# Patient Record
Sex: MALE | Race: WHITE | Employment: OTHER | URBAN - METROPOLITAN AREA
[De-identification: names, ages, dates, MRNs, and addresses within clinical notes are randomized per-mention and may not be internally consistent; named-entity substitution may affect disease eponyms.]

---

## 2017-01-16 DIAGNOSIS — R73.02 GLUCOSE INTOLERANCE (IMPAIRED GLUCOSE TOLERANCE): Primary | ICD-10-CM

## 2017-01-16 NOTE — TELEPHONE ENCOUNTER
accuchek marcus plus strips        Last Written Prescription Date 12/01/2015  Last Fill Quantity: 200, # refills: 3  Last Office Visit with [facility], [facility] or  Health prescribing provider:  08/17/2016   Next 5 appointments (look out 90 days)     Feb 15, 2017  8:00 AM   Renetta Pratt with Tee Cuba MD   [facility]    [address] [place] [id number] [phone]                   BP Readings from Last 3 Encounters:   12/01/16 156/75   11/14/16 132/70   10/19/16 135/76     MICROL       18   6/1/2015  No results found for this basename: microalbumin  CREATININE   Date Value Ref Range Status   09/22/2016 0.86 0.66 - 1.25 mg/dL Final   ]  GFR ESTIMATE   Date Value Ref Range Status   09/22/2016 87 >60 mL/min/1.7m2 Final     Comment:     Non  GFR Calc   09/21/2016 >90  Non  GFR Calc   >60 mL/min/1.7m2 Final   09/21/2016 >90  Non  GFR Calc   >60 mL/min/1.7m2 Final     GFR ESTIMATE IF BLACK   Date Value Ref Range Status   09/22/2016 >90   GFR Calc   >60 mL/min/1.7m2 Final   09/21/2016 >90   GFR Calc   >60 mL/min/1.7m2 Final   09/21/2016 >90   GFR Calc   >60 mL/min/1.7m2 Final     CHOL      147   11/14/2016  HDL       74   11/14/2016  LDL       57   11/14/2016  TRIG       80   11/14/2016  CHOLHDLRATIO      2.2   6/1/2015  AST       25   9/2/2008  ALT       19   11/15/2011  A1C      6.2   11/14/2016  A1C      6.5   3/29/2016  A1C      5.8   8/3/2015  A1C      6.0   6/1/2015  A1C      6.0   2/10/2015  POTASSIUM   Date Value Ref Range Status   09/22/2016 4.2 3.4 - 5.3 mmol/L Final       Claritza Acosta  Pharmacy Technician  [facility]  Ph# [phone]  Fax# [phone]

## 2017-01-18 NOTE — TELEPHONE ENCOUNTER
Prescription approved per [facility] Refill Protocol.     Annie Anglin RN   January 18, 2017 11:43 AM  [facility]   [phone]

## 2017-02-09 DIAGNOSIS — R73.02 GLUCOSE INTOLERANCE (IMPAIRED GLUCOSE TOLERANCE): Primary | ICD-10-CM

## 2017-02-09 NOTE — TELEPHONE ENCOUNTER
Thank you  Rupali Jennings CPht    [facility]  Phone: [phone]  Fax: [phone]

## 2017-02-13 NOTE — TELEPHONE ENCOUNTER
Routing refill request to provider for review/approval because:  Drug not active on patient's medication list    Annie Anglin RN   February 13, 2017 2:04 PM  [facility] Triage   [phone]

## 2017-02-16 ENCOUNTER — DOCUMENTATION ONLY (OUTPATIENT)
Dept: VASCULAR SURGERY | Facility: CLINIC | Age: 72
End: 2017-02-16

## 2017-02-28 ENCOUNTER — OFFICE VISIT (OUTPATIENT)
Dept: FAMILY MEDICINE | Facility: CLINIC | Age: 72
End: 2017-02-28
Payer: COMMERCIAL

## 2017-02-28 VITALS
HEIGHT: 74 IN | TEMPERATURE: 98.4 F | SYSTOLIC BLOOD PRESSURE: 126 MMHG | WEIGHT: 193 LBS | DIASTOLIC BLOOD PRESSURE: 76 MMHG | HEART RATE: 60 BPM | BODY MASS INDEX: 24.77 KG/M2

## 2017-02-28 DIAGNOSIS — Z00.00 ENCOUNTER FOR ROUTINE ADULT HEALTH EXAMINATION WITHOUT ABNORMAL FINDINGS: ICD-10-CM

## 2017-02-28 DIAGNOSIS — F34.1 DYSTHYMIC DISORDER: ICD-10-CM

## 2017-02-28 DIAGNOSIS — Z13.89 SCREENING FOR DIABETIC PERIPHERAL NEUROPATHY: ICD-10-CM

## 2017-02-28 DIAGNOSIS — E78.5 HYPERLIPIDEMIA LDL GOAL <100: ICD-10-CM

## 2017-02-28 DIAGNOSIS — M25.511 RIGHT SHOULDER PAIN, UNSPECIFIED CHRONICITY: ICD-10-CM

## 2017-02-28 DIAGNOSIS — R07.89 CHEST WALL PAIN: ICD-10-CM

## 2017-02-28 DIAGNOSIS — I10 HYPERTENSION GOAL BP (BLOOD PRESSURE) < 140/90: ICD-10-CM

## 2017-02-28 DIAGNOSIS — Z13.5 SCREENING FOR DIABETIC RETINOPATHY: ICD-10-CM

## 2017-02-28 DIAGNOSIS — C49.3 SOFT TISSUE SARCOMA OF CHEST WALL (H): ICD-10-CM

## 2017-02-28 DIAGNOSIS — R73.02 GLUCOSE INTOLERANCE (IMPAIRED GLUCOSE TOLERANCE): Primary | ICD-10-CM

## 2017-02-28 LAB — HBA1C MFR BLD: 6.5 % (ref 4.3–6)

## 2017-02-28 PROCEDURE — 36415 COLL VENOUS BLD VENIPUNCTURE: CPT | Performed by: FAMILY MEDICINE

## 2017-02-28 PROCEDURE — 99214 OFFICE O/P EST MOD 30 MIN: CPT | Mod: 25 | Performed by: FAMILY MEDICINE

## 2017-02-28 PROCEDURE — 99207 C FOOT EXAM  NO CHARGE: CPT | Performed by: FAMILY MEDICINE

## 2017-02-28 PROCEDURE — 83036 HEMOGLOBIN GLYCOSYLATED A1C: CPT | Performed by: FAMILY MEDICINE

## 2017-02-28 NOTE — PROGRESS NOTES
"  SUBJECTIVE:                                                    Gerber Levine is a 72 year old male who presents to clinic today for the following health issues:    Prediabetes. Tolerating metformin well without side effects.   Lab Results   Component Value Date    A1C 6.5 02/28/2017    A1C 6.2 11/14/2016    A1C 6.5 03/29/2016    A1C 5.8 08/03/2015    A1C 6.0 06/01/2015         Sarcoma. 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 Reviewed notes from oncologist dated 12/01/2016, who recommends chest CT scan in a 6 month interval. He does note intermittent right shoulder pain with associated decreased rage of motion secondary to surgery. Also notes intermittent transient right-sided superficial numbness in the fourth intercostal space surrounding scar from surgery. More recently, it has been \"acting up\" after spending the weekend doing some house cleaning and vacuuming.     Colonoscopy. Colonoscopy performed on 5/12/2016 remarkable for sessile polyp in ascending colon however pathology records not available.  Based on finding, recommend repeat in 3-5 years.     Hyperlipidemia. Tolerating medications well without side effects.     Subjective:    PHQ-9 was 7      Past/recent records reviewed and discussed for -- medications and immunizations.      Diabetes Follow-up    Patient is checking blood sugars: once daily.  Results are as follows:         Varies between morning/evening    Diabetic concerns: None     Symptoms of hypoglycemia (low blood sugar): none     Paresthesias (numbness or burning in feet) or sores: No     Date of last diabetic eye exam: 3/28/16     Hyperlipidemia Follow-Up      Rate your low fat/cholesterol diet?: not monitoring fat    Taking statin?  Yes, no muscle aches from statin    Other lipid medications/supplements?:  none     Hypertension Follow-up      Outpatient blood pressures checks occasionally    Low Salt Diet: low salt         Amount of " "exercise or physical activity: 6-7 days/week for an average of 30-45 minutes    Problems taking medications regularly: Yes,  Forgets once in while    Medication side effects: none  Diet: diabetic      Problem list and histories reviewed & adjusted, as indicated.  Additional history: as documented    Problem list, Medication list, Allergies, and Medical/Social/Surgical histories reviewed in EPIC and updated as appropriate.    ROS:  MS: +myalgias  Constitutional, HEENT, cardiovascular, pulmonary, GI, , musculoskeletal, neuro, skin, endocrine and psych systems are negative, except as otherwise noted.    This document serves as a record of the services and decisions personally performed and made by Iggy Cuba MD. It was created on their behalf by Braeden Van, a trained medical scribe. The creation of this document is based the provider's statements to the medical scribe.  Braeden Van February 28, 2017 8:10 AM         OBJECTIVE:                                                    /76 (Cuff Size: Adult Large)  Pulse 60  Temp 98.4  F (36.9  C) (Oral)  Ht 1.88 m (6' 2\")  Wt 87.5 kg (193 lb)  BMI 24.78 kg/m2  Body mass index is 24.78 kg/(m^2).  GENERAL: healthy, alert and no distress  HENT: ear canals and TM's normal, nose and mouth without ulcers or lesions  RESP: lungs clear to auscultation - no rales, rhonchi or wheezes  CV: regular rate and rhythm, normal S1 S2, no S3 or S4, no murmur, click or rub, no peripheral edema   MS: no gross musculoskeletal defects noted, no edema -- crepitation in right AC joint, decreased range of motion with adduction but able to raise arm at 90 degrees  PSYCH: mentation appears normal, affect normal/bright  Foot exam: normal monofilament exam, no lesions  Skin non lestons    Diagnostic Test Results:  Results for orders placed or performed in visit on 02/28/17 (from the past 24 hour(s))   Hemoglobin A1c   Result Value Ref Range    Hemoglobin A1C 6.5 (H) 4.3 - 6.0 %    "     ASSESSMENT/PLAN:                                                    (R73.02) Glucose intolerance (impaired glucose tolerance)  (primary encounter diagnosis)  Comment: controlled  Plan: Hemoglobin A1c, OPHTHALMOLOGY ADULT REFERRAL,         ** Albumin Random Urine Quant FUTURE 1yr,         CANCELED: Albumin Random Urine Quantitative        Continue present medications    (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 PHQ-9 was 7  Plan: Follow up as needed    (I10) Hypertension goal BP (blood pressure) < 140/90  Comment: stable  Plan: Continue to monitor and same medications    (E78.5) Hyperlipidemia LDL goal <100  Comment: stable from previous workups  Plan: Continue present medications    (Z13.5) Screening for diabetic retinopathy  Comment: patient due for eye exam  Plan: OPHTHALMOLOGY ADULT REFERRAL            (Z13.89) Screening for diabetic peripheral neuropathy  Comment: foot exam was normal  Plan: FOOT EXAM  NO CHARGE [25092.114]            (Z00.00) Encounter for routine adult health examination without abnormal findings  Comment:   Plan:     (R07.89) Chest wall pain  Comment: see comments above  Plan: [facility] PT, HAND, AND CHIROPRACTIC REFERRAL            (M25.511) Right shoulder pain, unspecified chronicity  Comment: crepitation in right AC joint with some decreased range of motion  Plan: Schedule appointment with PT     Patient Instructions     MY DIABETES TODAY:    1)  Goal A1C is under <7.0  Mine is:      Lab Results   Component Value Date    A1C 6.5 02/28/2017       2)  Goal LDL (bad cholesterol) under 100  (measured at least yearly)- I am currently at:   Lab Results   Component Value Date    LDL 57 11/14/2016       3)  Goal blood pressure under 140/90- mine was 126/76 today    4)  Take  aspirin daily yes    5)  No tobacco use          ACTION PLAN CHANGES FROM TODAY:    Care Plan changes:    -work with physical therapist     Labs:     Lab Results   Component Value Date    A1C 6.5 02/28/2017    A1C 6.2 11/14/2016    A1C 6.5 03/29/2016    A1C 5.8 08/03/2015    A1C 6.0 06/01/2015         Follow up in 6 months      Call one number to schedule at any of the above locations: [phone].        Tee Cuba MD, MD  [facility]

## 2017-02-28 NOTE — MR AVS SNAPSHOT
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)        Dysthymic disorder        Hypertension goal BP (blood pressure) < 140/90        Hyperlipidemia LDL goal <100        Screening for diabetic retinopathy        Screening for diabetic peripheral neuropathy        Encounter for routine adult health examination without abnormal findings        Chest wall pain        Right shoulder pain, unspecified chronicity          Care Instructions    MY DIABETES TODAY:    1)  Goal A1C is under <7.0  Mine is:      Lab Results   Component Value Date    A1C 6.5 02/28/2017       2)  Goal LDL (bad cholesterol) under 100  (measured at least yearly)- I am currently at:   Lab Results   Component Value Date    LDL 57 11/14/2016       3)  Goal blood pressure under 140/90- mine was 126/76 today    4)  Take aspirin daily yes    5)  No tobacco use          ACTION PLAN CHANGES FROM TODAY:    Care Plan changes:    -work with physical therapist     Labs:     Lab Results   Component Value Date    A1C 6.5 02/28/2017    A1C 6.2 11/14/2016    A1C 6.5 03/29/2016    A1C 5.8 08/03/2015    A1C 6.0 06/01/2015         Follow up in 6 months      Call one number to schedule at any of the above locations: [phone].        Follow-ups after your visit        Additional Services     [facility] PT, HAND, AND CHIROPRACTIC REFERRAL       **This order will print in the [facility] Scheduling Office**    Physical Therapy, Hand Therapy and Chiropractic Care are available through:    *[facility]  *[facility]  *[facility]    Call one number to schedule at any of the  above locations: [phone].    Your provider has referred you to: Physical Therapy at [facility] or [facility]    Indication/Reason for Referral: chest wall pain post surgica  Onset of Illness: surgery Sept 21- right shoulder pain and chest wall pain since then  Therapy Orders: Evaluate and Treat  Special Programs: None  Special Request: None    Tanesha Almendarez      Additional Comments for the Therapist or Chiropractor: recent thoracic surgery    Please be aware that coverage of these services is subject to the terms and limitations of your health insurance plan.  Call member services at your health plan with any benefit or coverage questions.      Please bring the following to your appointment:    *Your personal calendar for scheduling future appointments  *Comfortable clothing            OPHTHALMOLOGY ADULT REFERRAL       Your provider has referred you to: [facility]    Please be aware that coverage of these services is subject to the terms and limitations of your health insurance plan.  Call member services at your health plan with any benefit or coverage questions.      Please bring the following with you to your appointment:    (1) Any X-Rays, CTs or MRIs which have been performed.  Contact the facility where they were done to arrange for  prior to your scheduled appointment.    (2) List of current medications  (3) This referral request   (4) Any documents/labs given to you for this referral                  Future tests that were ordered for you today     Open Future Orders        Priority Expected Expires Ordered    ** Albumin Random Urine Quant FUTURE 1yr Routine 1/29/2018 2/28/2018 2/28/2017            Who to contact     If you have questions or need follow up information about today's clinic visit or your schedule please contact [facility] directly at [phone].  Normal or non-critical lab and imaging results will be communicated to you by MyChart, letter or phone within  "4 business days after the clinic has received the results. If you do not hear from us within 7 days, please contact the clinic through [facility] or phone. If you have a critical or abnormal lab result, we will notify you by phone as soon as possible.  Submit refill requests through [facility] or call your pharmacy and they will forward the refill request to us. Please allow 3 business days for your refill to be completed.          Additional Information About Your Visit        [facility] Information     [facility] gives you secure access to your electronic health record. If you see a primary care provider, you can also send messages to your care team and make appointments. If you have questions, please call your primary care clinic.  If you do not have a primary care provider, please call [phone] and they will assist you.        Care EveryWhere ID     This is your Care EveryWhere ID. This could be used by other organizations to access your [facility] medical records  [id number]        Your Vitals Were     Pulse Temperature Height BMI (Body Mass Index)          60 98.4  F (36.9  C) (Oral) 6' 2\" (1.88 m) 24.78 kg/m2         Blood Pressure from Last 3 Encounters:   02/28/17 126/76   12/01/16 156/75   11/14/16 132/70    Weight from Last 3 Encounters:   02/28/17 193 lb (87.5 kg)   12/01/16 185 lb 3.2 oz (84 kg)   11/14/16 186 lb 2 oz (84.4 kg)              We Performed the Following     FOOT EXAM  NO CHARGE [09476.114]     Hemoglobin A1c     [facility] PT, HAND, AND CHIROPRACTIC REFERRAL     OPHTHALMOLOGY ADULT REFERRAL          Today's Medication Changes          These changes are accurate as of: 2/28/17  8:33 AM.  If you have any questions, ask your nurse or doctor.               These medicines have changed or have updated prescriptions.        Dose/Directions    aspirin 81 MG tablet   This may have changed:  See the new instructions.        1 tab po QD (Once per day)   Refills:  0       atorvastatin 40 MG tablet   Commonly known " as:  LIPITOR   This may have changed:    - when to take this  - additional instructions   Used for:  Glucose intolerance (impaired glucose tolerance)        Dose:  40 mg   Take 1 tablet (40 mg) by mouth daily Refill when requested   Quantity:  90 tablet   Refills:  3       fluticasone 50 MCG/ACT spray   Commonly known as:  FLONASE   This may have changed:  when to take this   Used for:  Cough, Globus pharyngeus        Dose:  2 spray   Spray 2 sprays into both nostrils daily   Quantity:  16 g   Refills:  1         Stop taking these medicines if you haven't already. Please contact your care team if you have questions.     oxyCODONE 5 MG IR tablet   Commonly known as:  ROXICODONE   Stopped by:  Tee Cuba MD                    Primary Care Provider Office Phone # Fax #    Tee Cuba [phone]       [address] [address]        Thank you!     Thank you for choosing [facility]  for your care. Our goal is always to provide you with excellent care. Hearing back from our patients is one way we can continue to improve our services. Please take a few minutes to complete the written survey that you may receive in the mail after your visit with us. Thank you!             Your Updated Medication List - Protect others around you: Learn how to safely use, store and throw away your medicines at www.disposemymeds.org.          This list is accurate as of: 2/28/17  8:33 AM.  Always use your most recent med list.                   Brand Name Dispense Instructions for use    aspirin 81 MG tablet      1 tab po QD (Once per day)       atorvastatin 40 MG tablet    LIPITOR    90 tablet    Take 1 tablet (40 mg) by mouth daily Refill when requested       B-12 PO      Take by mouth every morning Take 1/2 tablet daily       blood glucose calibration solution     3 Bottle    1 drop by In Vitro route as needed       blood glucose  monitoring lancets          blood glucose monitoring meter device kit          blood glucose monitoring test strip    ACCU-CHEK RODRI    200 each    1 strip by In Vitro route 2 times daily       CALCITRATE/VITAMIN D 315-200 MG-UNIT Tabs per tablet   Generic drug:  calcium citrate-vitamin D      One tab daily in the morning       fluticasone 50 MCG/ACT spray    FLONASE    16 g    Spray 2 sprays into both nostrils daily       losartan 25 MG tablet    COZAAR    90 tablet    Take 1 tablet (25 mg) by mouth daily       metFORMIN 500 MG tablet    GLUCOPHAGE    45 tablet    Take 0.5 tablets (250 mg) by mouth daily (with dinner) REFILL WHEN REQUESTED       * order for DME     1 each    Glucometer, brand as covered by insurance.       * order for DME     100 each    Test strips for pt's glucometer, brand as covered by insurance. Test daily and prn.       * order for DME     100 each    Lancets.  Daily and prn.       * Notice:  This list has 3 medication(s) that are the same as other medications prescribed for you. Read the directions carefully, and ask your doctor or other care provider to review them with you.

## 2017-02-28 NOTE — NURSING NOTE
"Chief Complaint   Patient presents with     Diabetes     Surgical Followup       Initial /76 (Cuff Size: Adult Large)  Pulse 60  Temp 98.4  F (36.9  C) (Oral)  Ht 6' 2\" (1.88 m)  Wt 193 lb (87.5 kg)  BMI 24.78 kg/m2 Estimated body mass index is 24.78 kg/(m^2) as calculated from the following:    Height as of this encounter: 6' 2\" (1.88 m).    Weight as of this encounter: 193 lb (87.5 kg).  Medication Reconciliation: complete   Ignacia Gonzales, Certified Medical Assistant (AAMA)     "

## 2017-02-28 NOTE — PATIENT INSTRUCTIONS
MY DIABETES TODAY:    1)  Goal A1C is under <7.0  Mine is:      Lab Results   Component Value Date    A1C 6.5 02/28/2017       2)  Goal LDL (bad cholesterol) under 100  (measured at least yearly)- I am currently at:   Lab Results   Component Value Date    LDL 57 11/14/2016       3)  Goal blood pressure under 140/90- mine was 126/76 today    4)  Take aspirin daily yes    5)  No tobacco use          ACTION PLAN CHANGES FROM TODAY:    Care Plan changes:    -work with physical therapist     Labs:     Lab Results   Component Value Date    A1C 6.5 02/28/2017    A1C 6.2 11/14/2016    A1C 6.5 03/29/2016    A1C 5.8 08/03/2015    A1C 6.0 06/01/2015         Follow up in 6 months      Call one number to schedule at any of the above locations: [phone].

## 2017-03-01 ASSESSMENT — PATIENT HEALTH QUESTIONNAIRE - PHQ9: SUM OF ALL RESPONSES TO PHQ QUESTIONS 1-9: 7

## 2017-03-06 ENCOUNTER — THERAPY VISIT (OUTPATIENT)
Dept: PHYSICAL THERAPY | Facility: CLINIC | Age: 72
End: 2017-03-06
Payer: COMMERCIAL

## 2017-03-06 DIAGNOSIS — M25.511 ACUTE PAIN OF RIGHT SHOULDER: Primary | ICD-10-CM

## 2017-03-06 PROCEDURE — 97110 THERAPEUTIC EXERCISES: CPT | Mod: GP | Performed by: PHYSICAL THERAPIST

## 2017-03-06 PROCEDURE — 97161 PT EVAL LOW COMPLEX 20 MIN: CPT | Mod: GP | Performed by: PHYSICAL THERAPIST

## 2017-03-06 NOTE — PROGRESS NOTES
[facility] Initial Evaluation -- Upper Extremity    Evaluation Date: March 6, 2017  Gerber Levine is a 72 year old male with a chest wall/(R) shoulder condition.   Referral: GP  Work mechanical stresses: NA  Employment status:  Retired  Leisure mechanical stresses: Walking 30 min daily  Functional disability score (SPADI): See SPADI  VAS score (0-10): 2-3/10  Handedness (R/L):  Right    HISTORY    Present symptoms: (R) pec region, scapula.    Pain quality (sharp/shooting/stabbing/aching/burning/cramping):  aching    Present since:  September 2016   Symptoms (improving/unchanging/worsening):  improving.  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   Condition occurred in the following environment: Post surgical status.    Symptoms at onset: As above  Paresthesia (yes/no):  Yes  Spinal history: No   Cough/Sneeze (pos/neg):  Neg    Constant symptoms: None  Intermittent symptoms: As above    (With consideration for bending, sitting, turning neck, dressing, reaching, gripping, am/as day progresses/pm, when still/on the move, sleeping prone/sup/side R/L, other)  Symptoms are worse with the following: reaching overhead, pushing   Symptoms are better with the following: heat/ice    Continued use makes the pain (better/worse/no effect): No effect  Disturbed night (yes/no):  No  Pain at rest (yes/no): No  Site (neck/shoulder/elbow/wrist/hand): NA  Other questions (swelling/catching/clicking/locking/subluxing):  Clicking in shoulder    Previous episodes:  No  Previous treatments: None    Specific Questions:  General health (excellent/good/fair/poor):  Good  Pertinent medical history includes: Cancer (stable - scans every 6 months), Diabetes and High blood pressure  Medications (nil/NSAIDS/analg/steroids/anticoag/other):  Other - High blood pressure and Metformin  Medical allergies:  Oxycodone  Imaging (NA/Xray/MRI): X-Ray (post surgical October - stable)  Recent or  "major surgery (yes/no): As above  Night pain (yes/no): No  Accidents (yes/no): No  Unexplained weight loss (yes/no): No  Barriers at home: None  Other red flags: None    Sites for physical examination (neck/shoulder/elbow/wrist/hand: Shoulder    EXAMINATION    Posture:  Sitting (good/fair/poor): Fair  Correction of posture (better/worse/no effect/NA): No Effect  Standing (good/fair/poor): Fair  Other observations:  NA    Neurological (NA/motor/sensory/reflexes/dural): NA    Baselines (pain or functional activity): Painful and limited reaching overhead    Extremities (Shoulder/Elbow/Wrist/Hand): Shoulder    Movement Loss Eduardo Mod Min Nil Pain   Flexion  X   120/ERP vs 140 L   Extension    X 65 (B)   Abduction  X   120/ERP vs 140 L   Internal Rotation   X  2\" difference   External Rotation   X  2\" difference      Passive movement (+/- overpressure):  Not assessed  Resisted Test Response (pain): Flex 4+/5, painful, Abd 4+/5, painful, ER 5/5; IR 5/5, Bicep 5/5  Other Tests: NA    Spine:  Movement loss: NA  Effect of repeated movements: NA  Effect of static positioning: NA  Spine testing (not relevant/relevant/secondary problem): Not relevant    Baseline Symptoms: ROM as above  Repeated Tests Symptom Response Mechanical Response   Active/Passive movement, resisted test, functional test During - Produce, Abolish, Increase, Decrease, NE After -   Better, Worse, NB, NW, NE Effect -   ? or ? ROM, strength or key functional test No Effect   Flexion (sliding up door w/self OP) Produce No Worse  X   Abd/ER w/self OP (Door) Produce No Worse  X   Effect of static positioning       NA           Provisional Classification: Other - Post surgical    Extremity or Spine: Extremity  Principle of Management (education/equipment/mechanical therapy/specific principle): Remodeling, restore ROM, progressive strengthening    ASSESSMENT/PLAN:    Patient is a 72 year old male with right side shoulder complaints.    Patient has the following " significant findings with corresponding treatment plan.                Diagnosis 1:  (R) Shoulder/Chest wall pain    Pain -  self management, education, directional preference exercise and home program  Decreased ROM/flexibility - manual therapy, therapeutic exercise and home program  Decreased joint mobility - manual therapy, therapeutic exercise and home program  Decreased strength - therapeutic exercise, therapeutic activities and home program  Impaired muscle performance - neuro re-education and home program  Decreased function - therapeutic activities and home program    Therapy Evaluation Codes:   1) History comprised of:   Personal factors that impact the plan of care:      None.    Comorbidity factors that impact the plan of care are:      Cancer, Diabetes and High blood pressure.     Medications impacting care: High blood pressure and Diabetes.  2) Examination of Body Systems comprised of:   Body structures and functions that impact the plan of care:      Shoulder.   Activity limitations that impact the plan of care are:      reaching, pushing/pulling.  3) Clinical presentation characteristics are:   Stable/Uncomplicated.  4) Decision-Making    Low complexity using standardized patient assessment instrument and/or measureable assessment of functional outcome.  Cumulative Therapy Evaluation is: Low complexity.    Previous and current functional limitations:  (See Goal Flow Sheet for this information)    Short term and Long term goals: (See Goal Flow Sheet for this information)     Communication ability:  Patient appears to be able to clearly communicate and understand verbal and written communication and follow directions correctly.  Treatment Explanation - The following has been discussed with the patient:   RX ordered/plan of care  Anticipated outcomes  Possible risks and side effects  This patient would benefit from PT intervention to resume normal activities.   Rehab potential is good.    Frequency:  1 X  week, once daily  Duration:  for 6 weeks  Discharge Plan:  Achieve all LTG.  Independent in home treatment program.  Reach maximal therapeutic benefit.    Please refer to the daily flowsheet for treatment today, total treatment time and time spent performing 1:1 timed codes.

## 2017-03-08 ENCOUNTER — TRANSFERRED RECORDS (OUTPATIENT)
Dept: HEALTH INFORMATION MANAGEMENT | Facility: CLINIC | Age: 72
End: 2017-03-08

## 2017-03-20 ENCOUNTER — THERAPY VISIT (OUTPATIENT)
Dept: PHYSICAL THERAPY | Facility: CLINIC | Age: 72
End: 2017-03-20
Payer: COMMERCIAL

## 2017-03-20 DIAGNOSIS — M25.511 ACUTE PAIN OF RIGHT SHOULDER: ICD-10-CM

## 2017-03-20 PROCEDURE — 97112 NEUROMUSCULAR REEDUCATION: CPT | Mod: GP | Performed by: PHYSICAL THERAPIST

## 2017-03-20 PROCEDURE — 97110 THERAPEUTIC EXERCISES: CPT | Mod: GP | Performed by: PHYSICAL THERAPIST

## 2017-03-27 ENCOUNTER — THERAPY VISIT (OUTPATIENT)
Dept: PHYSICAL THERAPY | Facility: CLINIC | Age: 72
End: 2017-03-27
Payer: COMMERCIAL

## 2017-03-27 DIAGNOSIS — M25.511 ACUTE PAIN OF RIGHT SHOULDER: ICD-10-CM

## 2017-03-27 PROCEDURE — 97110 THERAPEUTIC EXERCISES: CPT | Mod: GP | Performed by: PHYSICAL THERAPIST

## 2017-03-27 PROCEDURE — 97112 NEUROMUSCULAR REEDUCATION: CPT | Mod: GP | Performed by: PHYSICAL THERAPIST

## 2017-03-29 DIAGNOSIS — R09.A2 GLOBUS PHARYNGEUS: ICD-10-CM

## 2017-03-29 DIAGNOSIS — R05.9 COUGH: ICD-10-CM

## 2017-03-29 NOTE — TELEPHONE ENCOUNTER
Fluticasone nasal spray      Last Written Prescription Date: 08/25/2016  Last Fill Quantity: 16,  # refills: 1   Last Office Visit with [facility], [facility] or [facility] prescribing provider: 05/05/2016                                         Next 5 appointments (look out 90 days)     Jun 13, 2017  8:00 AM CDT   PHYSICAL with Tee Cuba MD   [facility]    [address] [place] [id number] [phone]                    Claritza Acosta  Pharmacy Technician  [facility]  Ph# [phone]  Fax# [phone]

## 2017-03-30 NOTE — TELEPHONE ENCOUNTER
Routing refill request to provider for review/approval because:  Patient reports taking differently than prescribed        Giuliana Frias RN - BC

## 2017-03-31 RX ORDER — FLUTICASONE PROPIONATE 50 MCG
2 SPRAY, SUSPENSION (ML) NASAL DAILY
Qty: 16 G | Refills: 1 | Status: SHIPPED | OUTPATIENT
Start: 2017-03-31 | End: 2018-01-19

## 2017-04-10 ENCOUNTER — THERAPY VISIT (OUTPATIENT)
Dept: PHYSICAL THERAPY | Facility: CLINIC | Age: 72
End: 2017-04-10
Payer: COMMERCIAL

## 2017-04-10 DIAGNOSIS — M25.511 ACUTE PAIN OF RIGHT SHOULDER: ICD-10-CM

## 2017-04-10 PROCEDURE — 97112 NEUROMUSCULAR REEDUCATION: CPT | Mod: GP | Performed by: PHYSICAL THERAPIST

## 2017-04-10 PROCEDURE — 97110 THERAPEUTIC EXERCISES: CPT | Mod: GP | Performed by: PHYSICAL THERAPIST

## 2017-05-01 ENCOUNTER — THERAPY VISIT (OUTPATIENT)
Dept: PHYSICAL THERAPY | Facility: CLINIC | Age: 72
End: 2017-05-01
Payer: COMMERCIAL

## 2017-05-01 DIAGNOSIS — M25.511 ACUTE PAIN OF RIGHT SHOULDER: ICD-10-CM

## 2017-05-01 PROCEDURE — 97110 THERAPEUTIC EXERCISES: CPT | Mod: GP | Performed by: PHYSICAL THERAPIST

## 2017-05-01 NOTE — PROGRESS NOTES
Subjective:    HPI                    Objective:    System    Physical Exam    General     ROS    Assessment/Plan:      DISCHARGE REPORT    Progress reporting period is from 3.6.17 to 5.1.17.       SUBJECTIVE  Subjective changes noted by patient:  Pt reports he is not noticing any real pain in shoulder.  Will feel a little stiffness/discomfort at end range w/reaching overhead but nothing that lingers.  Has still been doing HEP 1 x daily and performing all ADL's.    Current Pain level: 0/10.     Initial Pain level:  (2-5/10).   Changes in function:  Yes (See Goal flowsheet attached for changes in current functional level)  Adverse reaction to treatment or activity: None    OBJECTIVE  AROM:  Flex 135; Abd 130; Flex/ER WNL; Ext/IR WNL.  Strength:  Flex 5/5; Abd 5/5; ER 5/5; IR 5/5     ASSESSMENT/PLAN  Updated problem list and treatment plan: Diagnosis 1: (R) Shoulder Pain    Pain -  self management, education, directional preference exercise and home program  Decreased ROM/flexibility - manual therapy, therapeutic exercise and home program  Decreased joint mobility - therapeutic exercise and home program  Impaired muscle performance - neuro re-education and home program  STG/LTGs have been met or progress has been made towards goals:  Yes (See Goal flow sheet completed today.)  Assessment of Progress: The patient's condition is improving.  Self Management Plans:  Patient is independent in a home treatment program.  Patient is independent in self management of symptoms.  I have re-evaluated this patient and find that the nature, scope, duration and intensity of the therapy is appropriate for the medical condition of the patient.  Gerber continues to require the following intervention to meet STG and LTG's:  PT intervention is no longer required to meet STG/LTG.    Recommendations:  This patient is ready to be discharged from therapy and continue their home treatment program.    Please refer to the daily flowsheet for  treatment today, total treatment time and time spent performing 1:1 timed codes.

## 2017-05-12 DIAGNOSIS — I10 ESSENTIAL HYPERTENSION, BENIGN: ICD-10-CM

## 2017-05-12 DIAGNOSIS — R73.02 GLUCOSE INTOLERANCE (IMPAIRED GLUCOSE TOLERANCE): ICD-10-CM

## 2017-05-15 RX ORDER — LOSARTAN POTASSIUM 25 MG/1
TABLET ORAL
Qty: 90 TABLET | Refills: 0 | Status: SHIPPED | OUTPATIENT
Start: 2017-05-15 | End: 2017-06-13

## 2017-05-15 RX ORDER — ATORVASTATIN CALCIUM 40 MG/1
TABLET, FILM COATED ORAL
Qty: 90 TABLET | Refills: 0 | Status: SHIPPED | OUTPATIENT
Start: 2017-05-15 | End: 2017-06-13

## 2017-05-15 NOTE — TELEPHONE ENCOUNTER
atorvastatin (LIPITOR) 40 MG tablet   40 mg, DAILY 3 ordered  Edit     Summary: Take 1 tablet (40 mg) by mouth daily Refill when requested, Disp-90 tablet, R-3, E-Prescribe   Dose, Route, Frequency: 40 mg, Oral, DAILY  Start: 4/1/2016  Ord/Sold: 4/1/2016 (O)  Report  Taking:   Long-term:   Pharmacy: [facility]  [address]  Med Dose History       Patient Sig: Take 1 tablet (40 mg) by mouth daily Refill when requested       Ordered on: 4/1/2016       Authorized by: MICHAEL MATAMOROS       Dispense: 90 tablet       Admin Instructions: Refill when requested       Patient taking differently: 40 mg Oral EVERY MORNING, Refill when requested, Informant: Self, Reported on 9/14/2016          Last Office Visit with [facility], [facility] or [facility] prescribing provider: 2-  Next 5 appointments (look out 90 days)     Jun 13, 2017  8:00 AM CDT   PHYSICAL with Michael Matamoros MD   [facility] ([address] [id number] [phone]                   Lab Results   Component Value Date    CHOL 147 11/14/2016     Lab Results   Component Value Date    HDL 74 11/14/2016     Lab Results   Component Value Date    LDL 57 11/14/2016     Lab Results   Component Value Date    TRIG 80 11/14/2016     Lab Results   Component Value Date    CHOLHDLRATIO 2.2 06/01/2015         losartan (COZAAR) 25 MG tablet   25 mg, DAILY 1 ordered  Edit     Summary: Take 1 tablet (25 mg) by mouth daily, Disp-90 tablet, R-1, E-Prescribe   Dose, Route, Frequency: 25 mg, Oral, DAILY  Start: 10/18/2016  Ord/Sold: 10/18/2016 (O)  Report  Taking:   Long-term:   Pharmacy: [facility]  [address]  Med Dose History       Patient Sig: Take 1 tablet (25 mg) by mouth daily       Ordered on: 10/18/2016       Authorized by: MICHAEL MATAMOROS       Dispense: 90 tablet          Last Office Visit with [facility], [facility] or  [facility] prescribing provider: 2-  Next 5 appointments (look out 90 days)     Jun 13, 2017  8:00 AM CDT   PHYSICAL with Tee Cuba MD   [facility]    [address] [place] [id number] [phone]                   Potassium   Date Value Ref Range Status   09/22/2016 4.2 3.4 - 5.3 mmol/L Final     Creatinine   Date Value Ref Range Status   09/22/2016 0.86 0.66 - 1.25 mg/dL Final     BP Readings from Last 3 Encounters:   02/28/17 126/76   12/01/16 156/75   11/14/16 132/70

## 2017-05-15 NOTE — TELEPHONE ENCOUNTER
Prescription approved per [facility] Refill Protocol.     Annie Anglin RN

## 2017-06-13 ENCOUNTER — OFFICE VISIT (OUTPATIENT)
Dept: FAMILY MEDICINE | Facility: CLINIC | Age: 72
End: 2017-06-13
Payer: COMMERCIAL

## 2017-06-13 ENCOUNTER — TELEPHONE (OUTPATIENT)
Dept: FAMILY MEDICINE | Facility: CLINIC | Age: 72
End: 2017-06-13

## 2017-06-13 ENCOUNTER — MYC MEDICAL ADVICE (OUTPATIENT)
Dept: FAMILY MEDICINE | Facility: CLINIC | Age: 72
End: 2017-06-13

## 2017-06-13 VITALS
SYSTOLIC BLOOD PRESSURE: 128 MMHG | WEIGHT: 192 LBS | BODY MASS INDEX: 24.64 KG/M2 | HEIGHT: 74 IN | HEART RATE: 76 BPM | TEMPERATURE: 98 F | DIASTOLIC BLOOD PRESSURE: 70 MMHG

## 2017-06-13 DIAGNOSIS — C49.3 SOFT TISSUE SARCOMA OF CHEST WALL (H): ICD-10-CM

## 2017-06-13 DIAGNOSIS — R52 PAIN IN SURGICAL SCAR: Primary | ICD-10-CM

## 2017-06-13 DIAGNOSIS — E78.5 HYPERLIPIDEMIA LDL GOAL <100: ICD-10-CM

## 2017-06-13 DIAGNOSIS — I10 ESSENTIAL HYPERTENSION, BENIGN: ICD-10-CM

## 2017-06-13 DIAGNOSIS — Z00.00 ROUTINE HISTORY AND PHYSICAL EXAMINATION OF ADULT: Primary | ICD-10-CM

## 2017-06-13 DIAGNOSIS — R07.89 CHEST WALL PAIN: ICD-10-CM

## 2017-06-13 DIAGNOSIS — G89.18 POSTOPERATIVE PAIN: ICD-10-CM

## 2017-06-13 DIAGNOSIS — L90.5 PAIN IN SURGICAL SCAR: Primary | ICD-10-CM

## 2017-06-13 DIAGNOSIS — R73.02 GLUCOSE INTOLERANCE (IMPAIRED GLUCOSE TOLERANCE): ICD-10-CM

## 2017-06-13 LAB — HBA1C MFR BLD: 6.2 % (ref 4.3–6)

## 2017-06-13 PROCEDURE — 99213 OFFICE O/P EST LOW 20 MIN: CPT | Mod: 25 | Performed by: FAMILY MEDICINE

## 2017-06-13 PROCEDURE — 83036 HEMOGLOBIN GLYCOSYLATED A1C: CPT | Performed by: FAMILY MEDICINE

## 2017-06-13 PROCEDURE — 80061 LIPID PANEL: CPT | Performed by: FAMILY MEDICINE

## 2017-06-13 PROCEDURE — 36415 COLL VENOUS BLD VENIPUNCTURE: CPT | Performed by: FAMILY MEDICINE

## 2017-06-13 PROCEDURE — 80048 BASIC METABOLIC PNL TOTAL CA: CPT | Performed by: FAMILY MEDICINE

## 2017-06-13 PROCEDURE — 99397 PER PM REEVAL EST PAT 65+ YR: CPT | Performed by: FAMILY MEDICINE

## 2017-06-13 RX ORDER — LOSARTAN POTASSIUM 25 MG/1
25 TABLET ORAL DAILY
Qty: 90 TABLET | Refills: 3 | Status: SHIPPED | OUTPATIENT
Start: 2017-06-13 | End: 2018-06-06

## 2017-06-13 RX ORDER — LIDOCAINE 50 MG/G
OINTMENT TOPICAL PRN
Qty: 50 G | Refills: 3 | Status: SHIPPED | OUTPATIENT
Start: 2017-06-13 | End: 2018-06-06

## 2017-06-13 RX ORDER — ATORVASTATIN CALCIUM 40 MG/1
40 TABLET, FILM COATED ORAL DAILY
Qty: 90 TABLET | Refills: 3 | Status: SHIPPED | OUTPATIENT
Start: 2017-06-13 | End: 2018-06-06

## 2017-06-13 NOTE — TELEPHONE ENCOUNTER
Ok to change to 5 % if covered otherwise review options for him.     Orders Placed This Encounter     lidocaine (XYLOCAINE) 5 % ointment     Sig: Apply topically as needed for moderate pain     Dispense:  50 g     Refill:  3

## 2017-06-13 NOTE — NURSING NOTE
"Chief Complaint   Patient presents with     Physical       Initial /70 (BP Location: Right arm, Cuff Size: Adult Regular)  Pulse 76  Temp 98  F (36.7  C) (Oral)  Ht 6' 2\" (1.88 m)  Wt 192 lb (87.1 kg)  BMI 24.65 kg/m2 Estimated body mass index is 24.65 kg/(m^2) as calculated from the following:    Height as of this encounter: 6' 2\" (1.88 m).    Weight as of this encounter: 192 lb (87.1 kg).  Medication Reconciliation: complete     Adriana Cunha MA     "

## 2017-06-13 NOTE — TELEPHONE ENCOUNTER
You sent Rx for Lidocaine gel 4% -- That is OTC and I don't have in stock.  I do have aspercreme with 4% lidocaine or salonpas lidocaine 4% patch.  Do you want us to switch to one of those items or change to 5% which is prescription?    Thank you  Rupali Jennings CPht    [facility]  Phone: [phone]  Fax: [phone]

## 2017-06-13 NOTE — PROGRESS NOTES
"  SUBJECTIVE:                                                            Gerber Levine is a 72 year old male who presents for Preventive Visit.    Are you in the first 12 months of your Medicare Part B coverage?  No    Healthy Habits:    Do you get at least three servings of calcium containing foods daily (dairy, green leafy vegetables, etc.)? yes    Amount of exercise or daily activities, outside of work: 7 day(s) per week    Problems taking medications regularly No    Medication side effects: No    Have you had an eye exam in the past two years? yes    Do you see a dentist twice per year? yes    Do you have sleep apnea, excessive snoring or daytime drowsiness? Yes, mild sleep apnea, does not use a CPAP machine    COGNITIVE SCREEN  1) Repeat 3 items (Banana, Sunrise, Chair)    2) Clock draw: NORMAL  3) 3 item recall: Recalls 2 objects   Results: NORMAL clock, 1-2 items recalled: COGNITIVE IMPAIRMENT LESS LIKELY    Mini-CogTM Copyright S [employer]. Licensed by the author for use in [facility]; reprinted with permission (gisele@[facility]). All rights reserved.      Diabetic eye examination. Reviewed transfer records. Exam was normal.     Right shoulder pain. 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 He notes intermittent right shoulder/scapula \"aching or muscle pull\" non-radiating pain sequential to surgery, specially in area surrounding scar from surgery. Discomfort worsens when he puts pressure on it and with reaching overhead. Also notes that area feels numb. Denies right upper extremity weakness or paresthesias. He has been doing PT but this doesn't seem to help.     Needs Surveillance CT for sarcoma- recommended by oncology    Past/recent records reviewed and discussed for -- medications.      Reviewed and updated as needed this visit by clinical staff         Reviewed and updated as needed this visit by Provider        Social History   Substance Use " Topics     Smoking status: Never Smoker     Smokeless tobacco: Never Used     Alcohol use Yes      Comment: Occasionaly       The patient does not drink >3 drinks per day nor >7 drinks per week.    Today's PHQ-2 Score: 0  PHQ-2 ( 1999 Pf[employer]) 5/31/2015 5/13/2014   Q1: Little interest or pleasure in doing things Not at all Not at all   Q2: Feeling down, depressed or hopeless Not at all Not at all   PHQ-2 Score 0 0       Do you feel safe in your environment - Yes    Do you have a Health Care Directive?: Yes: Patient states has Advance Directive and will bring in a copy to clinic.    Current providers sharing in care for this patient include:   Patient Care Team:  Tee Cuba MD as PCP - Cheryl Aldridge APRN CNS as Clinical Nurse Specialist (Oncology)      Hearing impairment: Yes, patient wears hearing aids    Ability to successfully perform activities of daily living: Yes, no assistance needed     Fall risk:  Fallen 2 or more times in the past year?: No  Any fall with injury in the past year?: No    Home safety:  lack of grab bars in the bathroom      The following health maintenance items are reviewed in Epic and correct as of today:  Health Maintenance   Topic Date Due     MICROALBUMIN Q1 YEAR  06/01/2016     TSH W/ FREE T4 REFLEX Q2 YEAR  06/01/2017     FALL RISK ASSESSMENT  06/01/2017     A1C Q6 MO  08/28/2017     INFLUENZA VACCINE (SYSTEM ASSIGNED)  09/01/2017     CREATININE Q1 YEAR  09/22/2017     LIPID MONITORING Q1 YEAR  11/14/2017     FOOT EXAM Q1 YEAR  02/28/2018     PHQ-9 Q1YR  02/28/2018     EYE EXAM Q1 YEAR  03/08/2018     ADVANCE DIRECTIVE PLANNING Q5 YRS  02/03/2020     TETANUS Q10 YR  03/29/2026     COLONOSCOPY Q10 YR  05/12/2026     PNEUMOCOCCAL  Completed     AORTIC ANEURYSM SCREENING (SYSTEM ASSIGNED)  Completed            Problem list, Medication list, Allergies, and Medical/Social/Surgical histories reviewed in EPIC and updated as  "appropriate.    ROS:  Constitutional, HEENT, cardiovascular, pulmonary, GI, , musculoskeletal, neuro, skin, endocrine and psych systems are negative, except as otherwise noted.    This document serves as a record of the services and decisions personally performed and made by Iggy Cuba MD. It was created on their behalf by Braeden Van, a trained medical scribe. The creation of this document is based the provider's statements to the medical scribe.  Braeden Van June 13, 2017 8:07 AM         OBJECTIVE:                                                            /70 (BP Location: Right arm, Cuff Size: Adult Regular)  Pulse 76  Temp 98  F (36.7  C) (Oral)  Ht 1.88 m (6' 2\")  Wt 87.1 kg (192 lb)  BMI 24.65 kg/m2 Estimated body mass index is 24.78 kg/(m^2) as calculated from the following:    Height as of 2/28/17: 6' 2\" (1.88 m).    Weight as of 2/28/17: 193 lb (87.5 kg).  EXAM:   GENERAL: healthy, alert and no distress  EYES: Eyes grossly normal to inspection, PERRL and conjunctivae and sclerae normal  HENT: ear canals and TM's normal, nose and mouth without ulcers or lesions  NECK: no adenopathy, no asymmetry, masses, or scars and thyroid normal to palpation  RESP: lungs clear to auscultation - no rales, rhonchi or wheezes  CV: regular rate and rhythm, normal S1 S2, no S3 or S4, no murmur, click or rub, no peripheral edema and peripheral pulses strong  ABDOMEN: soft, nontender, no hepatosplenomegaly, no masses and bowel sounds normal  MS: no gross musculoskeletal defects noted, no edema -- mild tenderness around the incision, incision is otherwise well healed without signs of infection   SKIN: no suspicious lesions or rashes  NEURO: Normal strength and tone, mentation intact and speech normal  PSYCH: mentation appears normal, affect normal/bright  LYMPH: no cervical, supraclavicular, axillary, or inguinal adenopathy    ASSESSMENT / PLAN:                                                            (Z00.00) " Routine history and physical examination of adult  (primary encounter diagnosis)  Comment: patient doing well; see comments below  Plan: see below    Sarcoma of chest wall  Q 6 juan surveillance recommended by oncology  DT ordered      (R73.02) Glucose intolerance (impaired glucose tolerance)  Comment: borderline elevated A1C in the past, we'll recheck today. Blood glucose has been somewhat elevated, per patient.    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 No focal neurological deficits or myalgias noted otherwise.   Plan: Lidocaine HCl 5 % GEL        -consider nerve block if not improved    (G89.18) Postoperative pain  Comment: see above  Plan: Lidocaine HCl 5 % GEL        See above  4% gel not covered    Patient Instructions       Preventive Health Recommendations:       Male Ages 65 and over    Yearly exam:             See your health care provider every year in order to  o   Review health changes.   o   Discuss preventive care.    o   Review your medicines if your doctor has prescribed any.    Talk with your health care provider about whether you should have a test to screen for prostate cancer (PSA).    Every 3 years, have a diabetes test (fasting glucose). If you are at risk for diabetes, you should have this test more often.    Every 5 years, have a cholesterol test. Have this test more often if you are at risk for high cholesterol or heart disease.     Every 10 years, have a colonoscopy. Or, have a yearly FIT test (stool test). These exams will check  for colon cancer.    Talk to with your health care provider about screening for Abdominal Aortic Aneurysm if you have a family history of AAA or have a history of smoking.  Shots:     Get a flu shot each year.     Get a tetanus shot every 10 years.     Talk to your doctor about your pneumonia vaccines. There are now two you should receive - Pneumovax (PPSV 23) and Prevnar (PCV 13).    Talk to your doctor about a shingles vaccine.     Talk to your doctor about the hepatitis B vaccine.  Nutrition:     Eat at least 5 servings of fruits and vegetables each day.     Eat whole-grain bread, whole-wheat pasta and brown rice instead of white grains and rice.     Talk to your doctor about Calcium and Vitamin D.   Lifestyle    Exercise for at least 150 minutes a week (30 minutes a day, 5 days a week). This will help you control your weight and prevent disease.     Limit alcohol to one drink per day.     No smoking.     Wear sunscreen to prevent skin cancer.     See your dentist every six months for an exam and cleaning.     See your eye doctor every 1 to 2 years to screen for conditions such as glaucoma, macular degeneration and cataracts.    Orders Placed This Encounter     Hemoglobin A1c     Last Lab Result: Hemoglobin A1C (%)       Date                     Value                 02/28/2017               6.5 (H)          ----------     Basic metabolic panel     Lipid Profile with reflex to direct LDL     Last Lab Result: LDL Cholesterol Calculated (mg/dL)       Date                     Value                 11/14/2016               57               ----------     Order Specific Question:   Perform labs while fasting or non-fasting?     Answer:   Fasting     losartan (COZAAR) 25 MG tablet     Sig: Take 1 tablet (25 mg) by mouth daily     Dispense:  90 tablet     Refill:  3     atorvastatin (LIPITOR) 40 MG tablet     Sig: Take 1 tablet (40 mg) by mouth daily     Dispense:  90 tablet     Refill:  3     Lidocaine HCl 4 % GEL      "Sig: Externally apply 1 Application topically 2 times daily     Dispense:  120 mL     Refill:  3         End of Life Planning:  Patient currently has an advanced directive: not discussed     COUNSELING:  Reviewed preventive health counseling, as reflected in patient instructions       Regular exercise       Healthy diet/nutrition       Colon cancer screening        Estimated body mass index is 24.78 kg/(m^2) as calculated from the following:    Height as of 2/28/17: 6' 2\" (1.88 m).    Weight as of 2/28/17: 193 lb (87.5 kg).     reports that he has never smoked. He has never used smokeless tobacco.      Appropriate preventive services were discussed with this patient, including applicable screening as appropriate for cardiovascular disease, diabetes, osteopenia/osteoporosis, and glaucoma.  As appropriate for age/gender, discussed screening for colorectal cancer, prostate cancer, breast cancer, and cervical cancer. Checklist reviewing preventive services available has been given to the patient.    Reviewed patients plan of care and provided an AVS. The Basic Care Plan (routine screening as documented in Health Maintenance) for Gerber meets the Care Plan requirement. This Care Plan has been established and reviewed with the Patient.    Counseling Resources:  ATP IV Guidelines  Pooled Cohorts Equation Calculator  Breast Cancer Risk Calculator  FRAX Risk Assessment  ICSI Preventive Guidelines  Dietary Guidelines for Americans, 2010  USDA's MyPlate  ASA Prophylaxis  Lung CA Screening  The information in this document, created by the medical scribe for me, accurately reflects the services I personally performed and the decisions made by me. I have reviewed and approved this document for accuracy prior to leaving the patient care area.    Tee Cuba MD, MD  [facility]  "

## 2017-06-13 NOTE — TELEPHONE ENCOUNTER
Prior authorization required for : Lidocaine 5% Ointment  Insurance plan: [employer] Part D  Patient Id: [id number]  [facility] Desk Number: [phone]    Please fax over an alternative medication to the pharmacy or if you are going to pursue a prior authorization please contact the pharmacy and patient when approved. Thanks!    Thank you  Rupali Jennings CPht    [facility] [place]  Phone: [phone]  Fax: [phone]

## 2017-06-13 NOTE — PATIENT INSTRUCTIONS
Preventive Health Recommendations:       Male Ages 65 and over    Yearly exam:             See your health care provider every year in order to  o   Review health changes.   o   Discuss preventive care.    o   Review your medicines if your doctor has prescribed any.    Talk with your health care provider about whether you should have a test to screen for prostate cancer (PSA).    Every 3 years, have a diabetes test (fasting glucose). If you are at risk for diabetes, you should have this test more often.    Every 5 years, have a cholesterol test. Have this test more often if you are at risk for high cholesterol or heart disease.     Every 10 years, have a colonoscopy. Or, have a yearly FIT test (stool test). These exams will check for colon cancer.    Talk to with your health care provider about screening for Abdominal Aortic Aneurysm if you have a family history of AAA or have a history of smoking.  Shots:     Get a flu shot each year.     Get a tetanus shot every 10 years.     Talk to your doctor about your pneumonia vaccines. There are now two you should receive - Pneumovax (PPSV 23) and Prevnar (PCV 13).    Talk to your doctor about a shingles vaccine.     Talk to your doctor about the hepatitis B vaccine.  Nutrition:     Eat at least 5 servings of fruits and vegetables each day.     Eat whole-grain bread, whole-wheat pasta and brown rice instead of white grains and rice.     Talk to your doctor about Calcium and Vitamin D.   Lifestyle    Exercise for at least 150 minutes a week (30 minutes a day, 5 days a week). This will help you control your weight and prevent disease.     Limit alcohol to one drink per day.     No smoking.     Wear sunscreen to prevent skin cancer.     See your dentist every six months for an exam and cleaning.     See your eye doctor every 1 to 2 years to screen for conditions such as glaucoma, macular degeneration and cataracts.    Orders Placed This Encounter     Hemoglobin A1c     Last  Lab Result: Hemoglobin A1C (%)       Date                     Value                 02/28/2017               6.5 (H)          ----------     Basic metabolic panel     Lipid Profile with reflex to direct LDL     Last Lab Result: LDL Cholesterol Calculated (mg/dL)       Date                     Value                 11/14/2016               57               ----------     Order Specific Question:   Perform labs while fasting or non-fasting?     Answer:   Fasting     losartan (COZAAR) 25 MG tablet     Sig: Take 1 tablet (25 mg) by mouth daily     Dispense:  90 tablet     Refill:  3     atorvastatin (LIPITOR) 40 MG tablet     Sig: Take 1 tablet (40 mg) by mouth daily     Dispense:  90 tablet     Refill:  3     Lidocaine HCl 4 % GEL     Sig: Externally apply 1 Application topically 2 times daily     Dispense:  120 mL     Refill:  3

## 2017-06-13 NOTE — MR AVS SNAPSHOT
After Visit Summary   6/13/2017    Gerber Levine    MRN: [id number]           Patient Information     Date Of Birth          1945        Visit Information        Provider Department      6/13/2017 8:00 AM Tee Cuba MD [facility]        Today's Diagnoses     Routine history and physical examination of adult    -  1    Glucose intolerance (impaired glucose tolerance)        Essential hypertension, benign        Chest wall pain        Postoperative pain        Hyperlipidemia LDL goal <100          Care Instructions      Preventive Health Recommendations:       Male Ages 65 and over    Yearly exam:             See your health care provider every year in order to  o   Review health changes.   o   Discuss preventive care.    o   Review your medicines if your doctor has prescribed any.    Talk with your health care provider about whether you should have a test to screen for prostate cancer (PSA).    Every 3 years, have a diabetes test (fasting glucose). If you are at risk for diabetes, you should have this test more often.    Every 5 years, have a cholesterol test. Have this test more often if you are at risk for high cholesterol or heart disease.     Every 10 years, have a colonoscopy. Or, have a yearly FIT test (stool test). These exams will check for colon cancer.    Talk to with your health care provider about screening for Abdominal Aortic Aneurysm if you have a family history of AAA or have a history of smoking.  Shots:     Get a flu shot each year.     Get a tetanus shot every 10 years.     Talk to your doctor about your pneumonia vaccines. There are now two you should receive - Pneumovax (PPSV 23) and Prevnar (PCV 13).    Talk to your doctor about a shingles vaccine.     Talk to your doctor about the hepatitis B vaccine.  Nutrition:     Eat at least 5 servings of fruits and vegetables each day.     Eat whole-grain bread, whole-wheat pasta and brown rice instead  of white grains and rice.     Talk to your doctor about Calcium and Vitamin D.   Lifestyle    Exercise for at least 150 minutes a week (30 minutes a day, 5 days a week). This will help you control your weight and prevent disease.     Limit alcohol to one drink per day.     No smoking.     Wear sunscreen to prevent skin cancer.     See your dentist every six months for an exam and cleaning.     See your eye doctor every 1 to 2 years to screen for conditions such as glaucoma, macular degeneration and cataracts.    Orders Placed This Encounter     Hemoglobin A1c     Last Lab Result: Hemoglobin A1C (%)       Date                     Value                 02/28/2017               6.5 (H)          ----------     Basic metabolic panel     Lipid Profile with reflex to direct LDL     Last Lab Result: LDL Cholesterol Calculated (mg/dL)       Date                     Value                 11/14/2016               57               ----------     Order Specific Question:   Perform labs while fasting or non-fasting?     Answer:   Fasting     losartan (COZAAR) 25 MG tablet     Sig: Take 1 tablet (25 mg) by mouth daily     Dispense:  90 tablet     Refill:  3     atorvastatin (LIPITOR) 40 MG tablet     Sig: Take 1 tablet (40 mg) by mouth daily     Dispense:  90 tablet     Refill:  3     Lidocaine HCl 4 % GEL     Sig: Externally apply 1 Application topically 2 times daily     Dispense:  120 mL     Refill:  3               Follow-ups after your visit        Who to contact     If you have questions or need follow up information about today's clinic visit or your schedule please contact [facility] directly at [phone].  Normal or non-critical lab and imaging results will be communicated to you by MyChart, letter or phone within 4 business days after the clinic has received the results. If you do not hear from us within 7 days, please contact the clinic through MyChart or phone. If you have a critical or abnormal  "lab result, we will notify you by phone as soon as possible.  Submit refill requests through [employer] or call your pharmacy and they will forward the refill request to us. Please allow 3 business days for your refill to be completed.          Additional Information About Your Visit        [employer] Information     [employer] gives you secure access to your electronic health record. If you see a primary care provider, you can also send messages to your care team and make appointments. If you have questions, please call your primary care clinic.  If you do not have a primary care provider, please call [phone] and they will assist you.        Care EveryWhere ID     This is your Care EveryWhere ID. This could be used by other organizations to access your [facility] medical records  [id number]        Your Vitals Were     Pulse Temperature Height BMI (Body Mass Index)          76 98  F (36.7  C) (Oral) 6' 2\" (1.88 m) 24.65 kg/m2         Blood Pressure from Last 3 Encounters:   06/13/17 128/70   02/28/17 126/76   12/01/16 156/75    Weight from Last 3 Encounters:   06/13/17 192 lb (87.1 kg)   02/28/17 193 lb (87.5 kg)   12/01/16 185 lb 3.2 oz (84 kg)              We Performed the Following     Basic metabolic panel     Hemoglobin A1c     Lipid Profile with reflex to direct LDL          Today's Medication Changes          These changes are accurate as of: 6/13/17  8:45 AM.  If you have any questions, ask your nurse or doctor.               Start taking these medicines.        Dose/Directions    Lidocaine HCl 4 % Gel   Used for:  Postoperative pain, Chest wall pain   Started by:  Tee Cuba MD        Dose:  1 Application   Externally apply 1 Application topically 2 times daily   Quantity:  120 mL   Refills:  3         These medicines have changed or have updated prescriptions.        Dose/Directions    aspirin 81 MG tablet   This may have changed:  See the new instructions.        1 tab po QD (Once per day) "   Refills:  0       atorvastatin 40 MG tablet   Commonly known as:  LIPITOR   This may have changed:  See the new instructions.   Used for:  Glucose intolerance (impaired glucose tolerance)   Changed by:  Tee Cuba MD        Dose:  40 mg   Take 1 tablet (40 mg) by mouth daily   Quantity:  90 tablet   Refills:  3       losartan 25 MG tablet   Commonly known as:  COZAAR   This may have changed:  See the new instructions.   Used for:  Essential hypertension, benign   Changed by:  Tee Cuba MD        Dose:  25 mg   Take 1 tablet (25 mg) by mouth daily   Quantity:  90 tablet   Refills:  3            Where to get your medicines      These medications were sent to [facility]  [place] - [address]  [address], [place] [address]     Phone:  [phone]     atorvastatin 40 MG tablet    Lidocaine HCl 4 % Gel    losartan 25 MG tablet                Primary Care Provider Office Phone # Fax #    Tee Cuba [phone]       [address] [address]        Thank you!     Thank you for choosing [facility]  for your care. Our goal is always to provide you with excellent care. Hearing back from our patients is one way we can continue to improve our services. Please take a few minutes to complete the written survey that you may receive in the mail after your visit with us. Thank you!             Your Updated Medication List - Protect others around you: Learn how to safely use, store and throw away your medicines at www.disposemymeds.org.          This list is accurate as of: 6/13/17  8:45 AM.  Always use your most recent med list.                   Brand Name Dispense Instructions for use    aspirin 81 MG tablet      1 tab po QD (Once per day)       atorvastatin 40 MG tablet    LIPITOR    90 tablet    Take 1 tablet (40 mg) by mouth daily       B-12 PO      Take  by mouth every morning Take 1/2 tablet daily       blood glucose calibration solution     3 Bottle    1 drop by In Vitro route as needed       blood glucose monitoring lancets          blood glucose monitoring meter device kit          blood glucose monitoring test strip    ACCU-CHEK RODRI    200 each    1 strip by In Vitro route 2 times daily       CALCITRATE/VITAMIN D 315-200 MG-UNIT Tabs per tablet   Generic drug:  calcium citrate-vitamin D      One tab daily in the morning       fluticasone 50 MCG/ACT spray    FLONASE    16 g    Spray 2 sprays into both nostrils daily       Lidocaine HCl 4 % Gel     120 mL    Externally apply 1 Application topically 2 times daily       losartan 25 MG tablet    COZAAR    90 tablet    Take 1 tablet (25 mg) by mouth daily       metFORMIN 500 MG tablet    GLUCOPHAGE    45 tablet    Take 0.5 tablets (250 mg) by mouth daily (with dinner) REFILL WHEN REQUESTED       * order for DME     1 each    Glucometer, brand as covered by insurance.       * order for DME     100 each    Test strips for pt's glucometer, brand as covered by insurance. Test daily and prn.       * order for DME     100 each    Lancets.  Daily and prn.       * Notice:  This list has 3 medication(s) that are the same as other medications prescribed for you. Read the directions carefully, and ask your doctor or other care provider to review them with you.

## 2017-06-14 ENCOUNTER — MYC MEDICAL ADVICE (OUTPATIENT)
Dept: FAMILY MEDICINE | Facility: CLINIC | Age: 72
End: 2017-06-14

## 2017-06-14 DIAGNOSIS — N28.1 RENAL CYST: Primary | ICD-10-CM

## 2017-06-14 LAB
ANION GAP SERPL CALCULATED.3IONS-SCNC: 10 MMOL/L (ref 3–14)
BUN SERPL-MCNC: 27 MG/DL (ref 7–30)
CALCIUM SERPL-MCNC: 9.4 MG/DL (ref 8.5–10.1)
CHLORIDE SERPL-SCNC: 108 MMOL/L (ref 94–109)
CHOLEST SERPL-MCNC: 137 MG/DL
CO2 SERPL-SCNC: 25 MMOL/L (ref 20–32)
CREAT SERPL-MCNC: 0.84 MG/DL (ref 0.66–1.25)
GFR SERPL CREATININE-BSD FRML MDRD: 90 ML/MIN/1.7M2
GLUCOSE SERPL-MCNC: 121 MG/DL (ref 70–99)
HDLC SERPL-MCNC: 67 MG/DL
LDLC SERPL CALC-MCNC: 54 MG/DL
NONHDLC SERPL-MCNC: 70 MG/DL
POTASSIUM SERPL-SCNC: 4.4 MMOL/L (ref 3.4–5.3)
SODIUM SERPL-SCNC: 143 MMOL/L (ref 133–144)
TRIGL SERPL-MCNC: 81 MG/DL

## 2017-07-06 ENCOUNTER — MYC MEDICAL ADVICE (OUTPATIENT)
Dept: FAMILY MEDICINE | Facility: CLINIC | Age: 72
End: 2017-07-06

## 2017-07-06 ENCOUNTER — TELEPHONE (OUTPATIENT)
Dept: FAMILY MEDICINE | Facility: CLINIC | Age: 72
End: 2017-07-06

## 2017-07-06 NOTE — TELEPHONE ENCOUNTER
Dr Cuba- message for you from Mr Levine.   Francine Levine RN  Triage  [facility]

## 2017-07-06 NOTE — TELEPHONE ENCOUNTER
Called and left message. I will call again. No new concerns on chest CT     Cyst noted on kidney so may need to do ultrasound.     I will call again. Order for US has been placed.     Iggy Cuba MD

## 2017-07-10 ENCOUNTER — RADIANT APPOINTMENT (OUTPATIENT)
Dept: ULTRASOUND IMAGING | Facility: CLINIC | Age: 72
End: 2017-07-10
Attending: FAMILY MEDICINE
Payer: COMMERCIAL

## 2017-07-10 DIAGNOSIS — N28.1 RENAL CYST: ICD-10-CM

## 2017-07-10 PROCEDURE — 76770 US EXAM ABDO BACK WALL COMP: CPT

## 2017-07-27 DIAGNOSIS — R73.02 GLUCOSE INTOLERANCE (IMPAIRED GLUCOSE TOLERANCE): ICD-10-CM

## 2017-07-27 NOTE — TELEPHONE ENCOUNTER
blood glucose calibration (ACCU-CHEK RODRI) solution   1 drop, PRN 3 ordered  Edit     Summary: 1 drop by In Vitro route as needed, Disp-3 Bottle, R-3, E-Prescribe   Dose, Route, Frequency: 1 drop, In Vitro, PRN  Start: 2016  Ord/Sold: 2016 (O)  Report  Taking:   Long-term:   [facility] Dose History       Patient Si drop by In Vitro route as needed       Ordered on: 2016       Authorized by: MICHAEL MATAMOROS       Dispense: 3 Bottle        blood glucose monitoring (ACCU-CHEK RODRI) test strip   1 strip, 2 TIMES DAILY 0 ordered  Edit     Summary: 1 strip by In Vitro route 2 times daily, Disp-200 each, R-0, E-Prescribe   Dose, Route, Frequency: 1 strip, In Vitro, 2 TIMES DAILY  Start: 2017  Ord/Sold: 2017 (O)  Report  Taking:   Long-term:   Pharmacy: [facility]  [address] Dose History       Patient Si strip by In Vitro route 2 times daily       Ordered on: 2017       Authorized by: MICHAEL MATAMOROS       Dispense: 200 each          Last Office Visit with [facility], P or [facility] prescribing provider: 2017

## 2017-07-28 RX ORDER — BLOOD GLUCOSE CONTROL HIGH,LOW
EACH MISCELLANEOUS
Qty: 3 EACH | Refills: 3 | Status: SHIPPED | OUTPATIENT
Start: 2017-07-28 | End: 2018-11-12

## 2017-07-28 RX ORDER — BLOOD SUGAR DIAGNOSTIC
STRIP MISCELLANEOUS
Qty: 200 EACH | Refills: 0 | Status: SHIPPED | OUTPATIENT
Start: 2017-07-28 | End: 2018-03-02

## 2017-12-27 ENCOUNTER — OFFICE VISIT (OUTPATIENT)
Dept: FAMILY MEDICINE | Facility: CLINIC | Age: 72
End: 2017-12-27
Payer: COMMERCIAL

## 2017-12-27 VITALS
HEART RATE: 68 BPM | WEIGHT: 198.5 LBS | TEMPERATURE: 97.6 F | SYSTOLIC BLOOD PRESSURE: 134 MMHG | HEIGHT: 74 IN | DIASTOLIC BLOOD PRESSURE: 68 MMHG | BODY MASS INDEX: 25.48 KG/M2

## 2017-12-27 DIAGNOSIS — E11.9 TYPE 2 DIABETES MELLITUS WITHOUT COMPLICATION, WITHOUT LONG-TERM CURRENT USE OF INSULIN (H): Primary | ICD-10-CM

## 2017-12-27 DIAGNOSIS — Z82.0 FAMILY HISTORY OF ALZHEIMER'S DISEASE: ICD-10-CM

## 2017-12-27 DIAGNOSIS — I10 HYPERTENSION GOAL BP (BLOOD PRESSURE) < 140/90: ICD-10-CM

## 2017-12-27 DIAGNOSIS — E78.5 HYPERLIPIDEMIA LDL GOAL <100: ICD-10-CM

## 2017-12-27 DIAGNOSIS — C49.3 CHEST WALL SARCOMA (H): ICD-10-CM

## 2017-12-27 DIAGNOSIS — R41.3 MEMORY CHANGES: ICD-10-CM

## 2017-12-27 LAB
ANION GAP SERPL CALCULATED.3IONS-SCNC: 4 MMOL/L (ref 3–14)
BUN SERPL-MCNC: 25 MG/DL (ref 7–30)
CALCIUM SERPL-MCNC: 9.3 MG/DL (ref 8.5–10.1)
CHLORIDE SERPL-SCNC: 106 MMOL/L (ref 94–109)
CO2 SERPL-SCNC: 30 MMOL/L (ref 20–32)
CREAT SERPL-MCNC: 0.82 MG/DL (ref 0.66–1.25)
CREAT UR-MCNC: 146 MG/DL
GFR SERPL CREATININE-BSD FRML MDRD: >90 ML/MIN/1.7M2
GLUCOSE SERPL-MCNC: 140 MG/DL (ref 70–99)
HBA1C MFR BLD: 6.4 % (ref 4.3–6)
MICROALBUMIN UR-MCNC: 12 MG/L
MICROALBUMIN/CREAT UR: 8.22 MG/G CR (ref 0–17)
POTASSIUM SERPL-SCNC: 4.6 MMOL/L (ref 3.4–5.3)
SODIUM SERPL-SCNC: 140 MMOL/L (ref 133–144)

## 2017-12-27 PROCEDURE — 99214 OFFICE O/P EST MOD 30 MIN: CPT | Performed by: FAMILY MEDICINE

## 2017-12-27 PROCEDURE — 36415 COLL VENOUS BLD VENIPUNCTURE: CPT | Performed by: FAMILY MEDICINE

## 2017-12-27 PROCEDURE — 80048 BASIC METABOLIC PNL TOTAL CA: CPT | Performed by: FAMILY MEDICINE

## 2017-12-27 PROCEDURE — 83036 HEMOGLOBIN GLYCOSYLATED A1C: CPT | Performed by: FAMILY MEDICINE

## 2017-12-27 PROCEDURE — 82043 UR ALBUMIN QUANTITATIVE: CPT | Performed by: FAMILY MEDICINE

## 2017-12-27 NOTE — MR AVS SNAPSHOT
After Visit Summary   12/27/2017    Gerber Levine    MRN: [id number]           Patient Information     Date Of Birth          1945        Visit Information        Provider Department      12/27/2017 9:00 AM Tee Cuba MD [facility]        Today's Diagnoses     Glucose intolerance (impaired glucose tolerance)    -  1    Hypertension goal BP (blood pressure) < 140/90        Chest wall sarcoma (H)        Hyperlipidemia LDL goal <100        Memory changes        Family history of Alzheimer's disease          Care Instructions    Radiology scheduling at [facility] and [facility]-[phone] or    [phone] [facility] imaging scheduling    Orders Placed This Encounter     CT Chest w/o Contrast     Standing Status:   Future     Standing Expiration Date:   12/27/2018     Order Specific Question:   Priority     Answer:   Routine     Order Specific Question:   Clinical Info for the Interpreting Provider     Answer:   Surveillance chest wall sarcoma     Order Specific Question:   Appointment urgency?     Answer:   AT YOUR CONVENIENCE     Order Specific Question:   PLEASE ANSWER ONE OF THE FOLLOWING QUESTIONS!     Answer:   SELECT ONE ONLY     Order Specific Question:   Other?     Answer:   Enter comment to the right => [20]     Hemoglobin A1c     Last Lab Result: Hemoglobin A1C (%)       Date                     Value                 06/13/2017               6.2 (H)          ----------     Basic metabolic panel     metFORMIN (GLUCOPHAGE) 500 MG tablet     Sig: Take 0.5 tablets (250 mg) by mouth daily (with dinner) REFILL WHEN REQUESTED     Dispense:  45 tablet     Refill:  3             Follow-ups after your visit        Additional Services     NEUROPSYCHOLOGY REFERRAL       Your provider has referred you to:    [facility] [place] [phone] Preferred Provider: No preferred provider    [email or web address]    All scheduling is subject to the client's specific insurance plan & benefits, provider/location availability, and provider clinical specialities.  Please arrive 15 minutes early for your first appointment and bring your completed paperwork.    Please be aware that coverage of these services is subject to the terms and limitations of your health insurance plan.  Call member services at your health plan with any benefit or coverage questions.    Please bring the following to your appointment:  >>   Any x-rays, CTs or MRIs which have been performed.  Contact the facility where they were done to arrange for  prior to your scheduled appointment.  Any new CT, MRI or other procedures ordered by your specialist must be performed at a [facility] or coordinated by your clinic's referral office.    >>   List of current medications   >>   This referral request   >>   Any documents/labs given to you for this referral                  Future tests that were ordered for you today     Open Future Orders        Priority Expected Expires Ordered    CT Chest w/o Contrast Routine  12/27/2018 12/27/2017            Who to contact     If you have questions or need follow up information about today's clinic visit or your schedule please contact [facility] directly at [phone].  Normal or non-critical lab and imaging results will be communicated to you by MyChart, letter or phone within 4 business days after the clinic has received the results. If you do not hear from us within 7 days, please contact the clinic through MyChart or phone. If you have a critical or abnormal lab result, we will notify you by phone as soon as possible.  Submit refill requests through [employer] or call your pharmacy and they will forward the refill request to us. Please allow 3 business days for your refill to be completed.          Additional Information About Your  "Visit        MyChart Information     [employer] gives you secure access to your electronic health record. If you see a primary care provider, you can also send messages to your care team and make appointments. If you have questions, please call your primary care clinic.  If you do not have a primary care provider, please call [phone] and they will assist you.        Care EveryWhere ID     This is your Care EveryWhere ID. This could be used by other organizations to access your [place] medical records  [id number]        Your Vitals Were     Pulse Temperature Height BMI (Body Mass Index)          68 97.6  F (36.4  C) (Oral) 6' 2\" (1.88 m) 25.49 kg/m2         Blood Pressure from Last 3 Encounters:   12/27/17 134/68   06/13/17 128/70   02/28/17 126/76    Weight from Last 3 Encounters:   12/27/17 198 lb 8 oz (90 kg)   06/13/17 192 lb (87.1 kg)   02/28/17 193 lb (87.5 kg)              We Performed the Following     ** Albumin Random Urine Quant FUTURE 1yr     Basic metabolic panel     Hemoglobin A1c     NEUROPSYCHOLOGY REFERRAL          Today's Medication Changes          These changes are accurate as of: 12/27/17  9:55 AM.  If you have any questions, ask your nurse or doctor.               These medicines have changed or have updated prescriptions.        Dose/Directions    aspirin 81 MG tablet   This may have changed:  See the new instructions.        1 tab po QD (Once per day)   Refills:  0            Where to get your medicines      These medications were sent to [facility] [address] [place] [address]     Phone:  [phone]     metFORMIN 500 MG tablet                Primary Care Provider Office Phone # Fax #    Tee Cuba [phone]       [address] [facility] [address]        Equal Access to Services     SARAH DELEON: Ishmael Soto, sandor bailey, qasebastian han, sudarshan ramirez " raffaele joaquínzo lorenayvon ladeng ah. So [facility] [phone].    ATENCIÓN: Si samila chava, tiene a pierre disposición servicios gratuitos de asistencia lingüística. Marquita calderón [phone].    We comply with applicable federal civil rights laws and [place] laws. We do not discriminate on the basis of race, color, national origin, age, disability, sex, sexual orientation, or gender identity.            Thank you!     Thank you for choosing [facility]  for your care. Our goal is always to provide you with excellent care. Hearing back from our patients is one way we can continue to improve our services. Please take a few minutes to complete the written survey that you may receive in the mail after your visit with us. Thank you!             Your Updated Medication List - Protect others around you: Learn how to safely use, store and throw away your medicines at www.disposemymeds.org.          This list is accurate as of: 12/27/17  9:55 AM.  Always use your most recent med list.                   Brand Name Dispense Instructions for use Diagnosis    ACCU-CHEK RODRI PLUS test strip   Generic drug:  blood glucose monitoring     200 each    USE TO TEST BLOOD SUGAR TWO TIMES A DAY OR AS DIRECTED    Glucose intolerance (impaired glucose tolerance)       aspirin 81 MG tablet      1 tab po QD (Once per day)        atorvastatin 40 MG tablet    LIPITOR    90 tablet    Take 1 tablet (40 mg) by mouth daily    Glucose intolerance (impaired glucose tolerance)       B-12 PO      Take by mouth every morning Take 1/2 tablet daily        blood glucose calibration solution     3 each    USE 1 DROP AS NEEDED    Glucose intolerance (impaired glucose tolerance)       blood glucose monitoring meter device kit           CALCITRATE/VITAMIN D 315-200 MG-UNIT Tabs per tablet   Generic drug:  calcium citrate-vitamin D      One tab daily in the morning        fluticasone 50 MCG/ACT spray    FLONASE    16 g    Spray 2 sprays into both  nostrils daily    Cough, Globus pharyngeus       lidocaine 5 % ointment    XYLOCAINE    50 g    Apply topically as needed for moderate pain    Pain in surgical scar       losartan 25 MG tablet    COZAAR    90 tablet    Take 1 tablet (25 mg) by mouth daily    Essential hypertension, benign       metFORMIN 500 MG tablet    GLUCOPHAGE    45 tablet    Take 0.5 tablets (250 mg) by mouth daily (with dinner) REFILL WHEN REQUESTED    Glucose intolerance (impaired glucose tolerance)       order for DME     100 each    Lancets.  Daily and prn.    Glucose intolerance (impaired glucose tolerance)

## 2017-12-27 NOTE — PATIENT INSTRUCTIONS
Radiology scheduling at [facility] and [facility]-[phone] or    [phone] [place] imaging scheduling    Orders Placed This Encounter     CT Chest w/o Contrast     Standing Status:   Future     Standing Expiration Date:   12/27/2018     Order Specific Question:   Priority     Answer:   Routine     Order Specific Question:   Clinical Info for the Interpreting Provider     Answer:   Surveillance chest wall sarcoma     Order Specific Question:   Appointment urgency?     Answer:   AT YOUR CONVENIENCE     Order Specific Question:   PLEASE ANSWER ONE OF THE FOLLOWING QUESTIONS!     Answer:   SELECT ONE ONLY     Order Specific Question:   Other?     Answer:   Enter comment to the right => [20]     Hemoglobin A1c     Last Lab Result: Hemoglobin A1C (%)       Date                     Value                 06/13/2017               6.2 (H)          ----------     Basic metabolic panel     metFORMIN (GLUCOPHAGE) 500 MG tablet     Sig: Take 0.5 tablets (250 mg) by mouth daily (with dinner) REFILL WHEN REQUESTED     Dispense:  45 tablet     Refill:  3

## 2017-12-27 NOTE — PROGRESS NOTES
SUBJECTIVE:   Gerber Levine is a 72 year old male who presents to clinic today for the following health issues:    Diabetes Follow-up    Patient is checking blood sugars: once daily.  Results are as follows:       Sometimes below 100, or 108, 109, 120, once in great while will be 159        Diabetic concerns: None     Symptoms of hypoglycemia (low blood sugar): none     Paresthesias (numbness or burning in feet) or sores: No     Date of last diabetic eye exam: March 2017 and has appt for March 2018  BP Readings from Last 2 Encounters:   06/13/17 128/70   02/28/17 126/76     Hemoglobin A1C (%)   Date Value   06/13/2017 6.2 (H)   02/28/2017 6.5 (H)     LDL Cholesterol Calculated (mg/dL)   Date Value   06/13/2017 54   11/14/2016 57         Amount of exercise or physical activity: Walking pretty much every day for 30 minutes.    Problems taking medications regularly: No    Medication side effects: none    Diet: regular (no restrictions)    Diabetes Mellitus Type 2  A1C is slightly elevated today at 6.4 compared to 6.2 six months ago on 6/13/17. No recent changes in diet, but admits he could eat healthier. He continues to take metformin 500 mg QD.   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 He has been getting chest CT scans in 6 month intervals and is due for another one but needs a referral. He denies any changes post-op and reports he continues to feel right chest pain as well as right posterior flank pain. He has been going to physical therapy for this with no improvement, but thinks that physical therapy has been good exercise. He has never pursued a massage before.     Hypertension  Continues to take losartan 25 mg. Blood pressure stable today at 134/68.     Immunizations  Up to date on influenza  vaccine for 2017 and pneumonia vaccines.     Memory concerns  Patient expresses concern for dementia as he is currently experiencing symptoms and mentions he has a family history of dementia (mother). He is still able to drive without any issues, but states that he is becoming more forgetful. Names, dates, and places are becoming more difficult to recall. He states that he is able to complete numerical calculations without any problems though. No close friends or family members have noted subtle changes yet.     Chronic cough   Previous history of chronic cough that appeared to be consistent with post nasal drip. Has used Flonase for this but discontinued use since cough improved. However, cough has now returned.    Problem list and histories reviewed & adjusted, as indicated.  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)     Chest wall sarcoma (H)     Past Surgical History:   Procedure Laterality Date     ABDOMEN SURGERY  1976    Spleen removed     APPENDECTOMY  1976     BIOPSY  2016     BRONCHOSCOPY FLEXIBLE N/A 9/21/2016    Procedure: BRONCHOSCOPY FLEXIBLE;  Surgeon: Leah Nixon MD;  Location: [facility] OR     CHOLECYSTECTOMY  1976     COLONOSCOPY  2006     ENT SURGERY  2014     EYE SURGERY  2014    cataracts     HERNIA REPAIR  1976     REPAIR CHEST WALL N/A 9/21/2016    Procedure: REPAIR CHEST WALL;  Surgeon: Leah Nixon MD;  Location: [facility] OR     SURGICAL HISTORY OF -   1976    SPLENECTOMY     SURGICAL HISTORY OF -       APPENDECTOMY     SURGICAL HISTORY OF -        "CHOLECYSTECTOMY     SURGICAL HISTORY OF -       HERNIAORRHAPHY     SURGICAL HISTORY OF -   1981    cholesteotoma       Social History   Substance Use Topics     Smoking status: Never Smoker     Smokeless tobacco: Never Used     Alcohol use Yes      Comment: Occasionaly     Family History   Problem Relation Age of Onset     DIABETES Father      Genitourinary Problems Daughter      spherocytosis, had spleen removed     DIABETES Maternal Grandmother      Intellectual Disability (Mental Retardation) Sister      Dementia Mother      DIABETES Sister      type 1     DIABETES Sister      Prostate Cancer Brother      C.A.D. No family hx of      Cancer - colorectal No family hx of      Hypertension No family hx of      CEREBROVASCULAR DISEASE No family hx of      Breast Cancer No family hx of          Labs reviewed in EPIC      Reviewed and updated as needed this visit by clinical staff     Reviewed and updated as needed this visit by Provider       ROS:  Constitutional, HEENT, cardiovascular, pulmonary, GI, , musculoskeletal, neuro, skin, endocrine and psych systems are negative, except as otherwise noted.    This document serves as a record of the services and decisions personally performed and made by Tee Cuba MD. It was created on his behalf by Donita Meyer, a trained medical scribe. The creation of this document is based the provider's statements to the medical scribe.  Scribe Donita Meyer 9:29 AM, December 27, 2017    OBJECTIVE:     /68 (BP Location: Right arm, Cuff Size: Adult Large)  Pulse 68  Temp 97.6  F (36.4  C) (Oral)  Ht 6' 2\" (1.88 m)  Wt 198 lb 8 oz (90 kg)  BMI 25.49 kg/m2  Body mass index is 25.49 kg/(m^2).  GENERAL: healthy, alert and no distress  NECK: no adenopathy, no asymmetry, masses, or scars and thyroid normal to palpation  RESP: lungs clear to auscultation - no rales, rhonchi or wheezes  CV: regular rate and rhythm, normal S1 S2, no S3 or S4, no murmur, click or rub, no " peripheral edema and peripheral pulses strong  ABDOMEN: soft, nontender, no hepatosplenomegaly, no masses and bowel sounds normal  MS: no gross musculoskeletal defects noted, no edema. Tenderness of the rhomboids and mild tenderness of the intercostal spaces. No masses palpable.   Neuro: normal clock test 3 word recall and serial 7's  Diagnostic Test Results:  Results for orders placed or performed in visit on 12/27/17 (from the past 24 hour(s))   Hemoglobin A1c   Result Value Ref Range    Hemoglobin A1C 6.4 (H) 4.3 - 6.0 %       ASSESSMENT/PLAN:   Diabetes Type 2 not on insulin  Comment: Hemoglobin A1C slightly elevated at 6.4 compared to 6.2 six months ago. Advised to continue present plan and medications, but return for a repeat A1C in 3 months rather than in 6 months. Recommended walking 30 minutes a day.   Plan: Hemoglobin A1c, Basic metabolic panel,         metFORMIN (GLUCOPHAGE) 500 MG tablet    Test 1 time per day but do am and 2 hour post prandial throughout the week    (I10) Hypertension goal BP (blood pressure) < 140/90  Comment: Stable, the current medical regimen is effective;  continue present plan and medications.   Plan: Continue to monitor and same medications.     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 I discussed considerations for nerve blocks and suggested pursuing massages for relief. After discussion, it was agreed to obtain a chest CT scan to evaluate for any abnormalities. Patient will call and schedule this.   Plan: CT Chest w/o Contrast, CANCELED: CT Chest w/o         Contrast    (E78.5) Hyperlipidemia LDL goal <100  Comment: Stable from previous workups.   Plan: Continue current medications without change.     (R41.3) Memory changes  Comment: Concerns for dementia given his family history. Successfully recalled 3 things, kezia clock correctly, but did have minor problems with serial 7s. Per patient's request,  neuropsychology referral placed at the [facility] for neuropsychometric testing.   Plan: NEUROPSYCHOLOGY REFERRAL    (Z82.0) Family history of Alzheimer's disease  Comment: Mother with a history of dementia. Neuropsychometric testing referral placed per patient request.   Plan: NEUROPSYCHOLOGY REFERRAL    Follow up in 3 months    FUTURE LABS:       - Schedule non-fasting labs in 3 months to recheck hemoglobin A1C.     The information in this document, created by the medical scribe for me, accurately reflects the services I personally performed and the decisions made by me. I have reviewed and approved this document for accuracy prior to leaving the patient care area.  9:29 AM, 12/27/17    Tee Cuba MD, MD  [facility]

## 2018-01-03 ENCOUNTER — RADIANT APPOINTMENT (OUTPATIENT)
Dept: CT IMAGING | Facility: CLINIC | Age: 73
End: 2018-01-03
Attending: FAMILY MEDICINE
Payer: COMMERCIAL

## 2018-01-03 DIAGNOSIS — C49.3 CHEST WALL SARCOMA (H): ICD-10-CM

## 2018-01-04 ENCOUNTER — OFFICE VISIT (OUTPATIENT)
Dept: NEUROPSYCHOLOGY | Facility: CLINIC | Age: 73
End: 2018-01-04
Payer: COMMERCIAL

## 2018-01-04 DIAGNOSIS — R41.3 MEMORY LOSS: Primary | ICD-10-CM

## 2018-01-04 DIAGNOSIS — F33.0 MAJOR DEPRESSIVE DISORDER, RECURRENT EPISODE, MILD (H): ICD-10-CM

## 2018-01-04 NOTE — MR AVS SNAPSHOT
After Visit Summary   1/4/2018    Gerber Levine    MRN: [id number]           Patient Information     Date Of Birth          1945        Visit Information        Provider Department      1/4/2018 12:30 PM Alfonzo Lara, PhD [facility] Neuropsychology        Today's Diagnoses     Memory loss    -  1    Major depressive disorder, recurrent episode, mild (H)           Follow-ups after your visit        Your next 10 appointments already scheduled     Mar 19, 2018  8:00 AM CDT   [facility]   [facility]    [address] [place] [id number] [phone]              Who to contact     Please call your clinic at [phone] to:    Ask questions about your health    Make or cancel appointments    Discuss your medicines    Learn about your test results    Speak to your doctor   If you have compliments or concerns about an experience at your clinic, or if you wish to file a complaint, please contact [facility] Patient Relations at [phone] or email us at [email or web address]         Additional Information About Your Visit        MyChart Information     [employer] gives you secure access to your electronic health record. If you see a primary care provider, you can also send messages to your care team and make appointments. If you have questions, please call your primary care clinic.  If you do not have a primary care provider, please call [phone] and they will assist you.      [employer] is an electronic gateway that provides easy, online access to your medical records. With [employer], you can request a clinic appointment, read your test results, renew a prescription or communicate with your care team.     To access your existing account, please contact your [facility] or call [phone] for assistance.        Care EveryWhere ID      This is your Care EveryWhere ID. This could be used by other organizations to access your [place] medical records  [id number]         Blood Pressure from Last 3 Encounters:   12/27/17 134/68   06/13/17 128/70   02/28/17 126/76    Weight from Last 3 Encounters:   12/27/17 90 kg (198 lb 8 oz)   06/13/17 87.1 kg (192 lb)   02/28/17 87.5 kg (193 lb)              We Performed the Following     [id number] TESTING, PER HR/PSYCHOLOGIST     NEUROPSYCH TESTING BY [facility]          Today's Medication Changes          These changes are accurate as of: 1/4/18 11:59 PM.  If you have any questions, ask your nurse or doctor.               These medicines have changed or have updated prescriptions.        Dose/Directions    aspirin 81 MG tablet   This may have changed:  See the new instructions.        1 tab po QD (Once per day)   Refills:  0                Primary Care Provider Office Phone # Fax #    Tee Cuba [phone]       [facility] [address]        Equal Access to Services     [facility]: Hadii idalmis singh hadasho Soomaali, waaxda luqadaha, qaybta kaalmada adezoyajaida, sudarshan olson . So [facility] [phone].    ATENCIÓN: Si habla español, tiene a pierre disposición servicios gratuitos de asistencia lingüística. Llame al [phone].    We comply with applicable federal civil rights laws and [place] laws. We do not discriminate on the basis of race, color, national origin, age, disability, sex, sexual orientation, or gender identity.            Thank you!     Thank you for choosing [facility] NEUROPSYCHOLOGY  for your care. Our goal is always to provide you with excellent care. Hearing back from our patients is one way we can continue to improve our services. Please take a few minutes to complete the written survey that you may receive in the mail after your visit with us. Thank you!             Your Updated Medication List - Protect others around you:  Learn how to safely use, store and throw away your medicines at www.disposemymeds.org.          This list is accurate as of: 1/4/18 11:59 PM.  Always use your most recent med list.                   Brand Name Dispense Instructions for use Diagnosis    ACCU-CHEK RODRI PLUS test strip   Generic drug:  blood glucose monitoring     200 each    USE TO TEST BLOOD SUGAR TWO TIMES A DAY OR AS DIRECTED    Glucose intolerance (impaired glucose tolerance)       aspirin 81 MG tablet      1 tab po QD (Once per day)        atorvastatin 40 MG tablet    LIPITOR    90 tablet    Take 1 tablet (40 mg) by mouth daily    Glucose intolerance (impaired glucose tolerance)       B-12 PO      Take by mouth every morning Take 1/2 tablet daily        blood glucose calibration solution     3 each    USE 1 DROP AS NEEDED    Glucose intolerance (impaired glucose tolerance)       blood glucose monitoring meter device kit           CALCITRATE/VITAMIN D 315-200 MG-UNIT Tabs per tablet   Generic drug:  calcium citrate-vitamin D      One tab daily in the morning        fluticasone 50 MCG/ACT spray    FLONASE    16 g    Spray 2 sprays into both nostrils daily    Cough, Globus pharyngeus       lidocaine 5 % ointment    XYLOCAINE    50 g    Apply topically as needed for moderate pain    Pain in surgical scar       losartan 25 MG tablet    COZAAR    90 tablet    Take 1 tablet (25 mg) by mouth daily    Essential hypertension, benign       metFORMIN 500 MG tablet    GLUCOPHAGE    45 tablet    Take 0.5 tablets (250 mg) by mouth daily (with dinner) REFILL WHEN REQUESTED    Glucose intolerance (impaired glucose tolerance)       order for DME     100 each    Lancets.  Daily and prn.    Glucose intolerance (impaired glucose tolerance)

## 2018-01-05 NOTE — PROGRESS NOTES
Name: Gerber Levine  MR#: [id number]  YOB: 1945  Date of Exam: 01/04/2018    Neuropsychology Laboratory  [facility]  [address]  [place]  [address] [phone]  NEUROPSYCHOLOGICAL EVALUATION    IDENTIFYING INFORMATION  Gerber Levine is a 72 year old, right handed, retired , with 13 years of formal education. He was accompanied to the evaluation by his wife, Adilia.     BACKGROUND INFORMATION / INTERVIEW FINDINGS    Records indicate that Mr. Levine's medical history includes generalized anxiety disorder, dysthymic disorder, chest wall sarcoma, hypertension, glucose intolerance, hyperlipidemia, hearing loss, hereditary spherocytosis, and allergic rhinitis. He has expressed concerns about changes in his thinking, and memory in particular. Further, he reportedly has a family history of Alzheimer's disease. The current evaluation was requested by Dr. Tee Cuba, in this context.    On interview, Mr. Levine confirmed the above history. Regarding cognition, he reported that he first started to notice changes in his thinking as many as five or more years ago. He denied having identified a trigger for onset of these changes. He stated his belief that the difficulties have been slowly and gradually worsening since that time. He reported that he first had difficulties with remembering names. He indicated that these names may come back to him later. He also noted troubles with forgetfulness such as forgetting what he needs to do. The patient s wife reported her belief that the patient is not having more troubles with his thinking than his same age peers. She reported her belief that he is depressed. She indicated that he is crabbier than he used to be, and indicated that he has low confidence levels. She reported her belief that he needs to find something to do with his time. She denied other changes in his personality. She reported that  he has outbursts when he becomes frustrated. The patient stated that he was diagnosed with depression and anxiety many years ago. He stated that his sleep is poor, and he often worries at night. He reported that he gets 5   or six hours of sleep per night. He reported that he was prescribed a medicine for his sleep in the past, but it was not effective. He stated that he does not nap. He reported that he was under the care of a psychologist and psychiatrist in the past, but is not currently seeing either of these types of providers. He reported that he is not currently prescribed a medication for depression. He denied suicidal ideation.    With respect to other medical background, Mr. Levine denied prior TBI, stroke, or seizure. Per records, his current medications include aspirin, atorvastatin, calcitriol-vitamin D, cyanocobalamin, fluticasone, lidocaine, losartan, and metformin. He reported that he consumes approximate one alcoholic drink per month, and denied other substance use. He denied past substance abuse.    Mr. Levine lives at home with his wife. He manages his own basic and instrumental daily activities. He drives. By way of background, the patient and his wife have been  for 47 years. They have two adult daughters, both of whom live locally. Regarding educational background, he graduated from high school with average grades. He completed some college coursework at a [employer] and at the [employer], but did not earn a degree. He served in the [employer], but did not see active combat. 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 He retired in 2002. He reported that he currently keeps himself active with occasional handiwork and at his wife s store, or at their Jehov[employer] witness. He indicated that he is otherwise not engaged in much activity.    BEHAVIORAL OBSERVATIONS  Mr. Levine was  polite and cooperative with the exam. He worked quickly. His speech was normal. Comprehension was normal. His thought processes were notable for mild variability in attention. His mood was anxious with congruent affect. His effort was good. The current results are felt to be an accurate representation of his cognitive functioning.    RESULTS OF EXAM  His performances on measures of neuropsychological functioning were as follows:     He was fully oriented to time, place, and various aspects of personal information. Performance on a measure of single word reading was high average. Auditory attention for digits was borderline impaired. Mental calculations were average. Learning of words a list format was impaired. Delayed recall of list words was low average. Percent retention of list words was average. Delayed recognition of list words was low average. Learning, delayed recall, and delayed recognition of story information were all performed in the high average range. Learning of simple geometric shapes and their spatial locations was borderline impaired. Delayed recall of the shapes and their locations was borderline impaired. Percent retention of the shapes was performed of the borderline impaired to low average range. Delayed recognition of the shapes was normal. Visual-spatial judgments for variably oriented lines were average. Visual problem-solving with blocks was low average. His drawing of a complicated geometric figure was normal. Comprehension of phrases short stories was average. Verbal associative fluency was high average. Semantic verbal fluency was average. Naming to confrontation was average. Verbal abstract reasoning was average. Speed of visual sequencing under focused attention was average. A similar measure with a divided attention component was performed in the low average to average range. Speeded word reading was superior. Speeded color naming was performed in the average to high average range.  Speeded inhibition of an over learned response was performed in the low average range. Speeded visual motor coding was average.    He endorsed items consistent with mild symptoms of depression, and minimal symptoms of anxiety and self-report measures.    IMPRESSIONS  Mr. Levine demonstrated a pattern of weaknesses that raises some concern about mild dysfunction of frontal brain regions. The etiology of these weaknesses is not certain. Speculatively, one potential contributing factor could be cerebrovascular disease, given his medical history. I do suspect that there is a contribution to his weaknesses from his depression. Indeed, it could be the case that a good portion or all of his weaknesses could be attributable to depression. In this exam, mild weaknesses were identified in aspects of attention, learning, and executive functioning. Other cognitive abilities, including anterograde memory, were normal and performed in keeping with his likely average range cognitive baseline. I do not think that he has Alzheimer's disease. It may be the case that improvements are noted in his thinking following a reduction in his depression and anxiety.    RECOMMENDATIONS  Preliminary results and recommendations were provided to the patient over the telephone on 01/05/2018, and all questions were answered.    1. If medically indicated, an imaging study of his brain could be helpful in diagnostic clarification.    2. If medically indicated, consideration could be given to treatment of his mental health.    3. Along similar lines, referral for psychotherapy services could be considered. One potential referral option will be [facility], with locations throughout the [facility] area. They can be reached by calling [phone].    4. If he continues to have difficulties with memory, routine use of a memory notebook or other assistive device could be a benefit.    5. Follow-up neuropsychological evaluation is recommended  in one year in order to assess and update recommendations as appropriate. I would be pleased to evaluate sooner if changes are noted or if clinically indicated.    Alfonzo Lara, Ph.D., L.P., ABPP-CN   / Licensed Psychologist [id number]  Department of Rehabilitation Medicine  Division of Adult Neuropsychology  [facility]    Time spent:  Four hours professional time, including interview, record review, data integration, and report writing (CPT 84122); two hours of testing administered by a psychometrist and interpreted by a neuropsychologist (CPT 21004). Diagnosis: R41.3, F33.0.

## 2018-01-09 ENCOUNTER — TELEPHONE (OUTPATIENT)
Dept: FAMILY MEDICINE | Facility: CLINIC | Age: 73
End: 2018-01-09

## 2018-01-09 NOTE — TELEPHONE ENCOUNTER
Called and notified patient that pulmonary/ nodule clinic is reviewing his CT. Non-specific change form 2016 CT.    I will be out of town for the rest of the week so I will forward this to Cheryl Ward so she is aware and can contact patient after review.    Iggy Cuba MD

## 2018-01-12 ENCOUNTER — TELEPHONE (OUTPATIENT)
Dept: FAMILY MEDICINE | Facility: CLINIC | Age: 73
End: 2018-01-12

## 2018-01-12 ENCOUNTER — TEAM CONFERENCE (OUTPATIENT)
Dept: SURGERY | Facility: CLINIC | Age: 73
End: 2018-01-12

## 2018-01-12 ENCOUNTER — TELEPHONE (OUTPATIENT)
Dept: SURGERY | Facility: CLINIC | Age: 73
End: 2018-01-12

## 2018-01-12 DIAGNOSIS — E88.810 DYSMETABOLIC SYNDROME X: Primary | ICD-10-CM

## 2018-01-12 DIAGNOSIS — C49.3 CHEST WALL SARCOMA (H): Primary | ICD-10-CM

## 2018-01-12 NOTE — TELEPHONE ENCOUNTER
Pulmonary Nodule Conference      Patient Name: Gerber Levine    Reason for conference discussion (brief overview): 72 year old male s/p chest wall resection September 2016. Saw PCP and c/o intercostal pain. CT obtained. Recent chest CT shows:  A 6 x 6 mm soft tissue nodule in the soft tissues of the right  chest wall along the course of a lateral thoracic vessel is unchanged  since the prior exam dated 6/29/2017 but is definitely enlarged since  9/14/2016 when it measured 2 x 2 mm.     Specific Question:  Intervention vs follow up?    Pertinent Histology:  Sarcoma    Referring Physician: Dr. Iggy Cuba    The patient's case was presented at the multidisciplinary conference for the above noted reason.  There was a consensus recommendation for the following actions:     This sof tissue nodule has been stable since June. Recommend follow up in 6 months. This is not the source of his symptoms.          Case Lead:  Cheryl Ward    Interventional Radiology Staff Present: Ric Pearl MD

## 2018-01-12 NOTE — TELEPHONE ENCOUNTER
Call to Gerber to let him know the recommendations from Nodule Conference this morning. He understood the recommendations and denies questions at this time. He will continue to follow up with Dr. Cuba.

## 2018-01-12 NOTE — TELEPHONE ENCOUNTER
Forms received from [facility]: Medicare Prescriptions Order for Tee Cuab MD.  Forms placed in provider 'sign me' folder.  Please fax forms to [phone] after completion.    Chantelle Kwan,

## 2018-01-15 PROBLEM — E88.810 DYSMETABOLIC SYNDROME X: Status: ACTIVE | Noted: 2018-01-15

## 2018-01-16 ENCOUNTER — MYC MEDICAL ADVICE (OUTPATIENT)
Dept: FAMILY MEDICINE | Facility: CLINIC | Age: 73
End: 2018-01-16

## 2018-01-19 ENCOUNTER — MYC REFILL (OUTPATIENT)
Dept: OTOLARYNGOLOGY | Facility: CLINIC | Age: 73
End: 2018-01-19

## 2018-01-19 DIAGNOSIS — R05.9 COUGH: ICD-10-CM

## 2018-01-19 DIAGNOSIS — R09.A2 GLOBUS PHARYNGEUS: ICD-10-CM

## 2018-01-22 RX ORDER — FLUTICASONE PROPIONATE 50 MCG
2 SPRAY, SUSPENSION (ML) NASAL DAILY
Qty: 16 G | Refills: 1 | Status: SHIPPED | OUTPATIENT
Start: 2018-01-22 | End: 2018-06-06

## 2018-01-22 NOTE — TELEPHONE ENCOUNTER
Message from [employer]:  Original authorizing provider: July Kennedy MD    Art GEOFF Levine would like a refill of the following medications:  fluticasone (FLONASE) 50 MCG/ACT spray [July Kennedy MD]    Preferred pharmacy: [facility] [address] [place] [address]    Comment:

## 2018-01-22 NOTE — TELEPHONE ENCOUNTER
Signed Prescriptions:                        Disp   Refills    fluticasone (FLONASE) 50 MCG/ACT spray     16 g   1        Sig: Spray 2 sprays into both nostrils daily  Authorizing Provider: CARMEN HOWELL  Ordering User: FREEDOM SULLIVAN

## 2018-03-01 DIAGNOSIS — E88.810 DYSMETABOLIC SYNDROME X: Primary | ICD-10-CM

## 2018-03-01 NOTE — TELEPHONE ENCOUNTER
"Pharmacy Comments:  Patient is out   Please refill    Requested Prescriptions   Pending Prescriptions Disp Refills     blood glucose monitoring (ACCU-CHEK MULTICLIX) lancets [Pharmacy Med Name: Accu-Chek Multiclix Lancets Miscellaneous]  DISCONTINUED   (HISTORICAL)      Last Written Prescription Date:  6/27/2016  Last Fill Quantity: na,   # refills: na  Last Office Visit: 12/27/2017   w/Dr. Cuba  Future Office visit:    Next 5 appointments (look out 90 days)     Mar 19, 2018  8:00 AM CDT   [facility]    [address] [place] [id number]   [phone]                   Routing refill request to provider for review/approval because:  Drug not active on patient's medication list  Medication is reported/historical   102 each 0     Sig: USE TO TEST BLOOD SUGARS DAILY AND AS NEEDED    Diabetic Supplies Protocol Passed    3/1/2018 12:09 PM       Passed - Patient is 18 years of age or older       Passed - Patient has had appt within past 6 mos    Patient had office visit in the last 6 months or has a visit in the next 30 days with authorizing provider.  See \"Patient Info\" tab in inbasket, or \"Choose Columns\" in Meds & Orders section of the refill encounter.              "

## 2018-03-02 ENCOUNTER — TELEPHONE (OUTPATIENT)
Dept: FAMILY MEDICINE | Facility: CLINIC | Age: 73
End: 2018-03-02

## 2018-03-02 DIAGNOSIS — R73.02 GLUCOSE INTOLERANCE (IMPAIRED GLUCOSE TOLERANCE): ICD-10-CM

## 2018-03-02 NOTE — TELEPHONE ENCOUNTER
Reason for Call:  Other prescription    Detailed comments: please refill ACCU-CHEK RODRI PLUS test strip please send to [facility] [address] [place] [address]    Phone Number Patient can be reached at: Other phone number:  [phone]    Best Time: any    Can we leave a detailed message on this number? YES    Call taken on 3/2/2018 at 3:11 PM by Heather Barillas

## 2018-03-02 NOTE — TELEPHONE ENCOUNTER
Prescription approved per [facility] Refill Protocol.    Curtis Weldon RN

## 2018-03-05 ENCOUNTER — MYC MEDICAL ADVICE (OUTPATIENT)
Dept: FAMILY MEDICINE | Facility: CLINIC | Age: 73
End: 2018-03-05

## 2018-03-05 DIAGNOSIS — E11.9 TYPE 2 DIABETES MELLITUS WITHOUT COMPLICATION, WITHOUT LONG-TERM CURRENT USE OF INSULIN (H): Primary | ICD-10-CM

## 2018-03-05 NOTE — TELEPHONE ENCOUNTER
Called and spoke with pharmacist fransisco, she said that they don't have any diagnosis code attached to the med. Gave current dx code, insurance does not cover. Per problem list, patient is not diabetic. Is there any other diagnosis that you think may be covered?     Curtis Weldon RN

## 2018-03-05 NOTE — TELEPHONE ENCOUNTER
New RX sent with new code.     They may only cover for testing 1 time per day so if that is the case may need to change RX    Call and review with patient and pharmacy    Iggy Cuba MD

## 2018-03-05 NOTE — TELEPHONE ENCOUNTER
Called and spoke with [facility]. They said that this diagnosis code won't work either that we have sent. I asked if it would be different if he was only testing once per day and she said know. Dx has to be either type 1 or type 2 diabetes for it to be covered. I called wife, she states that they changed insurance on January 1st, so this may be why it's no longer covered. I told wife that it may not be covered and they may have to pay out of pocket. Will route to PCP to see if any other thoughts. Patient only has one strip left for tomorrow.    Curtis Weldon RN

## 2018-03-05 NOTE — TELEPHONE ENCOUNTER
Denisha, patient's spouse, called and stated she just spoke with the pharmacy. They stated the prescription has the wrong diagnostic code on it. They need this code fixed for [employer] to cover this. Denisha asked if it is possible for the RN to call the pharmacy to get this completed. Please call Denisha back to discuss at [phone].    Thank you  Oma Handy  Patient Representative

## 2018-03-06 PROBLEM — E88.810 DYSMETABOLIC SYNDROME X: Status: RESOLVED | Noted: 2018-01-15 | Resolved: 2018-03-06

## 2018-03-06 PROBLEM — E11.9 TYPE 2 DIABETES MELLITUS WITHOUT COMPLICATION, WITHOUT LONG-TERM CURRENT USE OF INSULIN (H): Status: ACTIVE | Noted: 2018-03-06

## 2018-03-06 NOTE — TELEPHONE ENCOUNTER
Called and reviewed and will adjust diagnosis    Orders Placed This Encounter     blood glucose monitoring (ACCU-CHEK RODRI PLUS) test strip     Si strip by [employer] route daily USE TO TEST BLOOD SUGAR TWO TIMES A DAY OR AS DIRECTED     Dispense:  100 each     Refill:  3

## 2018-03-08 ENCOUNTER — TRANSFERRED RECORDS (OUTPATIENT)
Dept: HEALTH INFORMATION MANAGEMENT | Facility: CLINIC | Age: 73
End: 2018-03-08

## 2018-03-15 ENCOUNTER — TELEPHONE (OUTPATIENT)
Dept: FAMILY MEDICINE | Facility: CLINIC | Age: 73
End: 2018-03-15

## 2018-03-15 DIAGNOSIS — E11.9 TYPE 2 DIABETES MELLITUS WITHOUT COMPLICATION, WITHOUT LONG-TERM CURRENT USE OF INSULIN (H): ICD-10-CM

## 2018-03-15 NOTE — TELEPHONE ENCOUNTER
Forms received from: [employer]   Phone number listed: [phone]   Fax listed: [phone]  Date received: 3-15-18  Form description: Request for Documentation Accu-check test strips   Once forms are completed, please return to [employer] via fax.  Is patient requesting to be contacted when forms are completed: n/a  Form placed: Provider folder    Claire Ramirez

## 2018-03-16 NOTE — TELEPHONE ENCOUNTER
Updated refill     RX should have been for only 1 time per day    Orders Placed This Encounter     blood glucose monitoring (ACCU-CHEK RODRI PLUS) test strip     Si strip by In [employer]itro route daily     Dispense:  100 each     Refill:  3     No need to send form

## 2018-03-19 ENCOUNTER — OFFICE VISIT (OUTPATIENT)
Dept: FAMILY MEDICINE | Facility: CLINIC | Age: 73
End: 2018-03-19
Payer: COMMERCIAL

## 2018-03-19 VITALS
HEIGHT: 74 IN | TEMPERATURE: 97.7 F | DIASTOLIC BLOOD PRESSURE: 74 MMHG | SYSTOLIC BLOOD PRESSURE: 128 MMHG | BODY MASS INDEX: 25.28 KG/M2 | HEART RATE: 64 BPM | WEIGHT: 197 LBS

## 2018-03-19 DIAGNOSIS — C49.3 SOFT TISSUE SARCOMA OF CHEST WALL (H): ICD-10-CM

## 2018-03-19 DIAGNOSIS — E11.9 TYPE 2 DIABETES MELLITUS WITHOUT COMPLICATION, WITHOUT LONG-TERM CURRENT USE OF INSULIN (H): Primary | ICD-10-CM

## 2018-03-19 DIAGNOSIS — F34.1 DYSTHYMIC DISORDER: ICD-10-CM

## 2018-03-19 DIAGNOSIS — E78.5 HYPERLIPIDEMIA LDL GOAL <100: ICD-10-CM

## 2018-03-19 DIAGNOSIS — I10 HYPERTENSION GOAL BP (BLOOD PRESSURE) < 140/90: ICD-10-CM

## 2018-03-19 LAB
HBA1C MFR BLD: 6.5 % (ref 4.3–6)
TSH SERPL DL<=0.005 MIU/L-ACNC: 3.42 MU/L (ref 0.4–4)

## 2018-03-19 PROCEDURE — 99214 OFFICE O/P EST MOD 30 MIN: CPT | Performed by: FAMILY MEDICINE

## 2018-03-19 PROCEDURE — 36415 COLL VENOUS BLD VENIPUNCTURE: CPT | Performed by: FAMILY MEDICINE

## 2018-03-19 PROCEDURE — 83036 HEMOGLOBIN GLYCOSYLATED A1C: CPT | Performed by: FAMILY MEDICINE

## 2018-03-19 PROCEDURE — 84443 ASSAY THYROID STIM HORMONE: CPT | Performed by: FAMILY MEDICINE

## 2018-03-19 NOTE — PROGRESS NOTES
SUBJECTIVE:   Gerber Levine is a 73 year old male who presents to clinic today for the following health issues:      Diabetes Follow-up    Patient is checking blood sugars: once daily.  Results are as follows:  124-135    Diabetic concerns: None     Symptoms of hypoglycemia (low blood sugar): none     Paresthesias (numbness or burning in feet) or sores: No     Date of last diabetic eye exam: 3 weeks ago    BP Readings from Last 2 Encounters:   12/27/17 134/68   06/13/17 128/70     Hemoglobin A1C (%)   Date Value   12/27/2017 6.4 (H)   06/13/2017 6.2 (H)     LDL Cholesterol Calculated (mg/dL)   Date Value   06/13/2017 54   11/14/2016 57     Hypertension Follow-up      Outpatient blood pressures are being checked at home occasionally.  Results the other day was 120/60??.    Low Salt Diet: no added salt      Amount of exercise or physical activity: daily    Problems taking medications regularly: No    Medication side effects: none    Diet: regular (no restrictions)      Diabetes:   A1c today is 6.5. Gerber reports recent insurance issues regarding care with his diabetes. He brought in a paper with his blood sugars over the last few weeks and he had one abnormally high reading, which he believes may be related to a faulty test strip. He reports walking regularly for exercise. Denies chest pain on exertion.    Surgical pain:  He also reports pain in his side where had previous surgery. We discussed use of Salonpas patches and possible nerve ablation surgery for pain reduction.    Colonoscopy:  He also asks about a colonoscopy and asks when he should get one next. He has a hx of polyps and is recommended to get a colonoscopy every 3-5 years. He plans to get one next year.    Memory loss:  He also expresses concern regarding his memory loss. He had seen [facility] on 1/4/18 and they did a full examination, and told him he did not have Alzheimer's Disease. He was also told his memory loss is a normal part of aging.  His depression and anxiety may also be related.    Additional information:  Informed patient about new shingles vaccine. He plans to check with insurance for coverage.      Problem list and histories reviewed & adjusted, as indicated.  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     Chest wall sarcoma (H)     Type 2 diabetes mellitus without complication, without long-term current use of insulin (H)     Past Surgical History:   Procedure Laterality Date     ABDOMEN SURGERY  1976    Spleen removed     APPENDECTOMY  1976     BIOPSY  2016     BRONCHOSCOPY FLEXIBLE N/A 9/21/2016    Procedure: BRONCHOSCOPY FLEXIBLE;  Surgeon: Leah Nixon MD;  Location: [facility] OR     CHOLECYSTECTOMY  1976     COLONOSCOPY  2006     EYE SURGERY  2014    cataracts     HERNIA REPAIR  1976     REPAIR CHEST WALL N/A 9/21/2016    Procedure: REPAIR CHEST WALL;  Surgeon: Leah Nixon MD;  Location: [facility] OR     SURGICAL HISTORY OF -   1976    SPLENECTOMY     SURGICAL HISTORY OF -       APPENDECTOMY     SURGICAL HISTORY OF -       CHOLECYSTECTOMY     SURGICAL HISTORY OF -       HERNIAORRHAPHY     SURGICAL HISTORY OF -   1981    cholesteotoma       Social History   Substance Use Topics     Smoking status: Never Smoker     Smokeless tobacco: Never Used     Alcohol use Yes      Comment: Occasionaly     Family History   Problem Relation Age of Onset     DIABETES Father      Genitourinary Problems Daughter      spherocytosis, had spleen removed     DIABETES Maternal Grandmother      Intellectual Disability (Mental Retardation) Sister       Dementia Mother      DIABETES Sister      type 1     DIABETES Sister      Prostate Cancer Brother      C.A.D. No family hx of      Cancer - colorectal No family hx of      Hypertension No family hx of      CEREBROVASCULAR DISEASE No family hx of      Breast Cancer No family hx of          Current Outpatient Prescriptions   Medication Sig Dispense Refill     blood glucose monitoring (ACCU-CHEK RODRI PLUS) test strip 1 strip by In Vitro route daily 100 each 3     blood glucose monitoring (ACCU-CHEK MULTICLIX) lancets USE TO TEST BLOOD SUGARS DAILY AND AS NEEDED 102 each 11     fluticasone (FLONASE) 50 MCG/ACT spray Spray 2 sprays into both nostrils daily 16 g 1     metFORMIN (GLUCOPHAGE) 500 MG tablet Take 0.5 tablets (250 mg) by mouth daily (with dinner) REFILL WHEN REQUESTED 45 tablet 3     blood glucose calibration (ACCU-CHEK RODRI) solution USE 1 DROP AS NEEDED 3 each 3     losartan (COZAAR) 25 MG tablet Take 1 tablet (25 mg) by mouth daily 90 tablet 3     atorvastatin (LIPITOR) 40 MG tablet Take 1 tablet (40 mg) by mouth daily 90 tablet 3     lidocaine (XYLOCAINE) 5 % ointment Apply topically as needed for moderate pain 50 g 3     order for DME Lancets.  Daily and prn. 100 each 4     blood glucose monitoring (ACCU-CHEK RODRI PLUS) meter device kit        Cyanocobalamin (B-12 PO) Take by mouth every morning Take 1/2 tablet daily       CALCITRATE/VITAMIN D 315-200 MG-UNIT OR TABS One tab daily in the morning       ASPIRIN 81 MG OR TABS 1 tab po QD (Once per day) (Patient taking differently: 1 tab po QD (Once per day) QAM)       Allergies   Allergen Reactions     Oxycodone Itching     No Known Drug Allergies      Recent Labs   Lab Test  03/19/18   0801  12/27/17   0854  06/13/17   0849   11/14/16   0918   06/01/15   1002   05/14/13   0717   11/15/11   1759   11/16/10   1033   A1C  6.5*  6.4*  6.2*   < >  6.2*   < >  6.0   < >  5.9   < >   --    < >  5.6   LDL   --    --   54   --   57   --   58   < >  47   < >   --   "  < >   --    HDL   --    --   67   --   74   --   67   < >  58   < >   --    < >   --    TRIG   --    --   81   --   80   --   106   < >  148   < >   --    < >   --    ALT   --    --    --    --    --    --    --    --    --    --   19   --   18   CR   --   0.82  0.84   --    --    < >  0.86   < >  0.79   < >  0.86   --   0.84   GFRESTIMATED   --   >90  90   --    --    < >  87   < >  >90   < >  88   --   >90   GFRESTBLACK   --   >90  >90  African American GFR Calc     --    --    < >  >90   GFR Calc     < >  >90   < >  >90   --   >90   POTASSIUM   --   4.6  4.4   --    --    < >  4.0   < >  4.6   --    --    --   4.4   TSH   --    --    --    --    --    --   2.52   --   2.08   --    --    < >   --     < > = values in this interval not displayed.      BP Readings from Last 3 Encounters:   03/19/18 128/74   12/27/17 134/68   06/13/17 128/70    Wt Readings from Last 3 Encounters:   03/19/18 89.4 kg (197 lb)   12/27/17 90 kg (198 lb 8 oz)   06/13/17 87.1 kg (192 lb)        Reviewed and updated as needed this visit by clinical staff       Reviewed and updated as needed this visit by Provider       ROS:  CONSTITUTIONAL: NEGATIVE for fever, chills, change in weight  ENT/MOUTH: NEGATIVE for ear, mouth and throat problems  RESP: NEGATIVE for significant cough or SOB  CV: NEGATIVE for chest pain, palpitations or peripheral edema  All other symptoms reviewed and negative, unless otherwise specified above    This document serves as a record of the services and decisions personally performed and made by Tee Cuba MD. It was created on his/her behalf by Abigail Sanchez, trained medical scribe. The creation of this document is based the provider's statements to the medical scribes.    Scribe Abigail Sanchez, March 19, 2018    OBJECTIVE:     /74 (BP Location: Right arm, Cuff Size: Adult Regular)  Pulse 64  Temp 97.7  F (36.5  C) (Oral)  Ht 1.88 m (6' 2\")  Wt 89.4 kg (197 lb)  BMI 25.29 " kg/m2  Body mass index is 25.29 kg/(m^2).  GENERAL: healthy, alert and no distress  HEENT: normal  EYES: Eyes grossly normal to inspection, PERRL and conjunctivae and sclerae normal  RESP: lungs clear to auscultation - no rales, rhonchi or wheezes  CV: regular rate and rhythm, normal S1 S2, no S3 or S4, no murmur, click or rub, no peripheral edema and peripheral pulses strong  ABD: soft  MS: no gross musculoskeletal defects noted, no edema  PSYCH: mentation appears normal, affect normal/bright    Diagnostic Test Results:  none     ASSESSMENT/PLAN:   (E11.9) Type 2 diabetes mellitus without complication, without long-term current use of insulin (H)  (primary encounter diagnosis)  Comment: Hemoglobin slightly elevated today at 6.5. Advised to continue present plan and medications, but return for A1c in 3 months for follow up.  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 Discussed use of Salonpas patches and nerve blocks.  Plan: Continue to monitor    (F34.1) Dysthymic disorder  Comment: Stable. Neuropsych working done on 1/4/18.   Plan: Continue to monitor, no concerns for Alzheimers    Patient Instructions     MY DIABETES TODAY:    1)  Goal A1C is under <7.0  Mine is:      Lab Results   Component Value Date    A1C 6.5 03/19/2018       2)  Goal LDL (bad cholesterol) under 70  (measured at least yearly)- I am currently at:   Lab Results   Component Value Date    LDL 54 06/13/2017       3)  Goal blood pressure under 140/90- mine was 128/74 today    4)  Take aspirin daily     5)  No tobacco use          ACTION PLAN  CHANGES FROM TODAY:    Care Plan changes:     Continue current medication regimen, exercise routine, and healthy diet.      Labs:     I will fast (nothing to eat or drink except water with medications) 12 hours before my lab appointment.    Follow up:    I will follow up with my physician:  In three months.         The information in this document, created by the medical scribe for me, accurately reflects the services I personally performed and the decisions made by me. I have reviewed and approved this document for accuracy. 03/19/18    Tee Cuba MD  [facility]

## 2018-03-19 NOTE — MR AVS SNAPSHOT
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      Labs:     I will fast (nothing to eat or drink except water with medications) 12 hours before my lab appointment.    Follow up:    I will follow up with my physician:  In three months.               Follow-ups after your visit        Who to contact     If you have questions or need follow up information about today's clinic visit or your schedule please contact [facility] directly at [phone].  Normal or non-critical lab and imaging results will be communicated to you by MyChart, letter or phone within 4 business days after the clinic has received the results. If you do not hear from us within 7 days, please contact the clinic through MyChart or phone. If you have a critical or abnormal lab result, we will notify you by phone as soon as possible.  Submit refill  "requests through [email or web address] or call your pharmacy and they will forward the refill request to us. Please allow 3 business days for your refill to be completed.          Additional Information About Your Visit        [facility] Information     [email or web address] gives you secure access to your electronic health record. If you see a primary care provider, you can also send messages to your care team and make appointments. If you have questions, please call your primary care clinic.  If you do not have a primary care provider, please call [phone] and they will assist you.        Care EveryWhere ID     This is your Care EveryWhere ID. This could be used by other organizations to access your [place] medical records  [id number]        Your Vitals Were     Pulse Temperature Height BMI (Body Mass Index)          64 97.7  F (36.5  C) (Oral) 6' 2\" (1.88 m) 25.29 kg/m2         Blood Pressure from Last 3 Encounters:   03/19/18 128/74   12/27/17 134/68   06/13/17 128/70    Weight from Last 3 Encounters:   03/19/18 197 lb (89.4 kg)   12/27/17 198 lb 8 oz (90 kg)   06/13/17 192 lb (87.1 kg)              We Performed the Following     Hemoglobin A1c     TSH with free T4 reflex          Today's Medication Changes          These changes are accurate as of 3/19/18 10:17 AM.  If you have any questions, ask your nurse or doctor.               These medicines have changed or have updated prescriptions.        Dose/Directions    aspirin 81 MG tablet   This may have changed:  See the new instructions.        1 tab po QD (Once per day)   Refills:  0                Primary Care Provider Office Phone # Fax #    Tee Cuba [phone] [facility] [address]        Equal Access to Services     [facility] CenterJAZIEL : Ishmael Soto, sandor bailey, sudarshan caceres. So [facility] [phone].    ATENCIÓN: Si habla español, tiene a pierre disposición " servicios gratuitos de asistencia lingüística. Marquita calderón [phone].    We comply with applicable federal civil rights laws and [place] laws. We do not discriminate on the basis of race, color, national origin, age, disability, sex, sexual orientation, or gender identity.            Thank you!     Thank you for choosing [facility]  for your care. Our goal is always to provide you with excellent care. Hearing back from our patients is one way we can continue to improve our services. Please take a few minutes to complete the written survey that you may receive in the mail after your visit with us. Thank you!             Your Updated Medication List - Protect others around you: Learn how to safely use, store and throw away your medicines at www.disposemymeds.org.          This list is accurate as of 3/19/18 10:17 AM.  Always use your most recent med list.                   Brand Name Dispense Instructions for use Diagnosis    aspirin 81 MG tablet      1 tab po QD (Once per day)        atorvastatin 40 MG tablet    LIPITOR    90 tablet    Take 1 tablet (40 mg) by mouth daily    Glucose intolerance (impaired glucose tolerance)       B-12 PO      Take by mouth every morning Take 1/2 tablet daily        blood glucose calibration solution     3 each    USE 1 DROP AS NEEDED    Glucose intolerance (impaired glucose tolerance)       blood glucose monitoring lancets     102 each    USE TO TEST BLOOD SUGARS DAILY AND AS NEEDED    Dysmetabolic syndrome X       blood glucose monitoring meter device kit           blood glucose monitoring test strip    ACCU-CHEK RODRI PLUS    100 each    1 strip by In Vitro route daily    Type 2 diabetes mellitus without complication, without long-term current use of insulin (H)       CALCITRATE/VITAMIN D 315-200 MG-UNIT Tabs per tablet   Generic drug:  calcium citrate-vitamin D      One tab daily in the morning        fluticasone 50 MCG/ACT spray    FLONASE    16 g    Spray 2  sprays into both nostrils daily    Cough, Globus pharyngeus       lidocaine 5 % ointment    XYLOCAINE    50 g    Apply topically as needed for moderate pain    Pain in surgical scar       losartan 25 MG tablet    COZAAR    90 tablet    Take 1 tablet (25 mg) by mouth daily    Essential hypertension, benign       metFORMIN 500 MG tablet    GLUCOPHAGE    45 tablet    Take 0.5 tablets (250 mg) by mouth daily (with dinner) REFILL WHEN REQUESTED    Type 2 diabetes mellitus without complication, without long-term current use of insulin (H)       order for DME     100 each    Lancets.  Daily and prn.    Glucose intolerance (impaired glucose tolerance)

## 2018-03-19 NOTE — PATIENT INSTRUCTIONS
MY DIABETES TODAY:    1)  Goal A1C is under <7.0  Mine is:      Lab Results   Component Value Date    A1C 6.5 03/19/2018       2)  Goal LDL (bad cholesterol) under 70  (measured at least yearly)- I am currently at:   Lab Results   Component Value Date    LDL 54 06/13/2017       3)  Goal blood pressure under 140/90- mine was 128/74 today    4)  Take aspirin daily     5)  No tobacco use          ACTION PLAN CHANGES FROM TODAY:    Care Plan changes:     Continue current medication regimen, exercise routine, and healthy diet.      Labs:     I will fast (nothing to eat or drink except water with medications) 12 hours before my lab appointment.    Follow up:    I will follow up with my physician:  In three months.

## 2018-04-24 NOTE — PROGRESS NOTES
__ Orientation            Time          __    -0 ____        Place        _____2____        Personal Info.     _____4____    __WAIS-IV   FSIQ____VCI_____PRI_____ WMI__86__PSI___     Raw  Age SS  __Similarities  __26__               ___11___  __Vocabulary  _______ _______  __Information  _______          _______  __Comprehension _______ _______  __Block Design  __20___ ___7___  __Matrix Reas.  _______ _______  __Visual Puzzles _______ _______  __Picture Comp. _______ _______  __Figure Weights _______ _______  __Digit Span  __ 15___ ___4___  __Arithmetic  ___14___             __11___  __L-N Sequencing _______ _______  __Symbol Search ______  _______  __Coding  __40___ ___8___  __Cancellation  _______ _______           HVLT-R  Trial   1 2    3      Total                  2             4             6                12                                                 Raw  T  Immediate Recall            12                         29      Delayed Recall                  5                          38      Retention (%)                    83                     48                          Rec/Dis Inc.  # Hits      9      #FPs     0                 43                BVMT-R  Trial   1   2     3       Total                  3               5             3                 11                                                   Raw       T  Immediate Recall            11                             34       Delayed Recall                    3                            31       Retention (%)                    60                          6-10                        Rec/Dis Inc.  # Hits       5     #FPs     0              16           __Reankit-Osterreith/Arlette Complex Figure Test    Raw  T-score   %ile  Copy   _32__         -                >16  Time               223 __                                 __WRAT-4   Reading SS = 110   %ile = 75    GE = >12.9    __COWAT   Raw__46___ Tscore__58___   __Semantic Fluency/Animals   Raw = 19      T-score = 54  __BNT   Raw = 58/60 %ile = 75-84  __Complex Ideational Material   Raw = 11/12 T-Score = 44    __Trail Making Test   A =   36         Errors = 0    %ile = 35-50   B =  105        Errors = 0    %ile= 20-25  __Stroop                       Raw         %ile        Word = _108_       90_        Color =  _68_       70-80_        C/W   =  __24_       20__    __JoLO   Raw = 24    %ile = 51-62    __BDI-II   Raw__14__ Interp.__ Mild ___   __BAI     Raw__7__ Interp. __Minimal___    __WMS-III   LM1 =42  SS = 12   LM2 = 25  SS = 13   LM2R = 27 Zscore = 1.08   home

## 2018-06-05 ASSESSMENT — ACTIVITIES OF DAILY LIVING (ADL)
I_NEED_ASSISTANCE_FOR_THE_FOLLOWING_DAILY_ACTIVITIES:: NO ASSISTANCE IS NEEDED
CURRENT_FUNCTION: NO ASSISTANCE NEEDED

## 2018-06-06 ENCOUNTER — OFFICE VISIT (OUTPATIENT)
Dept: FAMILY MEDICINE | Facility: CLINIC | Age: 73
End: 2018-06-06
Payer: COMMERCIAL

## 2018-06-06 VITALS
HEIGHT: 74 IN | BODY MASS INDEX: 24.66 KG/M2 | WEIGHT: 192.13 LBS | DIASTOLIC BLOOD PRESSURE: 74 MMHG | TEMPERATURE: 97.7 F | SYSTOLIC BLOOD PRESSURE: 128 MMHG | HEART RATE: 80 BPM

## 2018-06-06 DIAGNOSIS — Z13.89 SCREENING FOR DIABETIC PERIPHERAL NEUROPATHY: ICD-10-CM

## 2018-06-06 DIAGNOSIS — F32.0 MILD MAJOR DEPRESSION (H): ICD-10-CM

## 2018-06-06 DIAGNOSIS — E11.9 TYPE 2 DIABETES MELLITUS WITHOUT COMPLICATION, WITHOUT LONG-TERM CURRENT USE OF INSULIN (H): ICD-10-CM

## 2018-06-06 DIAGNOSIS — C49.3 SOFT TISSUE SARCOMA OF CHEST WALL (H): ICD-10-CM

## 2018-06-06 DIAGNOSIS — Z00.00 ROUTINE HISTORY AND PHYSICAL EXAMINATION OF ADULT: Primary | ICD-10-CM

## 2018-06-06 DIAGNOSIS — L90.5 PAIN IN SURGICAL SCAR: ICD-10-CM

## 2018-06-06 DIAGNOSIS — R52 PAIN IN SURGICAL SCAR: ICD-10-CM

## 2018-06-06 DIAGNOSIS — E78.5 HYPERLIPIDEMIA LDL GOAL <100: ICD-10-CM

## 2018-06-06 DIAGNOSIS — I10 HYPERTENSION GOAL BP (BLOOD PRESSURE) < 140/90: ICD-10-CM

## 2018-06-06 DIAGNOSIS — F41.9 ANXIETY: ICD-10-CM

## 2018-06-06 LAB
ANION GAP SERPL CALCULATED.3IONS-SCNC: 10 MMOL/L (ref 3–14)
BUN SERPL-MCNC: 23 MG/DL (ref 7–30)
CALCIUM SERPL-MCNC: 9.5 MG/DL (ref 8.5–10.1)
CHLORIDE SERPL-SCNC: 105 MMOL/L (ref 94–109)
CHOLEST SERPL-MCNC: 127 MG/DL
CO2 SERPL-SCNC: 26 MMOL/L (ref 20–32)
CREAT SERPL-MCNC: 0.85 MG/DL (ref 0.66–1.25)
GFR SERPL CREATININE-BSD FRML MDRD: 88 ML/MIN/1.7M2
GLUCOSE SERPL-MCNC: 143 MG/DL (ref 70–99)
HBA1C MFR BLD: 6.3 % (ref 0–5.6)
HDLC SERPL-MCNC: 63 MG/DL
LDLC SERPL CALC-MCNC: 50 MG/DL
NONHDLC SERPL-MCNC: 64 MG/DL
POTASSIUM SERPL-SCNC: 4.8 MMOL/L (ref 3.4–5.3)
SODIUM SERPL-SCNC: 141 MMOL/L (ref 133–144)
TRIGL SERPL-MCNC: 71 MG/DL

## 2018-06-06 PROCEDURE — 99207 C FOOT EXAM  NO CHARGE: CPT | Mod: 25 | Performed by: FAMILY MEDICINE

## 2018-06-06 PROCEDURE — 36415 COLL VENOUS BLD VENIPUNCTURE: CPT | Performed by: FAMILY MEDICINE

## 2018-06-06 PROCEDURE — 80061 LIPID PANEL: CPT | Performed by: FAMILY MEDICINE

## 2018-06-06 PROCEDURE — 99397 PER PM REEVAL EST PAT 65+ YR: CPT | Performed by: FAMILY MEDICINE

## 2018-06-06 PROCEDURE — 83036 HEMOGLOBIN GLYCOSYLATED A1C: CPT | Performed by: FAMILY MEDICINE

## 2018-06-06 PROCEDURE — 99213 OFFICE O/P EST LOW 20 MIN: CPT | Mod: 25 | Performed by: FAMILY MEDICINE

## 2018-06-06 PROCEDURE — 80048 BASIC METABOLIC PNL TOTAL CA: CPT | Performed by: FAMILY MEDICINE

## 2018-06-06 RX ORDER — LIDOCAINE 50 MG/G
OINTMENT TOPICAL PRN
Qty: 50 G | Refills: 3 | Status: CANCELLED | OUTPATIENT
Start: 2018-06-06

## 2018-06-06 RX ORDER — SERTRALINE HYDROCHLORIDE 25 MG/1
25 TABLET, FILM COATED ORAL DAILY
Qty: 60 TABLET | Refills: 3 | Status: SHIPPED | OUTPATIENT
Start: 2018-06-06 | End: 2018-07-17

## 2018-06-06 RX ORDER — LOSARTAN POTASSIUM 25 MG/1
25 TABLET ORAL DAILY
Qty: 90 TABLET | Refills: 3 | Status: SHIPPED | OUTPATIENT
Start: 2018-06-06 | End: 2019-06-12

## 2018-06-06 RX ORDER — ATORVASTATIN CALCIUM 40 MG/1
40 TABLET, FILM COATED ORAL DAILY
Qty: 90 TABLET | Refills: 3 | Status: SHIPPED | OUTPATIENT
Start: 2018-06-06 | End: 2019-06-12

## 2018-06-06 ASSESSMENT — ACTIVITIES OF DAILY LIVING (ADL): CURRENT_FUNCTION: NO ASSISTANCE NEEDED

## 2018-06-06 NOTE — MR AVS SNAPSHOT
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   o   Discuss preventive care.    o   Review your medicines if your doctor has prescribed any.    Talk with your health care provider about whether you should have a test to screen for prostate cancer (PSA).    Every 3 years, have a diabetes test (fasting glucose). If you are at risk for diabetes, you should have this test more often.    Every 5 years, have a cholesterol test. Have this test more often if you are at risk for high cholesterol or heart disease.     Every 10 years, have a colonoscopy. Or, have a yearly FIT test (stool test). These exams will check for colon cancer.    Talk to with your health care provider about screening for Abdominal Aortic Aneurysm if you have a family history of AAA or have a history of smoking.  Shots:     Get a flu shot each year.     Get a tetanus shot every 10 years.     Talk to your doctor about your pneumonia vaccines. There are now two you should  receive - Pneumovax (PPSV 23) and Prevnar (PCV 13).    Talk to your doctor about a shingles vaccine.     Talk to your doctor about the hepatitis B vaccine.  Nutrition:     Eat at least 5 servings of fruits and vegetables each day.     Eat whole-grain bread, whole-wheat pasta and brown rice instead of white grains and rice.     Talk to your doctor about Calcium and Vitamin D.   Lifestyle    Exercise for at least 150 minutes a week (30 minutes a day, 5 days a week). This will help you control your weight and prevent disease.     Limit alcohol to one drink per day.     No smoking.     Wear sunscreen to prevent skin cancer.     See your dentist every six months for an exam and cleaning.     See your eye doctor every 1 to 2 years to screen for conditions such as glaucoma, macular degeneration and cataracts.    MY DIABETES TODAY:    1)  Goal A1C is under <7.0  Mine is:      Lab Results   Component Value Date    A1C 6.3 06/06/2018       2)  Goal LDL (bad cholesterol) under 100  (measured at least yearly)- I am currently at:   Lab Results   Component Value Date    LDL 54 06/13/2017       3)  Goal blood pressure under 130/80- mine was 128/74 today    4)  Take aspirin daily     5)  No tobacco use          ACTION PLAN CHANGES FROM TODAY:    Care Plan changes:    -continue present medications  -start Zoloft  [phone] [facility] scheduling for CT scan      Labs:     Lab Results   Component Value Date    A1C 6.3 06/06/2018    A1C 6.5 03/19/2018    A1C 6.4 12/27/2017    A1C 6.2 06/13/2017    A1C 6.5 02/28/2017       Follow up 1 month for recheck    [place] patches. Lidocaine    Orders Placed This Encounter     FOOT EXAM  NO CHARGE [61777.114]     CT Chest w/o Contrast     Follow up Surveillance for sarcoma     Standing Status:   Future     Standing Expiration Date:   6/6/2019     Order Specific Question:   Priority     Answer:   Routine     Order Specific Question:   Clinical Info for the Interpreting Provider      Answer:   follow up CT     Order Specific Question:   Appointment urgency?     Answer:   AT YOUR CONVENIENCE     Order Specific Question:   PLEASE ANSWER ONE OF THE FOLLOWING QUESTIONS!     Answer:   SELECT ONE ONLY     Order Specific Question:   Other?     Answer:   Enter comment to the right => [20]     Lipid panel reflex to direct LDL Fasting     Last Lab Result: LDL Cholesterol Calculated (mg/dL)       Date                     Value                 2017               54               ----------     Order Specific Question:   Perform labs while fasting or non-fasting?     Answer:   Fasting     Hemoglobin A1c     Last Lab Result: Hemoglobin A1C (%)       Date                     Value                 2018               6.5 (H)          ----------     Basic metabolic panel     losartan (COZAAR) 25 MG tablet     Sig: Take 1 tablet (25 mg) by mouth daily     Dispense:  90 tablet     Refill:  3     atorvastatin (LIPITOR) 40 MG tablet     Sig: Take 1 tablet (40 mg) by mouth daily     Dispense:  90 tablet     Refill:  3     sertraline (ZOLOFT) 25 MG tablet     Sig: Take 1 tablet (25 mg) by mouth daily     Dispense:  60 tablet     Refill:  3     blood glucose monitoring (ACCU-CHEK RODRI PLUS) test strip     Si strip by In Vitro route daily     Dispense:  100 each     Refill:  3                 Follow-ups after your visit        Future tests that were ordered for you today     Open Future Orders        Priority Expected Expires Ordered    CT Chest w/o Contrast Routine  2019            Who to contact     If you have questions or need follow up information about today's clinic visit or your schedule please contact [facility] directly at [phone].  Normal or non-critical lab and imaging results will be communicated to you by MyChart, letter or phone within 4 business days after the clinic has received the results. If you do not hear from us within 7 days, please contact  "the clinic through [employer] or phone. If you have a critical or abnormal lab result, we will notify you by phone as soon as possible.  Submit refill requests through [employer] or call your pharmacy and they will forward the refill request to us. Please allow 3 business days for your refill to be completed.          Additional Information About Your Visit        [employer] Information     [employer] gives you secure access to your electronic health record. If you see a primary care provider, you can also send messages to your care team and make appointments. If you have questions, please call your primary care clinic.  If you do not have a primary care provider, please call [phone] and they will assist you.        Care EveryWhere ID     This is your Care EveryWhere ID. This could be used by other organizations to access your [place] medical records  [id number]        Your Vitals Were     Pulse Temperature Height BMI (Body Mass Index)          80 97.7  F (36.5  C) (Oral) 6' 1.5\" (1.867 m) 25 kg/m2         Blood Pressure from Last 3 Encounters:   06/06/18 128/74   03/19/18 128/74   12/27/17 134/68    Weight from Last 3 Encounters:   06/06/18 192 lb 2 oz (87.1 kg)   03/19/18 197 lb (89.4 kg)   12/27/17 198 lb 8 oz (90 kg)              We Performed the Following     Basic metabolic panel     FOOT EXAM  NO CHARGE [80011.114]     Hemoglobin A1c     Lipid panel reflex to direct LDL Fasting          Today's Medication Changes          These changes are accurate as of 6/6/18  9:30 AM.  If you have any questions, ask your nurse or doctor.               Start taking these medicines.        Dose/Directions    sertraline 25 MG tablet   Commonly known as:  ZOLOFT   Used for:  Mild major depression (H), Anxiety   Started by:  Tee Cuba MD        Dose:  25 mg   Take 1 tablet (25 mg) by mouth daily   Quantity:  60 tablet   Refills:  3         These medicines have changed or have updated prescriptions.        " Dose/Directions    aspirin 81 MG tablet   This may have changed:  See the new instructions.        1 tab po QD (Once per day)   Refills:  0            Where to get your medicines      These medications were sent to [facility] [address] [facility]  [address] [address]     Phone:  [phone]     atorvastatin 40 MG tablet    blood glucose monitoring test strip    losartan 25 MG tablet    sertraline 25 MG tablet                Primary Care Provider Office Phone # Fax #    Tee Cuba [phone] [address] [address]        Equal Access to Services     [facility]: Hadii aad ku hadasho Soomaali, waaxda luqadaha, qaybta kaalmada adeegyada, waxjose ramirez hayjordanan sushila olson . So [facility] [phone].    ATENCIÓN: Si habla español, tiene a pierre disposición servicios gratuitos de asistencia lingüística. Llame al [phone].    We comply with applicable federal civil rights laws and [place] laws. We do not discriminate on the basis of race, color, national origin, age, disability, sex, sexual orientation, or gender identity.            Thank you!     Thank you for choosing [facility]  for your care. Our goal is always to provide you with excellent care. Hearing back from our patients is one way we can continue to improve our services. Please take a few minutes to complete the written survey that you may receive in the mail after your visit with us. Thank you!             Your Updated Medication List - Protect others around you: Learn how to safely use, store and throw away your medicines at www.disposemymeds.org.          This list is accurate as of 6/6/18  9:30 AM.  Always use your most recent med list.                   Brand Name Dispense Instructions for use Diagnosis    aspirin 81 MG tablet      1 tab po QD (Once per day)        atorvastatin 40 MG tablet    LIPITOR    90 tablet    Take 1  tablet (40 mg) by mouth daily    Glucose intolerance (impaired glucose tolerance)       B-12 PO      Take by mouth every morning Take 1/2 tablet daily        blood glucose calibration solution     3 each    USE 1 DROP AS NEEDED    Glucose intolerance (impaired glucose tolerance)       blood glucose monitoring lancets     102 each    USE TO TEST BLOOD SUGARS DAILY AND AS NEEDED    Dysmetabolic syndrome X       blood glucose monitoring meter device kit           blood glucose monitoring test strip    ACCU-CHEK RODRI PLUS    100 each    1 strip by In Vitro route daily    Type 2 diabetes mellitus without complication, without long-term current use of insulin (H)       CALCITRATE/VITAMIN D 315-200 MG-UNIT Tabs per tablet   Generic drug:  calcium citrate-vitamin D      One tab daily in the morning        losartan 25 MG tablet    COZAAR    90 tablet    Take 1 tablet (25 mg) by mouth daily    Essential hypertension, benign       metFORMIN 500 MG tablet    GLUCOPHAGE    45 tablet    Take 0.5 tablets (250 mg) by mouth daily (with dinner) REFILL WHEN REQUESTED    Type 2 diabetes mellitus without complication, without long-term current use of insulin (H)       sertraline 25 MG tablet    ZOLOFT    60 tablet    Take 1 tablet (25 mg) by mouth daily    Mild major depression (H), Anxiety

## 2018-06-06 NOTE — PATIENT INSTRUCTIONS
Preventive Health Recommendations:       Male Ages 65 and over    Yearly exam:             See your health care provider every year in order to  o   Review health changes.   o   Discuss preventive care.    o   Review your medicines if your doctor has prescribed any.    Talk with your health care provider about whether you should have a test to screen for prostate cancer (PSA).    Every 3 years, have a diabetes test (fasting glucose). If you are at risk for diabetes, you should have this test more often.    Every 5 years, have a cholesterol test. Have this test more often if you are at risk for high cholesterol or heart disease.     Every 10 years, have a colonoscopy. Or, have a yearly FIT test (stool test). These exams will check for colon cancer.    Talk to with your health care provider about screening for Abdominal Aortic Aneurysm if you have a family history of AAA or have a history of smoking.  Shots:     Get a flu shot each year.     Get a tetanus shot every 10 years.     Talk to your doctor about your pneumonia vaccines. There are now two you should receive - Pneumovax (PPSV 23) and Prevnar (PCV 13).    Talk to your doctor about a shingles vaccine.     Talk to your doctor about the hepatitis B vaccine.  Nutrition:     Eat at least 5 servings of fruits and vegetables each day.     Eat whole-grain bread, whole-wheat pasta and brown rice instead of white grains and rice.     Talk to your doctor about Calcium and Vitamin D.   Lifestyle    Exercise for at least 150 minutes a week (30 minutes a day, 5 days a week). This will help you control your weight and prevent disease.     Limit alcohol to one drink per day.     No smoking.     Wear sunscreen to prevent skin cancer.     See your dentist every six months for an exam and cleaning.     See your eye doctor every 1 to 2 years to screen for conditions such as glaucoma, macular degeneration and cataracts.    MY DIABETES TODAY:    1)  Goal A1C is under <7.0  Mine  is:      Lab Results   Component Value Date    A1C 6.3 06/06/2018       2)  Goal LDL (bad cholesterol) under 100  (measured at least yearly)- I am currently at:   Lab Results   Component Value Date    LDL 54 06/13/2017       3)  Goal blood pressure under 130/80- mine was 128/74 today    4)  Take aspirin daily     5)  No tobacco use          ACTION PLAN CHANGES FROM TODAY:    Care Plan changes:    -continue present medications  -start Zoloft  [phone] [facility] scheduling for CT scan      Labs:     Lab Results   Component Value Date    A1C 6.3 06/06/2018    A1C 6.5 03/19/2018    A1C 6.4 12/27/2017    A1C 6.2 06/13/2017    A1C 6.5 02/28/2017       Follow up 1 month for recheck    [place] patches. Lidocaine    Orders Placed This Encounter     FOOT EXAM  NO CHARGE [18834.114]     CT Chest w/o Contrast     Follow up Surveillance for sarcoma     Standing Status:   Future     Standing Expiration Date:   6/6/2019     Order Specific Question:   Priority     Answer:   Routine     Order Specific Question:   Clinical Info for the Interpreting Provider     Answer:   follow up CT     Order Specific Question:   Appointment urgency?     Answer:   AT YOUR CONVENIENCE     Order Specific Question:   PLEASE ANSWER ONE OF THE FOLLOWING QUESTIONS!     Answer:   SELECT ONE ONLY     Order Specific Question:   Other?     Answer:   Enter comment to the right => [20]     Lipid panel reflex to direct LDL Fasting     Last Lab Result: LDL Cholesterol Calculated (mg/dL)       Date                     Value                 06/13/2017               54               ----------     Order Specific Question:   Perform labs while fasting or non-fasting?     Answer:   Fasting     Hemoglobin A1c     Last Lab Result: Hemoglobin A1C (%)       Date                     Value                 03/19/2018               6.5 (H)          ----------     Basic metabolic panel     losartan (COZAAR) 25 MG tablet     Sig: Take 1 tablet (25 mg) by  mouth daily     Dispense:  90 tablet     Refill:  3     atorvastatin (LIPITOR) 40 MG tablet     Sig: Take 1 tablet (40 mg) by mouth daily     Dispense:  90 tablet     Refill:  3     sertraline (ZOLOFT) 25 MG tablet     Sig: Take 1 tablet (25 mg) by mouth daily     Dispense:  60 tablet     Refill:  3     blood glucose monitoring (ACCU-CHEK RODRI PLUS) test strip     Si strip by In Vitro route daily     Dispense:  100 each     Refill:  3

## 2018-06-06 NOTE — PROGRESS NOTES
"SUBJECTIVE:   Gerber Levine is a 73 year old male who presents for Preventive Visit.    Are you in the first 12 months of your Medicare coverage?  No    Physical   Annual:     Getting at least 3 servings of Calcium per day::  Yes    Bi-annual eye exam::  Yes    Dental care twice a year::  Yes    Sleep apnea or symptoms of sleep apnea::  Daytime drowsiness    Diet::  Diabetic    Frequency of exercise::  6-7 days/week    Duration of exercise::  30-45 minutes    Taking medications regularly::  Yes    Medication side effects::  None    Additional concerns today::  YES (Patient has a list of a few concerns he would like to go over today.)    Ability to successfully perform activities of daily living: no assistance needed  Home Safety:  Lack of grab bars in the bathroom  Hearing Impairment: difficulty following a conversation in a noisy restaurant or crowded room, difficult to understand a speaker at a public meeting or Buddhism service, need to ask people to speak up or repeat themselves, difficulty understanding soft or whispered speech and difficulty understanding speech on the telephone        Fall risk:  Fallen 2 or more times in the past year?: No  Any fall with injury in the past year?: No    COGNITIVE SCREEN  1) Repeat 3 items (Banana, Sunrise, Chair)    2) Clock draw: NORMAL  3) 3 item recall: Recalls 3 objects  Results: 3 items recalled: COGNITIVE IMPAIRMENT LESS LIKELY    Mini-CogTM Copyright ANGELA Villeda. Licensed by the author for use in [facility]; reprinted with permission (gisele@.[facility]). All rights reserved.      Diabetes. Taking metformin 250 mg QD. He will occasionally notice elevated BS at ~160 mg/dL without apparent reason, typically after a short walk before lunch  Lab Results   Component Value Date    A1C 6.3 06/06/2018    A1C 6.5 03/19/2018    A1C 6.4 12/27/2017    A1C 6.2 06/13/2017    A1C 6.5 02/28/2017     Depression/anxiety. He notes that he has little energy, \"ambition\", " "motivation, and anhedonia. He does note that he has been \"worried all my life\", and has worked with multiple counselors in the past. Lately, he has had anxiety stemming from thinking about her daughter using a 90 year old winch amongst other things.  Historically, he has been on both Zoloft and Prozac. Prozac caused adverse effects (shakiness), but pt doesn't recall any side effects while on Zoloft. Patient had some questions regarding melatonin as well as pathology of depression.      Sleep apnea. Reports daytime drowsiness while driving to the point where he needs to have his wife drive when he has relatively long road trips. He does note a previous hx of sleep apnea treated via CPAP machine, but then stopped using machine after 50 lbs weight loss.      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 Oncology had suggested getting a chest CT scan every 6 months for two years. Patient's last CT chest was on 01/03/2018 which was negative for recurrence.      He had an hearing exam a couple of days ago for evaluation of hearing impairment. He declines hearing aids due to poor sound quality.       PHQ-9 score of 8    IVETTE-7 score of 8    Past/recent records reviewed and discussed for -- immunizations (patient got the new shingles vaccine).      Reviewed and updated as needed this visit by clinical staff  Tobacco  Allergies  Meds  Med Hx  Surg Hx  Fam Hx  Soc Hx        Reviewed and updated as needed this visit by Provider        Social History   Substance Use Topics     Smoking status: Never Smoker     Smokeless tobacco: Never Used     Alcohol use Yes      Comment: Occasionaly       Alcohol Use 6/5/2018   If you drink alcohol do you typically have greater than 3 drinks per day OR greater than 7 drinks per week? No   No flowsheet data found.            Today's PHQ-2 Score:   PHQ-2 ( 1999 [employer]) 6/5/2018   Q1: Little interest or pleasure in doing things 1   Q2: Feeling " down, depressed or hopeless 0   PHQ-2 Score 1   Q1: Little interest or pleasure in doing things Several days   Q2: Feeling down, depressed or hopeless Not at all   PHQ-2 Score 1       Do you feel safe in your environment - Yes    Do you have a Health Care Directive?: Yes: Advance Directive has been received and scanned.    Current providers sharing in care for this patient include:   Patient Care Team:  Tee Cuba MD as PCP - Cheryl Aldridge APRN CNS as Clinical Nurse Specialist (Oncology)    The following health maintenance items are reviewed in Epic and correct as of today:  Health Maintenance   Topic Date Due     DEPRESSION ACTION PLAN Q1 YR  02/10/1963     PHQ-9 Q6 MONTHS  08/28/2017     FOOT EXAM Q1 YEAR  02/28/2018     FALL RISK ASSESSMENT  06/13/2018     LIPID MONITORING Q1 YEAR  06/13/2018     A1C Q6 MO  09/19/2018     CREATININE Q1 YEAR  12/27/2018     MICROALBUMIN Q1 YEAR  12/27/2018     EYE EXAM Q1 YEAR  03/08/2019     ADVANCE DIRECTIVE PLANNING Q5 YRS  02/03/2020     TSH W/ FREE T4 REFLEX Q2 YEAR  03/19/2020     TETANUS Q10 YR  03/29/2026     COLONOSCOPY Q10 YR  05/12/2026     PNEUMOCOCCAL  Completed     INFLUENZA VACCINE  Completed     AORTIC ANEURYSM SCREENING (SYSTEM ASSIGNED)  Completed     Labs reviewed in EPIC  BP Readings from Last 3 Encounters:   06/06/18 128/74   03/19/18 128/74   12/27/17 134/68    Wt Readings from Last 3 Encounters:   06/06/18 87.1 kg (192 lb 2 oz)   03/19/18 89.4 kg (197 lb)   12/27/17 90 kg (198 lb 8 oz)                  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      Chest wall sarcoma (H)     Type 2 diabetes mellitus without complication, without long-term current use of insulin (H)     Past Surgical History:   Procedure Laterality Date     ABDOMEN SURGERY  1976    Spleen removed     APPENDECTOMY  1976     BIOPSY  2016     BRONCHOSCOPY FLEXIBLE N/A 9/21/2016    Procedure: BRONCHOSCOPY FLEXIBLE;  Surgeon: Leah Nixon MD;  Location: [facility] OR     CHOLECYSTECTOMY  1976     COLONOSCOPY  2006     EYE SURGERY  2014    cataracts     HERNIA REPAIR  1976     REPAIR CHEST WALL N/A 9/21/2016    Procedure: REPAIR CHEST WALL;  Surgeon: Leah Nixon MD;  Location: [facility] OR     SURGICAL HISTORY OF -   1976    SPLENECTOMY     SURGICAL HISTORY OF -       APPENDECTOMY     SURGICAL HISTORY OF -       CHOLECYSTECTOMY     SURGICAL HISTORY OF -       HERNIAORRHAPHY     SURGICAL HISTORY OF -   1981    cholesteotoma       Social History   Substance Use Topics     Smoking status: Never Smoker     Smokeless tobacco: Never Used     Alcohol use Yes      Comment: Occasionaly     Family History   Problem Relation Age of Onset     DIABETES Father      Genitourinary Problems Daughter      spherocytosis, had spleen removed     DIABETES Maternal Grandmother      Intellectual Disability (Mental Retardation) Sister      Dementia Mother      DIABETES Sister      type 1     DIABETES Sister      Prostate Cancer Brother      C.A.D. No family hx of      Cancer - colorectal No family hx of      Hypertension No family hx of      CEREBROVASCULAR DISEASE No family hx of      Breast Cancer No family hx of          Current Outpatient Prescriptions   Medication Sig Dispense Refill     ASPIRIN 81 MG OR TABS 1 tab po QD (Once per day) (Patient taking differently: 1 tab po QD (Once per day) QAM)       atorvastatin (LIPITOR) 40 MG tablet Take 1 tablet (40 mg) by mouth daily 90 tablet 3     blood glucose calibration (ACCU-CHEK RODRI) solution USE 1 DROP AS NEEDED 3 each 3     blood glucose monitoring (ACCU-CHEK RODRI PLUS) meter  device kit        blood glucose monitoring (ACCU-CHEK RODRI PLUS) test strip 1 strip by In Vitro route daily 100 each 3     blood glucose monitoring (ACCU-CHEK MULTICLIX) lancets USE TO TEST BLOOD SUGARS DAILY AND AS NEEDED 102 each 11     CALCITRATE/VITAMIN D 315-200 MG-UNIT OR TABS One tab daily in the morning       Cyanocobalamin (B-12 PO) Take by mouth every morning Take 1/2 tablet daily       losartan (COZAAR) 25 MG tablet Take 1 tablet (25 mg) by mouth daily 90 tablet 3     metFORMIN (GLUCOPHAGE) 500 MG tablet Take 0.5 tablets (250 mg) by mouth daily (with dinner) REFILL WHEN REQUESTED 45 tablet 3     Allergies   Allergen Reactions     Oxycodone Itching     No Known Drug Allergies      Recent Labs   Lab Test  06/06/18   0821  03/19/18   0801  12/27/17   0854  06/13/17   0849   11/14/16   0918   06/01/15   1002   11/15/11   1759   11/16/10   1033   A1C  6.3*  6.5*  6.4*  6.2*   < >  6.2*   < >  6.0   < >   --    < >  5.6   LDL   --    --    --   54   --   57   --   58   < >   --    < >   --    HDL   --    --    --   67   --   74   --   67   < >   --    < >   --    TRIG   --    --    --   81   --   80   --   106   < >   --    < >   --    ALT   --    --    --    --    --    --    --    --    --   19   --   18   CR   --    --   0.82  0.84   --    --    < >  0.86   < >  0.86   --   0.84   GFRESTIMATED   --    --   >90  90   --    --    < >  87   < >  88   --   >90   GFRESTBLACK   --    --   >90  >90  African American GFR Calc     --    --    < >  >90   GFR Calc     < >  >90   --   >90   POTASSIUM   --    --   4.6  4.4   --    --    < >  4.0   < >   --    --   4.4   TSH   --   3.42   --    --    --    --    --   2.52   < >   --    < >   --     < > = values in this interval not displayed.            Review of Systems  Constitutional, HEENT, cardiovascular, pulmonary, GI, , musculoskeletal, neuro, skin, endocrine and psych systems are negative, except as otherwise noted.    This document serves as a  "record of the services and decisions personally performed and made by Iggy Cuba MD. It was created on their behalf by Braeden Van, a trained medical scribe. The creation of this document is based the provider's statements to the medical scribe.  Braeden Van June 6, 2018 8:49 AM       OBJECTIVE:   /74 (BP Location: Right arm, Cuff Size: Adult Regular)  Pulse 80  Temp 97.7  F (36.5  C) (Oral)  Ht 1.867 m (6' 1.5\")  Wt 87.1 kg (192 lb 2 oz)  BMI 25 kg/m2 Estimated body mass index is 25 kg/(m^2) as calculated from the following:    Height as of this encounter: 1.867 m (6' 1.5\").    Weight as of this encounter: 87.1 kg (192 lb 2 oz).  Physical Exam  GENERAL: healthy, alert and no distress  EYES: Eyes grossly normal to inspection, PERRL and conjunctivae and sclerae normal  HENT: ear canals and TM's normal, nose and mouth without ulcers or lesions  NECK: no adenopathy, no asymmetry, masses, or scars and thyroid normal to palpation  RESP: lungs clear to auscultation - no rales, rhonchi or wheezes  CV: regular rate and rhythm, normal S1 S2, no S3 or S4, no murmur, click or rub, no peripheral edema and peripheral pulses strong  ABDOMEN: soft, nontender, no hepatosplenomegaly, no masses and bowel sounds normal  MS: no gross musculoskeletal defects noted, no edema  SKIN: no suspicious lesions or rashes  NEURO: Normal strength and tone, mentation intact and speech normal  PSYCH: mentation appears normal, affect normal/bright  LYMPH: no cervical, supraclavicular, axillary, or inguinal adenopathy  Foot exam: normal peripheral pulses, normal sensation    Normal pulses, normal sensation  Results for orders placed or performed in visit on 06/06/18 (from the past 24 hour(s))   Hemoglobin A1c   Result Value Ref Range    Hemoglobin A1C 6.3 (H) 0 - 5.6 %       ASSESSMENT / PLAN:   (Z00.00) Routine history and physical examination of adult  (primary encounter diagnosis)  Comment: screening recommendations related to health " maintenance were reviewed.   Plan:     (F32.0) Mild major depression (H)  Comment: PHQ-9 score of 8 and IVETTE-7 score of 8. Scores denote mild depressive and anxiety symptoms. Patient amendable to trying serotonin modulators. Daytime drowsiness likely related to poor sleep quality due to anxiety, although question of sleep apnea given previous hx of NIKKIE prevails.    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 Per oncology's recommendations, we'll continue to check chest CT every 6 months. Last chest CT on 01/03/2018 was negative for recurrence, patient due for repeat.    Plan: CT Chest w/o Contrast        -follow up, pending results    (E11.9) Type 2 diabetes mellitus without complication, without long-term current use of insulin (H)  Comment: A1c at 6.3%; controlled  Plan: Hemoglobin A1c, Basic metabolic panel, blood         glucose monitoring (ACCU-CHEK RODRI PLUS) test         Strip, atorvastatin (LIPITOR) 40 MG tablet        -continue present medications    (E78.5) Hyperlipidemia LDL goal <100  Comment:   Plan: Lipid panel reflex to direct LDL Fasting            (Z13.89) Screening for diabetic peripheral neuropathy  Comment: foot exam was normal, no signs of neuropathy  Plan: FOOT EXAM  NO CHARGE [97307.114]            (R52,  L90.5) Pain in surgical scar  Comment: continues to note pain in surgical scar. Scar is clear  Plan: symptomatic  "treatment and salonpas patches      End of Life Planning:  Patient currently has an advanced directive: Yes.  Practitioner is supportive of decision.    COUNSELING:  Reviewed preventive health counseling, as reflected in patient instructions       Regular exercise       Healthy diet/nutrition        Estimated body mass index is 25 kg/(m^2) as calculated from the following:    Height as of this encounter: 1.867 m (6' 1.5\").    Weight as of this encounter: 87.1 kg (192 lb 2 oz).     reports that he has never smoked. He has never used smokeless tobacco.      Appropriate preventive services were discussed with this patient, including applicable screening as appropriate for cardiovascular disease, diabetes, osteopenia/osteoporosis, and glaucoma.  As appropriate for age/gender, discussed screening for colorectal cancer, prostate cancer, breast cancer, and cervical cancer. Checklist reviewing preventive services available has been given to the patient.    Reviewed patients plan of care and provided an AVS. The Basic Care Plan (routine screening as documented in Health Maintenance) for Gerber meets the Care Plan requirement. This Care Plan has been established and reviewed with the Patient.    Counseling Resources:  ATP IV Guidelines  Pooled Cohorts Equation Calculator  Breast Cancer Risk Calculator  FRAX Risk Assessment  ICSI Preventive Guidelines  Dietary Guidelines for Americans, 2010  USDA's MyPlate  ASA Prophylaxis  Lung CA Screening    The information in this document, created by the medical scribe for me, accurately reflects the services I personally performed and the decisions made by me. I have reviewed and approved this document for accuracy prior to leaving the patient care area.    Tee Cuba MD, MD  [facility]  Answers for HPI/ROS submitted by the patient on 6/5/2018   PHQ-2 Score: 1    "

## 2018-06-11 ENCOUNTER — RADIANT APPOINTMENT (OUTPATIENT)
Dept: CT IMAGING | Facility: CLINIC | Age: 73
End: 2018-06-11
Attending: FAMILY MEDICINE
Payer: COMMERCIAL

## 2018-06-11 DIAGNOSIS — C49.3 SOFT TISSUE SARCOMA OF CHEST WALL (H): ICD-10-CM

## 2018-06-13 ENCOUNTER — TELEPHONE (OUTPATIENT)
Dept: FAMILY MEDICINE | Facility: CLINIC | Age: 73
End: 2018-06-13

## 2018-06-25 NOTE — PROGRESS NOTES
The patient was seen for neuropsychological evaluation at the request of Tee Cuba for the purposes of diagnostic clarification and treatment planning.  One hour of face-to-face testing was provided by this writer.  Please see Dr. Alfonzo Lara's report for a full interpretation of the findings.     D-Team

## 2018-07-17 ENCOUNTER — OFFICE VISIT (OUTPATIENT)
Dept: FAMILY MEDICINE | Facility: CLINIC | Age: 73
End: 2018-07-17
Payer: COMMERCIAL

## 2018-07-17 VITALS
DIASTOLIC BLOOD PRESSURE: 72 MMHG | SYSTOLIC BLOOD PRESSURE: 128 MMHG | HEART RATE: 74 BPM | HEIGHT: 74 IN | BODY MASS INDEX: 24.9 KG/M2 | WEIGHT: 194 LBS | TEMPERATURE: 98.2 F

## 2018-07-17 DIAGNOSIS — F32.0 MILD MAJOR DEPRESSION (H): ICD-10-CM

## 2018-07-17 DIAGNOSIS — F41.9 ANXIETY: ICD-10-CM

## 2018-07-17 PROCEDURE — 99214 OFFICE O/P EST MOD 30 MIN: CPT | Performed by: FAMILY MEDICINE

## 2018-07-17 RX ORDER — SERTRALINE HYDROCHLORIDE 25 MG/1
50 TABLET, FILM COATED ORAL DAILY
Qty: 60 TABLET | Refills: 3 | COMMUNITY
Start: 2018-07-17 | End: 2018-08-28 | Stop reason: DRUGHIGH

## 2018-07-17 ASSESSMENT — ANXIETY QUESTIONNAIRES
5. BEING SO RESTLESS THAT IT IS HARD TO SIT STILL: NOT AT ALL
7. FEELING AFRAID AS IF SOMETHING AWFUL MIGHT HAPPEN: SEVERAL DAYS
3. WORRYING TOO MUCH ABOUT DIFFERENT THINGS: MORE THAN HALF THE DAYS
GAD7 TOTAL SCORE: 8
1. FEELING NERVOUS, ANXIOUS, OR ON EDGE: NEARLY EVERY DAY
6. BECOMING EASILY ANNOYED OR IRRITABLE: NOT AT ALL
2. NOT BEING ABLE TO STOP OR CONTROL WORRYING: MORE THAN HALF THE DAYS

## 2018-07-17 ASSESSMENT — PATIENT HEALTH QUESTIONNAIRE - PHQ9: 5. POOR APPETITE OR OVEREATING: NOT AT ALL

## 2018-07-17 NOTE — PATIENT INSTRUCTIONS
-You can take zoloft 50 mg (take two 25 mg tabs)    -MyCrolandot message me in a month your response to higher dose of zoloft

## 2018-07-17 NOTE — PROGRESS NOTES
SUBJECTIVE:   Gerber Levine is a 73 year old male who presents to clinic today for the following health issues:      Depression and Anxiety Follow-Up    Status since last visit: No change    Other associated symptoms:None    Complicating factors:     Significant life event: No     Current substance abuse: None    PHQ-9 2/25/2016 6/28/2016 2/28/2017   Total Score 6 3 7   Q9: Suicide Ideation Not at all Not at all Not at all     IVETTE-7 SCORE 12/15/2015 2/25/2016 6/28/2016   Total Score - - -   Total Score 3 3 8       PHQ-9  English  PHQ-9   Any Language  IVETTE-7  Suicide Assessment Five-step Evaluation and Treatment (SAFE-T)      Pulmonary Nodule Conference        Patient Name: Gerber Levine     Reason for conference discussion (brief overview): 72 year old male 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 Never smoker. Gets surveillance CTs with his PCP. Recent chest CT shows:     1. New 2 mm subpleural pulmonary nodule in the posterior left lower  lobe. Recommend attention on follow-up.  2. Additional sub-7 mm pulmonary nodules are stable since at least  9/14/2016.   3. Unchanged 4 mm soft tissue nodule along the right lateral chest,  since 6/29/2017. Recommend continued attention on follow-up.     Specific Question:  Follow up?     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 Iggy Cuba     The patient's case was presented at the multidisciplinary conference for the above noted reason.  There was a consensus recommendation for the following actions:      Recommend follow up chest CT in 6 months due to the new 2 mm nodule. If the 6 month chest CT is stable, then the subsequent follow up should be 1 year from then.    Note from pulmonary reference was reviewed with patient.       Depression/anxiety. Patient was started on zoloft 25 mg QD for treatment of anhedonia. Though over the past month he has had  difficulties waking up in the middle of the night, with his mind racing and finding himself tossing and turning, he feels like things have improved over the past couple of days.       Problem list and histories reviewed & adjusted, as indicated.  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     Chest wall sarcoma (H)     Type 2 diabetes mellitus without complication, without long-term current use of insulin (H)     Past Surgical History:   Procedure Laterality Date     ABDOMEN SURGERY  1976    Spleen removed     APPENDECTOMY  1976     BIOPSY  2016     BRONCHOSCOPY FLEXIBLE N/A 9/21/2016    Procedure: BRONCHOSCOPY FLEXIBLE;  Surgeon: Leah Nixon MD;  Location: [facility] OR     CHOLECYSTECTOMY  1976     COLONOSCOPY  2006     EYE SURGERY  2014    cataracts     HERNIA REPAIR  1976     REPAIR CHEST WALL N/A 9/21/2016    Procedure: REPAIR CHEST WALL;  Surgeon: Leah Nixon MD;  Location: [facility] OR     SURGICAL HISTORY OF -   1976    SPLENECTOMY     SURGICAL HISTORY OF -       APPENDECTOMY     SURGICAL HISTORY OF -       CHOLECYSTECTOMY     SURGICAL HISTORY OF -       HERNIAORRHAPHY     SURGICAL HISTORY OF -   1981    cholesteotoma       Social History   Substance Use Topics     Smoking status: Never Smoker     Smokeless tobacco: Never Used     Alcohol use Yes      Comment: Occasionaly     Family History   Problem Relation Age of Onset     Diabetes Father      Genitourinary Problems Daughter      spherocytosis, had spleen removed     Diabetes Maternal Grandmother      Intellectual Disability (Mental Retardation)  Sister      Dementia Mother      Diabetes Sister      type 1     Diabetes Sister      Prostate Cancer Brother      C.A.D. No family hx of      Cancer - colorectal No family hx of      Hypertension No family hx of      Cerebrovascular Disease No family hx of      Breast Cancer No family hx of          Current Outpatient Prescriptions   Medication Sig Dispense Refill     ASPIRIN 81 MG OR TABS 1 tab po QD (Once per day) (Patient taking differently: 1 tab po QD (Once per day) QAM)       atorvastatin (LIPITOR) 40 MG tablet Take 1 tablet (40 mg) by mouth daily 90 tablet 3     blood glucose calibration (ACCU-CHEK RODRI) solution USE 1 DROP AS NEEDED 3 each 3     blood glucose monitoring (ACCU-CHEK RODRI PLUS) meter device kit        blood glucose monitoring (ACCU-CHEK RODRI PLUS) test strip 1 strip by In Vitro route daily 100 each 3     blood glucose monitoring (ACCU-CHEK MULTICLIX) lancets USE TO TEST BLOOD SUGARS DAILY AND AS NEEDED 102 each 11     CALCITRATE/VITAMIN D 315-200 MG-UNIT OR TABS One tab daily in the morning       Cyanocobalamin (B-12 PO) Take by mouth every morning Take 1/2 tablet daily       losartan (COZAAR) 25 MG tablet Take 1 tablet (25 mg) by mouth daily 90 tablet 3     metFORMIN (GLUCOPHAGE) 500 MG tablet Take 0.5 tablets (250 mg) by mouth daily (with dinner) REFILL WHEN REQUESTED 45 tablet 3     sertraline (ZOLOFT) 25 MG tablet Take 1 tablet (25 mg) by mouth daily 60 tablet 3     Allergies   Allergen Reactions     Oxycodone Itching     No Known Drug Allergies      Recent Labs   Lab Test  06/06/18   0821  03/19/18   0801  12/27/17   0854  06/13/17   0849   11/14/16   0918   06/01/15   1002   11/15/11   1759   11/16/10   1033   A1C  6.3*  6.5*  6.4*  6.2*   < >  6.2*   < >  6.0   < >   --    < >  5.6   LDL  50   --    --   54   --   57   --   58   < >   --    < >   --    HDL  63   --    --   67   --   74   --   67   < >   --    < >   --    TRIG  71   --    --   81   --   80   --   106   < >   --    < >  "  --    ALT   --    --    --    --    --    --    --    --    --   19   --   18   CR  0.85   --   0.82  0.84   --    --    < >  0.86   < >  0.86   --   0.84   GFRESTIMATED  88   --   >90  90   --    --    < >  87   < >  88   --   >90   GFRESTBLACK  >90   --   >90  >90  African American GFR Calc     --    --    < >  >90   GFR Calc     < >  >90   --   >90   POTASSIUM  4.8   --   4.6  4.4   --    --    < >  4.0   < >   --    --   4.4   TSH   --   3.42   --    --    --    --    --   2.52   < >   --    < >   --     < > = values in this interval not displayed.      BP Readings from Last 3 Encounters:   07/17/18 128/72   06/06/18 128/74   03/19/18 128/74    Wt Readings from Last 3 Encounters:   07/17/18 88 kg (194 lb)   06/06/18 87.1 kg (192 lb 2 oz)   03/19/18 89.4 kg (197 lb)                  Labs reviewed in EPIC    Reviewed and updated as needed this visit by clinical staff       Reviewed and updated as needed this visit by Provider         ROS:  Constitutional, HEENT, cardiovascular, pulmonary, GI, , musculoskeletal, neuro, skin, endocrine and psych systems are negative, except as otherwise noted.    This document serves as a record of the services and decisions personally performed and made by Iggy Cuba MD. It was created on their behalf by Braeden Van, a trained medical scribe. The creation of this document is based the provider's statements to the medical scribe.  Braeden Van July 17, 2018 8:06 AM       OBJECTIVE:     /72  Pulse 74  Temp 98.2  F (36.8  C) (Oral)  Ht 1.867 m (6' 1.5\")  Wt 88 kg (194 lb)  BMI 25.25 kg/m2  Body mass index is 25.25 kg/(m^2).  GENERAL: healthy, alert and no distress  HENT: ear canals and TM's normal, nose and mouth without ulcers or lesions  RESP: lungs clear to auscultation - no rales, rhonchi or wheezes  CV: regular rate and rhythm, normal S1 S2, no S3 or S4, no murmur, click or rub  MS: no gross musculoskeletal defects noted, no edema  SKIN: no " suspicious lesions or rashes  PSYCH: mentation appears normal, affect normal/bright    Diagnostic Test Results:  none     ASSESSMENT/PLAN:   (F32.0) Mild major depression (H)  Comment: PHQ-9 score of 7 and IVETTE-7 score of 8, mildly improved from previous metrics. Patient currently taking zoloft 25 mg QD. SSRI has made an auspicious beginning at a low dose as it has started helping with insomnia. Patient amendable to trying a higher dose for better symptomatic relief.    Plan: sertraline (ZOLOFT) 25 MG tablet        -increase zoloft to 50 mg - okay to use 25 mg formulation BID until supply runs out.         -patient to notify our clinic of his response to higher dose of SSRI in a month    (F41.9) Anxiety  Comment: appears to have improved, patient would benefit from higher dose of zoloft.   Plan: sertraline (ZOLOFT) 25 MG tablet        -increase zoloft to 50 mg      The information in this document, created by the medical scribe for me, accurately reflects the services I personally performed and the decisions made by me. I have reviewed and approved this document for accuracy prior to leaving the patient care area.    Tee Cuba MD, MD  [facility]

## 2018-07-17 NOTE — MR AVS SNAPSHOT
After Visit Summary   7/17/2018    Gerber Levine    MRN: [id number]           Patient Information     Date Of Birth          1945        Visit Information        Provider Department      7/17/2018 8:00 AM Tee Cuba MD [facility]        Today's Diagnoses     Mild major depression (H)        Anxiety          Care Instructions    -You can take zoloft 50 mg (take two 25 mg tabs)    -[employer] message me in a month your response to higher dose of zoloft           Follow-ups after your visit        Who to contact     If you have questions or need follow up information about today's clinic visit or your schedule please contact [facility] directly at [phone].  Normal or non-critical lab and imaging results will be communicated to you by [employer], letter or phone within 4 business days after the clinic has received the results. If you do not hear from us within 7 days, please contact the clinic through [employer] or phone. If you have a critical or abnormal lab result, we will notify you by phone as soon as possible.  Submit refill requests through [employer] or call your pharmacy and they will forward the refill request to us. Please allow 3 business days for your refill to be completed.          Additional Information About Your Visit        MyChart Information     [employer] gives you secure access to your electronic health record. If you see a primary care provider, you can also send messages to your care team and make appointments. If you have questions, please call your primary care clinic.  If you do not have a primary care provider, please call [phone] and they will assist you.        Care EveryWhere ID     This is your Care EveryWhere ID. This could be used by other organizations to access your Mobile medical records  [id number]        Your Vitals Were     Pulse Temperature Height BMI (Body Mass Index)          74 98.2  F (36.8  C) (Oral) 6'  "1.5\" (1.867 m) 25.25 kg/m2         Blood Pressure from Last 3 Encounters:   07/17/18 128/72   06/06/18 128/74   03/19/18 128/74    Weight from Last 3 Encounters:   07/17/18 194 lb (88 kg)   06/06/18 192 lb 2 oz (87.1 kg)   03/19/18 197 lb (89.4 kg)              Today, you had the following     No orders found for display         Today's Medication Changes          These changes are accurate as of 7/17/18 12:40 PM.  If you have any questions, ask your nurse or doctor.               These medicines have changed or have updated prescriptions.        Dose/Directions    aspirin 81 MG tablet   This may have changed:  See the new instructions.        1 tab po QD (Once per day)   Refills:  0       sertraline 25 MG tablet   Commonly known as:  ZOLOFT   This may have changed:  how much to take   Used for:  Mild major depression (H), Anxiety   Changed by:  Tee Cuba MD        Dose:  50 mg   Take 2 tablets (50 mg) by mouth daily   Quantity:  60 tablet   Refills:  3                Primary Care Provider Office Phone # Fax #    Tee Cuba [phone]       [address] [facility] [address]        Equal Access to Services     SARAH MELO AH: Ishmael singh hadbrandyo Sodouglasali, waaxda luqadaha, qaybta kaalmada adeegyada, sudarshan farrell. So [facility] [phone].    ATENCIÓN: Si habla español, tiene a pierre disposición servicios gratuitos de asistencia lingüística. Llame al [phone].    We comply with applicable federal civil rights laws and [place] laws. We do not discriminate on the basis of race, color, national origin, age, disability, sex, sexual orientation, or gender identity.            Thank you!     Thank you for choosing [facility]  for your care. Our goal is always to provide you with excellent care. Hearing back from our patients is one way we can continue to improve our services. Please take a few minutes to complete the written " survey that you may receive in the mail after your visit with us. Thank you!             Your Updated Medication List - Protect others around you: Learn how to safely use, store and throw away your medicines at www.disposemymeds.org.          This list is accurate as of 7/17/18 12:40 PM.  Always use your most recent med list.                   Brand Name Dispense Instructions for use Diagnosis    aspirin 81 MG tablet      1 tab po QD (Once per day)        atorvastatin 40 MG tablet    LIPITOR    90 tablet    Take 1 tablet (40 mg) by mouth daily    Type 2 diabetes mellitus without complication, without long-term current use of insulin (H)       B-12 PO      Take by mouth every morning Take 1/2 tablet daily        blood glucose calibration solution     3 each    USE 1 DROP AS NEEDED    Glucose intolerance (impaired glucose tolerance)       blood glucose monitoring lancets     102 each    USE TO TEST BLOOD SUGARS DAILY AND AS NEEDED    Dysmetabolic syndrome X       blood glucose monitoring meter device kit           blood glucose monitoring test strip    ACCU-CHEK RODRI PLUS    100 each    1 strip by In Vitro route daily    Type 2 diabetes mellitus without complication, without long-term current use of insulin (H)       CALCITRATE/VITAMIN D 315-200 MG-UNIT Tabs per tablet   Generic drug:  calcium citrate-vitamin D      One tab daily in the morning        losartan 25 MG tablet    COZAAR    90 tablet    Take 1 tablet (25 mg) by mouth daily        metFORMIN 500 MG tablet    GLUCOPHAGE    45 tablet    Take 0.5 tablets (250 mg) by mouth daily (with dinner) REFILL WHEN REQUESTED    Type 2 diabetes mellitus without complication, without long-term current use of insulin (H)       sertraline 25 MG tablet    ZOLOFT    60 tablet    Take 2 tablets (50 mg) by mouth daily    Mild major depression (H), Anxiety

## 2018-07-18 ASSESSMENT — ANXIETY QUESTIONNAIRES: GAD7 TOTAL SCORE: 8

## 2018-07-18 ASSESSMENT — PATIENT HEALTH QUESTIONNAIRE - PHQ9: SUM OF ALL RESPONSES TO PHQ QUESTIONS 1-9: 7

## 2018-07-25 ENCOUNTER — TELEPHONE (OUTPATIENT)
Dept: FAMILY MEDICINE | Facility: CLINIC | Age: 73
End: 2018-07-25

## 2018-08-28 ENCOUNTER — MYC MEDICAL ADVICE (OUTPATIENT)
Dept: FAMILY MEDICINE | Facility: CLINIC | Age: 73
End: 2018-08-28

## 2018-08-28 DIAGNOSIS — F32.0 MILD MAJOR DEPRESSION (H): ICD-10-CM

## 2018-08-28 DIAGNOSIS — F41.9 ANXIETY: ICD-10-CM

## 2018-08-28 RX ORDER — SERTRALINE HYDROCHLORIDE 25 MG/1
50 TABLET, FILM COATED ORAL DAILY
Qty: 60 TABLET | Refills: 3 | Status: CANCELLED | OUTPATIENT
Start: 2018-08-28

## 2018-11-06 ENCOUNTER — TELEPHONE (OUTPATIENT)
Dept: FAMILY MEDICINE | Facility: CLINIC | Age: 73
End: 2018-11-06

## 2018-11-06 DIAGNOSIS — E11.9 TYPE 2 DIABETES MELLITUS WITHOUT COMPLICATION, WITHOUT LONG-TERM CURRENT USE OF INSULIN (H): Primary | ICD-10-CM

## 2018-11-07 NOTE — TELEPHONE ENCOUNTER
[facility] faxed a refill request re: blood glucose monitoring (ACCU-CHEK MULTICLIX) lancets    Pharmacy Comments:   Multiclix not available anymore.  Please send Rx for SOFTCLIX.

## 2018-11-07 NOTE — TELEPHONE ENCOUNTER
Route request for alternative medication to providers in Dr. Cuba's absence.  Please see note below with request for Softclix.     Dina Galo RN

## 2018-11-12 DIAGNOSIS — R73.02 GLUCOSE INTOLERANCE (IMPAIRED GLUCOSE TOLERANCE): ICD-10-CM

## 2018-11-12 NOTE — TELEPHONE ENCOUNTER
Reason for Call:  Medication or medication refill:    Do you use a [facility]?  Name of the pharmacy and phone number for the current request:  [facility] [address] [place] [address]    Name of the medication requested: blood glucose monitoring (ACCU-CHEK RODRI PLUS) meter device kit    Other request: Patient states the pharmacy told him to put in the reqeust    Can we leave a detailed message on this number? YES    Phone number patient can be reached at: Home number on file [phone] (home)    Best Time: anytime    Call taken on 11/12/2018 at 1:51 PM by Taylor Shea

## 2018-11-13 RX ORDER — BLOOD GLUCOSE CONTROL HIGH,LOW
EACH MISCELLANEOUS
Qty: 3 EACH | Refills: 3 | Status: SHIPPED | OUTPATIENT
Start: 2018-11-13 | End: 2023-12-25

## 2018-11-13 NOTE — TELEPHONE ENCOUNTER
"Requested Prescriptions   Pending Prescriptions Disp Refills     blood glucose calibration (ACCU-CHEK RODRI) solution 3 each 3     Sig: Use to calibrate blood glucose monitor as needed as directed.    Diabetic Supplies Protocol Passed    11/12/2018  1:56 PM       Passed - Patient is 18 years of age or older       Passed - Recent (6 mo) or future (30 days) visit within the authorizing provider's specialty    Patient had office visit in the last 6 months or has a visit in the next 30 days with authorizing provider.  See \"Patient Info\" tab in inbasket, or \"Choose Columns\" in Meds & Orders section of the refill encounter.            Prescription approved per [facility] Refill Protocol.    Dina Galo RN    "

## 2018-12-11 ENCOUNTER — OFFICE VISIT (OUTPATIENT)
Dept: FAMILY MEDICINE | Facility: CLINIC | Age: 73
End: 2018-12-11
Payer: COMMERCIAL

## 2018-12-11 VITALS
HEART RATE: 74 BPM | TEMPERATURE: 98.2 F | WEIGHT: 197 LBS | DIASTOLIC BLOOD PRESSURE: 70 MMHG | SYSTOLIC BLOOD PRESSURE: 126 MMHG | BODY MASS INDEX: 25.28 KG/M2 | HEIGHT: 74 IN

## 2018-12-11 DIAGNOSIS — R41.3 MEMORY CHANGE: ICD-10-CM

## 2018-12-11 DIAGNOSIS — C49.3 SOFT TISSUE SARCOMA OF CHEST WALL (H): ICD-10-CM

## 2018-12-11 DIAGNOSIS — E11.9 TYPE 2 DIABETES MELLITUS WITHOUT COMPLICATION, WITHOUT LONG-TERM CURRENT USE OF INSULIN (H): Primary | ICD-10-CM

## 2018-12-11 DIAGNOSIS — I10 HYPERTENSION GOAL BP (BLOOD PRESSURE) < 140/90: ICD-10-CM

## 2018-12-11 LAB — HBA1C MFR BLD: 6.4 % (ref 0–5.6)

## 2018-12-11 PROCEDURE — 99214 OFFICE O/P EST MOD 30 MIN: CPT | Performed by: FAMILY MEDICINE

## 2018-12-11 PROCEDURE — 36415 COLL VENOUS BLD VENIPUNCTURE: CPT | Performed by: FAMILY MEDICINE

## 2018-12-11 PROCEDURE — 83036 HEMOGLOBIN GLYCOSYLATED A1C: CPT | Performed by: FAMILY MEDICINE

## 2018-12-11 ASSESSMENT — MIFFLIN-ST. JEOR: SCORE: 1690.4

## 2018-12-11 NOTE — PATIENT INSTRUCTIONS
Scheduling Appointments    [facility] [place]  Call: [phone]    [place] [facility]  Call: [phone]    [facility]. All locations.  Call: [phone]    Lab Results   Component Value Date    A1C 6.4 12/11/2018    A1C 6.3 06/06/2018    A1C 6.5 03/19/2018    A1C 6.4 12/27/2017    A1C 6.2 06/13/2017      Continue present medications      Ok to taper Zoloft and monitor.

## 2018-12-11 NOTE — PROGRESS NOTES
"  SUBJECTIVE:   Gerber Levine is a 73 year old male who presents to clinic today for the following health issues:      Diabetes Follow-up      Patient is checking blood sugars: pretty good results, stable     Diabetic concerns: None     Symptoms of hypoglycemia (low blood sugar): none     Paresthesias (numbness or burning in feet) or sores: No     Date of last diabetic eye exam: March 2018    Diabetes Management Resources    Hyperlipidemia Follow-Up      Rate your low fat/cholesterol diet?: good    Taking statin?  Yes, no muscle aches from statin    Other lipid medications/supplements?:  none    Hypertension Follow-up      Outpatient blood pressures are being checked at store.  Results are \"generally fine\".    Low Salt Diet: no added salt    BP Readings from Last 2 Encounters:   07/17/18 128/72   06/06/18 128/74     Hemoglobin A1C (%)   Date Value   06/06/2018 6.3 (H)   03/19/2018 6.5 (H)     LDL Cholesterol Calculated (mg/dL)   Date Value   06/06/2018 50   06/13/2017 54       Amount of exercise or physical activity: None    Problems taking medications regularly: No    Medication side effects: none    Diet: low salt, low fat/cholesterol and diabetic        Also needs follow up CT scan for surveillance and follow up for 1 year recheck on memory    Problem list and histories reviewed & adjusted, as indicated.  Additional history: as documented    Recent Labs   Lab Test 12/11/18  0701 06/06/18  0821 03/19/18  0801 12/27/17  0854 06/13/17  0849  11/14/16  0918  06/01/15  1002  11/15/11  1759  11/16/10  1033   A1C 6.4* 6.3* 6.5* 6.4* 6.2*   < > 6.2*   < > 6.0   < >  --    < > 5.6   LDL  --  50  --   --  54  --  57  --  58   < >  --    < >  --    HDL  --  63  --   --  67  --  74  --  67   < >  --    < >  --    TRIG  --  71  --   --  81  --  80  --  106   < >  --    < >  --    ALT  --   --   --   --   --   --   --   --   --   --  19  --  18   CR  --  0.85  --  0.82 0.84  --   --    < > 0.86   < > 0.86  --  0.84 " "  GFRESTIMATED  --  88  --  >90 90  --   --    < > 87   < > 88  --  >90   GFRESTBLACK  --  >90  --  >90 >90  African American GFR Calc    --   --    < > >90   GFR Calc     < > >90  --  >90   POTASSIUM  --  4.8  --  4.6 4.4  --   --    < > 4.0   < >  --   --  4.4   TSH  --   --  3.42  --   --   --   --   --  2.52   < >  --    < >  --     < > = values in this interval not displayed.      BP Readings from Last 3 Encounters:   12/11/18 126/70   07/17/18 128/72   06/06/18 128/74    Wt Readings from Last 3 Encounters:   12/11/18 89.4 kg (197 lb)   07/17/18 88 kg (194 lb)   06/06/18 87.1 kg (192 lb 2 oz)                    Reviewed and updated as needed this visit by clinical staff       Reviewed and updated as needed this visit by Provider         ROS:  Constitutional, HEENT, cardiovascular, pulmonary, gi and gu systems are negative, except as otherwise noted.    OBJECTIVE:     /70   Pulse 74   Temp 98.2  F (36.8  C) (Oral)   Ht 1.867 m (6' 1.5\")   Wt 89.4 kg (197 lb)   BMI 25.64 kg/m    Body mass index is 25.64 kg/m .   GENERAL: healthy, alert and no distress  NECK: no adenopathy, no asymmetry, masses, or scars and thyroid normal to palpation  RESP: lungs clear to auscultation - no rales, rhonchi or wheezes  CV: regular rate and rhythm, normal S1 S2, no S3 or S4, no murmur, click or rub, no peripheral edema and peripheral pulses strong  ABDOMEN: soft, nontender, no hepatosplenomegaly, no masses and bowel sounds normal  MS: no gross musculoskeletal defects noted, no edema  LYMPH: no cervical, supraclavicular, axillary, or inguinal adenopathy    Diagnostic Test Results:  Results for orders placed or performed in visit on 12/11/18 (from the past 24 hour(s))   Hemoglobin A1c   Result Value Ref Range    Hemoglobin A1C 6.4 (H) 0 - 5.6 %       ASSESSMENT:       PLAN:   (E11.9) Type 2 diabetes mellitus without complication, without long-term current use of insulin (H)  (primary encounter " diagnosis)  Comment: controlled  Plan: Hemoglobin A1c         Continue present medications      (I10) Hypertension goal BP (blood pressure) < 140/90  Comment: controlled  Plan:  Continue present medications      (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

## 2018-12-14 ENCOUNTER — ANCILLARY PROCEDURE (OUTPATIENT)
Dept: CT IMAGING | Facility: CLINIC | Age: 73
End: 2018-12-14
Attending: FAMILY MEDICINE
Payer: COMMERCIAL

## 2018-12-14 DIAGNOSIS — C49.3 SOFT TISSUE SARCOMA OF CHEST WALL (H): ICD-10-CM

## 2019-01-14 ENCOUNTER — OFFICE VISIT (OUTPATIENT)
Dept: NEUROPSYCHOLOGY | Facility: CLINIC | Age: 74
End: 2019-01-14
Attending: FAMILY MEDICINE
Payer: COMMERCIAL

## 2019-01-14 DIAGNOSIS — F33.0 MAJOR DEPRESSIVE DISORDER, RECURRENT EPISODE, MILD (H): ICD-10-CM

## 2019-01-14 DIAGNOSIS — F09 MENTAL DISORDER DUE TO GENERAL MEDICAL CONDITION: ICD-10-CM

## 2019-01-14 DIAGNOSIS — R41.844 FRONTAL LOBE AND EXECUTIVE FUNCTION DEFICIT: Primary | ICD-10-CM

## 2019-01-14 NOTE — NURSING NOTE
The patient was seen for neuropsychological evaluation at the request of Dr. Tee Cuba, for the purposes of diagnostic clarification and treatment planning. 2 hours5 minutes of test administration and scoring were provided by this writer, Agustin Bruno. Please see Dr. Alfonzo Lara's report for a full interpretation of the findings.

## 2019-01-16 NOTE — PROGRESS NOTES
Name: Gerber Levine  MR#: [id number]  YOB: 1945  Date of Exam: 01/14/2019    Neuropsychology Laboratory  [address], [facility] [address]  [place]  [address] [phone]  NEUROPSYCHOLOGICAL EVALUATION    IDENTIFYING INFORMATION  Gerber Levine is a 73 year old, right handed, retired , with 13 years of formal education. He was unaccompanied to the evaluation.     BACKGROUND INFORMATION / INTERVIEW FINDINGS    Records indicate that Mr. 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 I saw the patient for a neuropsychology exam on 01/04/2018, in the context of concerns about changes in his thinking, and memory loss. He also reportedly has a family history of Alzheimer's disease. My evaluation documented a pattern of mild weaknesses that were felt to raise some concern about dysfunction of frontal brain regions. The etiology was felt to be not certain, but cerebrovascular disease was raised as a speculative possibility. I also felt that depression may have been playing a role. In the exam, he had weaknesses in attention, learning, and executive functioning, but memory was normal. The results were not felt to be compatible with Alzheimer's disease. The current follow-up exam was requested by Dr. Tee Cuba, in this context.    On interview, Mr. Levine confirmed the above history. He reported that he first began seeing changes in his thinking in 2002, at the time that he retired. He stated that he has had increased trouble with his memory for a couple of years. It seems like his memory is worse than it was at his exam a year ago. He noted that the decline has not been dramatic. He noted that there are times when his memory is better or worse, but  indicated that the memory troubles are pretty consistent. He denied having been able to identify a trigger for the periods of good and bad memory. He reported that he has the most difficulty remembering names and places. He stated that he forgets what he has done recently. He indicated that he has had long-standing troubles with attention and concentration. He noted that especially at night, when he is trying to go to sleep, his mind is racing and jumps between topics. He reported that his sleep is not good. He stated that he falls asleep quickly, but then wakes up in the night, and has racing thoughts. He reported that he averages five or six hours of sleep per night. He indicated that he slept normally the night before the exam. He stated that he had a sleep study in the late 1990s or early 2000's, and was diagnosed with mild sleep apnea. He reported that he used the CPAP for a while, but stated that he no longer uses this device.    With respect to mental health, Mr. Levine reported that he has a pretty high level of anxiety. He denied feeling sad. He stated that he worries about a lot of things. He indicated that he is prescribed sertraline, but stated that he does not think his medicine is doing anything. He is not seeing a counselor, but did see a counselor 1.5 or 2 years ago. He reported that he has tried several types of treatment for mental health over the years. He denied suicidal ideation.    With respect to other medical background, Mr. Levine denied interval TBI, stroke, or seizure. He reported that he had rib surgery 26 months ago, and still has some pain in his ribs. He denied other significant changes in his medical background. Per records, his current medications include aspirin, atorvastatin, calcitrate/vitamin D, cyanocobalamin, losartan, metformin, and sertraline. He reported that he will occasionally consume an alcoholic drink, but otherwise denied substance use.    Mr. Levine lives at  home with his wife. He manages his own basic and instrumental daily activities. He drives. He denied a significant change in his family, educational background, or work history in the last year. He remains retired.    BEHAVIORAL OBSERVATIONS  Mr. Levine was polite and cooperative with the exam. His speech was normal. Comprehension was normal. His thought processes were unremarkable. His mood was anxious with congruent affect. His effort was good. The current results are felt to be an accurate depiction of his cognitive functioning.    RESULTS OF EXAM  His performances on measures of neuropsychological functioning were as follows:     He was fully oriented to time, place, and various aspects of personal information. He was able to state the names of the four most recent presidents in order. Auditory attention for digits was average. Mental calculations were high average. Learning of words in a list format was borderline impaired. Delayed recall of list words was borderline impaired. Percent retention of list words was average. Delayed recognition of the list words was average. Learning and delayed recall of story information were both superior. Delayed recognition of story information was high average. Learning of simple geometric shapes and their spatial locations was low average. Delayed recall of the shapes and their locations was borderline impaired. Percent retention of the shapes was performed in the borderline impaired to low average range. Delayed recognition of the shapes was normal, and performed without error. Visuospatial judgments for variably oriented lines were performed in the low average to average range. Visual problem-solving blocks was average. His drawing of a complicated geometric figure was normal, but notable for a hurried approach. Comprehension of phrases and short stories was impaired. Verbal associative fluency was high average. Semantic verbal fluency was high average. Naming to  confrontation was high average. Verbal abstract reasoning was high average. Speeded visual sequencing under focused attention was low average. A similar measure with a divided attention component was average, but with three errors. Speeded word reading was high average. Speeded color naming was performed in the average to high average range. Speeded inhibition of an overlearned response was performed in the borderline impaired to low average range. Speeded visuomotor coding was average. Speeded fine motor dexterity was average, bilaterally.    He endorsed items consistent with mild symptoms of depression, and minimal symptoms of anxiety on self-report measures.    IMPRESSIONS  Mr. Levine demonstrated weaknesses that again raise some question of mild relative compromise of the patient's frontal lobes. Relative to the prior evaluation, the patient's cognition is generally stable, which certainly argues against a progressive nature to his condition. There has been some degree of variability between the two exams, although there is no consistent pattern that would suggest that any particular brain regions have had increased compromise in the current exam. He has a relatively stable weaknesses in executive functioning and learning of new information. The weaknesses that were present in attention are no longer evident. As in the previous exam, he does not have a deficit in anterograde memory, although weaknesses in learning of new information could be perceived as a  memory  deficit in day-to-day life. Similar to the previous exam, I do think that symptoms of depression and worry about cognitive dysfunction are contributing to his cognitive concerns. Speculatively, it remains a possibility that cerebrovascular disease could be playing a role in the weaknesses noted in this exam. However, I do suspect that psychological factors are playing a role, and it may be the case that the psychological factors are entirely  responsible for the variability noted in this exam, as well as his subjective concerns about cognitive dysfunction in day-to-day life. His sleep is also chronically poor, which could also be playing a role.    RECOMMENDATIONS  Preliminary results and recommendations were provided to the patient over the telephone on January 16, 2019, and all questions were answered.    1. An MRI of his brain could aid in diagnostic clarification.     2. In spite of treatment, he remains mildly depressed. If medically indicated, consideration could be given to a modified treatment of his mental health.    3. Along similar lines, referral for psychotherapy services could be considered. One possible referral option would be [facility], with locations throughout the [facility]. They can be reached by calling [phone].    4. If he continues to have difficulties with memory, routine use of a memory notebook or other assistive device could be of benefit.    5. If medically indicated, consideration could be given to a follow-up sleep study.     6. Given the relative stability of his cognition, a follow-up neuropsychological evaluation to be considered in the future, if clinically indicated.    Alfonzo Lara, Ph.D., L.P., ABPP-CN   / Licensed Psychologist [id number]  Department of Rehabilitation Medicine  Division of Adult Neuropsychology  [facility]    Time spent: One unit (35 minutes) neurobehavioral status exam including interview, clinical assessment of thinking, reasoning, and judgment by licensed and board-certified neuropsychologist (CPT 98909). One unit (60 minutes) neuropsychological testing evaluation by licensed and board-certified neuropsychologist, including integration of patient data, interpretation of standardized test results and clinical data, clinical decision-making, treatment planning, report, and interactive feedback to the patient, first hour (CPT 83002). One unit(s)  (75 minutes) of neuropsychological testing evaluation by licensed and board-certified neuropsychologist, including integration of patient data, interpretation of standardized test results and clinical data, clinical decision-making, treatment planning, report, and interactive feedback to the patient, subsequent hours (CPT 70246). One unit (30 minutes) of psychological and neuropsychological test administration and scoring by technician, first 30 minutes (CPT 48912). Three units (95 minutes) psychological or neuropsychological test administration and scoring by a technician, subsequent 30 minutes (CPT 17935). Diagnoses: R41.844, F06.8, F33.0.

## 2019-01-17 NOTE — PROGRESS NOTES
__ Orientation            Time          __  - 0 ____        Place        _____2____        Personal Info.     _____4____        Presidents                     4                __WAIS-IV   FSIQ____VCI_____PRI_____ WMI__102__PSI___       Raw  Age SS  __Similarities  __26__               ___12___  __Vocabulary  _______ _______  __Information  _______          _______  __Comprehension _______ _______  __Block Design  __30___ ___11___  __Matrix Reas.  _______ _______  __Visual Puzzles _______ _______  __Picture Comp. _______ _______  __Figure Weights _______ _______  __Digit Span  __ 27___ ___9___  __Arithmetic  ___15___             ___12___  __L-N Sequencing _______ _______  __Symbol Search _______ _______  __Coding  __47___ ___11___  __Cancellation  _______ _______           HVLT-R  Trial   1 2    3      Total                  4             5             5                14                                                 Raw  T  Immediate Recall             14                        33      Delayed Recall                  4                           34      Retention (%)                    80                      47                          Rec/Dis Inc.     12      # Hits      1      #FPs     54                  BVMT-R  Trial   1   2     3       Total                  5               6               5               16                                                   Raw       T  Immediate Recall            16                             43       Delayed Recall                    4                            35       Retention (%)                   67                           6-10                        Rec/Dis Inc             # Hits       6     #FPs     0          >16%           __Rey-Osterreith/Arlette Complex Figure Test    Raw  T-score   %ile  Copy   _31__         -                 >16  Time               121 __                                __COWAT   Raw__48___ Tscore__58___   __Semantic Fluency/Animals   Raw =  22     T-score = 59  __BNT   Raw = 57 %ile = 78-82  __Complex Ideational Material   Raw = 9/12 T-Score = 25    __Trail Making Test   A =   56         Errors = 0    %ile = 21   B =  128        Errors = 3    %ile =26-42  __Stroop                       Raw         %ile        Word = _97_          78-89_        Color =  _67_       72-78_        C/W   =  __22_     6-11__    __JoLO   Raw = 21    %ile = 19-36    __Grooved Pegboard   RH = 90          Tscore =47      Drops = 0   LH =  90          TScore =50      Drops = 1    __BDI-II   Raw__14__ Interp.__ Mild ___   __BAI     Raw__6__ Interp. __Minimal___    __WMS-III   LM1 =98  SS = 14   LM2 = 29  SS = 15   LM2R = 27 Zscore = 1.08

## 2019-01-21 DIAGNOSIS — E11.9 TYPE 2 DIABETES MELLITUS WITHOUT COMPLICATION, WITHOUT LONG-TERM CURRENT USE OF INSULIN (H): ICD-10-CM

## 2019-01-21 NOTE — TELEPHONE ENCOUNTER
Requested Prescriptions   Pending Prescriptions Disp Refills     metFORMIN (GLUCOPHAGE) 500 MG tablet [Pharmacy Med Name: MetFORMIN HCl Oral Tablet 500 MG]  Last Written Prescription Date:  12/27/2017  Last Fill Quantity: 45 tabs,  # refills: 3   Last office visit: 12/11/2018 with prescribing provider:  Bereket   Future Office Visit:     45 tablet 2     Sig: Take 0.5 tablets (250 mg) by mouth daily (with dinner) REFILL WHEN REQUESTED    Biguanide Agents Passed - 1/21/2019  3:13 PM       Passed - Blood pressure less than 140/90 in past 6 months    BP Readings from Last 3 Encounters:   12/11/18 126/70   07/17/18 128/72   06/06/18 128/74                Passed - Patient has documented LDL within the past 12 mos.    Recent Labs   Lab Test 06/06/18  0821   LDL 50            Passed - Patient has had a Microalbumin in the past 15 mos.    Recent Labs   Lab Test 12/27/17  0907   MICROL 12   UMALCR 8.22            Passed - Patient is age 10 or older       Passed - Patient has documented A1c within the specified period of time.    If HgbA1C is 8 or greater, it needs to be on file within the past 3 months.  If less than 8, must be on file within the past 6 months.     Recent Labs   Lab Test 12/11/18  0701   A1C 6.4*            Passed - Patient's CR is NOT>1.4 OR Patient's EGFR is NOT<45 within past 12 mos.    Recent Labs   Lab Test 06/06/18  0821 06/03/16   GFR  --   --  84   GFRB  --   --  83   GFRESTIMATED 88   < >  --    GFRESTBLACK >90   < >  --     < > = values in this interval not displayed.       Recent Labs   Lab Test 06/06/18 0821 06/03/16   CR 0.85   < >  --    CRPOC  --   --  0.9    < > = values in this interval not displayed.            Passed - Patient does NOT have a diagnosis of CHF.       Passed - Medication is active on med list       Passed - Recent (6 mo) or future (30 days) visit within the authorizing provider's specialty    Patient had office visit in the last 6 months or has a visit in the next 30 days  "with authorizing provider or within the authorizing provider's specialty.  See \"Patient Info\" tab in inbasket, or \"Choose Columns\" in Meds & Orders section of the refill encounter.              "

## 2019-01-22 NOTE — TELEPHONE ENCOUNTER
Due for follow up in June.    Prescription approved per [facility].    Yenny King RN  [facility]

## 2019-02-18 ENCOUNTER — TELEPHONE (OUTPATIENT)
Dept: FAMILY MEDICINE | Facility: CLINIC | Age: 74
End: 2019-02-18

## 2019-02-18 DIAGNOSIS — E11.9 TYPE 2 DIABETES MELLITUS WITHOUT COMPLICATION, WITHOUT LONG-TERM CURRENT USE OF INSULIN (H): ICD-10-CM

## 2019-02-18 NOTE — TELEPHONE ENCOUNTER
"Called pharmacy to clarify the \"host processing\" part. She said that was sent by accident.    Lancets, test strips, meter are all pended. Meter is historical. Patient has not seen you for diabetes for almost a year. Please send back to team once ordered so that we can get him on the schedule.    Thanks,  Curtis Weldon RN    "

## 2019-02-18 NOTE — TELEPHONE ENCOUNTER
Pharmacy comments: Per ins Gerri products for glucose monitoring are preferred. Please send new RX.    Also, third party rejection of Sertraline 50 mg due to host processing error.

## 2019-02-19 ENCOUNTER — TELEPHONE (OUTPATIENT)
Dept: FAMILY MEDICINE | Facility: CLINIC | Age: 74
End: 2019-02-19

## 2019-02-19 NOTE — TELEPHONE ENCOUNTER
Patient seen in December for his diabetes. He can follow up this spring    Ok to order new supplies and monitor    Orders sent    Orders Placed This Encounter     blood glucose (NO BRAND SPECIFIED) lancets standard     Sig: Use to test blood sugar daily and as needed     Dispense:  100 each     Refill:  11     blood glucose (NO BRAND SPECIFIED) test strip     Sig: Use to test blood sugar 1 times daily or as directed.     Dispense:  100 strip     Refill:  11     blood glucose monitoring (NO BRAND SPECIFIED) meter device kit     Sig: Use to test blood sugar 1 times daily or as directed.     Dispense:  1 kit     Refill:  0

## 2019-02-19 NOTE — TELEPHONE ENCOUNTER
These scripts were sent in. Called pharmacy to confirm they received them and they did. Will close.    Curtis Weldon RN

## 2019-03-06 ENCOUNTER — TRANSFERRED RECORDS (OUTPATIENT)
Dept: HEALTH INFORMATION MANAGEMENT | Facility: CLINIC | Age: 74
End: 2019-03-06

## 2019-06-09 ASSESSMENT — ENCOUNTER SYMPTOMS
ARTHRALGIAS: 1
FREQUENCY: 0
JOINT SWELLING: 0
HEARTBURN: 0
PALPITATIONS: 0
CONSTIPATION: 0
PARESTHESIAS: 0
CHILLS: 0
NAUSEA: 0
HEMATURIA: 0
SHORTNESS OF BREATH: 0
HEMATOCHEZIA: 0
MYALGIAS: 0
COUGH: 1
ABDOMINAL PAIN: 0
SORE THROAT: 0
WEAKNESS: 0
HEADACHES: 0
DYSURIA: 0
NERVOUS/ANXIOUS: 0
DIARRHEA: 0

## 2019-06-09 ASSESSMENT — ACTIVITIES OF DAILY LIVING (ADL): CURRENT_FUNCTION: NO ASSISTANCE NEEDED

## 2019-06-12 ENCOUNTER — OFFICE VISIT (OUTPATIENT)
Dept: FAMILY MEDICINE | Facility: CLINIC | Age: 74
End: 2019-06-12
Payer: COMMERCIAL

## 2019-06-12 VITALS
OXYGEN SATURATION: 97 % | WEIGHT: 199.4 LBS | HEIGHT: 74 IN | BODY MASS INDEX: 25.59 KG/M2 | TEMPERATURE: 97.7 F | HEART RATE: 61 BPM | DIASTOLIC BLOOD PRESSURE: 70 MMHG | SYSTOLIC BLOOD PRESSURE: 120 MMHG

## 2019-06-12 DIAGNOSIS — E11.9 TYPE 2 DIABETES MELLITUS WITHOUT COMPLICATION, WITHOUT LONG-TERM CURRENT USE OF INSULIN (H): ICD-10-CM

## 2019-06-12 DIAGNOSIS — C49.3 SOFT TISSUE SARCOMA OF CHEST WALL (H): ICD-10-CM

## 2019-06-12 DIAGNOSIS — I10 HYPERTENSION GOAL BP (BLOOD PRESSURE) < 140/90: ICD-10-CM

## 2019-06-12 DIAGNOSIS — Z00.00 ROUTINE HISTORY AND PHYSICAL EXAMINATION OF ADULT: Primary | ICD-10-CM

## 2019-06-12 DIAGNOSIS — Z12.11 SPECIAL SCREENING FOR MALIGNANT NEOPLASMS, COLON: ICD-10-CM

## 2019-06-12 DIAGNOSIS — F41.9 ANXIETY: ICD-10-CM

## 2019-06-12 DIAGNOSIS — E78.5 HYPERLIPIDEMIA LDL GOAL <100: ICD-10-CM

## 2019-06-12 DIAGNOSIS — F32.0 MILD MAJOR DEPRESSION (H): ICD-10-CM

## 2019-06-12 LAB
ANION GAP SERPL CALCULATED.3IONS-SCNC: 4 MMOL/L (ref 3–14)
BUN SERPL-MCNC: 23 MG/DL (ref 7–30)
CALCIUM SERPL-MCNC: 9 MG/DL (ref 8.5–10.1)
CHLORIDE SERPL-SCNC: 109 MMOL/L (ref 94–109)
CHOLEST SERPL-MCNC: 149 MG/DL
CO2 SERPL-SCNC: 30 MMOL/L (ref 20–32)
CREAT SERPL-MCNC: 0.84 MG/DL (ref 0.66–1.25)
CREAT UR-MCNC: 132 MG/DL
GFR SERPL CREATININE-BSD FRML MDRD: 85 ML/MIN/{1.73_M2}
GLUCOSE SERPL-MCNC: 125 MG/DL (ref 70–99)
HBA1C MFR BLD: 7 % (ref 0–5.6)
HDLC SERPL-MCNC: 69 MG/DL
LDLC SERPL CALC-MCNC: 64 MG/DL
MICROALBUMIN UR-MCNC: 11 MG/L
MICROALBUMIN/CREAT UR: 8.11 MG/G CR (ref 0–17)
NONHDLC SERPL-MCNC: 80 MG/DL
POTASSIUM SERPL-SCNC: 4.4 MMOL/L (ref 3.4–5.3)
SODIUM SERPL-SCNC: 143 MMOL/L (ref 133–144)
TRIGL SERPL-MCNC: 78 MG/DL

## 2019-06-12 PROCEDURE — 83036 HEMOGLOBIN GLYCOSYLATED A1C: CPT | Performed by: FAMILY MEDICINE

## 2019-06-12 PROCEDURE — 36415 COLL VENOUS BLD VENIPUNCTURE: CPT | Performed by: FAMILY MEDICINE

## 2019-06-12 PROCEDURE — 80048 BASIC METABOLIC PNL TOTAL CA: CPT | Performed by: FAMILY MEDICINE

## 2019-06-12 PROCEDURE — 99397 PER PM REEVAL EST PAT 65+ YR: CPT | Performed by: FAMILY MEDICINE

## 2019-06-12 PROCEDURE — 80061 LIPID PANEL: CPT | Performed by: FAMILY MEDICINE

## 2019-06-12 PROCEDURE — 82043 UR ALBUMIN QUANTITATIVE: CPT | Performed by: FAMILY MEDICINE

## 2019-06-12 PROCEDURE — 99213 OFFICE O/P EST LOW 20 MIN: CPT | Mod: 25 | Performed by: FAMILY MEDICINE

## 2019-06-12 RX ORDER — LOSARTAN POTASSIUM 25 MG/1
25 TABLET ORAL DAILY
Qty: 90 TABLET | Refills: 3 | Status: SHIPPED | OUTPATIENT
Start: 2019-06-12 | End: 2020-09-01

## 2019-06-12 RX ORDER — ATORVASTATIN CALCIUM 40 MG/1
40 TABLET, FILM COATED ORAL DAILY
Qty: 90 TABLET | Refills: 3 | Status: SHIPPED | OUTPATIENT
Start: 2019-06-12 | End: 2020-09-09

## 2019-06-12 ASSESSMENT — ENCOUNTER SYMPTOMS
NERVOUS/ANXIOUS: 0
ARTHRALGIAS: 1
CHILLS: 0
HEARTBURN: 0
SORE THROAT: 0
CONSTIPATION: 0
PALPITATIONS: 0
ABDOMINAL PAIN: 0
JOINT SWELLING: 0
NAUSEA: 0
COUGH: 1
WEAKNESS: 0
DYSURIA: 0
FREQUENCY: 0
HEMATURIA: 0
PARESTHESIAS: 0
SHORTNESS OF BREATH: 0
HEADACHES: 0
MYALGIAS: 0
DIARRHEA: 0
HEMATOCHEZIA: 0

## 2019-06-12 ASSESSMENT — MIFFLIN-ST. JEOR: SCORE: 1696.28

## 2019-06-12 ASSESSMENT — ANXIETY QUESTIONNAIRES
IF YOU CHECKED OFF ANY PROBLEMS ON THIS QUESTIONNAIRE, HOW DIFFICULT HAVE THESE PROBLEMS MADE IT FOR YOU TO DO YOUR WORK, TAKE CARE OF THINGS AT HOME, OR GET ALONG WITH OTHER PEOPLE: NOT DIFFICULT AT ALL
2. NOT BEING ABLE TO STOP OR CONTROL WORRYING: NEARLY EVERY DAY
1. FEELING NERVOUS, ANXIOUS, OR ON EDGE: NEARLY EVERY DAY
7. FEELING AFRAID AS IF SOMETHING AWFUL MIGHT HAPPEN: NOT AT ALL
5. BEING SO RESTLESS THAT IT IS HARD TO SIT STILL: NOT AT ALL
GAD7 TOTAL SCORE: 10
3. WORRYING TOO MUCH ABOUT DIFFERENT THINGS: NEARLY EVERY DAY
6. BECOMING EASILY ANNOYED OR IRRITABLE: NOT AT ALL

## 2019-06-12 ASSESSMENT — PATIENT HEALTH QUESTIONNAIRE - PHQ9
5. POOR APPETITE OR OVEREATING: SEVERAL DAYS
SUM OF ALL RESPONSES TO PHQ QUESTIONS 1-9: 8

## 2019-06-12 ASSESSMENT — ACTIVITIES OF DAILY LIVING (ADL): CURRENT_FUNCTION: NO ASSISTANCE NEEDED

## 2019-06-12 NOTE — PATIENT INSTRUCTIONS
Orders Placed This Encounter     Albumin Random Urine Quantitative with Creat Ratio     This test includes a Creatinine Ratio.  Do not order NFQ518 (Creatinine Random Urine)  Last Lab Result: Albumin Urine mg/g Cr (mg/g Cr)       Date                     Value                 12/27/2017               8.22             ----------  Creatinine Urine (mg/dL)       Date                     Value                 12/27/2017               146              ----------     Order Specific Question:   Includes     Answer:   with Creat Ratio     BASIC METABOLIC PANEL     Lipid panel reflex to direct LDL Fasting     Last Lab Result: LDL Cholesterol Calculated (mg/dL)       Date                     Value                 06/06/2018               50               ----------     Order Specific Question:   Perform labs while fasting or non-fasting?     Answer:   Fasting     HEMOGLOBIN A1C     Last Lab Result: Hemoglobin A1C (%)       Date                     Value                 12/11/2018               6.4 (H)          ----------     GASTROENTEROLOGY ADULT REF PROCEDURE ONLY     Referral Priority:   Routine     Number of Visits Requested:   1     metFORMIN (GLUCOPHAGE) 500 MG tablet     Sig: Take 0.5 tablets (250 mg) by mouth daily (with dinner) REFILL WHEN REQUESTED     Dispense:  45 tablet     Refill:  1     losartan (COZAAR) 25 MG tablet     Sig: Take 1 tablet (25 mg) by mouth daily     Dispense:  90 tablet     Refill:  3     atorvastatin (LIPITOR) 40 MG tablet     Sig: Take 1 tablet (40 mg) by mouth daily     Dispense:  90 tablet     Refill:  3           sertraline (ZOLOFT) 50 MG tablet     Sig: Take 1/2 tablet for 1 month then DC     Dispense:  90 tablet     Refill:  3     Cologaurd stool test for colon cancer will be sent to your home.   [facility]     Discharged by : Lissa Winter CMA    If you have any questions regarding your visit please contact your care team:     Team Rosie              Clinic Hours  Telephone Number     Dr. Tee Yusuf, CNP   7am-7pm  Monday - Thursday   7am-5pm  Fridays  [phone]   (Appointment scheduling available 24/7)     RN Line  [phone] option 2     Urgent Care - [place] [facility] - 11am-9pm Monday-Friday Saturday-Sunday- 9am-5pm     [place] -   5pm-9pm Monday-Friday Saturday-Sunday- 9am-5pm    [phone] - [place]    [phone] - [place]     For a Price Quote for your services, please call our Consumer Price Line at [phone].     What options do I have for visits at the clinic other than the traditional office visit?     To expand how we care for you, many of our providers are utilizing electronic visits (e-visits) and telephone visits, when medically appropriate, for interactions with their patients rather than a visit in the clinic. We also offer nurse visits for many medical concerns. Just like any other service, we will bill your insurance company for this type of visit based on time spent on the phone with your provider. Not all insurance companies cover these visits. Please check with your medical insurance if this type of visit is covered. You will be responsible for any charges that are not paid by your insurance.     E-visits via [employer]: generally incur a $45.00 fee.     Telephone visits:  Time spent on the phone: *charged based on time that is spent on the phone in increments of 10 minutes. Estimated cost:   5-10 mins $30.00   11-20 mins. $59.00   21-30 mins. $85.00       Use [employer] (secure email communication and access to your chart) to send your primary care provider a message or make an appointment. Ask someone on your Team how to sign up for [employer].     As always, Thank you for trusting us with your health care needs!      [facility]:    Scheduling Appointments  [facility] [place]  Call: [phone]    [facility]  Call:  [phone]    [facility]  Call: [phone]    For Gastroenterology referrals   [facility]   Clinics and Surgery Center, 4th Floor   [address] [place] [address]   Appointments: [phone]    WHERE TO GO FOR CARE?    Clinic    Make an appointment if you:       Are sick (cold, cough, flu, sore throat, earache or in pain).       Have a small injury (sprain, small cut, burn or broken bone).       Need a physical exam, Pap smear, vaccine or prescription refill.       Have questions about your health or medicines.    To reach us:      Call [phone] ([phone]). Open 24 hours every day. (For counseling services, call [phone].)    Log into [employer]. (Visit [email or web address] to create an account.) Hospital emergency room    An emergency is a serious or life- threatening problem that must be treated right away.    Call 195 or get to the hospital if you have:      Very bad or sudden:            - Chest pain or pressure         - Bleeding         - Head or belly pain         - Dizziness or trouble seeing, walking or                          Speaking      Problems breathing      Blood in your vomit or you are coughing up blood      A major injury (knocked out, loss of a finger or limb, rape, broken bone protruding from skin)    A mental health crisis. (Or call the Mental Health Crisis line at 1-961.303.9487 or Suicide Prevention Hotline at 1-791.489.8421.)    Open 24 hours every day. You don't need an appointment.     Urgent care    Visit urgent care for sickness or small injuries when the clinic is closed. You don't need an appointment. To check hours or find an urgent care near you, visit [email or web address]. Online care    Get online care from [facility] for more than 70 common problems, like colds, allergies and infections. Open 24 hours every day at:   [email or web address]   Need help deciding?    For advice about where to be seen,  you may call your clinic and ask to speak with a nurse. We're here for you 24 hours every day.         If you are deaf or hard of hearing, please let us know. We provide many free services including sign language interpreters, oral interpreters, TTYs, telephone amplifiers, note takers and written materials.

## 2019-06-12 NOTE — PROGRESS NOTES
"SUBJECTIVE:   Gerber Levine is a 74 year old male who presents for Preventive Visit.    Are you in the first 12 months of your Medicare coverage?  No    Healthy Habits:     In general, how would you rate your overall health?  Good    Frequency of exercise:  6-7 days/week    Duration of exercise:  30-45 minutes    Do you usually eat at least 4 servings of fruit and vegetables a day, include whole grains    & fiber and avoid regularly eating high fat or \"junk\" foods?  Yes    Taking medications regularly:  Yes    Medication side effects:  None    Ability to successfully perform activities of daily living:  No assistance needed    Home Safety:  Lack of grab bars in the bathroom    Hearing Impairment:  Difficulty following a conversation in a noisy restaurant or crowded room, difficult to understand a speaker at a public meeting or Rastafari service and difficulty understanding soft or whispered speech    In the past 6 months, have you been bothered by leaking of urine?  No    In general, how would you rate your overall mental or emotional health?  Fair      PHQ-2 Total Score: 2    Additional concerns today:  No    Do you feel safe in your environment? Yes    Do you have a Health Care Directive? Yes: Advance Directive has been received and scanned.      Fall risk  Fallen 2 or more times in the past year?: No  Any fall with injury in the past year?: No   Had one Fall this year but he was crossing the street and tripped- he landed on his face and was black and blue- hematoma with head? But he never went in to be seen- he broke his glasses from the fall-- has since noticed some floaters with left eye and is not sure if it's from the fall    Lab Results   Component Value Date    A1C 7.0 06/12/2019    A1C 6.4 12/11/2018    A1C 6.3 06/06/2018    A1C 6.5 03/19/2018    A1C 6.4 12/27/2017         Cognitive Screening   1) Repeat 3 items (Leader, Season, Table)    2) Clock draw: NORMAL  3) 3 item recall: Recalls 2 objects "   Results: NORMAL clock, 1-2 items recalled: COGNITIVE IMPAIRMENT LESS LIKELY    Mini-CogTM Copyright ANGELA Villeda. Licensed by the author for use in [facility]; reprinted with permission (gisele@[facility]). All rights reserved.      Do you have sleep apnea, excessive snoring or daytime drowsiness?: no    Reviewed and updated as needed this visit by clinical staff  Tobacco  Allergies  Meds  Med Hx  Surg Hx  Fam Hx  Soc Hx        Reviewed and updated as needed this visit by Provider        Social History     Tobacco Use     Smoking status: Never Smoker     Smokeless tobacco: Never Used   Substance Use Topics     Alcohol use: Yes     Comment: Occasionaly     If you drink alcohol do you typically have >3 drinks per day or >7 drinks per week? No          Diabetes Follow-up      How often are you checking your blood sugar? One time daily    What time of day are you checking your blood sugars (select all that apply)?  Before meals    Have you had any blood sugars above 200?  No    Have you had any blood sugars below 70?  Yes  Sometimes- 69    What symptoms do you notice when your blood sugar is low?  None    What concerns do you have today about your diabetes? None     Do you have any of these symptoms? (Select all that apply)  No numbness or tingling in feet.  No redness, sores or blisters on feet.  No complaints of excessive thirst.  No reports of blurry vision.  No significant changes to weight.     Have you had a diabetic eye exam in the last 12 months? Yes- Date of last eye exam: April- May 2019    Diabetes Management Resources    Hyperlipidemia Follow-Up      Are you having any of the following symptoms? (Select all that apply)  No complaints of shortness of breath, chest pain or pressure.  No increased sweating or nausea with activity.  No left-sided neck or arm pain.  No complaints of pain in calves when walking 1-2 blocks.    Are you regularly taking any medication or supplement to lower your  cholesterol?   Yes- Lipitor    Are you having muscle aches or other side effects that you think could be caused by your cholesterol lowering medication?  No    Hypertension Follow-up      Do you check your blood pressure regularly outside of the clinic? Yes - sometimes    Are you following a low salt diet? Yes    Are your blood pressures ever more than 140 on the top number (systolic) OR more   than 90 on the bottom number (diastolic), for example 140/90? No    BP Readings from Last 2 Encounters:   06/12/19 120/70   12/11/18 126/70     Hemoglobin A1C (%)   Date Value   06/12/2019 7.0 (H)   12/11/2018 6.4 (H)     LDL Cholesterol Calculated (mg/dL)   Date Value   06/06/2018 50   06/13/2017 54     Depression and Anxiety Follow-Up    How are you doing with your depression since your last visit? No change    How are you doing with your anxiety since your last visit?  No change    Are you having other symptoms that might be associated with depression or anxiety? No    Have you had a significant life event? No     Do you have any concerns with your use of alcohol or other drugs? No     He does not feel the serotonin specific reuptake inhibitor has made any difference. Reviewed Neuropsych testing. No progressive dementia concerns. Anxiety is noted and some executive function decline but not significant.    He would like to stop the Zoloft reviewed lowering to 25 mg for 1 month then discontinue. Monitor for any changes and ok to restart if he found out it was helping.     Review CT's. He has been getting Q 6 month rechecks. All have been stable. Unclear if he needs continued Q 6 months of could go to yearly. I will review with pulmonology.     Social History     Tobacco Use     Smoking status: Never Smoker     Smokeless tobacco: Never Used   Substance Use Topics     Alcohol use: Yes     Comment: Occasionaly     Drug use: No     PHQ 6/28/2016 2/28/2017 7/17/2018   PHQ-9 Total Score 3 7 7   Q9: Thoughts of better off  dead/self-harm past 2 weeks Not at all Not at all Not at all     IVETTE-7 SCORE 2/25/2016 6/28/2016 7/17/2018   Total Score - - -   Total Score 3 8 8           Suicide Assessment Five-step Evaluation and Treatment (SAFE-T)    Current providers sharing in care for this patient include:   Patient Care Team:  Tee Cuba MD as PCP - Cheryl Aldridge APRN CNS as Clinical Nurse Specialist (Oncology)  Tee Cuba MD as Assigned PCP    The following health maintenance items are reviewed in Epic and correct as of today:  Health Maintenance   Topic Date Due     DEPRESSION ACTION PLAN  1945     MICROALBUMIN  12/27/2018     PHQ-9  01/17/2019     BMP  06/06/2019     LIPID  06/06/2019     DIABETIC FOOT EXAM  06/06/2019     A1C  06/11/2019     MEDICARE ANNUAL WELLNESS VISIT  06/06/2019     ADVANCED DIRECTIVE PLANNING  02/03/2020     EYE EXAM  03/06/2020     TSH W/FREE T4 REFLEX  03/19/2020     DTAP/TDAP/TD IMMUNIZATION (4 - Td) 03/29/2026     COLONOSCOPY  05/12/2026     INFLUENZA VACCINE  Completed     ZOSTER IMMUNIZATION  Completed     IPV IMMUNIZATION  Aged Out     MENINGITIS IMMUNIZATION  Aged Out       Immunizations Up to Date    Review of Systems   Constitutional: Negative for chills.   HENT: Positive for congestion and hearing loss. Negative for sore throat.    Eyes: Negative for visual disturbance.   Respiratory: Positive for cough. Negative for shortness of breath.    Cardiovascular: Negative for chest pain, palpitations and peripheral edema.   Gastrointestinal: Negative for abdominal pain, constipation, diarrhea, heartburn, hematochezia and nausea.   Genitourinary: Negative for discharge, dysuria, frequency, genital sores, hematuria, impotence and urgency.   Musculoskeletal: Positive for arthralgias. Negative for joint swelling and myalgias.   Skin: Negative for rash.   Neurological: Negative for weakness, headaches and paresthesias.   Psychiatric/Behavioral: Negative for mood  "changes. The patient is not nervous/anxious.          OBJECTIVE:   /70 (BP Location: Right arm, Patient Position: Sitting, Cuff Size: Adult Large)   Pulse 61   Temp 97.7  F (36.5  C) (Oral)   Ht 1.867 m (6' 1.5\")   Wt 90.4 kg (199 lb 6.4 oz)   SpO2 97%   BMI 25.95 kg/m   Estimated body mass index is 25.95 kg/m  as calculated from the following:    Height as of this encounter: 1.867 m (6' 1.5\").    Weight as of this encounter: 90.4 kg (199 lb 6.4 oz).  Physical Exam  GENERAL: healthy, alert and no distress  NECK: no adenopathy, no asymmetry, masses, or scars and thyroid normal to palpation  RESP: lungs clear to auscultation - no rales, rhonchi or wheezes  CV: regular rate and rhythm, normal S1 S2, no S3 or S4, no murmur, click or rub, no peripheral edema and peripheral pulses strong  ABDOMEN: soft, nontender, no hepatosplenomegaly, no masses and bowel sounds normal  MS: no gross musculoskeletal defects noted, no edema  NEURO: Normal strength and tone, mentation intact and speech normal  PSYCH: mentation appears normal, affect normal/bright  LYMPH: no cervical, supraclavicular, axillary, or inguinal adenopathy    Diagnostic Test Results:  Labs reviewed in Epic  CT's reviewed    ASSESSMENT / PLAN:   (Z00.00) Routine history and physical examination of adult  (primary encounter diagnosis)  Comment: doing well chronc concerns stable  Plan: see below    (E11.9) Type 2 diabetes mellitus without complication, without long-term current use of insulin (H)  Comment: controlled  Plan: Albumin Random Urine Quantitative with Creat         Ratio, BASIC METABOLIC PANEL, HEMOGLOBIN A1C,         metFORMIN (GLUCOPHAGE) 500 MG tablet, losartan         (COZAAR) 25 MG tablet, atorvastatin (LIPITOR)         40 MG tablet         Continue present medications      (E78.5) Hyperlipidemia LDL goal <100  Comment: controled  Plan:  Continue present medications      (I10) Hypertension goal BP (blood pressure) < 140/90  Comment: " "controlled  Plan:  Continue present medications      (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   End of Life Planning:  Patient currently has an advanced directive: Yes.  Practitioner is supportive of decision.    COUNSELING:  Reviewed preventive health counseling, as reflected in patient instructions       Regular exercise       Vision screening       Colon cancer screening    Estimated body mass index is 25.95 kg/m  as calculated from the following:    Height as of this encounter: 1.867 m (6' 1.5\").    Weight as of this encounter: 90.4 kg (199 lb 6.4 oz).         reports that he has never smoked. He has never used smokeless tobacco.      Appropriate preventive services were discussed with this patient, including applicable screening as appropriate for cardiovascular disease, diabetes, osteopenia/osteoporosis, and glaucoma.  As appropriate for age/gender, discussed screening for colorectal cancer, prostate cancer, breast cancer, and cervical cancer. Checklist reviewing preventive services available has been given to the patient.    Reviewed patients plan of care and provided an AVS. The Basic Care Plan (routine screening as documented in Health Maintenance) for Gerber meets the Care Plan requirement. This Care Plan has been established and reviewed with the Patient.    Counseling Resources:  ATP IV Guidelines  Pooled Cohorts Equation Calculator  Breast Cancer " Risk Calculator  FRAX Risk Assessment  ICSI Preventive Guidelines  Dietary Guidelines for Americans, 2010  USDA's MyPlate  ASA Prophylaxis  Lung CA Screening    Tee Cuba MD, MD  [facility]    Identified Health Risks:

## 2019-06-13 ASSESSMENT — ANXIETY QUESTIONNAIRES: GAD7 TOTAL SCORE: 10

## 2019-06-14 ENCOUNTER — TELEPHONE (OUTPATIENT)
Dept: FAMILY MEDICINE | Facility: CLINIC | Age: 74
End: 2019-06-14

## 2019-06-14 DIAGNOSIS — C49.3 CHEST WALL SARCOMA (H): Primary | ICD-10-CM

## 2019-06-14 NOTE — TELEPHONE ENCOUNTER
Attempted to call and will call again. Pulmonology recommends one more 6 month CT for surveillance    Orders Placed This Encounter     CT Chest w/o Contrast     Standing Status:   Future     Standing Expiration Date:   6/14/2020     Order Specific Question:   Clinical Info for the Interpreting Provider     Answer:   surveillance -6 month follow up     Order Specific Question:   Priority     Answer:   Routine         Scheduling Appointments    [facility] [place]  Call: [phone]    [facility]  Call: [phone]    [facility]. All locations.  Call: [phone]

## 2019-06-24 ENCOUNTER — ANCILLARY PROCEDURE (OUTPATIENT)
Dept: CT IMAGING | Facility: CLINIC | Age: 74
End: 2019-06-24
Attending: FAMILY MEDICINE
Payer: COMMERCIAL

## 2019-06-24 DIAGNOSIS — C49.3 CHEST WALL SARCOMA (H): ICD-10-CM

## 2019-06-29 ENCOUNTER — TRANSFERRED RECORDS (OUTPATIENT)
Dept: HEALTH INFORMATION MANAGEMENT | Facility: CLINIC | Age: 74
End: 2019-06-29

## 2019-06-29 LAB — COLOGUARD-ABSTRACT: NEGATIVE

## 2019-07-10 ENCOUNTER — TELEPHONE (OUTPATIENT)
Dept: FAMILY MEDICINE | Facility: CLINIC | Age: 74
End: 2019-07-10

## 2019-07-10 NOTE — TELEPHONE ENCOUNTER
Called and reviewed message below. Also notified that cologuard was negative.   Iggy Cuba MD        ----- Message from ROSIE Veronica sent at 6/28/2019 12:04 PM CDT -----  Regarding: Lung Nodule Conference today  Hi, your patient was reviewed in conference.   See Epic note for more details.  Basically, because a new 2 mm nodule was seen on new scan, it was recommended he get a f/u scan in 6 months.    Thanks  LAURA

## 2019-08-20 ENCOUNTER — OFFICE VISIT (OUTPATIENT)
Dept: FAMILY MEDICINE | Facility: CLINIC | Age: 74
End: 2019-08-20
Payer: COMMERCIAL

## 2019-08-20 VITALS
SYSTOLIC BLOOD PRESSURE: 118 MMHG | WEIGHT: 196.8 LBS | HEART RATE: 72 BPM | DIASTOLIC BLOOD PRESSURE: 68 MMHG | BODY MASS INDEX: 25.61 KG/M2 | TEMPERATURE: 98.3 F

## 2019-08-20 DIAGNOSIS — H60.92 INFLAMMATION OF LEFT EAR CANAL: ICD-10-CM

## 2019-08-20 DIAGNOSIS — Z97.4 HEARING AID WORN: Primary | ICD-10-CM

## 2019-08-20 PROCEDURE — 99213 OFFICE O/P EST LOW 20 MIN: CPT | Performed by: PHYSICIAN ASSISTANT

## 2019-08-20 ASSESSMENT — PAIN SCALES - GENERAL: PAINLEVEL: NO PAIN (0)

## 2019-08-20 NOTE — PROGRESS NOTES
Subjective     Gerber Levine is a 74 year old male who presents to clinic today for the following health issues:    HPI     Here to have left ear canal evaluated. Was seen by audiologist yesterday and was told that he had a red ear canal. He is completely asymptomatic. No injuries. No foreign body in ear. Denies issues.    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     Type 2 diabetes mellitus without complication, without long-term current use of insulin (H)        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     Type 2 diabetes mellitus without complication, without long-term current use of insulin (H)     Past Surgical History:   Procedure Laterality Date     ABDOMEN SURGERY  1976    Spleen removed     APPENDECTOMY  1976     BIOPSY  2016     BRONCHOSCOPY FLEXIBLE N/A 9/21/2016    Procedure: BRONCHOSCOPY FLEXIBLE;  Surgeon: Leah Nixon MD;  Location: [facility] OR     CHOLECYSTECTOMY  1976     COLONOSCOPY  2006     EYE SURGERY  2014    cataracts     HERNIA REPAIR  1976     REPAIR CHEST WALL N/A 9/21/2016    Procedure: REPAIR CHEST WALL;  Surgeon: Leah Nixon MD;  Location: [facility] OR     SURGICAL HISTORY OF -   1976    SPLENECTOMY     SURGICAL HISTORY OF -        APPENDECTOMY     SURGICAL HISTORY OF -       CHOLECYSTECTOMY     SURGICAL HISTORY OF -       HERNIAORRHAPHY     SURGICAL HISTORY OF -   1981    cholesteotoma       Social History     Tobacco Use     Smoking status: Never Smoker     Smokeless tobacco: Never Used   Substance Use Topics     Alcohol use: Yes     Comment: Occasionaly     Family History   Problem Relation Age of Onset     Diabetes Father      Genitourinary Problems Daughter         spherocytosis, had spleen removed     Diabetes Maternal Grandmother      Intellectual Disability (Mental Retardation) Sister      Dementia Mother      Diabetes Sister         type 1     Diabetes Sister      Prostate Cancer Brother      C.A.D. No family hx of      Cancer - colorectal No family hx of      Hypertension No family hx of      Cerebrovascular Disease No family hx of      Breast Cancer No family hx of            Reviewed and updated as needed this visit by Provider         Review of Systems   ROS COMP: Constitutional, HEENT, cardiovascular, pulmonary, gi and gu systems are negative, except as otherwise noted.      Objective    /68 (BP Location: Right arm, Patient Position: Sitting, Cuff Size: Adult Regular)   Pulse 72   Temp 98.3  F (36.8  C) (Oral)   Wt 89.3 kg (196 lb 12.8 oz)   BMI 25.61 kg/m    Body mass index is 25.61 kg/m .  Physical Exam   GENERAL: healthy, alert and no distress  HENT: Left ear canal has mildly prominent superficial vascular findings, otherwise ear canals and TM's normal, nose and mouth without ulcers or lesions      Diagnostic Test Results:  Labs reviewed in Epic        Assessment & Plan       ICD-10-CM    1. Hearing aid worn Z97.4    2. Inflammation of left ear canal H60.92       Reviewed - this appears fine. I think this is simply due to hearing aid use or just age related thinning of the canal. His TM is normal. He's not had any issues or pain. Recommend monitor. Warning symptoms of worsening condition discussed and patient shows  good understanding.     Julio Cesar Irving, MPAS, PA-C

## 2019-09-09 DIAGNOSIS — F32.0 MILD MAJOR DEPRESSION (H): ICD-10-CM

## 2019-09-09 DIAGNOSIS — F41.9 ANXIETY: ICD-10-CM

## 2019-09-09 NOTE — TELEPHONE ENCOUNTER
"Requested Prescriptions   Pending Prescriptions Disp Refills     sertraline (ZOLOFT) 50 MG tablet [Pharmacy Med Name: Sertraline HCl Oral Tablet 50 MG]  (HISTORICAL)        Last Written Prescription Date:  6/12/2019  Last Fill Quantity: 90 tablet,   # refills: 3  Last Office Visit: 6/12/2019  w/ kye gomez    Future Office visit:       Routing refill request to provider for review/approval because:  Medication is reported/historical   90 tablet 2     Sig: Take 1 tablet (50 mg) by mouth daily       SSRIs Protocol Failed - 9/9/2019  7:59 AM        Failed - PHQ-9 score less than 5 in past 6 months     Please review last PHQ-9 score.   PHQ-9 SCORE 2/28/2017 7/17/2018 6/12/2019   PHQ-9 Total Score - - -   PHQ-9 Total Score 7 7 8     IVETTE-7 SCORE 6/28/2016 7/17/2018 6/12/2019   Total Score - - -   Total Score 8 8 10             Passed - Medication is active on med list        Passed - Patient is age 18 or older        Passed - Recent (6 mo) or future (30 days) visit within the authorizing provider's specialty     Patient had office visit in the last 6 months or has a visit in the next 30 days with authorizing provider or within the authorizing provider's specialty.  See \"Patient Info\" tab in inbasket, or \"Choose Columns\" in Meds & Orders section of the refill encounter.              "

## 2019-09-12 NOTE — TELEPHONE ENCOUNTER
I see that he was previously on 50 mg.  And have sent that dosage in.  However, please confirm that is the dosage he is currently taking since he has gone back on the medication.    If he was only taking 25 mg recently and feels good on that dosage we can keep him on the 25 mg.

## 2019-09-12 NOTE — TELEPHONE ENCOUNTER
Phone call to patient. He is taking 50 mg daily currently and would like to continue on that dose.    Jono Odonnell RN

## 2019-09-12 NOTE — TELEPHONE ENCOUNTER
Routing to providers' pool in absence of PCP.     Patient reports asking Dr. Cuba to go off Zoloft on 6/12, see note below. (He doesn't remember why.)    However, patient went back on Zoloft about one month ago and is asking for a refill, as he is now out of medication.     Nito Wiseman RN        Per OV note on 2/10 by Dr. Cuba:    F41.9) Anxiety  Comment: stable  Plan: sertraline (ZOLOFT) 50 MG tablet        Ok to taper and discontinue       PHQ-9 score:    PHQ-9 SCORE 6/12/2019   PHQ-9 Total Score -   PHQ-9 Total Score 8       IVETTE-7 SCORE 6/28/2016 7/17/2018 6/12/2019   Total Score - - -   Total Score 8 8 10

## 2019-10-02 ENCOUNTER — HEALTH MAINTENANCE LETTER (OUTPATIENT)
Age: 74
End: 2019-10-02

## 2019-10-24 ENCOUNTER — TELEPHONE (OUTPATIENT)
Dept: FAMILY MEDICINE | Facility: CLINIC | Age: 74
End: 2019-10-24

## 2019-10-24 NOTE — TELEPHONE ENCOUNTER
Patient's wife reports patient has been wearing bilateral hearing aids x several years.  His audiologist recently  recommended he sees an ENT due to ringing in his ears x a few months.    Patient's wife will call insurance company to see if a referral is needed.    If a referral is needed, RN to pend ENT order for provider.  She prefers he doesn't have to be seen again, as he just saw Pal Irving PA-C in August, 2019.    If no call back, okay to close encounter.    Nito Wiseman RN

## 2019-10-24 NOTE — TELEPHONE ENCOUNTER
Reason for Call:  Other     Detailed comments: wife states patient is having ringing in his ear. Hearing aid doctor suggested that patient see ENT and gave him a referral to see Dr. Grimm. Wife would like to stay within [place] and see someone at the [place] Location. Wife is wondering if a referral is required to set up an appointment. She is also wondering what provider is suggested to see.       Phone Number Patient can be reached at: Other phone number:  [phone]    Best Time: anytime    Can we leave a detailed message on this number? YES    Call taken on 10/24/2019 at 10:12 AM by Montserrat Yen

## 2019-10-24 NOTE — TELEPHONE ENCOUNTER
Wife Denisha returned phone call and stated that her insurance does not require a referral.  She will call and schedule an appointment for her .  Sue Nguyen RN

## 2019-11-05 ENCOUNTER — OFFICE VISIT (OUTPATIENT)
Dept: OTOLARYNGOLOGY | Facility: CLINIC | Age: 74
End: 2019-11-05
Payer: COMMERCIAL

## 2019-11-05 ENCOUNTER — OFFICE VISIT (OUTPATIENT)
Dept: AUDIOLOGY | Facility: CLINIC | Age: 74
End: 2019-11-05
Payer: COMMERCIAL

## 2019-11-05 DIAGNOSIS — H93.13 TINNITUS, BILATERAL: Primary | ICD-10-CM

## 2019-11-05 DIAGNOSIS — H90.3 SENSORINEURAL HEARING LOSS, BILATERAL: Primary | ICD-10-CM

## 2019-11-05 DIAGNOSIS — H90.3 SENSORINEURAL HEARING LOSS (SNHL) OF BOTH EARS: ICD-10-CM

## 2019-11-05 PROCEDURE — 92550 TYMPANOMETRY & REFLEX THRESH: CPT | Performed by: AUDIOLOGIST

## 2019-11-05 PROCEDURE — 99203 OFFICE O/P NEW LOW 30 MIN: CPT | Performed by: OTOLARYNGOLOGY

## 2019-11-05 PROCEDURE — 92557 COMPREHENSIVE HEARING TEST: CPT | Performed by: AUDIOLOGIST

## 2019-11-05 PROCEDURE — 99207 ZZC NO CHARGE LOS: CPT | Performed by: AUDIOLOGIST

## 2019-11-05 NOTE — PATIENT INSTRUCTIONS
General Scheduling Information  To schedule your CT/MRI scan, please contact [facility] at [phone] [address] [place] [address]    To schedule your Surgery, please contact our Specialty Schedulers at [phone]    ENT Clinic Locations Clinic Hours Telephone Number     [facility]  [address] [place] [address]   Tuesday:       8:00am -- 4:00pm    Wednesday:  8:00am - 4:00pm   To schedule an appointment with   Dr. Amezcua,   please contact our   Specialty Scheduling Department at:     [phone]       [facility]  [address] [place] [address]   Friday:          8:00am - 4:00pm         Urgent Care Locations Clinic Hours Telephone Numbers     [facility]  [address] [place] [address]     Monday-Friday:     11:00pm - 9:00pm    Saturday-Sunday:  9:00am - 5:00pm   [phone]     [facility]  [address] [place] [address]     Monday-Friday:      5:00pm - 9:00pm     Saturday-Sunday:  9:00am - 5:00pm   [phone]

## 2019-11-05 NOTE — PROGRESS NOTES
Chief Complaint - Tinnitus, hearing loss    History of Present Illness - Gerber Levine is a 74 year old male who presents to me today with ringing in the ears.  It has been present and noticeable for approximately 20 years, but worsening in last year.  It was not sudden in onset. He has worn 2 hearing aids most of the time for 20 years. Right ear is worse. He had a surgery on the right ear many years ago, cholesteatoma. With regards to recreational, , and work-related noise exposure [place] No family history of hearing loss. He brings in an outside audiogram from 3/2019 which showed about the same hearing as today - bilateral sensorineural hearing loss, moderate to severe, down-sloping right ear is worse.     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     Type 2 diabetes mellitus without complication, without long-term current use of insulin (H)       Current Medications -   Current Outpatient Medications:      ASPIRIN 81 MG OR TABS, 1 tab po QD (Once per day) (Patient taking differently: 1 tab po QD (Once per day) QAM), Disp: , Rfl:      atorvastatin (LIPITOR) 40 MG tablet, Take 1 tablet (40 mg) by mouth daily, Disp: 90 tablet, Rfl: 3     blood glucose (NO BRAND SPECIFIED) lancets standard, Use to test blood sugar daily and as needed, Disp: 100 each, Rfl: 11     blood glucose (NO BRAND SPECIFIED) test strip, Use to test blood sugar 1 times daily or as directed., Disp: 100 strip, Rfl: 11     blood glucose calibration (ACCU-CHEK RODRI) solution, USE 1 DROP AS NEEDED, Disp: 3  each, Rfl: 3     blood glucose monitoring (ACCU-CHEK RODRI PLUS) meter device kit, , Disp: , Rfl:      blood glucose monitoring (ACCU-CHEK RODRI PLUS) test strip, 1 strip by In Vitro route daily, Disp: 100 each, Rfl: 3     blood glucose monitoring (NO BRAND SPECIFIED) meter device kit, Use to test blood sugar 1 times daily or as directed., Disp: 1 kit, Rfl: 0     CALCITRATE/VITAMIN D 315-200 MG-UNIT OR TABS, One tab daily in the morning, Disp: , Rfl:      Cyanocobalamin (B-12 PO), Take by mouth every morning Take 1/2 tablet daily, Disp: , Rfl:      losartan (COZAAR) 25 MG tablet, Take 1 tablet (25 mg) by mouth daily, Disp: 90 tablet, Rfl: 3     metFORMIN (GLUCOPHAGE) 500 MG tablet, Take 0.5 tablets (250 mg) by mouth daily (with dinner) REFILL WHEN REQUESTED, Disp: 45 tablet, Rfl: 1     sertraline (ZOLOFT) 50 MG tablet, Take 1 tablet (50 mg) by mouth daily, Disp: 90 tablet, Rfl: 1     sertraline (ZOLOFT) 50 MG tablet, Take 1/2 tablet for 1 month then DC, Disp: 90 tablet, Rfl: 3    Allergies -   Allergies   Allergen Reactions     Oxycodone Itching     No Known Drug Allergies        Social History -   Social History     Socioeconomic History     Marital status:      Spouse name: Denisha     Number of children: 2     Years of education: 14     Highest education level: Not on file   Occupational History     Not on file   Social Needs     Financial resource strain: Not on file     Food insecurity:     Worry: Not on file     Inability: Not on file     Transportation needs:     Medical: Not on file     Non-medical: Not on file   Tobacco Use     Smoking status: Never Smoker     Smokeless tobacco: Never Used   Substance and Sexual Activity     Alcohol use: Yes     Comment: Occasionaly     Drug use: No     Sexual activity: Yes     Partners: Female     Birth control/protection: Female Surgical   Lifestyle     Physical activity:     Days per week: Not on file     Minutes per session: Not on file     Stress: Not on file    Relationships     Social connections:     Talks on phone: Not on file     Gets together: Not on file     Attends Taoism service: Not on file     Active member of club or organization: Not on file     Attends meetings of clubs or organizations: Not on file     Relationship status: Not on file     Intimate partner violence:     Fear of current or ex partner: Not on file     Emotionally abused: Not on file     Physically abused: Not on file     Forced sexual activity: Not on file   Other Topics Concern     Parent/sibling w/ CABG, MI or angioplasty before 65F 55M? No   Social History Narrative    Dairy/d 3-4 servings/d.     Caffeine 2-3 servings/d    Exercise 6 x week walk    Sunscreen used - Yes    Seatbelts used - Yes    Working smoke/CO detectors in the home - Yes    Guns stored in the home - Yes    Self Breast Exams - NA    Self Testicular Exam -Yes    Eye Exam up to date - Yes    Dental Exam up to date - Yes    Pap Smear up to date - NA    Mammogram up to date - NA    PSA up to date - unknown    Dexa Scan up to date - NA    Flex Sig / Colonoscopy up to date - Yes    Immunizations up to date - Yes    Abuse: Current or Past(Physical, Sexual or Emotional)- No    Do you feel safe in your environment - Yes    KWABENA VELASQUEZ LPN.    06        Mom  at age 89 from Alzheimer    DAd  at age 56 from pneumonia/COPD     for 46 years    Sibling: one brother  in accident.  Sister  at age 33 from kidney failure w/ down syndrome.  Two other brothers alive and well.    Two adult children in good health.       Family History -   Family History   Problem Relation Age of Onset     Diabetes Father      Genitourinary Problems Daughter         spherocytosis, had spleen removed     Diabetes Maternal Grandmother      Intellectual Disability (Mental Retardation) Sister      Dementia Mother      Diabetes Sister         type 1     Diabetes Sister      Prostate Cancer Brother      C.A.D. No family hx of      Cancer  - colorectal No family hx of      Hypertension No family hx of      Cerebrovascular Disease No family hx of      Breast Cancer No family hx of        Review of Systems - As per HPI and PMHx, otherwise 7 system review of the head and neck negative.    Physical Exam  General - The patient is in no distress. Alert and oriented to person and place, answers questions and cooperates with examination appropriately.   Neurologic - CN II-XII are grossly intact. No focal neurologic deficits.   Voice and Breathing - The patient was breathing comfortably without the use of accessory muscles. There was no wheezing, stridor, or stertor.  The patients voice was clear and strong.  Ears - canals clear. The tympanic membranes are normal in appearance, bony landmarks are intact.  No retraction, perforation, or masses. No fluid or purulence was seen in the external canal or the middle ear. No evidence of infection of the middle ear or external canal, cerumen was normal in appearance.  Eyes - Extraocular movements intact. Sclera were not icteric or injected, conjunctiva were pink and moist.  Mouth - Examination of the oral cavity showed pink, healthy oral mucosa. No lesions or ulcerations noted.  The tongue was mobile and midline.  Throat - The walls of the oropharynx were smooth, symmetric, and had no lesions or ulcerations.  The uvula was midline on elevation.    Neck - Palpation of the occipital, submental, submandibular, internal jugular chain, and supraclavicular nodes did not demonstrate any abnormal lymph nodes or masses. No parotid masses. Palpation of the thyroid was soft and smooth, with no nodules or goiter appreciated.  The trachea was mobile and midline.      Audiologic Studies - An audiogram and tympanogram were performed today as part of the evaluation and personally reviewed. The tympanogram shows a normal Type A curve, with normal canal volume and middle ear pressure.  There is no sign of eustachian tube dysfunction or  middle ear effusion.  The audiogram showed a significant sensorineural hearing loss bilateral, worse in right ear. Poor word recognition scores.     Assessment and Plan - Gerber Levine is a 74 year old male who presents to me today with subjective tinnitus, and sensorineural hearing loss, asymmetric. The asymmetry is likely due to prior right middle ear surgery for cholesteatoma.  He has worn hearing aids for 20 years and is experiencing worsening hearing loss likely due to age and his prior noise exposure.  Because of the poor word recognition scores on the right he could try a BiCROS aid.  He is not a candidate for cochlear implant at this time.  Recheck hearing in 1 year.    Luis Angel Amezcua MD  Otolaryngology  [facility]

## 2019-11-05 NOTE — LETTER
11/5/2019         RE: Gerber Levine  [address]  [facility] [address]        Dear Colleague,    Thank you for referring your patient, Gerber Levine, to the [facility]. Please see a copy of my visit note below.    Chief Complaint - Tinnitus, hearing loss    History of Present Illness - Gerber Levine is a 74 year old male who presents to me today with ringing in the ears.  It has been present and noticeable for approximately 20 years, but worsening in last year.  It was not sudden in onset. He has worn 2 hearing aids most of the time for 20 years. Right ear is worse. He had a surgery on the right ear many years ago, cholesteatoma. With regards to recreational, , and work-related noise exposure he had a [place]. No family history of hearing loss. He brings in an outside audiogram from 3/2019 which showed about the same hearing as today - bilateral sensorineural hearing loss, moderate to severe, down-sloping right ear is worse.     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     Type 2 diabetes mellitus without complication, without long-term current use of insulin (H)       Current Medications -   Current Outpatient Medications:      ASPIRIN 81 MG OR TABS, 1 tab po QD (Once per day) (Patient taking differently: 1 tab po QD (Once per day) QAM), Disp: , Rfl:      atorvastatin (LIPITOR) 40 MG tablet, Take 1 tablet (40 mg) by mouth daily, Disp: 90 tablet, Rfl: 3     blood glucose (NO BRAND SPECIFIED) lancets standard, Use to test blood sugar daily and as  needed, Disp: 100 each, Rfl: 11     blood glucose (NO BRAND SPECIFIED) test strip, Use to test blood sugar 1 times daily or as directed., Disp: 100 strip, Rfl: 11     blood glucose calibration (ACCU-CHEK RODRI) solution, USE 1 DROP AS NEEDED, Disp: 3 each, Rfl: 3     blood glucose monitoring (ACCU-CHEK RODRI PLUS) meter device kit, , Disp: , Rfl:      blood glucose monitoring (ACCU-CHEK RODRI PLUS) test strip, 1 strip by In Vitro route daily, Disp: 100 each, Rfl: 3     blood glucose monitoring (NO BRAND SPECIFIED) meter device kit, Use to test blood sugar 1 times daily or as directed., Disp: 1 kit, Rfl: 0     CALCITRATE/VITAMIN D 315-200 MG-UNIT OR TABS, One tab daily in the morning, Disp: , Rfl:      Cyanocobalamin (B-12 PO), Take by mouth every morning Take 1/2 tablet daily, Disp: , Rfl:      losartan (COZAAR) 25 MG tablet, Take 1 tablet (25 mg) by mouth daily, Disp: 90 tablet, Rfl: 3     metFORMIN (GLUCOPHAGE) 500 MG tablet, Take 0.5 tablets (250 mg) by mouth daily (with dinner) REFILL WHEN REQUESTED, Disp: 45 tablet, Rfl: 1     sertraline (ZOLOFT) 50 MG tablet, Take 1 tablet (50 mg) by mouth daily, Disp: 90 tablet, Rfl: 1     sertraline (ZOLOFT) 50 MG tablet, Take 1/2 tablet for 1 month then DC, Disp: 90 tablet, Rfl: 3    Allergies -   Allergies   Allergen Reactions     Oxycodone Itching     No Known Drug Allergies        Social History -   Social History     Socioeconomic History     Marital status:      Spouse name: Denisha     Number of children: 2     Years of education: 14     Highest education level: Not on file   Occupational History     Not on file   Social Needs     Financial resource strain: Not on file     Food insecurity:     Worry: Not on file     Inability: Not on file     Transportation needs:     Medical: Not on file     Non-medical: Not on file   Tobacco Use     Smoking status: Never Smoker     Smokeless tobacco: Never Used   Substance and Sexual Activity     Alcohol use: Yes     Comment:  Occasionaly     Drug use: No     Sexual activity: Yes     Partners: Female     Birth control/protection: Female Surgical   Lifestyle     Physical activity:     Days per week: Not on file     Minutes per session: Not on file     Stress: Not on file   Relationships     Social connections:     Talks on phone: Not on file     Gets together: Not on file     Attends Gnosticism service: Not on file     Active member of club or organization: Not on file     Attends meetings of clubs or organizations: Not on file     Relationship status: Not on file     Intimate partner violence:     Fear of current or ex partner: Not on file     Emotionally abused: Not on file     Physically abused: Not on file     Forced sexual activity: Not on file   Other Topics Concern     Parent/sibling w/ CABG, MI or angioplasty before 65F 55M? No   Social History Narrative    Dairy/d 3-4 servings/d.     Caffeine 2-3 servings/d    Exercise 6 x week walk    Sunscreen used - Yes    Seatbelts used - Yes    Working smoke/CO detectors in the home - Yes    Guns stored in the home - Yes    Self Breast Exams - NA    Self Testicular Exam -Yes    Eye Exam up to date - Yes    Dental Exam up to date - Yes    Pap Smear up to date - NA    Mammogram up to date - NA    PSA up to date - unknown    Dexa Scan up to date - NA    Flex Sig / Colonoscopy up to date - Yes    Immunizations up to date - Yes    Abuse: Current or Past(Physical, Sexual or Emotional)- No    Do you feel safe in your environment - Yes    KWABENA VELASQUEZ LPN.    06        Mom  at age 89 from Alzheimer    DAd  at age 56 from pneumonia/COPD     for 46 years    Sibling: one brother  in accident.  Sister  at age 33 from kidney failure w/ down syndrome.  Two other brothers alive and well.    Two adult children in good health.       Family History -   Family History   Problem Relation Age of Onset     Diabetes Father      Genitourinary Problems Daughter         spherocytosis, had  spleen removed     Diabetes Maternal Grandmother      Intellectual Disability (Mental Retardation) Sister      Dementia Mother      Diabetes Sister         type 1     Diabetes Sister      Prostate Cancer Brother      C.A.D. No family hx of      Cancer - colorectal No family hx of      Hypertension No family hx of      Cerebrovascular Disease No family hx of      Breast Cancer No family hx of        Review of Systems - As per HPI and PMHx, otherwise 7 system review of the head and neck negative.    Physical Exam  General - The patient is in no distress. Alert and oriented to person and place, answers questions and cooperates with examination appropriately.   Neurologic - CN II-XII are grossly intact. No focal neurologic deficits.   Voice and Breathing - The patient was breathing comfortably without the use of accessory muscles. There was no wheezing, stridor, or stertor.  The patients voice was clear and strong.  Ears - canals clear. The tympanic membranes are normal in appearance, bony landmarks are intact.  No retraction, perforation, or masses. No fluid or purulence was seen in the external canal or the middle ear. No evidence of infection of the middle ear or external canal, cerumen was normal in appearance.  Eyes - Extraocular movements intact. Sclera were not icteric or injected, conjunctiva were pink and moist.  Mouth - Examination of the oral cavity showed pink, healthy oral mucosa. No lesions or ulcerations noted.  The tongue was mobile and midline.  Throat - The walls of the oropharynx were smooth, symmetric, and had no lesions or ulcerations.  The uvula was midline on elevation.    Neck - Palpation of the occipital, submental, submandibular, internal jugular chain, and supraclavicular nodes did not demonstrate any abnormal lymph nodes or masses. No parotid masses. Palpation of the thyroid was soft and smooth, with no nodules or goiter appreciated.  The trachea was mobile and midline.      Audiologic  Studies - An audiogram and tympanogram were performed today as part of the evaluation and personally reviewed. The tympanogram shows a normal Type A curve, with normal canal volume and middle ear pressure.  There is no sign of eustachian tube dysfunction or middle ear effusion.  The audiogram showed a significant sensorineural hearing loss bilateral, worse in right ear. Poor word recognition scores.     Assessment and Plan - Gerber Levine is a 74 year old male who presents to me today with subjective tinnitus, and sensorineural hearing loss, asymmetric. The asymmetry is likely due to prior right middle ear surgery for cholesteatoma.  He has worn hearing aids for 20 years and is experiencing worsening hearing loss likely due to age and his prior noise exposure.  Because of the poor word recognition scores on the right he could try a BiCROS aid.  He is not a candidate for cochlear implant at this time.  Recheck hearing in 1 year.    Luis Angel Amezcua MD  Otolaryngology  [facility]        Again, thank you for allowing me to participate in the care of your patient.        Sincerely,        Luis Angel Amezcua MD

## 2019-12-02 ENCOUNTER — OFFICE VISIT (OUTPATIENT)
Dept: FAMILY MEDICINE | Facility: CLINIC | Age: 74
End: 2019-12-02
Payer: COMMERCIAL

## 2019-12-02 VITALS
HEART RATE: 62 BPM | WEIGHT: 198.4 LBS | BODY MASS INDEX: 25.46 KG/M2 | OXYGEN SATURATION: 98 % | DIASTOLIC BLOOD PRESSURE: 68 MMHG | SYSTOLIC BLOOD PRESSURE: 120 MMHG | HEIGHT: 74 IN | TEMPERATURE: 97.7 F

## 2019-12-02 DIAGNOSIS — E78.5 HYPERLIPIDEMIA LDL GOAL <100: ICD-10-CM

## 2019-12-02 DIAGNOSIS — I10 HYPERTENSION GOAL BP (BLOOD PRESSURE) < 140/90: ICD-10-CM

## 2019-12-02 DIAGNOSIS — C49.3 CHEST WALL SARCOMA (H): ICD-10-CM

## 2019-12-02 DIAGNOSIS — R91.8 PULMONARY NODULES: ICD-10-CM

## 2019-12-02 DIAGNOSIS — E11.9 TYPE 2 DIABETES MELLITUS WITHOUT COMPLICATION, WITHOUT LONG-TERM CURRENT USE OF INSULIN (H): Primary | ICD-10-CM

## 2019-12-02 LAB — HBA1C MFR BLD: 7 % (ref 0–5.6)

## 2019-12-02 PROCEDURE — 83036 HEMOGLOBIN GLYCOSYLATED A1C: CPT | Performed by: FAMILY MEDICINE

## 2019-12-02 PROCEDURE — 36415 COLL VENOUS BLD VENIPUNCTURE: CPT | Performed by: FAMILY MEDICINE

## 2019-12-02 PROCEDURE — 99214 OFFICE O/P EST MOD 30 MIN: CPT | Performed by: FAMILY MEDICINE

## 2019-12-02 ASSESSMENT — ANXIETY QUESTIONNAIRES
7. FEELING AFRAID AS IF SOMETHING AWFUL MIGHT HAPPEN: NOT AT ALL
2. NOT BEING ABLE TO STOP OR CONTROL WORRYING: NOT AT ALL
5. BEING SO RESTLESS THAT IT IS HARD TO SIT STILL: NOT AT ALL
3. WORRYING TOO MUCH ABOUT DIFFERENT THINGS: SEVERAL DAYS
6. BECOMING EASILY ANNOYED OR IRRITABLE: NOT AT ALL
1. FEELING NERVOUS, ANXIOUS, OR ON EDGE: SEVERAL DAYS
IF YOU CHECKED OFF ANY PROBLEMS ON THIS QUESTIONNAIRE, HOW DIFFICULT HAVE THESE PROBLEMS MADE IT FOR YOU TO DO YOUR WORK, TAKE CARE OF THINGS AT HOME, OR GET ALONG WITH OTHER PEOPLE: NOT DIFFICULT AT ALL
GAD7 TOTAL SCORE: 2

## 2019-12-02 ASSESSMENT — MIFFLIN-ST. JEOR: SCORE: 1691.75

## 2019-12-02 ASSESSMENT — PATIENT HEALTH QUESTIONNAIRE - PHQ9
5. POOR APPETITE OR OVEREATING: NOT AT ALL
SUM OF ALL RESPONSES TO PHQ QUESTIONS 1-9: 5

## 2019-12-02 NOTE — PATIENT INSTRUCTIONS
Scheduling Appointments    Schedule CT    [facility]. All locations.  Call: [phone]    I will follow-up with the pulmonologist in regards to the results of your Chest CT scan.

## 2019-12-02 NOTE — PROGRESS NOTES
Subjective     Gerber Levine is a 74 year old male who presents to clinic today for the following health issues:    HPI     Diabetes Follow-up    How often are you checking your blood sugar? One time daily  What time of day are you checking your blood sugars (select all that apply)?  Before meals  Have you had any blood sugars above 200?  No  Have you had any blood sugars below 70?  Yes 68-69- sometimes    What symptoms do you notice when your blood sugar is low?  None    What concerns do you have today about your diabetes? None     Do you have any of these symptoms? (Select all that apply)  No numbness or tingling in feet.  No redness, sores or blisters on feet.  No complaints of excessive thirst.  No reports of blurry vision.  No significant changes to weight.     Have you had a diabetic eye exam in the last 12 months? Yes- Date of last eye exam: March- April 2019    Diabetes Management Resources    Hyperlipidemia Follow-Up      Are you regularly taking any medication or supplement to lower your cholesterol?   Yes- Lipitor    Are you having muscle aches or other side effects that you think could be caused by your cholesterol lowering medication?  No    Hypertension Follow-up      Do you check your blood pressure regularly outside of the clinic? Yes     Are you following a low salt diet? Yes    Are your blood pressures ever more than 140 on the top number (systolic) OR more   than 90 on the bottom number (diastolic), for example 140/90? No    BP Readings from Last 2 Encounters:   12/02/19 120/68   08/20/19 118/68     Hemoglobin A1C (%)   Date Value   12/02/2019 7.0 (H)   06/12/2019 7.0 (H)     LDL Cholesterol Calculated (mg/dL)   Date Value   06/12/2019 64   06/06/2018 50       Depression and Anxiety Follow-Up    How are you doing with your depression since your last visit? No change    How are you doing with your anxiety since your last visit?  No change    Are you having other symptoms that might be  "associated with depression or anxiety? No    Have you had a significant life event? No     Do you have any concerns with your use of alcohol or other drugs? No     Social History     Tobacco Use     Smoking status: Never Smoker     Smokeless tobacco: Never Used   Substance Use Topics     Alcohol use: Yes     Comment: Occasionaly     Drug use: No     PHQ 7/17/2018 6/12/2019 12/2/2019   PHQ-9 Total Score 7 8 5   Q9: Thoughts of better off dead/self-harm past 2 weeks Not at all Not at all Not at all     IVETTE-7 SCORE 7/17/2018 6/12/2019 12/2/2019   Total Score - - -   Total Score 8 10 2     Suicide Assessment Five-step Evaluation and Treatment (SAFE-T)      How many servings of fruits and vegetables do you eat daily?  2-3    On average, how many sweetened beverages do you drink each day (Examples: soda, juice, sweet tea, etc.  Do NOT count diet or artificially sweetened beverages)?   0    How many days per week do you miss taking your medication? 0      Tinnitus   Art had an appointment with ENT for his tinnitus which didn't have a clear cause. He describes this as a \"humming.\" He notes that this is much worse with the right ear. He uses high quality hearing aids.     Additional Information   Follows with pulmonology every 6 months and had some new nodules on the last imaging. 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         Recent Labs   Lab Test 12/02/19  0834 06/12/19  0822 06/12/19  0740 12/11/18  0701 06/06/18  0821 03/19/18  0801  06/13/17  0849  06/01/15  1002  11/15/11  1759   A1C 7.0*  --  7.0* 6.4* 6.3* 6.5*   < > 6.2*   < > 6.0   < >  --    LDL  --  64  --   --  50  --   --  54   < > 58   < >  --    HDL  --  69  --   --  63  --   --  67   < > 67   < >  --    TRIG  --  78  --   --  71  --   --  81   < > 106   < >  --    ALT  --   --   --   --   --   --   --   --   --   --   --  19   CR  --  0.84  --   --  0.85  --    < > 0.84   < > 0.86   < > 0.86 " "  GFRESTIMATED  --  85  --   --  88  --    < > 90   < > 87   < > 88   GFRESTBLACK  --  >90  --   --  >90  --    < > >90   GFR Calc     < > >90   GFR Calc     < > >90   POTASSIUM  --  4.4  --   --  4.8  --    < > 4.4   < > 4.0   < >  --    TSH  --   --   --   --   --  3.42  --   --   --  2.52   < >  --     < > = values in this interval not displayed.      BP Readings from Last 3 Encounters:   12/02/19 120/68   08/20/19 118/68   06/12/19 120/70    Wt Readings from Last 3 Encounters:   12/02/19 90 kg (198 lb 6.4 oz)   08/20/19 89.3 kg (196 lb 12.8 oz)   06/12/19 90.4 kg (199 lb 6.4 oz)         Reviewed and updated as needed this visit by Provider       Review of Systems   ROS COMP: Constitutional, HEENT, cardiovascular, pulmonary, gi and gu systems are negative, except as otherwise noted.    This document serves as a record of the services and decisions personally performed and made by Magdy Cuba MD. It was created on his behalf by Yasir Paul, a trained medical scribe. The creation of this document is based on the provider's statements to the medical scribe.  Yasir Paul 9:00 AM December 2, 2019        Objective    /68 (BP Location: Right arm, Patient Position: Sitting, Cuff Size: Adult Large)   Pulse 62   Temp 97.7  F (36.5  C) (Oral)   Ht 1.867 m (6' 1.5\")   Wt 90 kg (198 lb 6.4 oz)   SpO2 98%   BMI 25.82 kg/m    Body mass index is 25.82 kg/m .  Physical Exam   GENERAL: healthy, alert and no distress  NECK: no adenopathy, no asymmetry, masses, or scars and thyroid normal to palpation  RESP: lungs clear to auscultation - no rales, rhonchi or wheezes  CV: regular rate and rhythm, normal S1 S2, no S3 or S4, no murmur, click or rub, no peripheral edema   SKIN: no suspicious lesions or rashes to visible skin   NEURO: Normal strength and tone, mentation intact and speech normal  PSYCH: mentation appears normal, affect normal/bright    Diagnostic Test Results:  Labs " reviewed in Epic  Results for orders placed or performed in visit on 12/02/19 (from the past 24 hour(s))   HEMOGLOBIN A1C   Result Value Ref Range    Hemoglobin A1C 7.0 (H) 0 - 5.6 %         Assessment & Plan       ICD-10-CM    1. Type 2 diabetes mellitus without complication, without long-term current use of insulin (H) E11.9 HEMOGLOBIN A1C   2. 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 Hypertension goal BP (blood pressure) < 140/90 I10    4. Hyperlipidemia LDL goal <100 E78.5    5. Pulmonary nodules R91.8 CT Chest w/o Contrast   Diabetes appears to be well controlled, continue current medications. Last CT had some abnormalities and was recommended a 6 month follow-up for repeat imaging. I will follow-up with the pulmonologist in regards to the results of his Chest CT scan. Blood pressure is controlled within acceptable limits, Continue current medication. LDL last checked in June and appears controlled with statin, continue current plan of care.     Patient Instructions   Scheduling Appointments    Schedule CT    [facility]. All locations.  Call: [phone]    I will follow-up with the pulmonologist in regards to the results of your Chest CT scan.       Return in about 6 months (around 6/2/2020) for Lab Work, Routine Visit.    The information in this document, created by the medical scribe for me, accurately reflects the services I personally performed and the decisions made by me. I have reviewed and approved this document for accuracy prior to leaving the patient care area.  December 2, 2019 9:12 AM    Tee Cuba MD, MD  [facility]

## 2019-12-03 ASSESSMENT — ANXIETY QUESTIONNAIRES: GAD7 TOTAL SCORE: 2

## 2019-12-05 ENCOUNTER — ANCILLARY PROCEDURE (OUTPATIENT)
Dept: CT IMAGING | Facility: CLINIC | Age: 74
End: 2019-12-05
Attending: FAMILY MEDICINE
Payer: COMMERCIAL

## 2019-12-05 DIAGNOSIS — R91.8 PULMONARY NODULES: ICD-10-CM

## 2019-12-05 DIAGNOSIS — C49.3 CHEST WALL SARCOMA (H): ICD-10-CM

## 2020-01-27 DIAGNOSIS — E11.9 TYPE 2 DIABETES MELLITUS WITHOUT COMPLICATION, WITHOUT LONG-TERM CURRENT USE OF INSULIN (H): ICD-10-CM

## 2020-01-27 NOTE — TELEPHONE ENCOUNTER
metFORMIN (GLUCOPHAGE) 500 MG tablet  Last Written Prescription Date:  6/12/2019  Last Fill Quantity: 45,  # refills: 1   Last office visit: 12/2/2019 with prescribing provider:  dylan   Future Office Visit:    Requested Prescriptions   Pending Prescriptions Disp Refills     metFORMIN (GLUCOPHAGE) 500 MG tablet 45 tablet 1     Sig: Take 0.5 tablets (250 mg) by mouth daily (with dinner) REFILL WHEN REQUESTED       Biguanide Agents Passed - 1/27/2020  9:04 AM        Passed - Blood pressure less than 140/90 in past 6 months     BP Readings from Last 3 Encounters:   12/02/19 120/68   08/20/19 118/68   06/12/19 120/70                 Passed - Patient has documented LDL within the past 12 mos.     Recent Labs   Lab Test 06/12/19  0822   LDL 64             Passed - Patient has had a Microalbumin in the past 15 mos.     Recent Labs   Lab Test 06/12/19  0741   MICROL 11   UMALCR 8.11             Passed - Patient is age 10 or older        Passed - Patient has documented A1c within the specified period of time.     If HgbA1C is 8 or greater, it needs to be on file within the past 3 months.  If less than 8, must be on file within the past 6 months.     Recent Labs   Lab Test 12/02/19  0834   A1C 7.0*             Passed - Patient's CR is NOT>1.4 OR Patient's EGFR is NOT<45 within past 12 mos.     Recent Labs   Lab Test 06/12/19 0822 06/03/16   GFR  --   --  84   GFRB  --   --  83   GFRESTIMATED 85   < >  --    GFRESTBLACK >90   < >  --     < > = values in this interval not displayed.       Recent Labs   Lab Test 06/12/19 0822 06/03/16   CR 0.84   < >  --    CRPOC  --   --  0.9    < > = values in this interval not displayed.             Passed - Patient does NOT have a diagnosis of CHF.        Passed - Medication is active on med list        Passed - Recent (6 mo) or future (30 days) visit within the authorizing provider's specialty     Patient had office visit in the last 6 months or has a visit in the next 30 days with  "authorizing provider or within the authorizing provider's specialty.  See \"Patient Info\" tab in inbasket, or \"Choose Columns\" in Meds & Orders section of the refill encounter.              "

## 2020-01-28 NOTE — TELEPHONE ENCOUNTER
Prescription approved per [facility] Refill Protocol.    Joslyn Meza, RN, BSN, PHN  [facility]: [place]

## 2020-03-02 DIAGNOSIS — E11.9 TYPE 2 DIABETES MELLITUS WITHOUT COMPLICATION, WITHOUT LONG-TERM CURRENT USE OF INSULIN (H): ICD-10-CM

## 2020-03-03 NOTE — TELEPHONE ENCOUNTER
"Requested Prescriptions   Pending Prescriptions Disp Refills     atorvastatin (LIPITOR) 40 MG table  This medication may not be due for a refill.    Last Written Prescription Date:  6/12/2019  Last Fill Quantity: 90 tablet,  # refills: 3   Last office visit: 12/2/2019 with prescribing provider:  PIPER Cuba   Future Office Visit:   90 tablet 3     Sig: Take 1 tablet (40 mg) by mouth daily       Statins Protocol Passed - 3/2/2020  1:30 PM        Passed - LDL on file in past 12 months     Recent Labs   Lab Test 06/12/19  0822   LDL 64             Passed - No abnormal creatine kinase in past 12 months     No lab results found.             Passed - Recent (12 mo) or future (30 days) visit within the authorizing provider's specialty     Patient has had an office visit with the authorizing provider or a provider within the authorizing providers department within the previous 12 mos or has a future within next 30 days. See \"Patient Info\" tab in inbasket, or \"Choose Columns\" in Meds & Orders section of the refill encounter.              Passed - Medication is active on med list        Passed - Patient is age 18 or older              losartan (COZAAR) 25 MG tablet  This medication may not be due for a refill.    Last Written Prescription Date:  6/12/2019  Last Fill Quantity: 90 tablet,  # refills: 3   Last office visit: 12/2/2019 with prescribing provider:  PIPER Cuba   Future Office Visit:   90 tablet 3     Sig: Take 1 tablet (25 mg) by mouth daily       Angiotensin-II Receptors Passed - 3/2/2020  1:30 PM        Passed - Last blood pressure under 140/90 in past 12 months     BP Readings from Last 3 Encounters:   12/02/19 120/68   08/20/19 118/68   06/12/19 120/70           Passed - Recent (12 mo) or future (30 days) visit within the authorizing provider's specialty     Patient has had an office visit with the authorizing provider or a provider within the authorizing providers department within the previous 12 mos or has a " "future within next 30 days. See \"Patient Info\" tab in inbasket, or \"Choose Columns\" in Meds & Orders section of the refill encounter.              Passed - Medication is active on med list        Passed - Patient is age 18 or older        Passed - Normal serum creatinine on file in past 12 months     Recent Labs   Lab Test 06/12/19 0822 06/03/16   CR 0.84   < >  --    CRPOC  --   --  0.9    < > = values in this interval not displayed.             Passed - Normal serum potassium on file in past 12 months     Recent Labs   Lab Test 06/12/19 0822   POTASSIUM 4.4                    "

## 2020-03-04 RX ORDER — LOSARTAN POTASSIUM 25 MG/1
25 TABLET ORAL DAILY
Qty: 90 TABLET | Refills: 3 | OUTPATIENT
Start: 2020-03-04

## 2020-03-04 RX ORDER — ATORVASTATIN CALCIUM 40 MG/1
40 TABLET, FILM COATED ORAL DAILY
Qty: 90 TABLET | Refills: 3 | OUTPATIENT
Start: 2020-03-04

## 2020-03-04 NOTE — TELEPHONE ENCOUNTER
90 day supply with 3 refill sent 6/12/19 to [facility] [phone]. Refill request too soon. Refused.     Joslyn Meza, RN, BSN, PHN  [facility]: [place]

## 2020-03-16 DIAGNOSIS — F32.0 MILD MAJOR DEPRESSION (H): ICD-10-CM

## 2020-03-16 DIAGNOSIS — F41.9 ANXIETY: ICD-10-CM

## 2020-03-16 NOTE — TELEPHONE ENCOUNTER
Done    Orders Placed This Encounter     sertraline (ZOLOFT) 50 MG tablet     Sig: Take 1 tablet (50 mg) by mouth daily     Dispense:  90 tablet     Refill:  3

## 2020-03-16 NOTE — TELEPHONE ENCOUNTER
Pt is requesting more refills for his Sertraline 50mg Tabs.    Pt is now using our  Pharmacy, & previous Rx from [facility] has no refills.    Thanks!!    - [facility] ([phone])

## 2020-03-18 ENCOUNTER — TRANSFERRED RECORDS (OUTPATIENT)
Dept: HEALTH INFORMATION MANAGEMENT | Facility: CLINIC | Age: 75
End: 2020-03-18

## 2020-03-18 LAB — RETINOPATHY: NEGATIVE

## 2020-04-20 DIAGNOSIS — E11.9 TYPE 2 DIABETES MELLITUS WITHOUT COMPLICATION, WITHOUT LONG-TERM CURRENT USE OF INSULIN (H): ICD-10-CM

## 2020-04-21 NOTE — TELEPHONE ENCOUNTER
Requested Prescriptions   Pending Prescriptions Disp Refills     blood glucose (NO BRAND SPECIFIED) lancets standard        Last Written Prescription Date:  2/19/2019  Last Fill Quantity: 100 each,   # refills: 11  Last Office Visit: 12/2/2019 ke[place]/ PIPER Cuba  Future Office visit:       Routing refill request to provider for review/approval because:  Drug not on the G, P or  Health refill protocol or controlled substance 100 each 11     Sig: Use to test blood sugar daily and as needed       There is no refill protocol information for this order

## 2020-05-28 ENCOUNTER — TRANSFERRED RECORDS (OUTPATIENT)
Dept: HEALTH INFORMATION MANAGEMENT | Facility: CLINIC | Age: 75
End: 2020-05-28

## 2020-05-28 DIAGNOSIS — E11.9 TYPE 2 DIABETES MELLITUS WITHOUT COMPLICATION, WITHOUT LONG-TERM CURRENT USE OF INSULIN (H): Primary | ICD-10-CM

## 2020-06-15 NOTE — PROGRESS NOTES
"AUDIOLOGY REPORT:    Patient was referred from ENT by Dr. Amezcua for audiology evaluation. The patient reports that he has had hearing loss for many years but his hearing has been getting noticeably worse recently. He reports a constant humming tinnitus in both ears that seems to be getting better, but he reports that he more recently developed an intermittent \"bell\" sound in the left ear. 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 The patient reports that he has worn hearing aids for around 20 years. He currently wears [employer]  in canal hearing aids with earmolds. 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 The patient denies ear pain and pressure bilaterally.    Testing:    Otoscopy:   Otoscopic exam indicates ears are clear of cerumen bilaterally     Tympanograms:    RIGHT: normal eardrum mobility     LEFT:   normal eardrum mobility    Reflexes (reported by stimulus ear):  RIGHT: Ipsilateral is present at elevated levels  RIGHT: Contralateral is absent at frequencies tested  LEFT:   Ipsilateral is present at elevated levels  LEFT:   Contralateral is absent at frequencies tested    Thresholds:   Pure Tone Thresholds assessed using conventional audiometry with good reliability from 250-8000 Hz bilaterally using insert earphones and circumaural headphones     RIGHT:  moderate to severe sensorineural hearing loss    LEFT:    mild to severe sensorineural hearing loss    Speech Reception Threshold:    RIGHT: 70 dB HL    LEFT:   55 dB HL  Results are in agreement with pure tone average.     Word Recognition Score:     RIGHT: 60% at 100 dB HL using NU-6 recorded word list.    LEFT:   88% at 95 dB HL using NU-6 recorded word list.    Discussed results with the patient.     Patient was returned to ENT for follow up.     Michelle Dill, CCC-A  Licensed Audiologist #[id number]  11/5/2019    " O-L Flap Text: The defect edges were debeveled with a #15 scalpel blade.  Given the location of the defect, shape of the defect and the proximity to free margins an O-L flap was deemed most appropriate.  Using a sterile surgical marker, an appropriate advancement flap was drawn incorporating the defect and placing the expected incisions within the relaxed skin tension lines where possible.    The area thus outlined was incised deep to adipose tissue with a #15 scalpel blade.  The skin margins were undermined to an appropriate distance in all directions utilizing iris scissors.

## 2020-07-26 DIAGNOSIS — E11.9 TYPE 2 DIABETES MELLITUS WITHOUT COMPLICATION, WITHOUT LONG-TERM CURRENT USE OF INSULIN (H): ICD-10-CM

## 2020-07-28 NOTE — TELEPHONE ENCOUNTER
Routing refill request to provider for review/approval because:  Labs not current:  Cr, A1C   > 6 mo last OV

## 2020-08-31 DIAGNOSIS — E11.9 TYPE 2 DIABETES MELLITUS WITHOUT COMPLICATION, WITHOUT LONG-TERM CURRENT USE OF INSULIN (H): ICD-10-CM

## 2020-09-01 RX ORDER — LOSARTAN POTASSIUM 25 MG/1
TABLET ORAL
Qty: 90 TABLET | Refills: 1 | Status: SHIPPED | OUTPATIENT
Start: 2020-09-01 | End: 2020-10-28

## 2020-09-01 NOTE — TELEPHONE ENCOUNTER
Routing refill request to provider for review/approval because:  Labs not current:    Creatinine   Date Value Ref Range Status   06/12/2019 0.84 0.66 - 1.25 mg/dL Final     Potassium   Date Value Ref Range Status   06/12/2019 4.4 3.4 - 5.3 mmol/L Final     Joslyn Meza RN, BSN, PHN  [facility]: [place]

## 2020-09-09 ENCOUNTER — TELEPHONE (OUTPATIENT)
Dept: FAMILY MEDICINE | Facility: CLINIC | Age: 75
End: 2020-09-09

## 2020-09-09 NOTE — TELEPHONE ENCOUNTER
Called and left a message informing patient that we unfortunately do not have a wait list, but we could schedule patient for a telephone visit today to discuss med refills.    Curtis Davila

## 2020-09-09 NOTE — TELEPHONE ENCOUNTER
Reason for call: Per patient: Would there be anyway I can be placed on a waiting list for a physical appt and med check within the next week perferably early morning? My appointment was cancelled and I have some medications that need to be refilled    Phone number to reach patient:  Cell number on file:    Telephone Information:   Mobile [phone]       Best Time:  Anytime    Can we leave a detailed message on this number?  YES    Travel screening: Not Applicable

## 2020-09-09 NOTE — TELEPHONE ENCOUNTER
Reason for Call:  Other returning call    Detailed comments: Patient returning call and stated that telephone visit will not work due to hearing. He stated he will wait for another call from team/nurse. Please advise.     Phone Number Patient can be reached at: Cell number on file:    Telephone Information:   Mobile [phone]       Best Time: Anytime     Can we leave a detailed message on this number? YES    Call taken on 9/9/2020 at 11:40 AM by Cookie Mehta

## 2020-10-28 ENCOUNTER — OFFICE VISIT (OUTPATIENT)
Dept: FAMILY MEDICINE | Facility: CLINIC | Age: 75
End: 2020-10-28
Payer: COMMERCIAL

## 2020-10-28 VITALS
HEIGHT: 74 IN | TEMPERATURE: 98.1 F | WEIGHT: 203 LBS | OXYGEN SATURATION: 98 % | DIASTOLIC BLOOD PRESSURE: 82 MMHG | HEART RATE: 60 BPM | BODY MASS INDEX: 26.05 KG/M2 | SYSTOLIC BLOOD PRESSURE: 132 MMHG

## 2020-10-28 DIAGNOSIS — F32.0 MILD MAJOR DEPRESSION (H): ICD-10-CM

## 2020-10-28 DIAGNOSIS — Z23 FLU VACCINE NEED: ICD-10-CM

## 2020-10-28 DIAGNOSIS — E11.9 TYPE 2 DIABETES MELLITUS WITHOUT COMPLICATION, WITHOUT LONG-TERM CURRENT USE OF INSULIN (H): ICD-10-CM

## 2020-10-28 DIAGNOSIS — Z00.00 ENCOUNTER FOR MEDICARE ANNUAL WELLNESS EXAM: Primary | ICD-10-CM

## 2020-10-28 DIAGNOSIS — C49.3 CHEST WALL SARCOMA (H): ICD-10-CM

## 2020-10-28 DIAGNOSIS — Z11.59 NEED FOR HEPATITIS C SCREENING TEST: ICD-10-CM

## 2020-10-28 LAB — HBA1C MFR BLD: 6.8 % (ref 0–5.6)

## 2020-10-28 PROCEDURE — 36415 COLL VENOUS BLD VENIPUNCTURE: CPT | Performed by: FAMILY MEDICINE

## 2020-10-28 PROCEDURE — 99397 PER PM REEVAL EST PAT 65+ YR: CPT | Mod: 25 | Performed by: FAMILY MEDICINE

## 2020-10-28 PROCEDURE — 80048 BASIC METABOLIC PNL TOTAL CA: CPT | Performed by: FAMILY MEDICINE

## 2020-10-28 PROCEDURE — 80061 LIPID PANEL: CPT | Performed by: FAMILY MEDICINE

## 2020-10-28 PROCEDURE — 83036 HEMOGLOBIN GLYCOSYLATED A1C: CPT | Performed by: FAMILY MEDICINE

## 2020-10-28 PROCEDURE — 90662 IIV NO PRSV INCREASED AG IM: CPT | Performed by: FAMILY MEDICINE

## 2020-10-28 PROCEDURE — 86803 HEPATITIS C AB TEST: CPT | Performed by: FAMILY MEDICINE

## 2020-10-28 PROCEDURE — G0008 ADMIN INFLUENZA VIRUS VAC: HCPCS | Performed by: FAMILY MEDICINE

## 2020-10-28 RX ORDER — LOSARTAN POTASSIUM 25 MG/1
25 TABLET ORAL DAILY
Qty: 90 TABLET | Refills: 1 | Status: SHIPPED | OUTPATIENT
Start: 2020-10-28 | End: 2021-06-07

## 2020-10-28 ASSESSMENT — ENCOUNTER SYMPTOMS
HEARTBURN: 0
FREQUENCY: 0
EYE PAIN: 0
DYSURIA: 0
COUGH: 1
DIZZINESS: 0
DIARRHEA: 0
ABDOMINAL PAIN: 0
NERVOUS/ANXIOUS: 1
SHORTNESS OF BREATH: 0
HEMATURIA: 0
ARTHRALGIAS: 1
NAUSEA: 0
JOINT SWELLING: 0
HEADACHES: 0
CONSTIPATION: 0
PARESTHESIAS: 0
SORE THROAT: 0
HEMATOCHEZIA: 0
WEAKNESS: 1
MYALGIAS: 1
FEVER: 0
PALPITATIONS: 0
CHILLS: 0

## 2020-10-28 ASSESSMENT — MIFFLIN-ST. JEOR: SCORE: 1707.61

## 2020-10-28 ASSESSMENT — ACTIVITIES OF DAILY LIVING (ADL): CURRENT_FUNCTION: NO ASSISTANCE NEEDED

## 2020-10-28 ASSESSMENT — PAIN SCALES - GENERAL: PAINLEVEL: NO PAIN (0)

## 2020-10-28 NOTE — PATIENT INSTRUCTIONS
We are waiting to see where the [facility] providers are going to end up,     Specific recommendations if needed    Dr Chiquis Sanchez,Dr Alexa Hall or Madonna Jordan rNP at our site    Dr Joi Cuellar, Dr Sergei Contreras, Dr Mckay Brown or Dr Cesar Bobo. They may be at our site or the [facility]. We will know on the next few weeks.            Dr Cuba will review with the specialists if another CT scan is needed    Orders Placed This Encounter     FLUZONE HIGH DOSE 65+  [[id number]]     Hepatitis C Screen Reflex to HCV RNA Quant and Genotype     The Hepatitis C Screen testing will be used for patients born between 1945 and 1965 following the CDC recommendations. HEP C screening testing can also be ordered for any patient for which it is clinically recommended.     HEMOGLOBIN A1C     Last Lab Result: Hemoglobin A1C (%)       Date                     Value                 12/02/2019               7.0 (H)          ----------     BASIC METABOLIC PANEL     Lipid panel reflex to direct LDL Fasting     Last Lab Result: LDL Cholesterol Calculated (mg/dL)       Date                     Value                 06/12/2019               64               ----------     Order Specific Question:   Perform labs while fasting or non-fasting?     Answer:   Fasting     metFORMIN (GLUCOPHAGE) 500 MG tablet     Sig: TAKE ONE-HALF TABLET BY MOUTH EVERY DAY WITH DINNER     Dispense:  45 tablet     Refill:  3     losartan (COZAAR) 25 MG tablet     Sig: Take 1 tablet (25 mg) by mouth daily     Dispense:  90 tablet     Refill:  1

## 2020-10-28 NOTE — PROGRESS NOTES
"SUBJECTIVE:   Gerber Levine is a 75 year old male who presents for Preventive Visit.      Patient has been advised of split billing requirements and indicates understanding: Yes   Are you in the first 12 months of your Medicare coverage?  No    Healthy Habits:     In general, how would you rate your overall health?  Good    Frequency of exercise:  6-7 days/week    Duration of exercise:  45-60 minutes    Do you usually eat at least 4 servings of fruit and vegetables a day, include whole grains    & fiber and avoid regularly eating high fat or \"junk\" foods?  Yes    Taking medications regularly:  Yes    Medication side effects:  Muscle aches    Ability to successfully perform activities of daily living:  No assistance needed    Home Safety:  Lack of grab bars in the bathroom    Hearing Impairment:  Difficulty following a conversation in a noisy restaurant or crowded room, difficulty following dialogue in the theater, difficult to understand a speaker at a public meeting or Gnosticism service, need to ask people to speak up or repeat themselves, difficulty understanding soft or whispered speech and difficulty understanding speech on the telephone    In the past 6 months, have you been bothered by leaking of urine? Yes    In general, how would you rate your overall mental or emotional health?  Good      PHQ-2 Total Score: 2    Additional concerns today:  Yes    Do you feel safe in your environment? Yes    Have you ever done Advance Care Planning? (For example, a Health Directive, POLST, or a discussion with a medical provider or your loved ones about your wishes): Yes, advance care planning is on file.      Fall risk  Fallen 2 or more times in the past year?: No  Any fall with injury in the past year?: No    Cognitive Screening   1) Repeat 3 items (Leader, Season, Table)    2) Clock draw: NORMAL  3) 3 item recall: Recalls 2 objects   Results: NORMAL clock, 1-2 items recalled: COGNITIVE IMPAIRMENT LESS " LIKELY    Mini-CogTM Copyright ANGELA Villeda. Licensed by the author for use in [facility]; reprinted with permission (soanjum@[facility]). All rights reserved.      Do you have sleep apnea, excessive snoring or daytime drowsiness?: no    Reviewed and updated as needed this visit by clinical staff  Tobacco  Allergies  Meds              Reviewed and updated as needed this visit by Provider                Social History     Tobacco Use     Smoking status: Never Smoker     Smokeless tobacco: Never Used   Substance Use Topics     Alcohol use: Yes     Comment: Occasionaly     If you drink alcohol do you typically have >3 drinks per day or >7 drinks per week? No    Alcohol Use 10/28/2020   Prescreen: >3 drinks/day or >7 drinks/week? No   Prescreen: >3 drinks/day or >7 drinks/week? -       We also reviewed his sarcoma. Unclear if he needs any surveillance CT's I will review with the lung  Nodule clinic    Diabetes is stable. No changes. Ok to refill meds    His memory is stable however he feels here is a decline. He was evaluated by Dr Rodney and no concerns found at the time. Possible association with depression. We did try treatment and no changes. He does not feel he is depressed at this time         Current providers sharing in care for this patient include:   Patient Care Team:  Tee Cuba MD as PCP - General  Tee Cuba MD as Assigned PCP  Cheryl Ward APRN CNS as Clinical Nurse Specialist (Oncology)    The following health maintenance items are reviewed in Epic and correct as of today:  Health Maintenance   Topic Date Due     DEPRESSION ACTION PLAN  1945     HEPATITIS C SCREENING  02/10/1961     HEPATITIS B IMMUNIZATION (1 of 3 - Risk 3-dose series) 02/10/1962     DIABETIC FOOT EXAM  06/06/2019     ADVANCE CARE PLANNING  02/03/2020     A1C  06/02/2020     BMP  06/12/2020     LIPID  06/12/2020     MICROALBUMIN  06/12/2020     FALL RISK ASSESSMENT  06/12/2020      INFLUENZA VACCINE (1) 09/01/2020     EYE EXAM  05/28/2021     PHQ-9  09/09/2021     MEDICARE ANNUAL WELLNESS VISIT  10/28/2021     DTAP/TDAP/TD IMMUNIZATION (4 - Td) 03/29/2026     COLORECTAL CANCER SCREENING  06/29/2029     Pneumococcal Vaccine: 65+ Years  Completed     ZOSTER IMMUNIZATION  Completed     Pneumococcal Vaccine: Pediatrics (0 to 5 Years) and At-Risk Patients (6 to 64 Years)  Aged Out     IPV IMMUNIZATION  Aged Out     MENINGITIS IMMUNIZATION  Aged Out     Recent Labs   Lab Test 10/28/20  0702 12/02/19  0834 06/12/19  0822 06/12/19  0740 06/06/18  0821 06/06/18  0821 03/19/18  0801 06/13/17  0849 06/13/17  0849 06/01/15  1002 06/01/15  1002   A1C 6.8* 7.0*  --  7.0*   < > 6.3* 6.5*   < > 6.2*   < > 6.0   LDL  --   --  64  --   --  50  --   --  54   < > 58   HDL  --   --  69  --   --  63  --   --  67   < > 67   TRIG  --   --  78  --   --  71  --   --  81   < > 106   CR  --   --  0.84  --   --  0.85  --    < > 0.84   < > 0.86   GFRESTIMATED  --   --  85  --   --  88  --    < > 90   < > 87   GFRESTBLACK  --   --  >90  --   --  >90  --    < > >90   GFR Calc     < > >90   GFR Calc     POTASSIUM  --   --  4.4  --   --  4.8  --    < > 4.4   < > 4.0   TSH  --   --   --   --   --   --  3.42  --   --   --  2.52    < > = values in this interval not displayed.      Needs flu vaccine    Review of Systems   Constitutional: Negative for chills and fever.   HENT: Positive for congestion and hearing loss. Negative for ear pain and sore throat.    Eyes: Negative for pain and visual disturbance.   Respiratory: Positive for cough. Negative for shortness of breath.    Cardiovascular: Negative for chest pain, palpitations and peripheral edema.   Gastrointestinal: Negative for abdominal pain, constipation, diarrhea, heartburn, hematochezia and nausea.   Genitourinary: Positive for urgency. Negative for discharge, dysuria, frequency, genital sores, hematuria and impotence.   Musculoskeletal:  "Positive for arthralgias and myalgias. Negative for joint swelling.   Skin: Negative for rash.   Neurological: Positive for weakness. Negative for dizziness, headaches and paresthesias.   Psychiatric/Behavioral: Negative for mood changes. The patient is nervous/anxious.      Constitutional, HEENT, cardiovascular, pulmonary, gi and gu systems are negative, except as otherwise noted.    OBJECTIVE:   /82 (BP Location: Right arm, Patient Position: Sitting, Cuff Size: Adult Regular)   Pulse 60   Temp 98.1  F (36.7  C) (Oral)   Ht 1.867 m (6' 1.5\")   Wt 92.1 kg (203 lb)   SpO2 98%   BMI 26.42 kg/m   Estimated body mass index is 26.42 kg/m  as calculated from the following:    Height as of this encounter: 1.867 m (6' 1.5\").    Weight as of this encounter: 92.1 kg (203 lb).  Physical Exam  GENERAL: healthy, alert and no distress  NECK: no adenopathy, no asymmetry, masses, or scars and thyroid normal to palpation  RESP: lungs clear to auscultation - no rales, rhonchi or wheezes  CV: regular rate and rhythm, normal S1 S2, no S3 or S4, no murmur, click or rub, no peripheral edema and peripheral pulses strong  ABDOMEN: soft, nontender, no hepatosplenomegaly, no masses and bowel sounds normal  MS: no gross musculoskeletal defects noted, no edema    Diagnostic Test Results:  Labs reviewed in Epic    ASSESSMENT / PLAN:   (Z00.00) Encounter for Medicare annual wellness exam  (primary encounter diagnosis)  Comment: overall doing well and healthy  Plan: FLUZONE HIGH DOSE 65+  [91259]            (E11.9) Type 2 diabetes mellitus without complication, without long-term current use of insulin (H)  Comment: controlled  Plan: HEMOGLOBIN A1C, BASIC METABOLIC PANEL, Lipid         panel reflex to direct LDL Fasting, metFORMIN         (GLUCOPHAGE) 500 MG tablet, losartan (COZAAR)         25 MG tablet         Continue present medications      (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 No new treatment recommendations.   COUNSELING:  Reviewed preventive health counseling, as reflected in patient instructions       Regular exercise       Colon cancer screening    Estimated body mass index is 26.42 kg/m  as calculated from the following:    Height as of this encounter: 1.867 m (6' 1.5\").    Weight as of this encounter: 92.1 kg (203 lb).        He reports that he has never smoked. He has never used smokeless tobacco.      Appropriate preventive services were discussed with this patient, including applicable screening as appropriate for cardiovascular disease, diabetes, osteopenia/osteoporosis, and glaucoma.  As appropriate for age/gender, discussed screening for colorectal cancer, prostate cancer, breast cancer, and cervical cancer. Checklist reviewing preventive services available has been given to the patient.    Reviewed patients plan of care and provided an AVS. The Basic Care Plan (routine screening as documented in Health Maintenance) for Gerber meets the Care Plan requirement. This Care Plan has been established and reviewed with the Patient.    Counseling Resources:  ATP IV Guidelines  Pooled Cohorts Equation Calculator  Breast Cancer Risk Calculator  Breast Cancer: Medication to Reduce Risk  FRAX Risk Assessment  ICSI Preventive Guidelines  Dietary Guidelines for Americans, 2010  USDA's MyPlate  ASA Prophylaxis  Lung CA Screening    Tee Cuba MD, MD  [facility]" DESMOND    Identified Health Risks:

## 2020-10-29 LAB
ANION GAP SERPL CALCULATED.3IONS-SCNC: 4 MMOL/L (ref 3–14)
BUN SERPL-MCNC: 31 MG/DL (ref 7–30)
CALCIUM SERPL-MCNC: 9.3 MG/DL (ref 8.5–10.1)
CHLORIDE SERPL-SCNC: 107 MMOL/L (ref 94–109)
CHOLEST SERPL-MCNC: 146 MG/DL
CO2 SERPL-SCNC: 28 MMOL/L (ref 20–32)
CREAT SERPL-MCNC: 0.8 MG/DL (ref 0.66–1.25)
GFR SERPL CREATININE-BSD FRML MDRD: 86 ML/MIN/{1.73_M2}
GLUCOSE SERPL-MCNC: 151 MG/DL (ref 70–99)
HCV AB SERPL QL IA: NONREACTIVE
HDLC SERPL-MCNC: 64 MG/DL
LDLC SERPL CALC-MCNC: 68 MG/DL
NONHDLC SERPL-MCNC: 82 MG/DL
POTASSIUM SERPL-SCNC: 5.1 MMOL/L (ref 3.4–5.3)
SODIUM SERPL-SCNC: 139 MMOL/L (ref 133–144)
TRIGL SERPL-MCNC: 70 MG/DL

## 2020-11-05 ENCOUNTER — TRANSCRIBE ORDERS (OUTPATIENT)
Dept: FAMILY MEDICINE | Facility: CLINIC | Age: 75
End: 2020-11-05

## 2020-11-05 DIAGNOSIS — C80.1 MALIGNANT SPINDLE CELL NEOPLASM (H): Primary | ICD-10-CM

## 2020-11-06 ENCOUNTER — TELEPHONE (OUTPATIENT)
Dept: ONCOLOGY | Facility: CLINIC | Age: 75
End: 2020-11-06

## 2020-11-06 DIAGNOSIS — C49.3 SOFT TISSUE SARCOMA OF CHEST WALL (H): Primary | ICD-10-CM

## 2020-11-10 ENCOUNTER — TELEPHONE (OUTPATIENT)
Dept: ONCOLOGY | Facility: CLINIC | Age: 75
End: 2020-11-10

## 2020-11-10 NOTE — TELEPHONE ENCOUNTER
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 Art did not answer either number, so a voicemail was left on both his home & mobile numbers requesting a return call. IB sent to Pa with status.

## 2020-11-10 NOTE — TELEPHONE ENCOUNTER
Oncology/Surgical Oncology Referral Request:     Specialty Requested: Medical Oncology     Referring Provider: Tee Cuba MD    Referring Clinic/Organization: [facility]    Records location: [facility]     Requested Provider (if specified): Not Specified

## 2020-11-11 NOTE — TELEPHONE ENCOUNTER
RECORDS STATUS - ALL OTHER DIAGNOSIS      RECORDS RECEIVED FROM: [facility] - Internal Referral   DATE RECEIVED: 11/11/20   NOTES STATUS DETAILS   OFFICE NOTE from referring provider Tee Franco MD    Team Conference - 11/6/20   OFFICE NOTE from medical oncologist     DISCHARGE SUMMARY from hospital [facility] 9/21/16, 6/14/16   DISCHARGE REPORT from the ER     OPERATIVE REPORT Epic 9/21/16: Flexible bronchoscopy, Right Chest Wall Mass excision with Resection (Ribs 4 and 5) Reconstruction With Mesh, Right Upper lobe wedge resection, Anesthesia General with Block.   MEDICATION LIST     CLINICAL TRIAL TREATMENTS TO DATE     LABS     PATHOLOGY REPORTS [facility] 9/21/16, 6/14/16: [facility]   ANYTHING RELATED TO DIAGNOSIS epic 10/28/20   GENONOMIC TESTING     TYPE:     IMAGING (NEED IMAGES & REPORT)     XR PACS [facility]   CT SCANS PACS 12/5/19, 6/24/19, 12/14/18, 6/11/18. 1/3/18, 6/29/17, 9/14/16, 7/13/16, 6/3/16: [facility]   MRI     MAMMO     ULTRASOUND     PET

## 2020-11-12 ENCOUNTER — TRANSFERRED RECORDS (OUTPATIENT)
Dept: HEALTH INFORMATION MANAGEMENT | Facility: CLINIC | Age: 75
End: 2020-11-12

## 2020-11-12 LAB — RETINOPATHY: NEGATIVE

## 2020-11-23 NOTE — PROGRESS NOTES
"Gerber Levine is a 75 year old male who is being evaluated via a billable video visit.      The patient has been notified of following:     \"This video visit will be conducted via a call between you and your physician/provider. We have found that certain health care needs can be provided without the need for an in-person physical exam.  This service lets us provide the care you need with a video conversation.  If a prescription is necessary we can send it directly to your pharmacy.  If lab work is needed we can place an order for that and you can then stop by our lab to have the test done at a later time.    Video visits are billed at different rates depending on your insurance coverage.  Please reach out to your insurance provider with any questions.    If during the course of the call the physician/provider feels a video visit is not appropriate, you will not be charged for this service.\"    Patient has given verbal consent for Video visit? Yes  How would you like to obtain your AVS? MyChart  If you are dropped from the video visit, the video invite should be resent to: Send to e-mail at: [email or web address]  Will anyone else be joining your video visit? No      Vitals - Patient Reported  Weight (Patient Reported): 90.7 kg (200 lb)  Height (Patient Reported): 186.7 cm (6' 1.5\")  BMI (Based on Pt Reported Ht/Wt): 26.03  Pain Score: No Pain (0)    Minnie Shepherd, [facility] November 24, 2020  2:43 PM       Video-Visit Details    Type of service:  Video Visit    Video Start Time: 313  Video End Time: 333    Originating Location (pt. Location): home    Distant Location (provider location):  [facility]     Platform used for Video Visit:[email or web address]    Due to technical issues we convderted to phone call t>11 min (not coiunting 30 min prep time)        11-24-20    I saw Mr. 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      Background  In brief, he had some " URI-like symptoms in 2016 leading to a chest x-ray which revealed a mass leading to further evaluation.  PET/CT imaging revealed about a 2 x 1.3-cm mass extending from the right pleura into the chest wall.  He also had some small nonspecific lung nodules noted.  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  He himself noted no symptoms from this lesion.  The tumor was excised en bloc on 2016 with a 3-cm margin on each side anteriorly and posteriorly.           Interval history    We are asked about appropriate f/u    Due to covid we are doing a video/phone visit    Prep time >30 min        His ROS today is really quite unremarkable in terms of symptoms, and he is doing pretty well at baseline. He does note his memory is declining and his mom  of dementia.  He does note some discomfort at the op site that blankenship not interfere w sleep.  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  He has a daughter who also had her spleen removed and there is a strong family history on his father's side.  He also has type 2 diabetes which he thinks is controlled pretty well, hyperlipidemia and hypertension, all of which are controlled, and he walks 3 miles a day.  At the time of his splenectomy, he also had an appendectomy, cholecystectomy and hernia repair.  He has also had bilateral cataract surgery.  He also has generalized anxiety disorder and has seen a psychologist.    He is not on depression/anxiety meds now and doenst think he needs them.    His PCP just retired and he has to find a new one.  .     On exam he sounded comfortable with normal affect.    CHEST:  no resp distress  NEUROLOGIC:  mentation and speech normal  PSYCH: mood good      I reviewed his old CT images showing the lesion of the chest wall and his pathology reports.      At the time of surgery, a 2-cm protuberant lesion in the fourth intercostal space was noted.  At this  time, he also had a small wedge resection of the right upper lobe for some nodularity that had been seen.  At the time of resection, a 3-cm tumor involving skeletal muscle was found without lymphovascular invasion and with negative margins.  Studies for the PDGFB gene rearrangement were negative, but could not eliminate the DFSP diagnosis re sampling.  There was no high-grade nuclear atypia, increased mitotic activity, or necrosis.  The lung biopsy was negative for malignancy.  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  This is an unusual lesion and unlike the cutaneous dermatofibroma, it usually is of high cellularity without other heterogeneous elements.  This is generally a low-grade lesion but can recur and rarely metastasize.      -  Pathology of resection:    Specimen #: [id number]   Collected: 9/21/2016   Received: 9/21/2016   Reported: 10/17/2016 13:00   Ordering Phy(s): ILANA BERNARDO     SPECIMEN(S):   A: Right chest wall resection   B: Additional right chest wall margin   C: Lung wedge, right upper lobe   D: Right muscle final superficial margin   E: Right chest wall skin margin     FINAL DIAGNOSIS:   A. Soft tissue, right chest wall, resection:   - Cellular/deep fibrous histiocytoma; see comment   - Tumor size: 3.0 cm   - Tumor extent: Involves skeletal muscle   - Lymphovascular invasion: Not identified   - Margins of resection: Free of tumor     B. Soft tissue, additional right chest wall margin, resection:   - Benign skeletal muscle, bone and connective tissue     C. 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 Soft tissue, right muscle final superficial margin, resection:    - Benign skeletal muscle and connective tissue     E. Soft tissue, right chest wall skin margin, resection:   - Benign skin and  subcutaneous tissue       Immunohistochemical stains show the tumor is positive for CD34 (patchy),   D2-40, factor 13a, calponin (weak), beta catenin (cytoplasmic reactivity   only) and negative for SMA, S100, CK AE1/AE3, EMA and desmin. Ki-67   index is approximately 2%. FISH test for PDGFB (22q13) rearrangement,   usually seen in dermatofibrosarcoma protuberans (DFSP) is negative.   These features favor a deep, cellular fibrous histiocytoma.     Deep fibrous histiocytoma (deep counterpart of cutaneous fibrous   histiocytoma / dermatofibroma) is a rare lesion that arises in the   skeletal muscle or visceral locations. Unlike cutaneous dermatofibroma,   it is usually a highly cellular lesion that lacks heterogeneous elements   such as giant cells, histiocytic aggregates and hemosiderin, thus   mimicking DFSP. These lesions in general are clinically indolent but   have the potential to recur at an increased frequency (varies from 22%   to 57%) and undergo metastasis in rare instances.      -      CT from 12-5-19 showed:  IMPRESSION:   1. No evidence of locally recurrent or metastatic disease.  2. Multiple unchanged pulmonary nodules, the largest a 7 mm solid  right lower lobe pulmonary nodule which is unchanged since 2016.      -    We discussed the nature of cancer in general and his sarcoma in particular, and I think it is a fairly low-risk lesion.    I told him I thought there was a fairly low risk of recurrence, but it does need some followup, as do his nonspecific lung nodules.      I suggested getting a chest CT scan in Jan and then in another 1-2 years.  This recommendation is not, obviously, based on a lot of data, but it seems like a reasonable approach.  All of his questions were addressed and he will call if he has others.       Total t>60 min most C&C  -  Augie Soto M.D.  Professor  Hematology, Oncology and Transplantation  -   cc:  MD Leah Freedman MD, Thoracic Surgery     Luis  MD Rod, Thoracic Surgery

## 2020-11-24 ENCOUNTER — VIRTUAL VISIT (OUTPATIENT)
Dept: ONCOLOGY | Facility: CLINIC | Age: 75
End: 2020-11-24
Attending: FAMILY MEDICINE
Payer: COMMERCIAL

## 2020-11-24 ENCOUNTER — PRE VISIT (OUTPATIENT)
Dept: ONCOLOGY | Facility: CLINIC | Age: 75
End: 2020-11-24

## 2020-11-24 DIAGNOSIS — Z90.81 HISTORY OF SPLENECTOMY: ICD-10-CM

## 2020-11-24 DIAGNOSIS — I10 ESSENTIAL HYPERTENSION: ICD-10-CM

## 2020-11-24 DIAGNOSIS — C49.9 SARCOMA OF SOFT TISSUE (H): Primary | ICD-10-CM

## 2020-11-24 DIAGNOSIS — D58.0 HEREDITARY SPHEROCYTOSIS (H): ICD-10-CM

## 2020-11-24 DIAGNOSIS — H90.3 SENSORINEURAL HEARING LOSS, BILATERAL: ICD-10-CM

## 2020-11-24 DIAGNOSIS — R41.3 MEMORY LOSS: ICD-10-CM

## 2020-11-24 PROCEDURE — 999N001193 HC VIDEO/TELEPHONE VISIT; NO CHARGE

## 2020-11-24 PROCEDURE — 99203 OFFICE O/P NEW LOW 30 MIN: CPT | Mod: 95 | Performed by: INTERNAL MEDICINE

## 2020-11-24 NOTE — LETTER
"    11/24/2020         RE: Gerber Levine  [address]  [facility] [address]        Dear Colleague,    Thank you for referring your patient, Gerber Levine, to the [facility]. Please see a copy of my visit note below.    Gerber Levine is a 75 year old male who is being evaluated via a billable video visit.      The patient has been notified of following:     \"This video visit will be conducted via a call between you and your physician/provider. We have found that certain health care needs can be provided without the need for an in-person physical exam.  This service lets us provide the care you need with a video conversation.  If a prescription is necessary we can send it directly to your pharmacy.  If lab work is needed we can place an order for that and you can then stop by our lab to have the test done at a later time.    Video visits are billed at different rates depending on your insurance coverage.  Please reach out to your insurance provider with any questions.    If during the course of the call the physician/provider feels a video visit is not appropriate, you will not be charged for this service.\"    Patient has given verbal consent for Video visit? Yes  How would you like to obtain your AVS? MyChart  If you are dropped from the video visit, the video invite should be resent to: Send to e-mail at: [email or web address]  Will anyone else be joining your video visit? No      Vitals - Patient Reported  Weight (Patient Reported): 90.7 kg (200 lb)  Height (Patient Reported): 186.7 cm (6' 1.5\")  BMI (Based on Pt Reported Ht/Wt): 26.03  Pain Score: No Pain (0)    Minnie Shepherd CMA November 24, 2020  2:43 PM       Video-Visit Details    Type of service:  Video Visit    Video Start Time: 313  Video End Time: 333    Originating Location (pt. Location): home    Distant Location (provider location):  [facility]     Platform used " for Video Visit:martha    Due to technical issues we convderted to phone call t>11 min (not coiunting 30 min prep time)        20    I saw Mr. 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      Background  In brief, he had some URI-like symptoms in 2016 leading to a chest x-ray which revealed a mass leading to further evaluation.  PET/CT imaging revealed about a 2 x 1.3-cm mass extending from the right pleura into the chest wall.  He also had some small nonspecific lung nodules noted.  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  He himself noted no symptoms from this lesion.  The tumor was excised en bloc on 2016 with a 3-cm margin on each side anteriorly and posteriorly.           Interval history    We are asked about appropriate f/u    Due to covid we are doing a video/phone visit    Prep time >30 min        His ROS today is really quite unremarkable in terms of symptoms, and he is doing pretty well at baseline. He does note his memory is declining and his mom  of dementia.  He does note some discomfort at the op site that blaknenship not interfere w sleep.  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  He has a daughter who also had her spleen removed and there is a strong family history on his father's side.  He also has type 2 diabetes which he thinks is controlled pretty well, hyperlipidemia and hypertension, all of which are controlled, and he walks 3 miles a day.  At the time of his splenectomy, he also had an appendectomy, cholecystectomy and hernia repair.  He has also had bilateral cataract surgery.  He also has generalized anxiety disorder and has seen a psychologist.    He is not on depression/anxiety meds now and doenst think he needs them.    His PCP just retired and he has to find a new one.  .     On exam he sounded comfortable with normal affect.    CHEST:  no resp  distress  NEUROLOGIC:  mentation and speech normal  PSYCH: mood good      I reviewed his old CT images showing the lesion of the chest wall and his pathology reports.      At the time of surgery, a 2-cm protuberant lesion in the fourth intercostal space was noted.  At this time, he also had a small wedge resection of the right upper lobe for some nodularity that had been seen.  At the time of resection, a 3-cm tumor involving skeletal muscle was found without lymphovascular invasion and with negative margins.  Studies for the PDGFB gene rearrangement were negative, but could not eliminate the DFSP diagnosis re sampling.  There was no high-grade nuclear atypia, increased mitotic activity, or necrosis.  The lung biopsy was negative for malignancy.  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  This is an unusual lesion and unlike the cutaneous dermatofibroma, it usually is of high cellularity without other heterogeneous elements.  This is generally a low-grade lesion but can recur and rarely metastasize.      -  Pathology of resection:    Specimen #: [id number]   Collected: 9/21/2016   Received: 9/21/2016   Reported: 10/17/2016 13:00   Ordering Phy(s): ILANA BERNARDO     SPECIMEN(S):   A: Right chest wall resection   B: Additional right chest wall margin   C: Lung wedge, right upper lobe   D: Right muscle final superficial margin   E: Right chest wall skin margin     FINAL DIAGNOSIS:   A. Soft tissue, right chest wall, resection:   - Cellular/deep fibrous histiocytoma; see comment   - Tumor size: 3.0 cm   - Tumor extent: Involves skeletal muscle   - Lymphovascular invasion: Not identified   - Margins of resection: Free of tumor     B. Soft tissue, additional right chest wall margin, resection:   - Benign skeletal muscle, bone and connective tissue     C. Lung, right upper lobe, wedge resection:   - Lung parenchyma with  emphysematous changes and subpleural fibrosis   - Negative for malignancy     D. Soft tissue, right muscle final superficial margin, resection:    - Benign skeletal muscle and connective tissue     E. Soft tissue, right chest wall skin margin, resection:   - Benign skin and subcutaneous tissue       Immunohistochemical stains show the tumor is positive for CD34 (patchy),   D2-40, factor 13a, calponin (weak), beta catenin (cytoplasmic reactivity   only) and negative for SMA, S100, CK AE1/AE3, EMA and desmin. Ki-67   index is approximately 2%. FISH test for PDGFB (22q13) rearrangement,   usually seen in dermatofibrosarcoma protuberans (DFSP) is negative.   These features favor a deep, cellular fibrous histiocytoma.     Deep fibrous histiocytoma (deep counterpart of cutaneous fibrous   histiocytoma / dermatofibroma) is a rare lesion that arises in the   skeletal muscle or visceral locations. Unlike cutaneous dermatofibroma,   it is usually a highly cellular lesion that lacks heterogeneous elements   such as giant cells, histiocytic aggregates and hemosiderin, thus   mimicking DFSP. These lesions in general are clinically indolent but   have the potential to recur at an increased frequency (varies from 22%   to 57%) and undergo metastasis in rare instances.      -      CT from 12-5-19 showed:  IMPRESSION:   1. No evidence of locally recurrent or metastatic disease.  2. Multiple unchanged pulmonary nodules, the largest a 7 mm solid  right lower lobe pulmonary nodule which is unchanged since 2016.      -    We discussed the nature of cancer in general and his sarcoma in particular, and I think it is a fairly low-risk lesion.    I told him I thought there was a fairly low risk of recurrence, but it does need some followup, as do his nonspecific lung nodules.      I suggested getting a chest CT scan in Jan and then in another 1-2 years.  This recommendation is not, obviously, based on a lot of data, but it seems like a  reasonable approach.  All of his questions were addressed and he will call if he has others.       Total t>60 min most C&C  -  Augie Soto M.D.  Professor  Hematology, Oncology and Transplantation  -   cc:  MD Leah Freedman MD, Thoracic Surgery     Luis Velasco MD, Thoracic Surgery

## 2020-11-30 ENCOUNTER — MYC MEDICAL ADVICE (OUTPATIENT)
Dept: FAMILY MEDICINE | Facility: CLINIC | Age: 75
End: 2020-11-30

## 2020-11-30 NOTE — TELEPHONE ENCOUNTER
[employer] message sent      Patient needing to establish care, Ousmane durbin; is closed. Asked whether he has preference between male or female provider.      Lurdes Calvert RN

## 2020-12-16 ENCOUNTER — OFFICE VISIT (OUTPATIENT)
Dept: FAMILY MEDICINE | Facility: CLINIC | Age: 75
End: 2020-12-16
Payer: COMMERCIAL

## 2020-12-16 VITALS
DIASTOLIC BLOOD PRESSURE: 70 MMHG | SYSTOLIC BLOOD PRESSURE: 120 MMHG | BODY MASS INDEX: 25.8 KG/M2 | HEART RATE: 66 BPM | WEIGHT: 201 LBS | TEMPERATURE: 97.1 F | HEIGHT: 74 IN

## 2020-12-16 DIAGNOSIS — E11.9 TYPE 2 DIABETES MELLITUS WITHOUT COMPLICATION, WITHOUT LONG-TERM CURRENT USE OF INSULIN (H): Primary | ICD-10-CM

## 2020-12-16 DIAGNOSIS — R41.3 MEMORY CHANGES: ICD-10-CM

## 2020-12-16 DIAGNOSIS — I10 ESSENTIAL HYPERTENSION, BENIGN: ICD-10-CM

## 2020-12-16 DIAGNOSIS — R05.9 COUGH: ICD-10-CM

## 2020-12-16 DIAGNOSIS — K21.9 GASTROESOPHAGEAL REFLUX DISEASE WITHOUT ESOPHAGITIS: ICD-10-CM

## 2020-12-16 DIAGNOSIS — C49.3 SOFT TISSUE SARCOMA OF CHEST WALL (H): ICD-10-CM

## 2020-12-16 PROCEDURE — 99214 OFFICE O/P EST MOD 30 MIN: CPT | Performed by: NURSE PRACTITIONER

## 2020-12-16 PROCEDURE — 82043 UR ALBUMIN QUANTITATIVE: CPT | Performed by: NURSE PRACTITIONER

## 2020-12-16 RX ORDER — FAMOTIDINE 20 MG/1
20 TABLET, FILM COATED ORAL 2 TIMES DAILY
Qty: 60 TABLET | Refills: 0 | Status: SHIPPED | OUTPATIENT
Start: 2020-12-16 | End: 2021-02-26

## 2020-12-16 ASSESSMENT — MIFFLIN-ST. JEOR: SCORE: 1698.54

## 2020-12-16 NOTE — PROGRESS NOTES
Subjective     Gerber Levine is a 75 year old male who presents to clinic today for the following health issues:    HPI         New Patient/Transfer of Care was followed by Dr. Cuba who has retired.    Diabetes Follow-up    How often are you checking your blood sugar? One time daily  What time of day are you checking your blood sugars (select all that apply)?  Before meals  Have you had any blood sugars above 200?  No  Have you had any blood sugars below 70?  No    What symptoms do you notice when your blood sugar is low?  None    What concerns do you have today about your diabetes? None     Do you have any of these symptoms? (Select all that apply)  No numbness or tingling in feet.  No redness, sores or blisters on feet.  No complaints of excessive thirst.  No reports of blurry vision.  No significant changes to weight.  Lab Results   Component Value Date    A1C 6.8 10/28/2020    A1C 7.0 12/02/2019    A1C 7.0 06/12/2019    A1C 6.4 12/11/2018    A1C 6.3 06/06/2018     Checks his sugars Daily -around 100 .  No issues.          Hyperlipidemia Follow-Up      Are you regularly taking any medication or supplement to lower your cholesterol?   Yes- Lipitor     Are you having muscle aches or other side effects that you think could be caused by your cholesterol lowering medication?  No    Hypertension Follow-up      Do you check your blood pressure regularly outside of the clinic? No     Are you following a low salt diet? Yes    Are your blood pressures ever more than 140 on the top number (systolic) OR more   than 90 on the bottom number (diastolic), for example 140/90?     Sarcoma  Background by oncology  In brief, he had some URI-like symptoms in 06/2016 leading to a chest x-ray which revealed a mass leading to further evaluation.  PET/CT imaging revealed about a 2 x 1.3-cm mass extending from the right pleura into the chest wall.  He also had some small nonspecific lung nodules noted.  On initial biopsy it was felt  "to be a DFSP.  He himself noted no symptoms from this lesion.  The tumor was excised en bloc on 09/21/2016 with a 3-cm margin on each side anteriorly and posteriorly.     No disease targeted treatment.  He was recently seen by oncology suggest the chest CT scan in January.  Then another one in 1 to 2 years.      BP Readings from Last 2 Encounters:   10/28/20 132/82   12/02/19 120/68     Hemoglobin A1C (%)   Date Value   10/28/2020 6.8 (H)   12/02/2019 7.0 (H)     LDL Cholesterol Calculated (mg/dL)   Date Value   10/28/2020 68   06/12/2019 64         How many servings of fruits and vegetables do you eat daily?  0-1    On average, how many sweetened beverages do you drink each day (Examples: soda, juice, sweet tea, etc.  Do NOT count diet or artificially sweetened beverages)?   3    How many days per week do you exercise enough to make your heart beat faster? Walks 3 miles a day     How many minutes a day do you exercise enough to make your heart beat faster? 30 - 60    How many days per week do you miss taking your medication? 0    Says that he is dealing with some memory issues. He has no trouble doing things.  Wants to know about Prevagen that he sees commercials for?.    Cough and congestion- temperature change worsens his symptoms. Certain types of foods. Crackers. No difficulty swallowing.         Review of Systems   Constitutional, HEENT, cardiovascular, pulmonary, GI, , musculoskeletal, neuro, skin, endocrine and psych systems are negative, except as otherwise noted.      Objective    /70   Pulse 66   Temp 97.1  F (36.2  C) (Oral)   Ht 1.867 m (6' 1.5\")   Wt 91.2 kg (201 lb)   BMI 26.16 kg/m    There is no height or weight on file to calculate BMI.  Physical Exam   GENERAL: healthy, alert and no distress  EYES: Eyes grossly normal to inspection, PERRL and conjunctivae and sclerae normal  HENT: ear canals and TM's normal, nose and mouth without ulcers or lesions  NECK: no adenopathy, no asymmetry, " masses, or scars and thyroid normal to palpation  RESP: lungs clear to auscultation - no rales, rhonchi or wheezes  CV: regular rate and rhythm, normal S1 S2, no S3 or S4, no murmur, click or rub, no peripheral edema and peripheral pulses strong  ABDOMEN: soft, nontender, no hepatosplenomegaly, no masses and bowel sounds normal  MS: no gross musculoskeletal defects noted, no edema  SKIN: no suspicious lesions or rashes  NEURO: Normal strength and tone, mentation intact and speech normal  PSYCH: mentation appears normal, affect normal/bright    No results found for this or any previous visit (from the past 24 hour(s)).        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       No follow-ups on file.    ROSIE Galan [facility]

## 2020-12-17 LAB
CREAT UR-MCNC: 104 MG/DL
MICROALBUMIN UR-MCNC: 7 MG/L
MICROALBUMIN/CREAT UR: 7.14 MG/G CR (ref 0–17)

## 2021-01-06 ENCOUNTER — ANCILLARY PROCEDURE (OUTPATIENT)
Dept: CT IMAGING | Facility: CLINIC | Age: 76
End: 2021-01-06
Attending: INTERNAL MEDICINE
Payer: COMMERCIAL

## 2021-01-06 DIAGNOSIS — Z90.81 HISTORY OF SPLENECTOMY: ICD-10-CM

## 2021-01-06 DIAGNOSIS — D58.0 HEREDITARY SPHEROCYTOSIS (H): ICD-10-CM

## 2021-01-06 DIAGNOSIS — H90.3 SENSORINEURAL HEARING LOSS, BILATERAL: ICD-10-CM

## 2021-01-06 DIAGNOSIS — C49.9 SARCOMA OF SOFT TISSUE (H): ICD-10-CM

## 2021-01-06 DIAGNOSIS — R41.3 MEMORY LOSS: ICD-10-CM

## 2021-01-06 DIAGNOSIS — I10 ESSENTIAL HYPERTENSION: ICD-10-CM

## 2021-01-06 PROCEDURE — 71250 CT THORAX DX C-: CPT | Performed by: RADIOLOGY

## 2021-01-11 ENCOUNTER — MYC MEDICAL ADVICE (OUTPATIENT)
Dept: FAMILY MEDICINE | Facility: CLINIC | Age: 76
End: 2021-01-11

## 2021-01-11 NOTE — TELEPHONE ENCOUNTER
RN replied to patient via [employer]. See message for details.     Joslyn Meza RN, BSN, PHN  [facility]: [place]

## 2021-01-22 ENCOUNTER — MYC MEDICAL ADVICE (OUTPATIENT)
Dept: FAMILY MEDICINE | Facility: CLINIC | Age: 76
End: 2021-01-22

## 2021-01-22 DIAGNOSIS — K21.00 GASTROESOPHAGEAL REFLUX DISEASE WITH ESOPHAGITIS WITHOUT HEMORRHAGE: Primary | ICD-10-CM

## 2021-01-24 RX ORDER — OMEPRAZOLE 40 MG/1
40 CAPSULE, DELAYED RELEASE ORAL DAILY
Qty: 30 CAPSULE | Refills: 1 | Status: SHIPPED | OUTPATIENT
Start: 2021-01-24 | End: 2021-02-26

## 2021-02-26 ENCOUNTER — OFFICE VISIT (OUTPATIENT)
Dept: FAMILY MEDICINE | Facility: CLINIC | Age: 76
End: 2021-02-26
Payer: COMMERCIAL

## 2021-02-26 ENCOUNTER — TELEPHONE (OUTPATIENT)
Dept: FAMILY MEDICINE | Facility: CLINIC | Age: 76
End: 2021-02-26

## 2021-02-26 VITALS
WEIGHT: 197 LBS | DIASTOLIC BLOOD PRESSURE: 80 MMHG | HEIGHT: 73 IN | OXYGEN SATURATION: 98 % | BODY MASS INDEX: 26.11 KG/M2 | HEART RATE: 63 BPM | SYSTOLIC BLOOD PRESSURE: 130 MMHG

## 2021-02-26 DIAGNOSIS — E11.9 TYPE 2 DIABETES MELLITUS WITHOUT COMPLICATION, WITHOUT LONG-TERM CURRENT USE OF INSULIN (H): ICD-10-CM

## 2021-02-26 DIAGNOSIS — Z53.9 ERRONEOUS ENCOUNTER--DISREGARD: Primary | ICD-10-CM

## 2021-02-26 LAB — HBA1C MFR BLD: 7.8 % (ref 0–5.6)

## 2021-02-26 PROCEDURE — 83036 HEMOGLOBIN GLYCOSYLATED A1C: CPT | Performed by: NURSE PRACTITIONER

## 2021-02-26 PROCEDURE — 36415 COLL VENOUS BLD VENIPUNCTURE: CPT | Performed by: NURSE PRACTITIONER

## 2021-02-26 PROCEDURE — 80048 BASIC METABOLIC PNL TOTAL CA: CPT | Performed by: NURSE PRACTITIONER

## 2021-02-26 ASSESSMENT — MIFFLIN-ST. JEOR: SCORE: 1674.21

## 2021-02-26 NOTE — PROGRESS NOTES
Pt had diabetic follow up and establish care visit on 12/16/20. At that time it was too early to check labs so future lab orders were placed. He denies concerns today and is only needing labs checked.

## 2021-02-26 NOTE — TELEPHONE ENCOUNTER
Routing below message to [employer] as unsure which message to relay to patient.  It appears patient read the message from 1/22 and was a no show for this morning's appointment.     Nito Wiseman RN

## 2021-02-26 NOTE — TELEPHONE ENCOUNTER
So strange, I cannot see my my charted message to pt that I just sent today ?   I wrote the following.     A1c is up from 4 months ago. Can you ask pt if he is taking his meds, change in diet, or has been ill recently ? If no drastic changes than we will need to increase metformin to 2000 mg daily and recheck A1c in 3 months.

## 2021-02-26 NOTE — PROGRESS NOTES
{PROVIDER CHARTING PREFERENCE:996850}    Subjective   Art is a 76 year old who presents for the following health issues {ACCOMPANIED BY STATEMENT (Optional):706402}    HPI         Diabetes Follow-up    How often are you checking your blood sugar? One time daily  What time of day are you checking your blood sugars (select all that apply)? Mostly  Before meals, sometimes varies   Have you had any blood sugars above 200?  Yes  Last week stomach flu & was high   Have you had any blood sugars below 70?  No    What symptoms do you notice when your blood sugar is low?  None    What concerns do you have today about your diabetes? None     Do you have any of these symptoms? (Select all that apply)  No numbness or tingling in feet.  No redness, sores or blisters on feet.  No complaints of excessive thirst.  No reports of blurry vision.  No significant changes to weight.      {Reference  Diabetes Management Resources :669288}    {Reference  Diabetes Log - 7 days :417275}    Hyperlipidemia Follow-Up      Are you regularly taking any medication or supplement to lower your cholesterol?   Yes- Atorvastatin 40mg    Are you having muscle aches or other side effects that you think could be caused by your cholesterol lowering medication?  No     Hypertension Follow-up      Do you check your blood pressure regularly outside of the clinic? No     Are you following a low salt diet? Yes    Are your blood pressures ever more than 140 on the top number (systolic) OR more   than 90 on the bottom number (diastolic), for example 140/90?     BP Readings from Last 2 Encounters:   12/16/20 120/70   10/28/20 132/82     Hemoglobin A1C (%)   Date Value   10/28/2020 6.8 (H)   12/02/2019 7.0 (H)     LDL Cholesterol Calculated (mg/dL)   Date Value   10/28/2020 68   06/12/2019 64         How many days per week do you exercise enough to make your heart beat faster? 7    How many minutes a day do you exercise enough to make your heart beat faster?  60 or more    How many days per week do you miss taking your medication? 0    {SUPERLIST (Optional):914639}  {additonal problems for provider to add (Optional):522326}    Review of Systems   {ROS COMP (Optional):987434}      Objective    There were no vitals taken for this visit.  There is no height or weight on file to calculate BMI.  Physical Exam   {Exam List (Optional):948668}    {Diagnostic Test Results (Optional):876269}    {AMBULATORY ATTESTATION (Optional):438378}

## 2021-02-26 NOTE — TELEPHONE ENCOUNTER
Called pt, no answer, left a message asking him to call the clinic back and that we also sent him a message through his [employer] account.     Will await to hear back from pt.    Christianne Gallego RN

## 2021-02-27 ENCOUNTER — IMMUNIZATION (OUTPATIENT)
Dept: NURSING | Facility: CLINIC | Age: 76
End: 2021-02-27
Payer: COMMERCIAL

## 2021-02-27 LAB
ANION GAP SERPL CALCULATED.3IONS-SCNC: 5 MMOL/L (ref 3–14)
BUN SERPL-MCNC: 22 MG/DL (ref 7–30)
CALCIUM SERPL-MCNC: 9.3 MG/DL (ref 8.5–10.1)
CHLORIDE SERPL-SCNC: 106 MMOL/L (ref 94–109)
CO2 SERPL-SCNC: 28 MMOL/L (ref 20–32)
CREAT SERPL-MCNC: 0.87 MG/DL (ref 0.66–1.25)
GFR SERPL CREATININE-BSD FRML MDRD: 83 ML/MIN/{1.73_M2}
GLUCOSE SERPL-MCNC: 150 MG/DL (ref 70–99)
POTASSIUM SERPL-SCNC: 4.4 MMOL/L (ref 3.4–5.3)
SODIUM SERPL-SCNC: 139 MMOL/L (ref 133–144)

## 2021-02-27 PROCEDURE — 0011A PR COVID VAC MODERNA 100 MCG/0.5 ML IM: CPT

## 2021-02-27 PROCEDURE — 91301 PR COVID VAC MODERNA 100 MCG/0.5 ML IM: CPT

## 2021-03-01 NOTE — TELEPHONE ENCOUNTER
Appears patient read [employer] message, no response.     Attempt #3 to call patient.     Patient did not answer, RN left voicemail at mobile number. RN requested patient return call to [facility] at [phone].     Joslyn Meza, RN, BSN, PHN  [facility]: [place]

## 2021-03-03 NOTE — TELEPHONE ENCOUNTER
Attempt x 3 to contact patient, without response.     TC, please send generic call back letter.     Joslyn Meza RN, BSN, PHN  [facility]: [place]

## 2021-03-04 NOTE — TELEPHONE ENCOUNTER
Patient reports he was only sick for one day and during that time his blood sugars were very high.    Since Covid he started eating a bit more, not great food choices.  He is still going for walks and trying to get some exercise but not much to do since Covid.    He currently is taking Metformin 500mg- 1/2 tab every day so 250 mg daily with dinner.    Message from PCP recommends increase to 2000 mg.    Huddled with covering Provider Pal Irving PA-C- he instructed to increase Metformin to 1 tablet (500mg) and focus on diet.  Recheck A1C in 3 months.    Lab appointment made for 6/1/21 A1C recheck  Medication sent to pharmacy for 60 days, patient just picked up a prescription a few days ago so has at least a months worth.      Lurdes Calvert RN

## 2021-03-23 DIAGNOSIS — F41.9 ANXIETY: ICD-10-CM

## 2021-03-23 DIAGNOSIS — F32.0 MILD MAJOR DEPRESSION (H): ICD-10-CM

## 2021-03-23 NOTE — LETTER
March 30, 2021      Gerber Levine  [address]  [facility] [address]        Dear Gerber,     We recently received a call from your pharmacy requesting a refill of your medication.     A review of your chart indicates that an appointment is required with your provider. Please call the clinic at [phone] to schedule your appointment.     We have authorized one refill of your medication to allow time for you to schedule. If you have a history of diabetes or high cholesterol, please come fasting to the appointment. Fasting entails nothing to eat or drink 8 hours prior to your appointment; with the exception of water. You may take your medication the day of the appointment.        Sincerely,    Minnie Goodwin NP

## 2021-03-24 NOTE — TELEPHONE ENCOUNTER
Medication refilled however pt due for medication follow up regarding depression/anxiety. Okay for virtual visit.

## 2021-03-24 NOTE — TELEPHONE ENCOUNTER
Routing refill request to provider for review/approval because:  [facility] [place] Follow-up to PHQ 6/12/2019 12/2/2019 9/7/2020   PHQ-9 9. Suicide Ideation past 2 weeks Not at all Not at all Not at all     Christianne Gallego RN

## 2021-03-27 ENCOUNTER — IMMUNIZATION (OUTPATIENT)
Dept: NURSING | Facility: CLINIC | Age: 76
End: 2021-03-27
Attending: INTERNAL MEDICINE
Payer: COMMERCIAL

## 2021-03-27 PROCEDURE — 91301 PR COVID VAC MODERNA 100 MCG/0.5 ML IM: CPT

## 2021-03-27 PROCEDURE — 0012A PR COVID VAC MODERNA 100 MCG/0.5 ML IM: CPT

## 2021-03-30 NOTE — TELEPHONE ENCOUNTER
Letter mailed to patient's home address to call back and schedule an appointment.  Perla Encarnacion CMA ([facility])

## 2021-04-01 ENCOUNTER — TRANSFERRED RECORDS (OUTPATIENT)
Dept: MULTI SPECIALTY CLINIC | Facility: CLINIC | Age: 76
End: 2021-04-01

## 2021-04-01 LAB — RETINOPATHY: NEGATIVE

## 2021-04-09 ENCOUNTER — OFFICE VISIT (OUTPATIENT)
Dept: FAMILY MEDICINE | Facility: CLINIC | Age: 76
End: 2021-04-09
Payer: COMMERCIAL

## 2021-04-09 VITALS
BODY MASS INDEX: 25.71 KG/M2 | HEIGHT: 73 IN | OXYGEN SATURATION: 99 % | SYSTOLIC BLOOD PRESSURE: 110 MMHG | DIASTOLIC BLOOD PRESSURE: 70 MMHG | HEART RATE: 66 BPM | WEIGHT: 194 LBS

## 2021-04-09 DIAGNOSIS — F41.1 GAD (GENERALIZED ANXIETY DISORDER): Primary | ICD-10-CM

## 2021-04-09 DIAGNOSIS — E11.9 TYPE 2 DIABETES MELLITUS WITHOUT COMPLICATION, WITHOUT LONG-TERM CURRENT USE OF INSULIN (H): ICD-10-CM

## 2021-04-09 DIAGNOSIS — L60.9 NAIL ABNORMALITY: ICD-10-CM

## 2021-04-09 DIAGNOSIS — F33.0 MILD EPISODE OF RECURRENT MAJOR DEPRESSIVE DISORDER (H): ICD-10-CM

## 2021-04-09 PROCEDURE — 99214 OFFICE O/P EST MOD 30 MIN: CPT | Performed by: NURSE PRACTITIONER

## 2021-04-09 PROCEDURE — 96127 BRIEF EMOTIONAL/BEHAV ASSMT: CPT | Mod: 59 | Performed by: NURSE PRACTITIONER

## 2021-04-09 ASSESSMENT — ANXIETY QUESTIONNAIRES
GAD7 TOTAL SCORE: 7
5. BEING SO RESTLESS THAT IT IS HARD TO SIT STILL: NOT AT ALL
1. FEELING NERVOUS, ANXIOUS, OR ON EDGE: MORE THAN HALF THE DAYS
7. FEELING AFRAID AS IF SOMETHING AWFUL MIGHT HAPPEN: SEVERAL DAYS
6. BECOMING EASILY ANNOYED OR IRRITABLE: NOT AT ALL
IF YOU CHECKED OFF ANY PROBLEMS ON THIS QUESTIONNAIRE, HOW DIFFICULT HAVE THESE PROBLEMS MADE IT FOR YOU TO DO YOUR WORK, TAKE CARE OF THINGS AT HOME, OR GET ALONG WITH OTHER PEOPLE: NOT DIFFICULT AT ALL
3. WORRYING TOO MUCH ABOUT DIFFERENT THINGS: MORE THAN HALF THE DAYS
2. NOT BEING ABLE TO STOP OR CONTROL WORRYING: MORE THAN HALF THE DAYS

## 2021-04-09 ASSESSMENT — MIFFLIN-ST. JEOR: SCORE: 1660.6

## 2021-04-09 ASSESSMENT — PATIENT HEALTH QUESTIONNAIRE - PHQ9
SUM OF ALL RESPONSES TO PHQ QUESTIONS 1-9: 7
5. POOR APPETITE OR OVEREATING: NOT AT ALL

## 2021-04-09 NOTE — PROGRESS NOTES
Assessment & Plan     IVETTE (generalized anxiety disorder)  Stable, continue current medication     Mild episode of recurrent major depressive disorder (H)  Stable, continue current medication     Type 2 diabetes mellitus without complication, without long-term current use of insulin (H)  Its too early to have labs re-checked, future labs ordered to be completed the end of May beginning of June. Encouraged continued regular exercise and low carb diet.   - **A1C FUTURE anytime; Future  - Basic metabolic panel  (Ca, Cl, CO2, Creat, Gluc, K, Na, BUN); Future    Nail abnormality  Discussed treatment for nail fungus, pt declined at this time as symptoms have been stable for years. Encouraged regular foot exams and encouraged pt to monitor for signs/symptoms of bacterial infection       Return in about 8 weeks (around 6/1/2021) for Lab Work.    Minnie Goodwin NP  [facility]    Austen Castrejon is a 76 year old who presents for the following health issues     HPI     Depression and Anxiety Follow-Up    How are you doing with your depression since your last visit? No change    How are you doing with your anxiety since your last visit?  No change staying the same    Are you having other symptoms that might be associated with depression or anxiety? No    Have you had a significant life event? No Other than Covid pandemic     Do you have any concerns with your use of alcohol or other drugs? No    Social History     Tobacco Use     Smoking status: Never Smoker     Smokeless tobacco: Never Used   Substance Use Topics     Alcohol use: Yes     Comment: Occasionaly     Drug use: No     PHQ 12/2/2019 9/7/2020 4/9/2021   PHQ-9 Total Score 5 4 7   Q9: Thoughts of better off dead/self-harm past 2 weeks Not at all Not at all Not at all     IVETTE-7 SCORE 6/12/2019 12/2/2019 4/9/2021   Total Score - - -   Total Score 10 2 7         How many days per week do you miss taking your medication? 0    He states  "zoloft is helping. He denies troubles falling asleep but will occasionally wake up after 2am and sometimes cant fall back to sleep due to thinking about things.       Diabetes Follow-up    How often are you checking your blood sugar? One time daily  What time of day are you checking your blood sugars (select all that apply)?  before lunch  Have you had any blood sugars above 200?  Yes just one day   Have you had any blood sugars below 70?  No    What symptoms do you notice when your blood sugar is low?  None and Not applicable    What concerns do you have today about your diabetes? None     Do you have any of these symptoms? (Select all that apply)  No numbness or tingling in feet.  No redness, sores or blisters on feet.  No complaints of excessive thirst.  No reports of blurry vision.  No significant changes to weight.    He takes 3 mile walks per day. hes working on eating a heatlhy low carb diet. hes eating 3 meals per day with occasional snacks (example: crackers). hes taking 500mg of metformin daily.     He reports toenail fungus for years, denies worsening of symptoms. He tried vicks vapor rub which didn't help.     BP Readings from Last 2 Encounters:   04/09/21 110/70   02/26/21 130/80     Hemoglobin A1C (%)   Date Value   02/26/2021 7.8 (H)   10/28/2020 6.8 (H)     LDL Cholesterol Calculated (mg/dL)   Date Value   10/28/2020 68   06/12/2019 64                   How many days per week do you miss taking your medication? 0      Review of Systems   Constitutional, HEENT, cardiovascular, pulmonary, gi and gu systems are negative, except toenail fungus       Objective    /70 (Patient Position: Sitting, Cuff Size: Adult Regular)   Pulse 66   Ht 1.865 m (6' 1.43\")   Wt 88 kg (194 lb)   SpO2 99%   BMI 25.30 kg/m    Body mass index is 25.3 kg/m .  Physical Exam   GENERAL: healthy, alert and no distress  EYES: Eyes grossly normal to inspection  HENT: ear canals and TM's normal, nose and mouth without ulcers " or lesions  NECK: no adenopathy, no asymmetry, masses, or scars and thyroid normal to palpation  RESP: lungs clear to auscultation - no rales, rhonchi or wheezes  CV: regular rate and rhythm, normal S1 S2, no S3 or S4, no murmur, click or rub, no peripheral edema and peripheral pulses strong  MS: no gross musculoskeletal defects noted, no edema  SKIN: no suspicious lesions or rashes  NEURO: Normal strength and tone, mentation intact and speech normal  PSYCH: mentation appears normal, affect normal/bright  FOOT: normal monofilament test noted to bilat feet. Yellow thickening of toenails to all 5 digits on right foot and 1st and 2nd digits on left foot

## 2021-04-10 ASSESSMENT — ANXIETY QUESTIONNAIRES: GAD7 TOTAL SCORE: 7

## 2021-06-01 DIAGNOSIS — E11.9 TYPE 2 DIABETES MELLITUS WITHOUT COMPLICATION, WITHOUT LONG-TERM CURRENT USE OF INSULIN (H): ICD-10-CM

## 2021-06-01 LAB
ANION GAP SERPL CALCULATED.3IONS-SCNC: 5 MMOL/L (ref 3–14)
BUN SERPL-MCNC: 22 MG/DL (ref 7–30)
CALCIUM SERPL-MCNC: 8.6 MG/DL (ref 8.5–10.1)
CHLORIDE SERPL-SCNC: 105 MMOL/L (ref 94–109)
CO2 SERPL-SCNC: 30 MMOL/L (ref 20–32)
CREAT SERPL-MCNC: 0.89 MG/DL (ref 0.66–1.25)
GFR SERPL CREATININE-BSD FRML MDRD: 82 ML/MIN/{1.73_M2}
GLUCOSE SERPL-MCNC: 149 MG/DL (ref 70–99)
HBA1C MFR BLD: 7.1 % (ref 0–5.6)
POTASSIUM SERPL-SCNC: 4.6 MMOL/L (ref 3.4–5.3)
SODIUM SERPL-SCNC: 140 MMOL/L (ref 133–144)

## 2021-06-01 PROCEDURE — 80048 BASIC METABOLIC PNL TOTAL CA: CPT | Performed by: NURSE PRACTITIONER

## 2021-06-01 PROCEDURE — 36415 COLL VENOUS BLD VENIPUNCTURE: CPT | Performed by: NURSE PRACTITIONER

## 2021-06-01 PROCEDURE — 83036 HEMOGLOBIN GLYCOSYLATED A1C: CPT | Performed by: NURSE PRACTITIONER

## 2021-06-28 DIAGNOSIS — F32.0 MILD MAJOR DEPRESSION (H): ICD-10-CM

## 2021-06-28 DIAGNOSIS — F41.9 ANXIETY: ICD-10-CM

## 2021-06-28 NOTE — TELEPHONE ENCOUNTER
Routing refill request to provider for review/approval because:  PHQ9 score less then 5 in past 6 months    [facility] Follow-up to PHQ 12/2/2019 9/7/2020 4/9/2021   PHQ-9 9. Suicide Ideation past 2 weeks Not at all Not at all Not at all     Christianne Gallego RN

## 2021-09-04 ENCOUNTER — HEALTH MAINTENANCE LETTER (OUTPATIENT)
Age: 76
End: 2021-09-04

## 2021-10-19 ENCOUNTER — OFFICE VISIT (OUTPATIENT)
Dept: FAMILY MEDICINE | Facility: CLINIC | Age: 76
End: 2021-10-19
Payer: COMMERCIAL

## 2021-10-19 VITALS
SYSTOLIC BLOOD PRESSURE: 108 MMHG | HEIGHT: 73 IN | BODY MASS INDEX: 25.98 KG/M2 | WEIGHT: 196 LBS | HEART RATE: 72 BPM | DIASTOLIC BLOOD PRESSURE: 62 MMHG | TEMPERATURE: 99.1 F

## 2021-10-19 DIAGNOSIS — F32.0 MILD MAJOR DEPRESSION (H): ICD-10-CM

## 2021-10-19 DIAGNOSIS — Z13.220 SCREENING FOR HYPERLIPIDEMIA: ICD-10-CM

## 2021-10-19 DIAGNOSIS — F41.9 ANXIETY: ICD-10-CM

## 2021-10-19 DIAGNOSIS — Z23 NEED FOR PROPHYLACTIC VACCINATION AND INOCULATION AGAINST INFLUENZA: ICD-10-CM

## 2021-10-19 DIAGNOSIS — Z00.00 ENCOUNTER FOR MEDICARE ANNUAL WELLNESS EXAM: Primary | ICD-10-CM

## 2021-10-19 DIAGNOSIS — E11.9 TYPE 2 DIABETES MELLITUS WITHOUT COMPLICATION, WITHOUT LONG-TERM CURRENT USE OF INSULIN (H): ICD-10-CM

## 2021-10-19 DIAGNOSIS — R09.89 THROAT CLEARING: ICD-10-CM

## 2021-10-19 DIAGNOSIS — C49.3 SOFT TISSUE SARCOMA OF CHEST WALL (H): ICD-10-CM

## 2021-10-19 PROBLEM — F32.9 MAJOR DEPRESSION: Status: RESOLVED | Noted: 2020-10-28 | Resolved: 2020-10-28

## 2021-10-19 LAB — HBA1C MFR BLD: 6.9 % (ref 0–5.6)

## 2021-10-19 PROCEDURE — 99213 OFFICE O/P EST LOW 20 MIN: CPT | Mod: 25 | Performed by: NURSE PRACTITIONER

## 2021-10-19 PROCEDURE — G0008 ADMIN INFLUENZA VIRUS VAC: HCPCS | Performed by: NURSE PRACTITIONER

## 2021-10-19 PROCEDURE — 90662 IIV NO PRSV INCREASED AG IM: CPT | Performed by: NURSE PRACTITIONER

## 2021-10-19 PROCEDURE — 80061 LIPID PANEL: CPT | Performed by: NURSE PRACTITIONER

## 2021-10-19 PROCEDURE — 83036 HEMOGLOBIN GLYCOSYLATED A1C: CPT | Performed by: NURSE PRACTITIONER

## 2021-10-19 PROCEDURE — 36415 COLL VENOUS BLD VENIPUNCTURE: CPT | Performed by: NURSE PRACTITIONER

## 2021-10-19 PROCEDURE — 80048 BASIC METABOLIC PNL TOTAL CA: CPT | Performed by: NURSE PRACTITIONER

## 2021-10-19 PROCEDURE — 82043 UR ALBUMIN QUANTITATIVE: CPT | Performed by: NURSE PRACTITIONER

## 2021-10-19 PROCEDURE — 99397 PER PM REEVAL EST PAT 65+ YR: CPT | Mod: 25 | Performed by: NURSE PRACTITIONER

## 2021-10-19 RX ORDER — LOSARTAN POTASSIUM 25 MG/1
25 TABLET ORAL DAILY
Qty: 90 TABLET | Refills: 6 | Status: SHIPPED | OUTPATIENT
Start: 2021-10-19 | End: 2022-08-30

## 2021-10-19 RX ORDER — CETIRIZINE HYDROCHLORIDE 10 MG/1
10 TABLET ORAL DAILY
Qty: 30 TABLET | Refills: 1 | Status: SHIPPED | OUTPATIENT
Start: 2021-10-19 | End: 2022-02-02

## 2021-10-19 ASSESSMENT — MIFFLIN-ST. JEOR: SCORE: 1662.93

## 2021-10-19 NOTE — PATIENT INSTRUCTIONS
Oncology recommends repeat CHEST CT in January 2022     Augie Soto M.D.  Professor  Hematology, Oncology and Transplantation      Patient Education   Personalized Prevention Plan  You are due for the preventive services outlined below.  Your care team is available to assist you in scheduling these services.  If you have already completed any of these items, please share that information with your care team to update in your medical record.  Health Maintenance Due   Topic Date Due     ANNUAL REVIEW OF HM ORDERS  Never done     Depression Action Plan  Never done     Diabetic Foot Exam  06/06/2019     Flu Vaccine (1) 09/01/2021     Annual Wellness Visit  10/28/2021     Cholesterol Lab  10/28/2021     FALL RISK ASSESSMENT  10/28/2021     Eye Exam  11/12/2021

## 2021-10-19 NOTE — PROGRESS NOTES
"  SUBJECTIVE:   Gerber Levine is a 76 year old male who presents for Preventive Visit.      Patient has been advised of split billing requirements and indicates understanding: Yes  Are you in the first 12 months of your Medicare Part B coverage?  No    Physical Health:    In general, how would you rate your overall physical health? good    Outside of work, how many days during the week do you exercise? 6-7 days/week    Outside of work, approximately how many minutes a day do you exercise?45-60 minutes    If you drink alcohol do you typically have >3 drinks per day or >7 drinks per week? No    Do you usually eat at least 4 servings of fruit and vegetables a day, include whole grains & fiber and avoid regularly eating high fat or \"junk\" foods? Yes    Do you have any problems taking medications regularly?  No    Do you have any side effects from medications? none    Needs assistance for the following daily activities: no assistance needed    Which of the following safety concerns are present in your home?  lack of grab bars in the bathroom     Hearing impairment: Yes, hearing AIDS     In the past 6 months, have you been bothered by leaking of urine? yes    Wife fell and fractured her wrist that required surgery. She is recovering.     Mental Health:    In general, how would you rate your overall mental or emotional health? good  PHQ-2 Score: 0    Do you feel safe in your environment? Yes    Have you ever done Advance Care Planning? (For example, a Health Directive, POLST, or a discussion with a medical provider or your loved ones about your wishes): Yes, advance care planning is on file.    Additional concerns to address?  No    Fall risk:  Fallen 2 or more times in the past year?: No  Any fall with injury in the past year?: No    Cognitive Screenin) Repeat 3 items (Leader, Season, Table)    2) Clock draw: NORMAL  3) 3 item recall: Recalls 2 objects   Results: NORMAL clock, 1-2 items recalled: COGNITIVE " "IMPAIRMENT LESS LIKELY    Mini-CogTM Copyright S [employer]. Licensed by the author for use in Health system; reprinted with permission (gisele@.[facility]). All rights reserved.      Throat clearing. Seems to happen during environmental changes.   Eating crackers he gets congested.   Saw ENT and        Diabetes Follow-up    How often are you checking your blood sugar? One time daily  What time of day are you checking your blood sugars (select all that apply)?  Before meals  Have you had any blood sugars above 200?  No  Have you had any blood sugars below 70?  No    What symptoms do you notice when your blood sugar is low?  None    What concerns do you have today about your diabetes? None     Do you have any of these symptoms? (Select all that apply)  No numbness or tingling in feet.  No redness, sores or blisters on feet.  No complaints of excessive thirst.  No reports of blurry vision.  No significant changes to weight.    Have you had a diabetic eye exam in the last 12 months? Yes    Lab Results   Component Value Date    A1C 7.1 06/01/2021    A1C 7.8 02/26/2021    A1C 6.8 10/28/2020    A1C 7.0 12/02/2019    A1C 7.0 06/12/2019     Please abstract the following data from this visit with this patient into the appropriate field in Epic:    Tests that can be patient reported without a hard copy:    Eye exam with ophthalmology on this date: in April [facility] mal[facility] was referred to eye specialist. Saw \"freckles\" on his cornea.     Soft tissue Sarcoma chest. Saw oncology. Had CT chest done in 01/2021 results below. Pt says he never heard back about his results. Would like provider to tell him today. Results copied and pasted below.                                                                  IMPRESSION: No significant change in the 7 mm right lung base nodule  or other small nodules. Coronary artery and thoracic aortic  atherosclerosis. Right lateral chest wall deformity with some  calcifications, this could indicate " postsurgical change or possibly as  a differential consideration, prior asbestos exposure. Renal cysts.     SUSHILA OLIVO MD  BP Readings from Last 2 Encounters:   04/09/21 110/70   02/26/21 130/80     Hemoglobin A1C (%)   Date Value   06/01/2021 7.1 (H)   02/26/2021 7.8 (H)     LDL Cholesterol Calculated (mg/dL)   Date Value   10/28/2020 68   06/12/2019 64         Hyperlipidemia Follow-Up      Are you regularly taking any medication or supplement to lower your cholesterol?   Yes- Lipitor     Are you having muscle aches or other side effects that you think could be caused by your cholesterol lowering medication?  No      Reviewed and updated as needed this visit by clinical staff  Tobacco  Allergies  Meds   Med Hx  Surg Hx  Fam Hx  Soc Hx        Reviewed and updated as needed this visit by Provider                Social History     Tobacco Use     Smoking status: Never Smoker     Smokeless tobacco: Never Used   Substance Use Topics     Alcohol use: Yes     Comment: Occasionaly                           Current providers sharing in care for this patient include:   Patient Care Team:  Dinah Bui APRN CNP as PCP - General (Nurse Practitioner - Adult Health)  Cheryl Ward APRN CNS as Clinical Nurse Specialist (Oncology)  Augie Soto MD as Assigned Cancer Care Provider  Dinah Bui APRN CNP as Assigned PCP    The following health maintenance items are reviewed in Epic and correct as of today:  Health Maintenance   Topic Date Due     ANNUAL REVIEW OF  ORDERS  Never done     DEPRESSION ACTION PLAN  Never done     DIABETIC FOOT EXAM  06/06/2019     INFLUENZA VACCINE (1) 09/01/2021     MEDICARE ANNUAL WELLNESS VISIT  10/28/2021     LIPID  10/28/2021     FALL RISK ASSESSMENT  10/28/2021     EYE EXAM  11/12/2021     A1C  12/01/2021     MICROALBUMIN  12/16/2021     PHQ-9  04/09/2022     BMP  06/01/2022     ADVANCE CARE PLANNING  10/28/2025      "DTAP/TDAP/TD IMMUNIZATION (3 - Td or Tdap) 03/29/2026     COLORECTAL CANCER SCREENING  05/12/2026     HEPATITIS C SCREENING  Completed     Pneumococcal Vaccine: 65+ Years  Completed     ZOSTER IMMUNIZATION  Completed     COVID-19 Vaccine  Completed     IPV IMMUNIZATION  Aged Out     MENINGITIS IMMUNIZATION  Aged Out     Lab work is in process      ROS:  Constitutional, HEENT, cardiovascular, pulmonary, GI, , musculoskeletal, neuro, skin, endocrine and psych systems are negative, except as otherwise noted.    OBJECTIVE:   There were no vitals taken for this visit. Estimated body mass index is 25.3 kg/m  as calculated from the following:    Height as of 4/9/21: 1.865 m (6' 1.43\").    Weight as of 4/9/21: 88 kg (194 lb).  EXAM:   GENERAL: healthy, alert and no distress  EYES: Eyes grossly normal to inspection, PERRL and conjunctivae and sclerae normal  HENT: ear canals and TM's normal, nose and mouth without ulcers or lesions  NECK: no adenopathy, no asymmetry, masses, or scars and thyroid normal to palpation  RESP: lungs clear to auscultation - no rales, rhonchi or wheezes  CV: regular rate and rhythm, normal S1 S2, no S3 or S4, no murmur, click or rub, no peripheral edema and peripheral pulses strong  ABDOMEN: soft, nontender, no hepatosplenomegaly, no masses and bowel sounds normal  MS: no gross musculoskeletal defects noted, no edema  SKIN: no suspicious lesions or rashes  NEURO: Normal strength and tone, mentation intact and speech normal  PSYCH: mentation appears normal, affect normal/bright    Diagnostic Test Results:  Labs reviewed in Epic  No results found for this or any previous visit (from the past 24 hour(s)).    ASSESSMENT / PLAN:   (Z00.00) Encounter for Medicare annual wellness exam  (primary encounter diagnosis)  Comment: physical with chronic disease ma  Plan: REVIEW OF HEALTH MAINTENANCE PROTOCOL ORDERS            (Z13.220) Screening for hyperlipidemia  Comment: today continue statin   Plan: Lipid " "panel reflex to direct LDL Fasting, Lipid        panel reflex to direct LDL Fasting            (E11.9) Type 2 diabetes mellitus without complication, without long-term current use of insulin (H)  Comment: improved 6.9 continue meds and diet and exercise   Plan: OPTOMETRY REFERRAL, losartan (COZAAR) 25 MG         tablet, metFORMIN (GLUCOPHAGE) 500 MG tablet,         Hemoglobin A1c, Basic metabolic panel  (Ca, Cl,        CO2, Creat, Gluc, K, Na, BUN), Albumin Random         Urine Quantitative with Creat Ratio            (F32.0) Mild major depression (H)  Comment: stable   Plan: sertraline (ZOLOFT) 50 MG tablet            (F41.9) Anxiety  Comment: stable  Plan: sertraline (ZOLOFT) 50 MG tablet            (Z23) Need for prophylactic vaccination and inoculation against influenza  Comment: today   Plan: INFLUENZA, QUAD, HIGH DOSE, PF, 65YR + (FLUZONE        HD)            (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   Plan: per Onc slow growing tumor recommend repeat Chest CT in 1-2 years for surveillance . Provided pt with name and number of oncologist to schedule this.  I also review the result of his Chest CT with him since he tells me Onc did not follow up with results.       (R68.18) Throat clearing  Comment: allergies ? Post nasal drip? Has seen ENT no identified etiology   Plan: cetirizine (ZYRTEC) 10 MG tablet        Patient has been advised of split billing requirements and indicates understanding: Yes    COUNSELING:  Reviewed preventive health counseling, as reflected in patient instructions       Regular exercise       Healthy diet/nutrition    Estimated body mass index is 25.3 kg/m  as calculated from the following:    Height as of 4/9/21: 1.865 m (6' 1.43\").    Weight as of 4/9/21: 88 kg (194 lb).        He reports that he has never smoked. He has never used smokeless tobacco.    Appropriate preventive services were " discussed with this patient, including applicable screening as appropriate for cardiovascular disease, diabetes, osteopenia/osteoporosis, and glaucoma.  As appropriate for age/gender, discussed screening for colorectal cancer, prostate cancer, breast cancer, and cervical cancer. Checklist reviewing preventive services available has been given to the patient.    Reviewed patients plan of care and provided an AVS. The Basic Care Plan (routine screening as documented in Health Maintenance) for Gerber meets the Care Plan requirement. This Care Plan has been established and reviewed with the Patient.    Counseling Resources:  ATP IV Guidelines  Pooled Cohorts Equation Calculator  Breast Cancer Risk Calculator  BRCA-Related Cancer Risk Assessment: FHS-7 Tool  FRAX Risk Assessment  ICSI Preventive Guidelines  Dietary Guidelines for Americans, 2010  USDA's MyPlate  ASA Prophylaxis  Lung CA Screening    ROSIE Galan [employer]

## 2021-10-20 LAB
ANION GAP SERPL CALCULATED.3IONS-SCNC: 3 MMOL/L (ref 3–14)
BUN SERPL-MCNC: 23 MG/DL (ref 7–30)
CALCIUM SERPL-MCNC: 9.1 MG/DL (ref 8.5–10.1)
CHLORIDE BLD-SCNC: 104 MMOL/L (ref 94–109)
CHOLEST SERPL-MCNC: 134 MG/DL
CO2 SERPL-SCNC: 28 MMOL/L (ref 20–32)
CREAT SERPL-MCNC: 0.86 MG/DL (ref 0.66–1.25)
FASTING STATUS PATIENT QL REPORTED: YES
GFR SERPL CREATININE-BSD FRML MDRD: 83 ML/MIN/1.73M2
GLUCOSE BLD-MCNC: 138 MG/DL (ref 70–99)
HDLC SERPL-MCNC: 68 MG/DL
LDLC SERPL CALC-MCNC: 51 MG/DL
NONHDLC SERPL-MCNC: 66 MG/DL
POTASSIUM BLD-SCNC: 3.7 MMOL/L (ref 3.4–5.3)
SODIUM SERPL-SCNC: 135 MMOL/L (ref 133–144)
TRIGL SERPL-MCNC: 73 MG/DL

## 2021-10-21 LAB
CREAT UR-MCNC: 120 MG/DL
MICROALBUMIN UR-MCNC: 11 MG/L
MICROALBUMIN/CREAT UR: 9.17 MG/G CR (ref 0–17)

## 2021-11-11 ENCOUNTER — TRANSFERRED RECORDS (OUTPATIENT)
Dept: HEALTH INFORMATION MANAGEMENT | Facility: CLINIC | Age: 76
End: 2021-11-11
Payer: COMMERCIAL

## 2021-11-11 LAB — RETINOPATHY: NEGATIVE

## 2021-11-30 ENCOUNTER — APPOINTMENT (OUTPATIENT)
Dept: URGENT CARE | Facility: CLINIC | Age: 76
End: 2021-11-30
Payer: COMMERCIAL

## 2022-01-21 ENCOUNTER — TELEPHONE (OUTPATIENT)
Dept: FAMILY MEDICINE | Facility: CLINIC | Age: 77
End: 2022-01-21
Payer: COMMERCIAL

## 2022-01-21 DIAGNOSIS — C49.3 SOFT TISSUE SARCOMA OF CHEST WALL (H): Primary | ICD-10-CM

## 2022-01-21 NOTE — TELEPHONE ENCOUNTER
Reason for Call: Request for an order or referral:    Order or referral being requested:  Chest CT scan    Date needed: as soon as possible    Additional comments: Patient received a [employer] message from Dinah Bui recommending that he have another chest CT scan done (use to Iggy [place] who ordered these).  Wife, Denisha is calling as patient is hard of hearing.    Please call patient back when order has been placed or send a [employer] message to let them know.    When she tried to called imaging to schedule the CT she was told there is no order.    Phone number Patient can be reached at:  Cell number on file:    Telephone Information:   Mobile [phone]     Best Time:  anytime    Can we leave a detailed message on this number?  YES    Call taken on 1/21/2022 at 10:40 AM by Alisia Martinez

## 2022-01-24 ENCOUNTER — ANCILLARY PROCEDURE (OUTPATIENT)
Dept: CT IMAGING | Facility: CLINIC | Age: 77
End: 2022-01-24
Attending: INTERNAL MEDICINE
Payer: COMMERCIAL

## 2022-01-24 DIAGNOSIS — C49.3 SOFT TISSUE SARCOMA OF CHEST WALL (H): ICD-10-CM

## 2022-01-24 LAB — RADIOLOGIST FLAGS: NORMAL

## 2022-01-24 PROCEDURE — 71250 CT THORAX DX C-: CPT | Mod: GC | Performed by: RADIOLOGY

## 2022-01-25 ENCOUNTER — NURSE TRIAGE (OUTPATIENT)
Dept: ONCOLOGY | Facility: CLINIC | Age: 77
End: 2022-01-25
Payer: COMMERCIAL

## 2022-01-25 NOTE — TELEPHONE ENCOUNTER
[facility] Triage    Incidental Finding:    CT CHEST - completed 1/24/22    Radiologist noted    New 9 mm solid nodule in the right lower lobe.    Ordering provider CHARLES - patient doesn' t have appt today or yesterday. Paging Charles 157pm with information    Routing to Charles and Ayo Conde

## 2022-02-02 ENCOUNTER — ANCILLARY PROCEDURE (OUTPATIENT)
Dept: GENERAL RADIOLOGY | Facility: CLINIC | Age: 77
End: 2022-02-02
Attending: STUDENT IN AN ORGANIZED HEALTH CARE EDUCATION/TRAINING PROGRAM
Payer: COMMERCIAL

## 2022-02-02 ENCOUNTER — OFFICE VISIT (OUTPATIENT)
Dept: FAMILY MEDICINE | Facility: CLINIC | Age: 77
End: 2022-02-02
Payer: COMMERCIAL

## 2022-02-02 VITALS
WEIGHT: 194 LBS | DIASTOLIC BLOOD PRESSURE: 72 MMHG | HEIGHT: 73 IN | OXYGEN SATURATION: 98 % | BODY MASS INDEX: 25.71 KG/M2 | SYSTOLIC BLOOD PRESSURE: 138 MMHG | HEART RATE: 67 BPM
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
## 2022-02-02 DIAGNOSIS — R07.89 RIGHT-SIDED CHEST WALL PAIN: ICD-10-CM
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
## 2022-02-02 DIAGNOSIS — C49.3 SOFT TISSUE SARCOMA OF CHEST WALL (H): ICD-10-CM

## 2022-02-02 PROCEDURE — 99213 OFFICE O/P EST LOW 20 MIN: CPT | Performed by: STUDENT IN AN ORGANIZED HEALTH CARE EDUCATION/TRAINING PROGRAM

## 2022-02-02 PROCEDURE — 71101 X-RAY EXAM UNILAT RIBS/CHEST: CPT | Mod: RT | Performed by: RADIOLOGY

## 2022-02-02 RX ORDER — NAPROXEN 500 MG/1
500 TABLET ORAL 2 TIMES DAILY WITH MEALS
Qty: 14 TABLET | Refills: 0 | Status: SHIPPED | OUTPATIENT
Start: 2022-02-02 | End: 2022-02-14

## 2022-02-02 ASSESSMENT — MIFFLIN-ST. JEOR: SCORE: 1653.86

## 2022-02-02 NOTE — PROGRESS NOTES
"  Assessment & Plan     Fall due to slipping on ice or snow, initial encounter  Recommend XR ribs to evaluate for fracture from his fall. Focal pain on right back 2 inch lateral to midline. No midline tenderness.   - XR Ribs & Chest Right G/E 3 Views; Future    Right-sided chest wall pain  XR ribs to evaluate for fracture - this is same area of his previous surgical interventions. Recommend Naproxen BID x 7 days with food. Tylenol as needed. He can use voltaren gel as needed over the location of pain. Use ice as tolerated.   -> XR ribs returned with NO fracture.   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 He hasn't heard the results and was unsure when to have his next scan. Recommend he call oncology to discuss as he is also probably due for a visit. Radiology recommend \"short term follow up CT or PET/CT for further evaluation\"      Return in about 1 week (around 2/9/2022), or if symptoms worsen or fail to improve.    [facility]    Austen Castrejon is a 76 year old who presents for the following health issues     HPI     Pain History:  Musculoskeletal problem/pain  Onset/Duration: today 02/02/2022  Description  Location: left side of ribs, exactly where he had surgery 5 years ago and [place] area   Joint Swelling: no  Redness: hasn't looked  Pain: YES   Warmth: no  Intensity:  8/10  Progression of Symptoms:  same  Accompanying signs and symptoms:   Fevers: no  Numbness/tingling/weakness: no  History  Trauma to the area: YES went on daily walk this morning and slipped and fell on ice. Landed on right side of body. Flat ground ice  Recent illness:  no  Previous similar problem: YES " "Hx of rib removal 5 years ago. Pain/discomfort has never gone away since surgery.   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 He had partial removal of 2 ribs int he right chest area. He follows with Oncology but hasn't seen since 11/2020. He did have a recent chest CT with new nodule noted. Needs follow up with Oncology. He is unsure when he should have a repeat scan.     Fell on ice today. Fell on his back mainly on the right side.   Having right back pain. No shoulder pain.   Used tylenol as needed   Hurts to move his torso and UE due to pain in his right back. No midline pain  Taking deep breaths                  Review of Systems   Constitutional, HEENT, cardiovascular, pulmonary, gi and gu systems are negative, except as otherwise noted.      Objective    /72 (BP Location: Right arm, Patient Position: Sitting, Cuff Size: Adult Regular)   Pulse 67   Ht 1.854 m (6' 1\")   Wt 88 kg (194 lb)   SpO2 98%   BMI 25.60 kg/m    Body mass index is 25.6 kg/m .  Physical Exam   GENERAL: healthy, alert and no distress  NECK: no adenopathy, no asymmetry, masses, or scars and thyroid normal to palpation  RESP: lungs clear to auscultation - no rales, rhonchi or wheezes  CV: regular rate and rhythm, normal S1 S2, no S3 or S4, no murmur, click or rub, no peripheral edema and peripheral pulses strong  MS: tenderness to palpation in focal area on right back 2 inches lateral to midline at approx T8. Pain in this location with ROM of his UE. No bruising or erythema, no rashes.no edema  NEURO: Normal strength and tone, mentation intact and speech normal    Xray ribs pending radiology read             "

## 2022-02-02 NOTE — PATIENT INSTRUCTIONS
Call oncology office to see when they would like you to have a repeat CT scan. Call [phone].    You can take Tylenol up to 1000 mg three times per day.   You can use Naproxen twice daily for 7 days, take with food. Don't take ibuprofen while on this.     Use voltaren gel 4 times daily as needed over the location of your pain.    Let me know if you are not having controlled pain.    I'll send a message later today with your Xray results        Patient Education     Rib Contusion or Minor Fracture    A rib contusion is a bruise to one or more rib bones. It may cause pain, tenderness, swelling, and a purplish color to the skin. There may be a sharp pain with each breath. A rib contusion takes anywhere from a few days to a few weeks to heal. A minor rib fracture or break may cause the same symptoms as a rib contusion. The small crack may not be seen on a regular chest X-ray. Treatment for both problems is basically the same.  Home care    You may use over-the-counter pain medicine to control pain, unless another pain medicine was prescribed. If you have chronic liver or kidney disease or ever had a stomach ulcer or GI (gastrointestinal) bleeding, talk with your healthcare provider before using these medicines.    Rest. Don't lift anything heavy or do any activity that causes pain.    Apply an ice pack over the injured area for 15 to 20 minutes every 1 to 2 hours. You should do this for the first 24 to 48 hours. To make an ice pack, put ice cubes in a plastic bag that seals at the top. Wrap the bag in a clean, thin towel or cloth. Never put ice or an ice pack directly on the skin. Continue with ice packs as needed for the relief of pain and swelling.    The first 3 to 4 weeks of healing will be the most painful. If your pain is not under control with the treatment given, call your healthcare provider. Sometimes a stronger pain medicine may be needed. A nerve block can be done in case of severe pain. It will numb the  nerve between the ribs.  Follow-up care  Follow up with your healthcare provider, or as advised.  If X-rays were taken, you will be told of any new findings that may affect your care.  Call 911  Call 911 if you have:    Dizziness, weakness or fainting    Shortness of breath with or without chest discomfort    New or worsening pain  When to seek medical advice  Call your healthcare provider right away if any of these occur:    Fever of 100.4 F (38 C) or higher, or as directed by your healthcare provider    Chills    Stomach pain, vomiting  [employer] last reviewed this educational content on 6/1/2018 2000-2021 [employer]. All rights reserved. This information is not intended as a substitute for professional medical care. Always follow your healthcare professional's instructions.

## 2022-02-10 DIAGNOSIS — R07.89 RIGHT-SIDED CHEST WALL PAIN: ICD-10-CM

## 2022-02-14 ENCOUNTER — MYC MEDICAL ADVICE (OUTPATIENT)
Dept: FAMILY MEDICINE | Facility: CLINIC | Age: 77
End: 2022-02-14
Payer: COMMERCIAL

## 2022-02-14 RX ORDER — NAPROXEN 500 MG/1
500 TABLET ORAL 2 TIMES DAILY WITH MEALS
Qty: 14 TABLET | Refills: 0 | Status: SHIPPED | OUTPATIENT
Start: 2022-02-14 | End: 2022-02-21

## 2022-02-14 NOTE — TELEPHONE ENCOUNTER
Sent MyChart to patient. Refill encounter was send to Dr. Galina Dorado to consider. See encounter from 2/10.    Nito Wiseman RN

## 2022-02-14 NOTE — TELEPHONE ENCOUNTER
"Routing refill request for Naprosyn to Dr. Galina Dorado.    Patient had replied to Nurse Christianne in other encounter:      Per patient on 2/14:    \"It's slowly getting better but still significant pain. I think one more time will do it.  Thanks   @rt\"      Requested Prescriptions   Pending Prescriptions Disp Refills     naproxen (NAPROSYN) 500 MG tablet [Pharmacy Med Name: NAPROXEN 500MG TABS] 14 tablet 0     Sig: TAKE 1 TABLET (500 MG) BY MOUTH 2 TIMES DAILY (WITH MEALS) FOR 7 DAYS       NSAID Medications Failed - 2/14/2022  2:56 PM        Failed - Normal ALT on file in past 12 months     No lab results found.          Failed - Normal AST on file in past 12 months     No lab results found.          Failed - Patient is age 6-64 years        Failed - Normal CBC on file in past 12 months     Recent Labs   Lab Test 09/24/16  0708   WBC 13.5*   RBC 3.86*   HGB 12.1*   HCT 36.3*     198                 Passed - Blood pressure under 140/90 in past 12 months     BP Readings from Last 3 Encounters:   02/02/22 138/72   10/19/21 108/62   04/09/21 110/70                 Passed - Recent (12 mo) or future (30 days) visit within the authorizing provider's specialty     Patient has had an office visit with the authorizing provider or a provider within the authorizing providers department within the previous 12 mos or has a future within next 30 days. See \"Patient Info\" tab in inbasket, or \"Choose Columns\" in Meds & Orders section of the refill encounter.              Passed - Medication is active on med list        Passed - Normal serum creatinine on file in past 12 months     Recent Labs   Lab Test 10/19/21  1155 09/21/16  0623 06/03/16  0000   CR 0.86   < >  --    CRPOC  --   --  0.9    < > = values in this interval not displayed.       Ok to refill medication if creatinine is low           Nito Wiseman RN    "

## 2022-02-14 NOTE — TELEPHONE ENCOUNTER
Checking on the status of this.       Rishabh Rangel    [facility]   [address] [place] [address]   Phone: [phone]  Fax: [phone]

## 2022-02-17 ENCOUNTER — VIRTUAL VISIT (OUTPATIENT)
Dept: ONCOLOGY | Facility: CLINIC | Age: 77
End: 2022-02-17
Attending: INTERNAL MEDICINE
Payer: COMMERCIAL

## 2022-02-17 DIAGNOSIS — R91.8 PULMONARY NODULES: ICD-10-CM

## 2022-02-17 DIAGNOSIS — C49.3 SOFT TISSUE SARCOMA OF CHEST WALL (H): Primary | ICD-10-CM

## 2022-02-17 DIAGNOSIS — Z90.81 HISTORY OF SPLENECTOMY: ICD-10-CM

## 2022-02-17 DIAGNOSIS — D58.0 HEREDITARY SPHEROCYTOSIS (H): ICD-10-CM

## 2022-02-17 DIAGNOSIS — R41.3 MEMORY LOSS: ICD-10-CM

## 2022-02-17 DIAGNOSIS — I10 ESSENTIAL HYPERTENSION: ICD-10-CM

## 2022-02-17 PROCEDURE — 99214 OFFICE O/P EST MOD 30 MIN: CPT | Mod: 95 | Performed by: INTERNAL MEDICINE

## 2022-02-17 NOTE — PROGRESS NOTES
Art is a 77 year old who is being evaluated via a billable video visit.      How would you like to obtain your AVS? NativooharBomberbot  If the video visit is dropped, the invitation should be resent by: Text to cell phone: [phone]  Will anyone else be joining your video visit? Milena Huerta VF    Video Start Time:900  Video-Visit Details    Type of service:  Video Visit    Video End [id number]    Originating Location (pt. 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       Background  In brief, he had some URI-like symptoms in 06/2016 leading to a chest x-ray which revealed a mass leading to further evaluation.  PET/CT imaging revealed about a 2 x 1.3-cm mass extending from the right pleura into the chest wall.  He also had some small nonspecific lung nodules noted.  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  He himself noted no symptoms from this lesion.  The tumor was excised en bloc on 09/21/2016 with a 3-cm margin on each side anteriorly and posteriorly.    -  Specimen #: [id number]   Collected: 9/21/2016      FINAL DIAGNOSIS:   A. Soft tissue, right chest wall, resection:   - Cellular/deep fibrous histiocytoma; see comment   - Tumor size: 3.0 cm   - Tumor extent: Involves skeletal muscle   - Lymphovascular invasion: Not identified   - Margins of resection: Free of tumor     B. Soft tissue, additional right chest wall margin, resection:   - Benign skeletal muscle, bone and connective tissue     C. 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 Soft tissue, right muscle final superficial margin, resection:    - Benign skeletal muscle and connective tissue     E. Soft tissue, right chest wall skin margin, resection:    - Benign skin and subcutaneous tissue     These features favor a deep, cellular fibrous histiocytoma.     Deep fibrous histiocytoma (deep counterpart of cutaneous fibrous   histiocytoma / dermatofibroma) is a rare lesion that arises in the   skeletal muscle or visceral locations. Unlike cutaneous dermatofibroma,   it is usually a highly cellular lesion that lacks heterogeneous elements   such as giant cells, histiocytic aggregates and hemosiderin, thus   mimicking DFSP. These lesions in general are clinically indolent but   have the potential to recur at an increased frequency (varies from 22%   to 57%) and undergo metastasis in rare instances.  -        Interval history     He is doing pretty well overall.  He did fall on the ice a couple weeks ago and hurt his chest and got some rib films that were okay.  That is still sore but improving.  He still has but some persistent pain at the operative site that has not changed in the last 2 years and he notices it but it does not bother him that much.  He is still walking about 3 miles a day and got all 3 Covid vaccines with no recent infectious symptoms. He also has type 2 diabetes which he thinks is controlled pretty well, hyperlipidemia and hypertension, all of which are controlled. He does note his memory is declining and his mom  of dementia.     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  He has a daughter who also had her spleen removed and there is a strong family history on his father's side.At the time of his splenectomy, he also had an appendectomy, cholecystectomy and hernia repair.  He has also has had bilateral cataract surgery.      He also has generalized anxiety disorder and has seen a psychologist.    He is not on depression/anxiety meds now and doenst think he needs them.    His got a new PCP.    On exam he appeared comfortable with normal affect.    HEENT full  EOMs  NECK no obvious goiter or mass  CHEST:  no resp distress  NEUROLOGIC:  mentation and speech normal  PSYCH: mood good      -  I reviewed the new images of 1-24-22 and there is a new lung nodule that needs f/u, and rib xray of 2-2-22 showing no fracture.    Formal read:    CT 1-24-22  IMPRESSION:   1. New 9 mm solid nodule in the right lower lobe resulting in  postobstructive atelectasis versus infection. Consider short term  follow up chest CT or PET/CT for further evaluation.   2. Increased size of multiple mediastinal lymph nodes. Continued  attention on follow-up.  3. Stable postoperative changes to the right chest wall and right  upper lobe wedge resection. No evidence for local disease recurrence.        Ribs 2-2-22  IMPRESSION:  1. The cardiomediastinal silhouette and pulmonary vasculature remain  within normal limits.  2. Postoperative changes and scarring in the right lung apex and right  upper chest wall deformity again noted.  3. There is no evidence of an acute or subacute fracture and there is  no evidence of pneumothorax or pleural fluid.  4. Chronic-appearing interstitial lung disease. No airspace disease.    -     We discussed the situation, and I think it is a fairly low-risk lesion.    At the time of surgery, a 2-cm protuberant lesion in the fourth intercostal space was noted.  At this time, he also had a small wedge resection of the right upper lobe for some nodularity that had been seen.  At the time of resection, a 3-cm tumor involving skeletal muscle was found without lymphovascular invasion and with negative margins.  Studies for the PDGFB gene rearrangement were negative, but could not eliminate the DFSP diagnosis re sampling.  There was no high-grade nuclear atypia, increased mitotic activity, or necrosis.  The lung biopsy was negative for malignancy.  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   This is an unusual lesion and unlike the cutaneous dermatofibroma, it usually is of high cellularity without other heterogeneous elements.  This is generally a low-grade lesion but can recur and rarely metastasize.    I told him I thought there was a fairly low risk of recurrence, but it does need some followup, as do his nonspecific lung nodules.  Given the new nodule we will do another CT in March and see him then.  If that's OK we might a chest CT scan in another 1-2 years.  This recommendation is not, obviously, based on a lot of data, but it seems like a reasonable approach.  All of his questions were addressed and he will call if he has others.       -  Augie Soto M.D.  Professor  Hematology, Oncology and Transplantation  -   cc:  MD Leah Freedman MD, Thoracic Surgery     Luis Velasco MD, Thoracic Surgery

## 2022-02-17 NOTE — LETTER
2/17/2022         RE: Gerber Levine  [address]  [facility] [address]        Dear Colleague,    Thank you for referring your patient, Gerber Levine, to the [facility]. Please see a copy of my visit note below.    Art is a 77 year old who is being evaluated via a billable video visit.      How would you like to obtain your AVS? MyChart  If the video visit is dropped, the invitation should be resent by: Text to cell phone: [phone]  Will anyone else be joining your video visit? Milena     Mariia Huerta     Video Start Time:900  Video-Visit Details    Type of service:  Video Visit    Video End [id number]    Originating Location (pt. 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       Background  In brief, he had some URI-like symptoms in 06/2016 leading to a chest x-ray which revealed a mass leading to further evaluation.  PET/CT imaging revealed about a 2 x 1.3-cm mass extending from the right pleura into the chest wall.  He also had some small nonspecific lung nodules noted.  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  He himself noted no symptoms from this lesion.  The tumor was excised en bloc on 09/21/2016 with a 3-cm margin on each side anteriorly and posteriorly.    -  Specimen #: [id number]   Collected: 9/21/2016      FINAL DIAGNOSIS:   A. Soft tissue, right chest wall, resection:   - Cellular/deep fibrous histiocytoma; see comment   - Tumor size: 3.0 cm   - Tumor extent: Involves skeletal muscle   - Lymphovascular invasion: Not identified   - Margins of resection: Free of tumor     B. Soft tissue, additional right chest wall margin, resection:   - Benign skeletal muscle, bone and connective tissue     C. Lung, right upper lobe, wedge resection:   - Lung  parenchyma with emphysematous changes and subpleural fibrosis   - Negative for malignancy     D. Soft tissue, right muscle final superficial margin, resection:    - Benign skeletal muscle and connective tissue     E. Soft tissue, right chest wall skin margin, resection:   - Benign skin and subcutaneous tissue     These features favor a deep, cellular fibrous histiocytoma.     Deep fibrous histiocytoma (deep counterpart of cutaneous fibrous   histiocytoma / dermatofibroma) is a rare lesion that arises in the   skeletal muscle or visceral locations. Unlike cutaneous dermatofibroma,   it is usually a highly cellular lesion that lacks heterogeneous elements   such as giant cells, histiocytic aggregates and hemosiderin, thus   mimicking DFSP. These lesions in general are clinically indolent but   have the potential to recur at an increased frequency (varies from 22%   to 57%) and undergo metastasis in rare instances.  -        Interval history     He is doing pretty well overall.  He did fall on the ice a couple weeks ago and hurt his chest and got some rib films that were okay.  That is still sore but improving.  He still has but some persistent pain at the operative site that has not changed in the last 2 years and he notices it but it does not bother him that much.  He is still walking about 3 miles a day and got all 3 Covid vaccines with no recent infectious symptoms. He also has type 2 diabetes which he thinks is controlled pretty well, hyperlipidemia and hypertension, all of which are controlled. He does note his memory is declining and his mom  of dementia.     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  He has a daughter who also had her spleen removed and there is a strong family history on his father's side.At the time of his splenectomy, he also had an appendectomy, cholecystectomy and hernia repair.  He has also has had  bilateral cataract surgery.      He also has generalized anxiety disorder and has seen a psychologist.    He is not on depression/anxiety meds now and doenst think he needs them.    His got a new PCP.    On exam he appeared comfortable with normal affect.    HEENT full EOMs  NECK no obvious goiter or mass  CHEST:  no resp distress  NEUROLOGIC:  mentation and speech normal  PSYCH: mood good      -  I reviewed the new images of 1-24-22 and there is a new lung nodule that needs f/u, and rib xray of 2-2-22 showing no fracture.    Formal read:    CT 1-24-22  IMPRESSION:   1. New 9 mm solid nodule in the right lower lobe resulting in  postobstructive atelectasis versus infection. Consider short term  follow up chest CT or PET/CT for further evaluation.   2. Increased size of multiple mediastinal lymph nodes. Continued  attention on follow-up.  3. Stable postoperative changes to the right chest wall and right  upper lobe wedge resection. No evidence for local disease recurrence.        Ribs 2-2-22  IMPRESSION:  1. The cardiomediastinal silhouette and pulmonary vasculature remain  within normal limits.  2. Postoperative changes and scarring in the right lung apex and right  upper chest wall deformity again noted.  3. There is no evidence of an acute or subacute fracture and there is  no evidence of pneumothorax or pleural fluid.  4. Chronic-appearing interstitial lung disease. No airspace disease.    -     We discussed the situation, and I think it is a fairly low-risk lesion.    At the time of surgery, a 2-cm protuberant lesion in the fourth intercostal space was noted.  At this time, he also had a small wedge resection of the right upper lobe for some nodularity that had been seen.  At the time of resection, a 3-cm tumor involving skeletal muscle was found without lymphovascular invasion and with negative margins.  Studies for the PDGFB gene rearrangement were negative, but could not eliminate the DFSP diagnosis re  sampling.  There was no high-grade nuclear atypia, increased mitotic activity, or necrosis.  The lung biopsy was negative for malignancy.  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  This is an unusual lesion and unlike the cutaneous dermatofibroma, it usually is of high cellularity without other heterogeneous elements.  This is generally a low-grade lesion but can recur and rarely metastasize.    I told him I thought there was a fairly low risk of recurrence, but it does need some followup, as do his nonspecific lung nodules.  Given the new nodule we will do another CT in March and see him then.  If that's OK we might a chest CT scan in another 1-2 years.  This recommendation is not, obviously, based on a lot of data, but it seems like a reasonable approach.  All of his questions were addressed and he will call if he has others.     -       Again, thank you for allowing me to participate in the care of your patient.      Sincerely,    Augie Soto MD

## 2022-02-18 ENCOUNTER — PATIENT OUTREACH (OUTPATIENT)
Dept: CARE COORDINATION | Facility: CLINIC | Age: 77
End: 2022-02-18
Payer: COMMERCIAL

## 2022-02-23 NOTE — PROGRESS NOTES
Oncology Distress Screening Follow-up  Clinical Social Work  [facility]    Identified Concern and Score From Distress Screenin. How concerned are you about feeling anxious or very scared?  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        Intervention/Education Provided: SW called and spoke with patient regarding concerns expressed in distress screen. Patient discussed how they were coping with their diagnosis and managing their treatment. SW provided validation and brief supportive counseling. SW discussed their role as a source of support for patient and the supports they can assist with. Patient expressed gratitude for the call. Patient did not identify any supportive needs at this time and agreed to follow up for support as needed.         Follow-up Required: SW provided their contact information for ongoing supports. SW will remain available as needed.            Mariia MURRAY, RENAE  - Oncology  Phone : [phone]  Pager: [phone]

## 2022-03-07 DIAGNOSIS — E11.9 TYPE 2 DIABETES MELLITUS WITHOUT COMPLICATION, WITHOUT LONG-TERM CURRENT USE OF INSULIN (H): ICD-10-CM

## 2022-03-07 RX ORDER — LANCETS
EACH MISCELLANEOUS
Qty: 100 EACH | Refills: 1 | Status: SHIPPED | OUTPATIENT
Start: 2022-03-07 | End: 2022-11-01

## 2022-03-16 DIAGNOSIS — E78.5 HYPERLIPIDEMIA LDL GOAL <100: Primary | ICD-10-CM

## 2022-03-16 DIAGNOSIS — E11.9 TYPE 2 DIABETES MELLITUS WITHOUT COMPLICATION, WITHOUT LONG-TERM CURRENT USE OF INSULIN (H): ICD-10-CM

## 2022-03-16 RX ORDER — ATORVASTATIN CALCIUM 40 MG/1
40 TABLET, FILM COATED ORAL DAILY
Qty: 90 TABLET | Refills: 1 | Status: SHIPPED | OUTPATIENT
Start: 2022-03-16 | End: 2022-05-17

## 2022-03-17 ENCOUNTER — ANCILLARY PROCEDURE (OUTPATIENT)
Dept: CT IMAGING | Facility: CLINIC | Age: 77
End: 2022-03-17
Attending: INTERNAL MEDICINE
Payer: COMMERCIAL

## 2022-03-17 DIAGNOSIS — I10 ESSENTIAL HYPERTENSION: ICD-10-CM

## 2022-03-17 DIAGNOSIS — R91.8 PULMONARY NODULES: ICD-10-CM

## 2022-03-17 DIAGNOSIS — Z90.81 HISTORY OF SPLENECTOMY: ICD-10-CM

## 2022-03-17 DIAGNOSIS — D58.0 HEREDITARY SPHEROCYTOSIS (H): ICD-10-CM

## 2022-03-17 DIAGNOSIS — C49.3 SOFT TISSUE SARCOMA OF CHEST WALL (H): ICD-10-CM

## 2022-03-17 DIAGNOSIS — R41.3 MEMORY LOSS: ICD-10-CM

## 2022-03-17 PROCEDURE — 71250 CT THORAX DX C-: CPT | Mod: GC | Performed by: RADIOLOGY

## 2022-03-21 NOTE — PROGRESS NOTES
Art is a 77 year old who is being evaluated via a billable video visit.      If the video visit is dropped, the invitation should be resent by: Send to e-mail at: [email or web address]  Will anyone else be joining your video visit? Yes: Wife will be w/ Pt.. How would they like to receive their invitation? Text to cell phone: n/a      Video Start Time:about 437  Video-Visit Details    Type of service:  Video Visit    Video End [id number]    Originating Location (pt. 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       Background  In brief, he had some URI-like symptoms in 06/2016 leading to a chest x-ray which revealed a mass leading to further evaluation.  PET/CT imaging revealed about a 2 x 1.3-cm mass extending from the right pleura into the chest wall.  He also had some small nonspecific lung nodules noted.  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  He himself noted no symptoms from this lesion.  The tumor was excised en bloc on 09/21/2016 with a 3-cm margin on each side anteriorly and posteriorly.    -  Specimen #: [id number]   Collected: 9/21/2016      FINAL DIAGNOSIS:   A. Soft tissue, right chest wall, resection:   - Cellular/deep fibrous histiocytoma; see comment   - Tumor size: 3.0 cm   - Tumor extent: Involves skeletal muscle   - Lymphovascular invasion: Not identified   - Margins of resection: Free of tumor     B. Soft tissue, additional right chest wall margin, resection:   - Benign skeletal muscle, bone and connective tissue     C. 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 Soft tissue, right muscle final superficial margin, resection:    - Benign  skeletal muscle and connective tissue     E. Soft tissue, right chest wall skin margin, resection:   - Benign skin and subcutaneous tissue     These features favor a deep, cellular fibrous histiocytoma.     Deep fibrous histiocytoma (deep counterpart of cutaneous fibrous   histiocytoma / dermatofibroma) is a rare lesion that arises in the   skeletal muscle or visceral locations. Unlike cutaneous dermatofibroma,   it is usually a highly cellular lesion that lacks heterogeneous elements   such as giant cells, histiocytic aggregates and hemosiderin, thus   mimicking DFSP. These lesions in general are clinically indolent but   have the potential to recur at an increased frequency (varies from 22%   to 57%) and undergo metastasis in rare instances.  -        Interval history       He is not doing too badly but he still has not fully recovered from the fall on the ice and his shoulder still bothers him some especially when he is using a computer mouse.  Does not appear with sleep much but his sleep is as usual which is not always that great.  Nevertheless he generally feels rested.  Overall he thinks he feels a bit better than a month ago.  He thinks his diabetes has not been under quite as good control since the fall.  He will be seeing his PCP for a wellness check next month.    He is still walking about 3 miles a day and got all 3 Covid vaccines with no recent infectious symptoms.     He also has type 2 diabetes which he thinks is controlled pretty well, hyperlipidemia and hypertension, all of which are controlled.     He does note his memory is declining and his mom  of dementia.     They are going on a bus trip to [place] in a couple of weeks.     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  He has a daughter who also had her spleen removed and there is a strong family history on his father's side.  At the time of his  splenectomy, he also had an appendectomy, cholecystectomy and hernia repair.  He has also has had bilateral cataract surgery.       He has generalized anxiety disorder and has seen a psychologist.    He is not on depression/anxiety meds now and doenst think he needs them.  Overall he thinks this is doing okay.      -  Background PMH, FH, SH  PMH notable for  hereditary spherocytosis, HBP, anxiety, hyperlipidemia , DM, s/p splenectomy, appy, stephanie, hernia repair, cataract removal.  FH-dementia, hereditary spherocytosis  -         On exam he appeared comfortable with normal affect.    CHEST:  no resp distress  NEUROLOGIC:  mentation and speech normal  PSYCH: mood good      -  I reviewed the new images of 3-17-22 and there are nonspecific small new lung nodules but no definite recurrence.    Formal read:  CT 3-17-22  IMPRESSION:  1. New areas of right middle lobe nodularity which is likely to  represent mucous plugging, however short term follow-up should be  considered since sarcoma may cause endobronchial metastasis.  2. Unchanged nodule in the right lower lobe with distal tubular  branching structure is likely also representing mucous plugging.   Findings suggestive of ongoing bronchiolitis.  3. New indeterminate 3 mm groundglass nodule in the left lower lobe.  4. Right upper lobe wedge resection and right chest wall mass  resection without definite evidence for recurrence.    -     We discussed the situation, and I think it is a fairly low-risk lesion.    At the time of surgery, a 2-cm protuberant lesion in the fourth intercostal space was noted.  At this time, he also had a small wedge resection of the right upper lobe for some nodularity that had been seen.  At the time of resection, a 3-cm tumor involving skeletal muscle was found without lymphovascular invasion and with negative margins.  Studies for the PDGFB gene rearrangement were negative, but could not eliminate the DFSP diagnosis re sampling.  There was  no high-grade nuclear atypia, increased mitotic activity, or necrosis.  The lung biopsy was negative for malignancy.  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  This is an unusual lesion and unlike the cutaneous dermatofibroma, it usually is of high cellularity without other heterogeneous elements.  This is generally a low-grade lesion but can recur and rarely metastasize.     I told him I thought there was a fairly low risk of recurrence, but it does need some followup, as do his nonspecific lung nodules.  Given the current imaging we will do another CT in 3 months and see him then.    If that's OK we might a chest CT scan in another 1-2 years.  This recommendation is not, obviously, based on a lot of data, but it seems like a reasonable approach.    We discussed the idea of coughing several times and taking some really big breaths as he is going into the CT machine and also getting his big breath is possible for the scan itself to get us a good picture.    He will discuss his shoulder discomfort with his PCP next month at the wellness visit to determine if it warrants further evaluation.    All of his questions were addressed and he will call if he has others.        -  Augie Soto M.D.  Professor  Hematology, Oncology and Transplantation  -   cc:  MD Leah Freedman MD, Thoracic Surgery     Luis Velasco MD, Thoracic Surgery

## 2022-03-22 ENCOUNTER — VIRTUAL VISIT (OUTPATIENT)
Dept: ONCOLOGY | Facility: CLINIC | Age: 77
End: 2022-03-22
Attending: INTERNAL MEDICINE
Payer: COMMERCIAL

## 2022-03-22 DIAGNOSIS — C49.3 SOFT TISSUE SARCOMA OF CHEST WALL (H): Primary | ICD-10-CM

## 2022-03-22 DIAGNOSIS — R91.8 PULMONARY NODULES: ICD-10-CM

## 2022-03-22 DIAGNOSIS — Z90.81 HISTORY OF SPLENECTOMY: ICD-10-CM

## 2022-03-22 DIAGNOSIS — D58.0 HEREDITARY SPHEROCYTOSIS (H): ICD-10-CM

## 2022-03-22 PROCEDURE — 99214 OFFICE O/P EST MOD 30 MIN: CPT | Mod: 95 | Performed by: INTERNAL MEDICINE

## 2022-03-22 NOTE — LETTER
3/22/2022         RE: Gerber Levine  [address]  [place] [address]        Dear Colleague,    Thank you for referring your patient, Gerber Levine, to the [facility]. Please see a copy of my visit note below.    Art is a 77 year old who is being evaluated via a billable video visit.      If the video visit is dropped, the invitation should be resent by: Send to e-mail at: [email or web address]  Will anyone else be joining your video visit? Yes: Wife will be w/ Pt.. How would they like to receive their invitation? Text to cell phone: n/a      Video Start Time:about 437  Video-Visit Details    Type of service:  Video Visit    Video End [id number]    Originating Location (pt. 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       Background  In brief, he had some URI-like symptoms in 06/2016 leading to a chest x-ray which revealed a mass leading to further evaluation.  PET/CT imaging revealed about a 2 x 1.3-cm mass extending from the right pleura into the chest wall.  He also had some small nonspecific lung nodules noted.  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  He himself noted no symptoms from this lesion.  The tumor was excised en bloc on 09/21/2016 with a 3-cm margin on each side anteriorly and posteriorly.    -  Specimen #: [id number]   Collected: 9/21/2016      FINAL DIAGNOSIS:   A. Soft tissue, right chest wall, resection:   - Cellular/deep fibrous histiocytoma; see comment   - Tumor size: 3.0 cm   - Tumor extent: Involves skeletal muscle   - Lymphovascular invasion: Not identified   - Margins of resection: Free of tumor     B. Soft tissue, additional right chest wall margin, resection:   - Benign skeletal  muscle, bone and connective tissue     C. 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 Soft tissue, right muscle final superficial margin, resection:    - Benign skeletal muscle and connective tissue     E. Soft tissue, right chest wall skin margin, resection:   - Benign skin and subcutaneous tissue     These features favor a deep, cellular fibrous histiocytoma.     Deep fibrous histiocytoma (deep counterpart of cutaneous fibrous   histiocytoma / dermatofibroma) is a rare lesion that arises in the   skeletal muscle or visceral locations. Unlike cutaneous dermatofibroma,   it is usually a highly cellular lesion that lacks heterogeneous elements   such as giant cells, histiocytic aggregates and hemosiderin, thus   mimicking DFSP. These lesions in general are clinically indolent but   have the potential to recur at an increased frequency (varies from 22%   to 57%) and undergo metastasis in rare instances.  -        Interval history       He is not doing too badly but he still has not fully recovered from the fall on the ice and his shoulder still bothers him some especially when he is using a computer mouse.  Does not appear with sleep much but his sleep is as usual which is not always that great.  Nevertheless he generally feels rested.  Overall he thinks he feels a bit better than a month ago.  He thinks his diabetes has not been under quite as good control since the fall.  He will be seeing his PCP for a wellness check next month.    He is still walking about 3 miles a day and got all 3 Covid vaccines with no recent infectious symptoms.     He also has type 2 diabetes which he thinks is controlled pretty well, hyperlipidemia and hypertension, all of which are controlled.     He does note his memory is declining and his mom  of dementia.     They are going on a bus trip to [place] in a couple of  weeks.     He does have some other issues including hereditary spherocytosis and had his spleen removed in his 30s the thinks around 1976.  He has a daughter who also had her spleen removed and there is a strong family history on his father's side.  At the time of his splenectomy, he also had an appendectomy, cholecystectomy and hernia repair.  He has also has had bilateral cataract surgery.       He has generalized anxiety disorder and has seen a psychologist.    He is not on depression/anxiety meds now and doenst think he needs them.  Overall he thinks this is doing okay.      -  Background PMH, FH, SH  PMH notable for  hereditary spherocytosis, HBP, anxiety, hyperlipidemia , DM, s/p splenectomy, appy, stephanie, hernia repair, cataract removal.  FH-dementia, hereditary spherocytosis  -         On exam he appeared comfortable with normal affect.    CHEST:  no resp distress  NEUROLOGIC:  mentation and speech normal  PSYCH: mood good      -  I reviewed the new images of 3-17-22 and there are nonspecific small new lung nodules but no definite recurrence.    Formal read:  CT 3-17-22  IMPRESSION:  1. New areas of right middle lobe nodularity which is likely to  represent mucous plugging, however short term follow-up should be  considered since sarcoma may cause endobronchial metastasis.  2. Unchanged nodule in the right lower lobe with distal tubular  branching structure is likely also representing mucous plugging.   Findings suggestive of ongoing bronchiolitis.  3. New indeterminate 3 mm groundglass nodule in the left lower lobe.  4. Right upper lobe wedge resection and right chest wall mass  resection without definite evidence for recurrence.    -     We discussed the situation, and I think it is a fairly low-risk lesion.    At the time of surgery, a 2-cm protuberant lesion in the fourth intercostal space was noted.  At this time, he also had a small wedge resection of the right upper lobe for some nodularity that had  been seen.  At the time of resection, a 3-cm tumor involving skeletal muscle was found without lymphovascular invasion and with negative margins.  Studies for the PDGFB gene rearrangement were negative, but could not eliminate the DFSP diagnosis re sampling.  There was no high-grade nuclear atypia, increased mitotic activity, or necrosis.  The lung biopsy was negative for malignancy.  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  This is an unusual lesion and unlike the cutaneous dermatofibroma, it usually is of high cellularity without other heterogeneous elements.  This is generally a low-grade lesion but can recur and rarely metastasize.     I told him I thought there was a fairly low risk of recurrence, but it does need some followup, as do his nonspecific lung nodules.  Given the current imaging we will do another CT in 3 months and see him then.    If that's OK we might a chest CT scan in another 1-2 years.  This recommendation is not, obviously, based on a lot of data, but it seems like a reasonable approach.    We discussed the idea of coughing several times and taking some really big breaths as he is going into the CT machine and also getting his big breath is possible for the scan itself to get us a good picture.    He will discuss his shoulder discomfort with his PCP next month at the wellness visit to determine if it warrants further evaluation.    All of his questions were addressed and he will call if he has others.        -    Again, thank you for allowing me to participate in the care of your patient.      Sincerely,    Augie Soto MD

## 2022-03-27 ENCOUNTER — APPOINTMENT (OUTPATIENT)
Dept: URGENT CARE | Facility: CLINIC | Age: 77
End: 2022-03-27
Payer: COMMERCIAL

## 2022-04-18 ENCOUNTER — TRANSFERRED RECORDS (OUTPATIENT)
Dept: HEALTH INFORMATION MANAGEMENT | Facility: CLINIC | Age: 77
End: 2022-04-18
Payer: COMMERCIAL

## 2022-04-18 LAB — RETINOPATHY: NEGATIVE

## 2022-04-22 RX ORDER — ATORVASTATIN CALCIUM 40 MG/1
40 TABLET, FILM COATED ORAL DAILY
Qty: 90 TABLET | Refills: 1 | OUTPATIENT
Start: 2022-04-22

## 2022-05-10 ENCOUNTER — TELEPHONE (OUTPATIENT)
Dept: FAMILY MEDICINE | Facility: CLINIC | Age: 77
End: 2022-05-10
Payer: COMMERCIAL

## 2022-05-10 NOTE — TELEPHONE ENCOUNTER
Routing to Dinah Bui - Please see message below from patient.  Do you want to double book over another appointment you have?  Please advise.      BABAK Gonzalez  [facility]

## 2022-05-10 NOTE — TELEPHONE ENCOUNTER
Please reschedule my 1:20pm on Friday with a pt that I've never seen before and put this pt in place.

## 2022-05-10 NOTE — TELEPHONE ENCOUNTER
Patient would like to get in with provider before she leaves for Diabetes follow up ,A1C and some other things. Please call and advise.  Gaye Soria,

## 2022-05-15 ASSESSMENT — ANXIETY QUESTIONNAIRES
2. NOT BEING ABLE TO STOP OR CONTROL WORRYING: NEARLY EVERY DAY
GAD7 TOTAL SCORE: 6
3. WORRYING TOO MUCH ABOUT DIFFERENT THINGS: NEARLY EVERY DAY
4. TROUBLE RELAXING: NOT AT ALL
6. BECOMING EASILY ANNOYED OR IRRITABLE: NOT AT ALL
7. FEELING AFRAID AS IF SOMETHING AWFUL MIGHT HAPPEN: NOT AT ALL
GAD7 TOTAL SCORE: 6
7. FEELING AFRAID AS IF SOMETHING AWFUL MIGHT HAPPEN: NOT AT ALL
1. FEELING NERVOUS, ANXIOUS, OR ON EDGE: NOT AT ALL
GAD7 TOTAL SCORE: 6
5. BEING SO RESTLESS THAT IT IS HARD TO SIT STILL: NOT AT ALL

## 2022-05-15 ASSESSMENT — PATIENT HEALTH QUESTIONNAIRE - PHQ9
10. IF YOU CHECKED OFF ANY PROBLEMS, HOW DIFFICULT HAVE THESE PROBLEMS MADE IT FOR YOU TO DO YOUR WORK, TAKE CARE OF THINGS AT HOME, OR GET ALONG WITH OTHER PEOPLE: NOT DIFFICULT AT ALL
SUM OF ALL RESPONSES TO PHQ QUESTIONS 1-9: 9
SUM OF ALL RESPONSES TO PHQ QUESTIONS 1-9: 9

## 2022-05-16 ASSESSMENT — PATIENT HEALTH QUESTIONNAIRE - PHQ9: SUM OF ALL RESPONSES TO PHQ QUESTIONS 1-9: 9

## 2022-05-16 ASSESSMENT — ANXIETY QUESTIONNAIRES: GAD7 TOTAL SCORE: 6

## 2022-05-17 ENCOUNTER — OFFICE VISIT (OUTPATIENT)
Dept: FAMILY MEDICINE | Facility: CLINIC | Age: 77
End: 2022-05-17
Payer: COMMERCIAL

## 2022-05-17 VITALS
SYSTOLIC BLOOD PRESSURE: 130 MMHG | HEIGHT: 73 IN | DIASTOLIC BLOOD PRESSURE: 66 MMHG | BODY MASS INDEX: 25.55 KG/M2 | WEIGHT: 192.8 LBS | OXYGEN SATURATION: 98 % | TEMPERATURE: 98 F | HEART RATE: 59 BPM | RESPIRATION RATE: 16 BRPM

## 2022-05-17 DIAGNOSIS — C49.3 CHEST WALL SARCOMA (H): ICD-10-CM

## 2022-05-17 DIAGNOSIS — G47.00 INSOMNIA, UNSPECIFIED TYPE: ICD-10-CM

## 2022-05-17 DIAGNOSIS — F32.0 MILD MAJOR DEPRESSION (H): ICD-10-CM

## 2022-05-17 DIAGNOSIS — I10 HYPERTENSION GOAL BP (BLOOD PRESSURE) < 140/90: ICD-10-CM

## 2022-05-17 DIAGNOSIS — E11.9 TYPE 2 DIABETES MELLITUS WITHOUT COMPLICATION, WITHOUT LONG-TERM CURRENT USE OF INSULIN (H): Primary | ICD-10-CM

## 2022-05-17 DIAGNOSIS — R91.8 PULMONARY NODULES: ICD-10-CM

## 2022-05-17 DIAGNOSIS — M25.511 ACUTE PAIN OF RIGHT SHOULDER: ICD-10-CM

## 2022-05-17 DIAGNOSIS — F41.9 ANXIETY: ICD-10-CM

## 2022-05-17 DIAGNOSIS — E78.5 HYPERLIPIDEMIA LDL GOAL <100: ICD-10-CM

## 2022-05-17 LAB — HBA1C MFR BLD: 8.3 % (ref 0–5.6)

## 2022-05-17 PROCEDURE — 83036 HEMOGLOBIN GLYCOSYLATED A1C: CPT | Performed by: NURSE PRACTITIONER

## 2022-05-17 PROCEDURE — 99214 OFFICE O/P EST MOD 30 MIN: CPT | Performed by: NURSE PRACTITIONER

## 2022-05-17 PROCEDURE — 36415 COLL VENOUS BLD VENIPUNCTURE: CPT | Performed by: NURSE PRACTITIONER

## 2022-05-17 PROCEDURE — 96127 BRIEF EMOTIONAL/BEHAV ASSMT: CPT | Mod: 59 | Performed by: NURSE PRACTITIONER

## 2022-05-17 RX ORDER — ATORVASTATIN CALCIUM 40 MG/1
40 TABLET, FILM COATED ORAL DAILY
Qty: 90 TABLET | Refills: 1 | Status: SHIPPED | OUTPATIENT
Start: 2022-05-17 | End: 2022-08-30

## 2022-05-17 ASSESSMENT — PATIENT HEALTH QUESTIONNAIRE - PHQ9
10. IF YOU CHECKED OFF ANY PROBLEMS, HOW DIFFICULT HAVE THESE PROBLEMS MADE IT FOR YOU TO DO YOUR WORK, TAKE CARE OF THINGS AT HOME, OR GET ALONG WITH OTHER PEOPLE: NOT DIFFICULT AT ALL
SUM OF ALL RESPONSES TO PHQ QUESTIONS 1-9: 9

## 2022-05-17 ASSESSMENT — ANXIETY QUESTIONNAIRES: GAD7 TOTAL SCORE: 6

## 2022-05-17 ASSESSMENT — PAIN SCALES - GENERAL: PAINLEVEL: NO PAIN (0)

## 2022-05-17 NOTE — PATIENT INSTRUCTIONS
Increase metformin 1000 mg at breakfast. Recheck A1c in 3 months.     Increase Zoloft 75 mg daily     Start melatonin 5 mg at bedtime    I ordered a new meter check sugars once daily fasting     Follow with Caty Yusuf NP     Good luck on your scan

## 2022-05-17 NOTE — PROGRESS NOTES
"  Assessment & Plan     Type 2 diabetes mellitus without complication, without long-term current use of insulin (H)  Increased from 6.9 to 8.3 with lack of exercise and diet changes. His meters are reading in the 100-130's which does not correspond with A1c of 8.3 recommend new meter, metformin increased, check fasting sugars once daily, target <8. F/U in 3 months   - HEMOGLOBIN A1C; Future  - FOOT EXAM  - HEMOGLOBIN A1C  - blood glucose monitoring (NO BRAND SPECIFIED) meter device kit; Use to test blood sugar once  daily or as directed.  - metFORMIN (GLUCOPHAGE) 500 MG tablet; Take 2 tablets (1,000 mg) by mouth daily (with breakfast)  - Basic metabolic panel  (Ca, Cl, CO2, Creat, Gluc, K, Na, BUN); Future    Mild major depression (H)  needs improvement recommend increase to 75 mg daily f/u in 3 months   - sertraline (ZOLOFT) 50 MG tablet; Take 1.5 tablets (75 mg) by mouth daily    Hypertension goal BP (blood pressure) < 140/90  Stable continue medication     Hyperlipidemia LDL goal <100  Labs  today   - atorvastatin (LIPITOR) 40 MG tablet; Take 1 tablet (40 mg) by mouth daily    Anxiety  As above   - sertraline (ZOLOFT) 50 MG tablet; Take 1.5 tablets (75 mg) by mouth daily    Insomnia, unspecified type  Ongoing issue rumination. Start melatonin 5 mg HS     Chest wall sarcoma (H)  Followed by Hem/Onc upcoming Chest CT scan in July. Reviewed CT scan from March.     Pulmonary nodules  As above     Acute pain of right shoulder  2/2 fall improved. No rom difficulties.     Advised pt of my departure from . Will establish care with new PCP.     Prescription drug management  30 minutes spent on the date of the encounter doing chart review, history and exam, documentation and further activities per the note       BMI:   Estimated body mass index is 25.5 kg/m  as calculated from the following:    Height as of this encounter: 1.852 m (6' 0.91\").    Weight as of this encounter: 87.5 kg (192 lb 12.8 oz).         Return in " about 3 months (around 8/17/2022).    ROSIE Galan [facility] GUTIERREZ Castrejon is a 77 year old who presents for the following health issues     History of Present Illness       Mental Health Follow-up:  Patient presents to follow-up on Anxiety.    Patient's anxiety since last visit has been:  Medium  The patient is having other symptoms associated with anxiety.  Any significant life events: No  Patient is feeling anxious or having panic attacks.  Patient has no concerns about alcohol or drug use.       Today's PHQ-9         PHQ-9 Total Score: 9  PHQ-9 Q9 Thoughts of better off dead/self-harm past 2 weeks :   (P) Not at all    How difficult have these problems made it for you to do your work, take care of things at home, or get along with other people: Not difficult at all    Today's IVETTE-7 Score: 6    Diabetes:   He presents for follow up of diabetes.  He is checking home blood glucose one time daily. He checks blood glucose before meals.  Blood glucose is never over 200 and sometimes under 70. When his blood glucose is low, the patient is asymptomatic for confusion, blurred vision, lethargy and reports not feeling dizzy, shaky, or weak.  He is concerned about other.  He is not experiencing numbness or burning in feet, excessive thirst, blurry vision, weight changes or redness, sores or blisters on feet.         Reason for visit:  Diabetes follow up,  back pain, misc other issues  Symptom onset:  More than a month  Symptoms include:  Back/shoulder pain related to a fall.  Pain yet from surgery on soft tissue containing cancere  Symptom intensity:  Mild  Symptom progression:  Staying the same  Had these symptoms before:  Yes  Has tried/received treatment for these symptoms:  Yes  Previous treatment was successful:  No  What makes it worse:  Position of arm & shoulder  What makes it better:  Stays pretty much the same    He eats 2-3 servings of fruits and vegetables  "daily.He consumes 1 sweetened beverage(s) daily.He exercises with enough effort to increase his heart rate 30 to 60 minutes per day.  He exercises with enough effort to increase his heart rate 7 days per week.   He is taking medications regularly.   says he worries all the time. Hasn't slept well for a long time. Is ruminating a lot. Has bene on Zoloft 50 mg at bedtime.     sugars have been elevated. He brings in a sheet but all are below 130. However, his A1c is 8.3 up from 6.9. Wonders if his meter is working ?     Hx of a fall and fell on his right shoulder. Has been improving.     Followed by Dr. Soto Hematology, Oncology and Transplantation    IMPRESSION:  1. New areas of right middle lobe nodularity which is likely to  represent mucous plugging, however short term follow-up should be  considered since sarcoma may cause endobronchial metastasis.  2. Unchanged nodule in the right lower lobe with distal tubular  branching structure is likely also representing mucous plugging.   Findings suggestive of ongoing bronchiolitis.  3. New indeterminate 3 mm groundglass nodule in the left lower lobe.  4. Right upper lobe wedge resection and right chest wall mass  resection without definite evidence for recurrence.     I have personally reviewed the examination and initial interpretation  and I agree with the findings.     TIM BALTAZAR MD        Review of Systems   Constitutional, HEENT, cardiovascular, pulmonary, gi and gu systems are negative, except as otherwise noted.      Objective    /66 (BP Location: Right arm, Patient Position: Chair, Cuff Size: Adult Regular)   Pulse 59   Temp 98  F (36.7  C) (Tympanic)   Resp 16   Ht 1.852 m (6' 0.91\")   Wt 87.5 kg (192 lb 12.8 oz)   SpO2 98%   BMI 25.50 kg/m    Body mass index is 25.5 kg/m .  Physical Exam   GENERAL: healthy, alert and no distress  EYES: Eyes grossly normal to inspection, PERRL and conjunctivae and sclerae normal  NECK: no adenopathy, no " asymmetry, masses, or scars and thyroid normal to palpation  RESP: lungs clear to auscultation - no rales, rhonchi or wheezes  CV: regular rate and rhythm, normal S1 S2, no S3 or S4, no murmur, click or rub, no peripheral edema and peripheral pulses strong  ABDOMEN: soft, nontender, no hepatosplenomegaly, no masses and bowel sounds normal  MS: no gross musculoskeletal defects noted, no edema  PSYCH: mentation appears normal, affect normal/bright    Transferred Records on 04/18/2022   Component Date Value Ref Range Status     RETINOPATHY 04/18/2022 NEGATIVE   Final

## 2022-07-08 ENCOUNTER — ANCILLARY PROCEDURE (OUTPATIENT)
Dept: CT IMAGING | Facility: CLINIC | Age: 77
End: 2022-07-08
Attending: INTERNAL MEDICINE
Payer: COMMERCIAL

## 2022-07-08 DIAGNOSIS — R91.8 PULMONARY NODULES: ICD-10-CM

## 2022-07-08 DIAGNOSIS — C49.3 SOFT TISSUE SARCOMA OF CHEST WALL (H): ICD-10-CM

## 2022-07-08 DIAGNOSIS — Z90.81 HISTORY OF SPLENECTOMY: ICD-10-CM

## 2022-07-08 DIAGNOSIS — D58.0 HEREDITARY SPHEROCYTOSIS (H): ICD-10-CM

## 2022-07-08 PROCEDURE — 71250 CT THORAX DX C-: CPT | Mod: GC | Performed by: RADIOLOGY

## 2022-07-11 NOTE — PROGRESS NOTES
Art is a 77 year old who is being evaluated via a billable video visit.      How would you like to obtain your AVS? Datadecisionhart  If the video visit is dropped, the invitation should be resent by: Text to cell phone: [phone]   Will anyone else be joining your video visit? No      Video-Visit Details    Video Start Timeabout 1224    Type of service:  Video Visit    Video End Timeabout 1238    Originating Location (pt. Location)home    Distant Location (provider location):  [facility]     Platform used for Video Visitdoximetry he was having some audio difficulty hearing so for the last couple of minutes we changed to voice only.        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       Background  In brief, he had some URI-like symptoms in 06/2016 leading to a chest x-ray which revealed a mass leading to further evaluation.  PET/CT imaging revealed about a 2 x 1.3-cm mass extending from the right pleura into the chest wall.  He also had some small nonspecific lung nodules noted.  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  He himself noted no symptoms from this lesion.  The tumor was excised en bloc on 09/21/2016 with a 3-cm margin on each side anteriorly and posteriorly.    -  Specimen #: [id number]   Collected: 9/21/2016      FINAL DIAGNOSIS:   A. Soft tissue, right chest wall, resection:   - Cellular/deep fibrous histiocytoma; see comment   - Tumor size: 3.0 cm   - Tumor extent: Involves skeletal muscle   - Lymphovascular invasion: Not identified   - Margins of resection: Free of tumor     B. Soft tissue, additional right chest wall margin, resection:   - Benign skeletal muscle, bone and connective tissue     C. 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 Soft tissue, right muscle final superficial margin, resection:    - Benign  skeletal muscle and connective tissue     E. Soft tissue, right chest wall skin margin, resection:   - Benign skin and subcutaneous tissue     These features favor a deep, cellular fibrous histiocytoma.     Deep fibrous histiocytoma (deep counterpart of cutaneous fibrous   histiocytoma / dermatofibroma) is a rare lesion that arises in the   skeletal muscle or visceral locations. Unlike cutaneous dermatofibroma,   it is usually a highly cellular lesion that lacks heterogeneous elements   such as giant cells, histiocytic aggregates and hemosiderin, thus   mimicking DFSP. These lesions in general are clinically indolent but   have the potential to recur at an increased frequency (varies from 22%   to 57%) and undergo metastasis in rare instances.  -        Interval history    He is generally doing up pretty well.  They had a nice trip to [place] in April.  He gets out of the house every day and walks 3 miles.    He did fall and hurt his right shoulder in January and that is improved greatly.     He does not sleep much but his sleep is as usual which is not always that great.  He is taking a new medication but he does not member the name of that at bedtime. Nevertheless he generally feels rested.    His diabetes got up bit less controlled so he is on a higher metformin dose now.  He follows with his PCP on that.    He got all 3 Covid vaccines with no recent infectious symptoms.     He has type 2 diabetes which he thinks is controlled pretty well, hyperlipidemia and hypertension.     He does note his memory is declining and his mom  of dementia.      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  He has a daughter who also had her spleen removed and there is a strong family history on his father's side.  At the time of his splenectomy, he also had an appendectomy, cholecystectomy and hernia repair.  He has also has had bilateral  cataract surgery.      He has generalized anxiety disorder and has seen a psychologist.    He is not on depression/anxiety meds now and doenst think he needs them.  Overall he thinks the mood is good now.        -  Background PMH, FH, SH  PMH notable for  hereditary spherocytosis, HBP, anxiety, hyperlipidemia , DM, s/p splenectomy, appy, stephanie, hernia repair, cataract removal.  FH-dementia, hereditary spherocytosis  -    On exam he appeared comfortable with normal affect.    HEENT full EOMs  NECK no obvious goiter or mass  CHEST:  no resp distress  NEUROLOGIC:  mentation and speech normal  PSYCH: mood good      -  I reviewed the new images of 7-8-22 and there are nonspecific small lung nodules including 1 new one, but no definite recurrence.  A healing rib fracture is also noted.    Formal read:  CT 7-8-22  IMPRESSION:  1. Decreasing medial right middle lobe nodularity/mucous plugging,  otherwise unchanged similar-appearing areas in the right middle and  lower lobes.  2. New indeterminate 3 mm lateral right middle lobe solid nodule,  recommend attention on follow-up.  3. Healing rib fracture deformities. Fracture deformity with question  cortical irregularity in the right posterolateral third rib.   Correlate with history of trauma.  Attention on followup vs chest wall  MRI which is more sensitive for recurrence.     -     We discussed the situation, and I think it is a fairly low-risk lesion.      At the time of surgery, a 2-cm protuberant lesion in the fourth intercostal space was noted.  At this time, he also had a small wedge resection of the right upper lobe for some nodularity that had been seen.  At the time of resection, a 3-cm tumor involving skeletal muscle was found without lymphovascular invasion and with negative margins.  Studies for the PDGFB gene rearrangement were negative, but could not eliminate the DFSP diagnosis re sampling.  There was no high-grade nuclear atypia, increased mitotic activity, or  necrosis.  The lung biopsy was negative for malignancy.  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  This is an unusual lesion and unlike the cutaneous dermatofibroma, it usually is of high cellularity without other heterogeneous elements.  This is generally a low-grade lesion but can recur and rarely metastasize.    I told him I thought there was a fairly low risk of recurrence, but it does need some followup, as do his nonspecific lung nodules.  Some of the nodules improved compared to the last scan but there is one new 1.  I doubt this is of major concern but given the current imaging we will do another CT in 4 months and see him then.  We will use contrast this time.     If that's OK we might a chest CT scan in another 1-2 years.  This recommendation is not, obviously, based on a lot of data, but it seems like a reasonable approach.      All of his questions were addressed and he will call if he has others.        -  Augie Soto M.D.  Professor  Hematology, Oncology and Transplantation  -   cc:  MD Leah Freedman MD, Thoracic Surgery     Luis Velasco MD, Thoracic Surgery

## 2022-07-12 ENCOUNTER — VIRTUAL VISIT (OUTPATIENT)
Dept: ONCOLOGY | Facility: CLINIC | Age: 77
End: 2022-07-12
Attending: INTERNAL MEDICINE
Payer: COMMERCIAL

## 2022-07-12 DIAGNOSIS — H90.3 SENSORINEURAL HEARING LOSS, BILATERAL: ICD-10-CM

## 2022-07-12 DIAGNOSIS — R41.3 MEMORY LOSS: ICD-10-CM

## 2022-07-12 DIAGNOSIS — R91.8 PULMONARY NODULES: ICD-10-CM

## 2022-07-12 DIAGNOSIS — D58.0 HEREDITARY SPHEROCYTOSIS (H): ICD-10-CM

## 2022-07-12 DIAGNOSIS — Z90.81 HISTORY OF SPLENECTOMY: ICD-10-CM

## 2022-07-12 DIAGNOSIS — C49.3 SOFT TISSUE SARCOMA OF CHEST WALL (H): Primary | ICD-10-CM

## 2022-07-12 PROCEDURE — 99214 OFFICE O/P EST MOD 30 MIN: CPT | Mod: 95 | Performed by: INTERNAL MEDICINE

## 2022-07-12 PROCEDURE — G0463 HOSPITAL OUTPT CLINIC VISIT: HCPCS | Mod: PN,RTG | Performed by: INTERNAL MEDICINE

## 2022-07-12 NOTE — LETTER
7/12/2022         RE: Gerber Levine  [address]  [facility] [address]        Dear Colleague,    Thank you for referring your patient, Gerber Levine, to the [facility]. Please see a copy of my visit note below.    Art is a 77 year old who is being evaluated via a billable video visit.      How would you like to obtain your AVS? MyChart  If the video visit is dropped, the invitation should be resent by: Text to cell phone: [phone]   Will anyone else be joining your video visit? No      Video-Visit Details    Video Start Timeabout 1224    Type of service:  Video Visit    Video End Timeabout 1238    Originating Location (pt. Location)home    Distant Location (provider location):  [facility]     Platform used for Video Visitdoximetry he was having some audio difficulty hearing so for the last couple of minutes we changed to voice only.        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       Background  In brief, he had some URI-like symptoms in 06/2016 leading to a chest x-ray which revealed a mass leading to further evaluation.  PET/CT imaging revealed about a 2 x 1.3-cm mass extending from the right pleura into the chest wall.  He also had some small nonspecific lung nodules noted.  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  He himself noted no symptoms from this lesion.  The tumor was excised en bloc on 09/21/2016 with a 3-cm margin on each side anteriorly and posteriorly.    -  Specimen #: [id number]   Collected: 9/21/2016      FINAL DIAGNOSIS:   A. Soft tissue, right chest wall, resection:   - Cellular/deep fibrous histiocytoma; see comment   - Tumor size: 3.0 cm   - Tumor extent: Involves skeletal muscle   - Lymphovascular invasion: Not identified   - Margins of resection: Free of tumor     B. Soft tissue, additional right chest wall margin, resection:   - Benign skeletal  muscle, bone and connective tissue     C. 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 Soft tissue, right muscle final superficial margin, resection:    - Benign skeletal muscle and connective tissue     E. Soft tissue, right chest wall skin margin, resection:   - Benign skin and subcutaneous tissue     These features favor a deep, cellular fibrous histiocytoma.     Deep fibrous histiocytoma (deep counterpart of cutaneous fibrous   histiocytoma / dermatofibroma) is a rare lesion that arises in the   skeletal muscle or visceral locations. Unlike cutaneous dermatofibroma,   it is usually a highly cellular lesion that lacks heterogeneous elements   such as giant cells, histiocytic aggregates and hemosiderin, thus   mimicking DFSP. These lesions in general are clinically indolent but   have the potential to recur at an increased frequency (varies from 22%   to 57%) and undergo metastasis in rare instances.  -        Interval history    He is generally doing up pretty well.  They had a nice trip to [place] in April.  He gets out of the house every day and walks 3 miles.    He did fall and hurt his right shoulder in January and that is improved greatly.     He does not sleep much but his sleep is as usual which is not always that great.  He is taking a new medication but he does not member the name of that at bedtime. Nevertheless he generally feels rested.    His diabetes got up bit less controlled so he is on a higher metformin dose now.  He follows with his PCP on that.    He got all 3 Covid vaccines with no recent infectious symptoms.     He has type 2 diabetes which he thinks is controlled pretty well, hyperlipidemia and hypertension.     He does note his memory is declining and his mom  of dementia.      He does have some other issues including hereditary spherocytosis and had his spleen removed in his  30s the thinks around 1976.  He has a daughter who also had her spleen removed and there is a strong family history on his father's side.  At the time of his splenectomy, he also had an appendectomy, cholecystectomy and hernia repair.  He has also has had bilateral cataract surgery.      He has generalized anxiety disorder and has seen a psychologist.    He is not on depression/anxiety meds now and doenst think he needs them.  Overall he thinks the mood is good now.     -  Background PMH, FH, SH  PMH notable for  hereditary spherocytosis, HBP, anxiety, hyperlipidemia , DM, s/p splenectomy, appy, stephanie, hernia repair, cataract removal.  FH-dementia, hereditary spherocytosis  -    On exam he appeared comfortable with normal affect.    HEENT full EOMs  NECK no obvious goiter or mass  CHEST:  no resp distress  NEUROLOGIC:  mentation and speech normal  PSYCH: mood good      -  I reviewed the new images of 7-8-22 and there are nonspecific small lung nodules including 1 new one, but no definite recurrence.  A healing rib fracture is also noted.    Formal read:  CT 7-8-22  IMPRESSION:  1. Decreasing medial right middle lobe nodularity/mucous plugging,  otherwise unchanged similar-appearing areas in the right middle and  lower lobes.  2. New indeterminate 3 mm lateral right middle lobe solid nodule,  recommend attention on follow-up.  3. Healing rib fracture deformities. Fracture deformity with question  cortical irregularity in the right posterolateral third rib.   Correlate with history of trauma.  Attention on followup vs chest wall  MRI which is more sensitive for recurrence.     -  We discussed the situation, and I think it is a fairly low-risk lesion.      At the time of surgery, a 2-cm protuberant lesion in the fourth intercostal space was noted.  At this time, he also had a small wedge resection of the right upper lobe for some nodularity that had been seen.  At the time of resection, a 3-cm tumor involving skeletal  muscle was found without lymphovascular invasion and with negative margins.  Studies for the PDGFB gene rearrangement were negative, but could not eliminate the DFSP diagnosis re sampling.  There was no high-grade nuclear atypia, increased mitotic activity, or necrosis.  The lung biopsy was negative for malignancy.  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  This is an unusual lesion and unlike the cutaneous dermatofibroma, it usually is of high cellularity without other heterogeneous elements.  This is generally a low-grade lesion but can recur and rarely metastasize.    I told him I thought there was a fairly low risk of recurrence, but it does need some followup, as do his nonspecific lung nodules.  Some of the nodules improved compared to the last scan but there is one new 1.  I doubt this is of major concern but given the current imaging we will do another CT in 4 months and see him then.  We will use contrast this time.  If that's OK we might a    chest CT scan in another 1-2 years.  This recommendation is not, obviously, based on a lot of data, but it seems like a reasonable approach.      All of his questions were addressed and he will call if he has others.        -    Again, thank you for allowing me to participate in the care of your patient.      Sincerely,    Augie Soto MD

## 2022-07-12 NOTE — NURSING NOTE
Patient confirms medications and allergies are accurate via patients echeck in completion, and or denies any changes since last reviewed/verified.     Nathaniel Ledezma, Virtual Facilitator

## 2022-08-30 ENCOUNTER — OFFICE VISIT (OUTPATIENT)
Dept: FAMILY MEDICINE | Facility: CLINIC | Age: 77
End: 2022-08-30
Payer: COMMERCIAL

## 2022-08-30 VITALS
HEART RATE: 60 BPM | RESPIRATION RATE: 15 BRPM | OXYGEN SATURATION: 97 % | BODY MASS INDEX: 25.42 KG/M2 | TEMPERATURE: 98.4 F | SYSTOLIC BLOOD PRESSURE: 112 MMHG | DIASTOLIC BLOOD PRESSURE: 60 MMHG | WEIGHT: 191.8 LBS | HEIGHT: 73 IN

## 2022-08-30 DIAGNOSIS — Z12.11 SCREEN FOR COLON CANCER: ICD-10-CM

## 2022-08-30 DIAGNOSIS — E78.5 HYPERLIPIDEMIA LDL GOAL <100: ICD-10-CM

## 2022-08-30 DIAGNOSIS — E11.9 TYPE 2 DIABETES MELLITUS WITHOUT COMPLICATION, WITHOUT LONG-TERM CURRENT USE OF INSULIN (H): Primary | ICD-10-CM

## 2022-08-30 DIAGNOSIS — F32.0 MILD MAJOR DEPRESSION (H): ICD-10-CM

## 2022-08-30 DIAGNOSIS — F41.9 ANXIETY: ICD-10-CM

## 2022-08-30 DIAGNOSIS — E55.9 VITAMIN D DEFICIENCY: ICD-10-CM

## 2022-08-30 DIAGNOSIS — I10 HYPERTENSION GOAL BP (BLOOD PRESSURE) < 140/90: ICD-10-CM

## 2022-08-30 LAB
DEPRECATED CALCIDIOL+CALCIFEROL SERPL-MC: 37 UG/L (ref 20–75)
HBA1C MFR BLD: 7.2 % (ref 0–5.6)
VIT B12 SERPL-MCNC: 1487 PG/ML (ref 232–1245)

## 2022-08-30 PROCEDURE — 82607 VITAMIN B-12: CPT | Performed by: NURSE PRACTITIONER

## 2022-08-30 PROCEDURE — 82306 VITAMIN D 25 HYDROXY: CPT | Performed by: NURSE PRACTITIONER

## 2022-08-30 PROCEDURE — 99214 OFFICE O/P EST MOD 30 MIN: CPT | Performed by: NURSE PRACTITIONER

## 2022-08-30 PROCEDURE — 36415 COLL VENOUS BLD VENIPUNCTURE: CPT | Performed by: NURSE PRACTITIONER

## 2022-08-30 PROCEDURE — 83036 HEMOGLOBIN GLYCOSYLATED A1C: CPT | Performed by: NURSE PRACTITIONER

## 2022-08-30 RX ORDER — GUAIFENESIN AND DEXTROMETHORPHAN HYDROBROMIDE 600; 30 MG/1; MG/1
1 TABLET, EXTENDED RELEASE ORAL EVERY 12 HOURS
COMMUNITY
End: 2023-12-25

## 2022-08-30 RX ORDER — LOSARTAN POTASSIUM 25 MG/1
25 TABLET ORAL DAILY
Qty: 90 TABLET | Refills: 6 | Status: SHIPPED | OUTPATIENT
Start: 2022-08-30 | End: 2023-10-03

## 2022-08-30 RX ORDER — ATORVASTATIN CALCIUM 40 MG/1
40 TABLET, FILM COATED ORAL DAILY
Qty: 90 TABLET | Refills: 1 | Status: SHIPPED | OUTPATIENT
Start: 2022-08-30 | End: 2023-01-18

## 2022-08-30 ASSESSMENT — PAIN SCALES - GENERAL: PAINLEVEL: NO PAIN (0)

## 2022-08-30 NOTE — PROGRESS NOTES
"  Assessment & Plan     Type 2 diabetes mellitus without complication, without long-term current use of insulin (H)  Chronic, stable, continue current treatment.   - Vitamin B12; Future  - Hemoglobin A1c; Future  -Continue metFORMIN (GLUCOPHAGE) 500 MG tablet; Take 2 tablets (1,000 mg) by mouth daily (with breakfast)  - Vitamin B12  - Hemoglobin A1c    Hypertension goal BP (blood pressure) < 140/90  Chronic, stable, continue current treatment.   - losartan (COZAAR) 25 MG tablet; Take 1 tablet (25 mg) by mouth daily    Mild major depression (H)  Needs improvement, increase dose from 75 mg to 100 mg  - sertraline (ZOLOFT) 50 MG tablet; Take 2 tablets (100 mg) by mouth daily    Anxiety  Chronic, stable  - sertraline (ZOLOFT) 50 MG tablet; Take 2 tablets (100 mg) by mouth daily    Hyperlipidemia LDL goal <100  Chronic, stable, continue current treatment.   - atorvastatin (LIPITOR) 40 MG tablet; Take 1 tablet (40 mg) by mouth daily    Screen for colon cancer  Discussed with the patient and he elects to do the Cologuard, discussed if abnormal would need to follow-up with colonoscopy.  - COLOGUARD([employer])    Vitamin D deficiency    - Vitamin D Deficiency; Future  - Vitamin D Deficiency             BMI:   Estimated body mass index is 25.3 kg/m  as calculated from the following:    Height as of this encounter: 1.854 m (6' 1\").    Weight as of this encounter: 87 kg (191 lb 12.8 oz).           Return in about 3 months (around 11/30/2022) for Physical Exam.    Caty Ysuuf NP  [facility]    Austen Castrejon is a 77 year old, presenting for the following health issues:  Diabetes (Est care. /)      History of Present Illness       Diabetes:   He presents for follow up of diabetes.  He is checking home blood glucose one time daily. He checks blood glucose before meals.  Blood glucose is never over 200 and never under 70. When his blood glucose is low, the patient is asymptomatic for " "confusion, blurred vision, lethargy and reports not feeling dizzy, shaky, or weak.  He has no concerns regarding his diabetes at this time.  He is not experiencing numbness or burning in feet, excessive thirst, blurry vision, weight changes or redness, sores or blisters on feet.         He eats 2-3 servings of fruits and vegetables daily.He consumes 0 sweetened beverage(s) daily.He exercises with enough effort to increase his heart rate 60 or more minutes per day.  He exercises with enough effort to increase his heart rate 7 days per week.   He is taking medications regularly.     Retired for 20 years.   to wife.  2 children, 4 grandchildren  Oldest grandchild going off to college [employer]     Next CT scan in November 2022 for h/o sarcoma, followed by Dr. Soto    Diabetes Follow-up  Blood sugars 100-110.  Sometimes goes up 135.  Before lunch checking- thinks this is when he gets the best result.  He got a new meter              BP Readings from Last 2 Encounters:   08/30/22 112/60   05/17/22 130/66     Hemoglobin A1C (%)   Date Value   08/30/2022 7.2 (H)   05/17/2022 8.3 (H)   06/01/2021 7.1 (H)   02/26/2021 7.8 (H)     LDL Cholesterol Calculated (mg/dL)   Date Value   10/19/2021 51   10/28/2020 68   06/12/2019 64       Review of Systems   Constitutional, HEENT, cardiovascular, pulmonary, gi and gu systems are negative, except as otherwise noted.      Objective    /60 (BP Location: Right arm)   Pulse 60   Temp 98.4  F (36.9  C) (Oral)   Resp 15   Ht 1.854 m (6' 1\")   Wt 87 kg (191 lb 12.8 oz)   SpO2 97%   BMI 25.30 kg/m    Body mass index is 25.3 kg/m .  Physical Exam   GENERAL: healthy, alert and no distress  RESP: lungs clear to auscultation - no rales, rhonchi or wheezes  CV: regular rates and rhythm, normal S1 S2, no S3 or S4 and no murmur, click or rub  PSYCH: mentation appears normal, affect normal/bright    Results for orders placed or performed in visit on 08/30/22   Vitamin B12     " Status: Abnormal   Result Value Ref Range    Vitamin B12 1,487 (H) 232 - 1,245 pg/mL   Vitamin D Deficiency     Status: Normal   Result Value Ref Range    Vitamin D, Total (25-Hydroxy) 37 20 - 75 ug/L    Narrative    Season, race, dietary intake, and treatment affect the concentration of 25-hydroxy-Vitamin D. Values may decrease during winter months and increase during summer months. Values 20-29 ug/L may indicate Vitamin D insufficiency and values <20 ug/L may indicate Vitamin D deficiency.    Vitamin D determination is routinely performed by an immunoassay specific for 25 hydroxyvitamin D3.  If an individual is on vitamin D2(ergocalciferol) supplementation, please specify 25 OH vitamin D2 and D3 level determination by LCMSMS test VITD23.     Hemoglobin A1c     Status: Abnormal   Result Value Ref Range    Hemoglobin A1C 7.2 (H) 0.0 - 5.6 %                   .  ..

## 2022-09-19 LAB — NONINV COLON CA DNA+OCC BLD SCRN STL QL: NEGATIVE

## 2022-10-04 ENCOUNTER — MYC MEDICAL ADVICE (OUTPATIENT)
Dept: FAMILY MEDICINE | Facility: CLINIC | Age: 77
End: 2022-10-04

## 2022-10-24 ENCOUNTER — TELEPHONE (OUTPATIENT)
Dept: ONCOLOGY | Facility: CLINIC | Age: 77
End: 2022-10-24

## 2022-11-04 ENCOUNTER — ANCILLARY PROCEDURE (OUTPATIENT)
Dept: CT IMAGING | Facility: CLINIC | Age: 77
End: 2022-11-04
Attending: INTERNAL MEDICINE
Payer: COMMERCIAL

## 2022-11-04 DIAGNOSIS — Z90.81 HISTORY OF SPLENECTOMY: ICD-10-CM

## 2022-11-04 DIAGNOSIS — R41.3 MEMORY LOSS: ICD-10-CM

## 2022-11-04 DIAGNOSIS — D58.0 HEREDITARY SPHEROCYTOSIS (H): ICD-10-CM

## 2022-11-04 DIAGNOSIS — R91.8 PULMONARY NODULES: ICD-10-CM

## 2022-11-04 DIAGNOSIS — H90.3 SENSORINEURAL HEARING LOSS, BILATERAL: ICD-10-CM

## 2022-11-04 DIAGNOSIS — C49.3 SOFT TISSUE SARCOMA OF CHEST WALL (H): ICD-10-CM

## 2022-11-04 LAB
CREAT BLD-MCNC: 0.8 MG/DL (ref 0.7–1.3)
GFR SERPL CREATININE-BSD FRML MDRD: >60 ML/MIN/1.73M2

## 2022-11-04 PROCEDURE — 82565 ASSAY OF CREATININE: CPT | Performed by: PATHOLOGY

## 2022-11-04 PROCEDURE — 71260 CT THORAX DX C+: CPT | Performed by: RADIOLOGY

## 2022-11-04 RX ORDER — IOPAMIDOL 755 MG/ML
85 INJECTION, SOLUTION INTRAVASCULAR ONCE
Status: COMPLETED | OUTPATIENT
Start: 2022-11-04 | End: 2022-11-04

## 2022-11-04 RX ADMIN — IOPAMIDOL 85 ML: 755 INJECTION, SOLUTION INTRAVASCULAR at 09:41

## 2022-11-07 ENCOUNTER — MYC MEDICAL ADVICE (OUTPATIENT)
Dept: FAMILY MEDICINE | Facility: CLINIC | Age: 77
End: 2022-11-07

## 2022-11-07 NOTE — PROGRESS NOTES
Art is a 77 year old who is being evaluated via a billable video visit.      How would you like to obtain your AVS? [employer]  If the video visit is dropped, the invitation should be resent by: Text to cell phone: [phone]  Will anyone else be joining your video visit? 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       Background  In brief, he had some URI-like symptoms in 06/2016 leading to a chest x-ray which revealed a mass leading to further evaluation.  PET/CT imaging revealed about a 2 x 1.3-cm mass extending from the right pleura into the chest wall.  He also had some small nonspecific lung nodules noted.  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  He himself noted no symptoms from this lesion.  The tumor was excised en bloc on 09/21/2016 with a 3-cm margin on each side anteriorly and posteriorly.    -  Specimen #: [id number]   Collected: 9/21/2016      FINAL DIAGNOSIS:   A. Soft tissue, right chest wall, resection:   - Cellular/deep fibrous histiocytoma; see comment   - Tumor size: 3.0 cm   - Tumor extent: Involves skeletal muscle   - Lymphovascular invasion: Not identified   - Margins of resection: Free of tumor     B. Soft tissue, additional right chest wall margin, resection:   - Benign skeletal muscle, bone and connective tissue     C. 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 Soft tissue, right muscle final superficial margin, resection:    - Benign skeletal muscle and connective tissue     E. Soft tissue, right chest wall skin margin, resection:   - Benign skin and subcutaneous tissue     These features favor a deep, cellular fibrous histiocytoma.     Deep fibrous histiocytoma  (deep counterpart of cutaneous fibrous   histiocytoma / dermatofibroma) is a rare lesion that arises in the   skeletal muscle or visceral locations. Unlike cutaneous dermatofibroma,   it is usually a highly cellular lesion that lacks heterogeneous elements   such as giant cells, histiocytic aggregates and hemosiderin, thus   mimicking DFSP. These lesions in general are clinically indolent but   have the potential to recur at an increased frequency (varies from 22%   to 57%) and undergo metastasis in rare instances.  -        Interval history       -        Pending not yet seen---------------------------------------------------------------        -    He is generally doing up pretty well.  They had a nice trip to [place] in April.  He gets out of the house every day and walks 3 miles.     He did fall and hurt his right shoulder in January and that is improved greatly.      He does not sleep much but his sleep is as usual which is not always that great.  He is taking a new medication but he does not member the name of that at bedtime. Nevertheless he generally feels rested.     His diabetes got up bit less controlled so he is on a higher metformin dose now.  He follows with his PCP on that.     He got all 3 Covid vaccines with no recent infectious symptoms.      He has type 2 diabetes which he thinks is controlled pretty well, hyperlipidemia and hypertension.              He does note his memory is declining and his mom  of dementia.      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  He has a daughter who also had her spleen removed and there is a strong family history on his father's side.  At the time of his splenectomy, he also had an appendectomy, cholecystectomy and hernia repair.  He has also has had bilateral cataract surgery.       '      He has generalized anxiety disorder and has seen a psychologist.    He is not on  depression/anxiety meds now and doenst think he needs them.  Overall he thinks the mood is good now.        -  Background PMH, FH, SH  PMH notable for  hereditary spherocytosis, HBP, anxiety, hyperlipidemia , DM, s/p splenectomy, appy, stephanie, hernia repair, cataract removal.  FH-dementia, hereditary spherocytosis  -     On exam he appeared comfortable with normal affect.                  HEENT full EOMs  NECK no obvious goiter or mass  CHEST:  no resp distress  NEUROLOGIC:  mentation and speech normal  PSYCH: mood good      -  I reviewed the new images of 11-4-22 and there are nonspecific small lung nodules but it appears stable c/w July 2022 although the July one looks slightly larger than in March.           Formal read:  CT 11-4-22      IMPRESSION: Stable pulmonary nodules since 11/8/2022. No evidence of  chest wall recurrence.        -     We discussed the situation, and I think it is a fairly low-risk lesion.       At the time of surgery, a 2-cm protuberant lesion in the fourth intercostal space was noted.  At this time, he also had a small wedge resection of the right upper lobe for some nodularity that had been seen.  At the time of resection, a 3-cm tumor involving skeletal muscle was found without lymphovascular invasion and with negative margins.  Studies for the PDGFB gene rearrangement were negative, but could not eliminate the DFSP diagnosis re sampling.  There was no high-grade nuclear atypia, increased mitotic activity, or necrosis.  The lung biopsy was negative for malignancy.  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  This is an unusual lesion and unlike the cutaneous dermatofibroma, it usually is of high cellularity without other heterogeneous elements.  This is generally a low-grade lesion but can recur and rarely metastasize.               -        Pending not yet  seen---------------------------------------------------------------        -      I told him I thought there was a fairly low risk of recurrence, but it does need some followup, as do his nonspecific lung nodules.         Given the stability we will check a CT in 6 months.                    If that's OK we might a chest CT scan in another 1-2 years.  This recommendation is not, obviously, based on a lot of data, but it seems like a reasonable approach.                 All of his questions were addressed and he will call if he has others.        -  Augie Soto M.D.  Professor  Hematology, Oncology and Transplantation  -   cc:  MD Leah Freedman MD, Thoracic Surgery     Luis Velasco MD, Thoracic Surgery

## 2022-11-08 ENCOUNTER — VIRTUAL VISIT (OUTPATIENT)
Dept: ONCOLOGY | Facility: CLINIC | Age: 77
End: 2022-11-08
Attending: INTERNAL MEDICINE
Payer: COMMERCIAL

## 2022-11-08 DIAGNOSIS — C49.3 SOFT TISSUE SARCOMA OF CHEST WALL (H): Primary | ICD-10-CM

## 2022-11-08 NOTE — PROGRESS NOTES
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       Background  In brief, he had some URI-like symptoms in 06/2016 leading to a chest x-ray which revealed a mass leading to further evaluation.  PET/CT imaging revealed about a 2 x 1.3-cm mass extending from the right pleura into the chest wall.  He also had some small nonspecific lung nodules noted.  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  He himself noted no symptoms from this lesion.  The tumor was excised en bloc on 09/21/2016 with a 3-cm margin on each side anteriorly and posteriorly.    -  Specimen #: [id number]   Collected: 9/21/2016      FINAL DIAGNOSIS:   A. Soft tissue, right chest wall, resection:   - Cellular/deep fibrous histiocytoma; see comment   - Tumor size: 3.0 cm   - Tumor extent: Involves skeletal muscle   - Lymphovascular invasion: Not identified   - Margins of resection: Free of tumor     B. Soft tissue, additional right chest wall margin, resection:   - Benign skeletal muscle, bone and connective tissue     C. 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 Soft tissue, right muscle final superficial margin, resection:    - Benign skeletal muscle and connective tissue     E. Soft tissue, right chest wall skin margin, resection:   - Benign skin and subcutaneous tissue     These features favor a deep, cellular fibrous histiocytoma.     Deep fibrous histiocytoma (deep counterpart of cutaneous fibrous   histiocytoma / dermatofibroma) is a rare lesion that arises in the   skeletal muscle or visceral locations. Unlike cutaneous dermatofibroma,   it is usually a highly cellular lesion that lacks heterogeneous elements   such as giant cells, histiocytic aggregates and hemosiderin, thus   mimicking DFSP. These lesions in general are clinically indolent but   have the potential to recur at an increased frequency (varies from  "22%   to 57%) and undergo metastasis in rare instances.  -        Interval history     He is generally doing up pretty well.  The only thing bothering him is gradually worsening dementia and memory issues; his mom  of dementia.  No limitations to activity. He gets out of the house every day and walks 3 miles.    He did fall and hurt his right shoulder in January and that has almost completely recovered.     They have a mystery trip in Feb.      He does not sleep much but his sleep is as usual which is not always that great.  Nevertheless he generally feels rested.  He follows DM with his PCP which he thinks is doing OK.    He got all 3 Covid vaccines.      He does have the type 2 diabetes, hyperlipidemia and hypertension.      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  He has a daughter who also had her spleen removed and there is a strong family history on his father's side.  At the time of his splenectomy, he also had an appendectomy, cholecystectomy and hernia repair.  He has also has had bilateral cataract surgery.      He has generalized anxiety disorder and has seen a psychologist.    Overall he thinks the mood is good now.        -  Background PMH, FH, SH  PMH notable for  hereditary spherocytosis, HBP, anxiety, hyperlipidemia , DM, s/p splenectomy, appy, stephanie, hernia repair, cataract removal.  FH-dementia, hereditary spherocytosis  -     On exam he appeared comfortable with normal affect.     BP (!) 143/66 (BP Location: Right arm, Patient Position: Sitting, Cuff Size: Adult Regular)   Pulse 86   Temp 98.4  F (36.9  C) (Oral)   Resp 18   Ht 1.843 m (6' 0.56\")   Wt 87.3 kg (192 lb 8 oz)   SpO2 96%   BMI 25.71 kg/m    HEENT full EOMs  NECK no obvious goiter or mass  CHEST:  no resp distress  NEUROLOGIC:  mentation and speech normal  PSYCH: mood good      -  I reviewed the new images of 22 and there are " nonspecific small lung nodules but it appears stable c/w July 2022 although the July one looks slightly larger than in March.    Formal read:  CT 11-4-22  IMPRESSION: Stable pulmonary nodules since 11/8/2022. No evidence of  chest wall recurrence.    -     We discussed the situation, and I think it is a fairly low-risk lesion.       At the time of surgery, a 2-cm protuberant lesion in the fourth intercostal space was noted.  At this time, he also had a small wedge resection of the right upper lobe for some nodularity that had been seen.  At the time of resection, a 3-cm tumor involving skeletal muscle was found without lymphovascular invasion and with negative margins.  Studies for the PDGFB gene rearrangement were negative, but could not eliminate the DFSP diagnosis re sampling.  There was no high-grade nuclear atypia, increased mitotic activity, or necrosis.  The lung biopsy was negative for malignancy.  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  This is an unusual lesion and unlike the cutaneous dermatofibroma, it usually is of high cellularity without other heterogeneous elements.  This is generally a low-grade lesion but can recur and rarely metastasize.     I told him I thought there was a fairly low risk of recurrence, but it does need some followup, as do his nonspecific lung nodules.      Given the stability we will check a CT in 6 months.    If that's OK we might a chest CT scan in another 1-2 years.  This recommendation is not, obviously, based on a lot of data, but it seems like a reasonable approach.      All of his questions were addressed and he will call if he has others.        -  Augie Soto M.D.  Professor  Hematology, Oncology and Transplantation  -   cc:  MD Leah Freedman MD, Thoracic Surgery     Luis Velasco MD, Thoracic Surgery

## 2022-11-08 NOTE — LETTER
11/8/2022         RE: Gerber Levine  [address]  [facility] [id number]        Dear Colleague,    Thank you for referring your patient, Gerber Levine, to the [facility]. Please see a copy of my visit note below.    Art is a 77 year old who is being evaluated via a billable video visit.      How would you like to obtain your AVS? MyChart  If the video visit is dropped, the invitation should be resent by: Text to cell phone: [phone]  Will anyone else be joining your video visit? 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       Background  In brief, he had some URI-like symptoms in 06/2016 leading to a chest x-ray which revealed a mass leading to further evaluation.  PET/CT imaging revealed about a 2 x 1.3-cm mass extending from the right pleura into the chest wall.  He also had some small nonspecific lung nodules noted.  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  He himself noted no symptoms from this lesion.  The tumor was excised en bloc on 09/21/2016 with a 3-cm margin on each side anteriorly and posteriorly.    -  Specimen #: [id number]   Collected: 9/21/2016      FINAL DIAGNOSIS:   A. Soft tissue, right chest wall, resection:   - Cellular/deep fibrous histiocytoma; see comment   - Tumor size: 3.0 cm   - Tumor extent: Involves skeletal muscle   - Lymphovascular invasion: Not identified   - Margins of resection: Free of tumor     B. Soft tissue, additional right chest wall margin, resection:   - Benign skeletal muscle, bone and connective tissue     C. 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 Soft tissue, right muscle final  superficial margin, resection:    - Benign skeletal muscle and connective tissue     E. Soft tissue, right chest wall skin margin, resection:   - Benign skin and subcutaneous tissue     These features favor a deep, cellular fibrous histiocytoma.     Deep fibrous histiocytoma (deep counterpart of cutaneous fibrous   histiocytoma / dermatofibroma) is a rare lesion that arises in the   skeletal muscle or visceral locations. Unlike cutaneous dermatofibroma,   it is usually a highly cellular lesion that lacks heterogeneous elements   such as giant cells, histiocytic aggregates and hemosiderin, thus   mimicking DFSP. These lesions in general are clinically indolent but   have the potential to recur at an increased frequency (varies from 22%   to 57%) and undergo metastasis in rare instances.  -        Interval history       -        Pending not yet seen---------------------------------------------------------------        -    He is generally doing up pretty well.  They had a nice trip to [place] in April.  He gets out of the house every day and walks 3 miles.     He did fall and hurt his right shoulder in January and that is improved greatly.      He does not sleep much but his sleep is as usual which is not always that great.  He is taking a new medication but he does not member the name of that at bedtime. Nevertheless he generally feels rested.     His diabetes got up bit less controlled so he is on a higher metformin dose now.  He follows with his PCP on that.     He got all 3 Covid vaccines with no recent infectious symptoms.      He has type 2 diabetes which he thinks is controlled pretty well, hyperlipidemia and hypertension.              He does note his memory is declining and his mom  of dementia.      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  He has a daughter who also had her spleen removed and there is a strong  family history on his father's side.  At the time of his splenectomy, he also had an appendectomy, cholecystectomy and hernia repair.  He has also has had bilateral cataract surgery.       '      He has generalized anxiety disorder and has seen a psychologist.    He is not on depression/anxiety meds now and doenst think he needs them.  Overall he thinks the mood is good now.        -  Background PMH, FH, SH  PMH notable for  hereditary spherocytosis, HBP, anxiety, hyperlipidemia , DM, s/p splenectomy, appy, stephanie, hernia repair, cataract removal.  FH-dementia, hereditary spherocytosis  -     On exam he appeared comfortable with normal affect.                  HEENT full EOMs  NECK no obvious goiter or mass  CHEST:  no resp distress  NEUROLOGIC:  mentation and speech normal  PSYCH: mood good      -  I reviewed the new images of 11-4-22 and there are nonspecific small lung nodules but it appears stable c/w July 2022 although the July one looks slightly larger than in March.           Formal read:  CT 11-4-22      IMPRESSION: Stable pulmonary nodules since 11/8/2022. No evidence of  chest wall recurrence.        -     We discussed the situation, and I think it is a fairly low-risk lesion.       At the time of surgery, a 2-cm protuberant lesion in the fourth intercostal space was noted.  At this time, he also had a small wedge resection of the right upper lobe for some nodularity that had been seen.  At the time of resection, a 3-cm tumor involving skeletal muscle was found without lymphovascular invasion and with negative margins.  Studies for the PDGFB gene rearrangement were negative, but could not eliminate the DFSP diagnosis re sampling.  There was no high-grade nuclear atypia, increased mitotic activity, or necrosis.  The lung biopsy was negative for malignancy.  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  This is an  unusual lesion and unlike the cutaneous dermatofibroma, it usually is of high cellularity without other heterogeneous elements.  This is generally a low-grade lesion but can recur and rarely metastasize.               -        Pending not yet seen---------------------------------------------------------------        -      I told him I thought there was a fairly low risk of recurrence, but it does need some followup, as do his nonspecific lung nodules.         Given the stability we will check a CT in 6 months.        If that's OK we might a chest CT scan in another 1-2 years.  This recommendation is not, obviously, based on a lot of data, but it seems like a reasonable approach.          All of his questions were addressed and he will call if he has others.        -        Again, thank you for allowing me to participate in the care of your patient.      Sincerely,    Augie Soto MD

## 2022-11-09 ENCOUNTER — ONCOLOGY VISIT (OUTPATIENT)
Dept: ONCOLOGY | Facility: CLINIC | Age: 77
End: 2022-11-09
Attending: INTERNAL MEDICINE
Payer: COMMERCIAL

## 2022-11-09 VITALS
WEIGHT: 192.5 LBS | DIASTOLIC BLOOD PRESSURE: 66 MMHG | HEART RATE: 86 BPM | SYSTOLIC BLOOD PRESSURE: 143 MMHG | OXYGEN SATURATION: 96 % | RESPIRATION RATE: 18 BRPM | BODY MASS INDEX: 25.51 KG/M2 | TEMPERATURE: 98.4 F | HEIGHT: 73 IN

## 2022-11-09 DIAGNOSIS — H90.3 SENSORINEURAL HEARING LOSS, BILATERAL: ICD-10-CM

## 2022-11-09 DIAGNOSIS — D58.0 HEREDITARY SPHEROCYTOSIS (H): ICD-10-CM

## 2022-11-09 DIAGNOSIS — R41.3 MEMORY LOSS: ICD-10-CM

## 2022-11-09 DIAGNOSIS — C49.3 SOFT TISSUE SARCOMA OF CHEST WALL (H): Primary | ICD-10-CM

## 2022-11-09 DIAGNOSIS — Z90.81 HISTORY OF SPLENECTOMY: ICD-10-CM

## 2022-11-09 DIAGNOSIS — R91.8 PULMONARY NODULES: ICD-10-CM

## 2022-11-09 PROCEDURE — G0463 HOSPITAL OUTPT CLINIC VISIT: HCPCS

## 2022-11-09 PROCEDURE — 99214 OFFICE O/P EST MOD 30 MIN: CPT | Performed by: INTERNAL MEDICINE

## 2022-11-09 ASSESSMENT — PAIN SCALES - GENERAL: PAINLEVEL: NO PAIN (0)

## 2022-11-09 NOTE — LETTER
11/9/2022         RE: Gerber Levine  [address]  [facility] [address]        Dear Colleague,    Thank you for referring your patient, Gerber Levine, to the [facility]. Please see a copy of my visit note below.      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       Background  In brief, he had some URI-like symptoms in 06/2016 leading to a chest x-ray which revealed a mass leading to further evaluation.  PET/CT imaging revealed about a 2 x 1.3-cm mass extending from the right pleura into the chest wall.  He also had some small nonspecific lung nodules noted.  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  He himself noted no symptoms from this lesion.  The tumor was excised en bloc on 09/21/2016 with a 3-cm margin on each side anteriorly and posteriorly.    -  Specimen #: [id number]   Collected: 9/21/2016      FINAL DIAGNOSIS:   A. Soft tissue, right chest wall, resection:   - Cellular/deep fibrous histiocytoma; see comment   - Tumor size: 3.0 cm   - Tumor extent: Involves skeletal muscle   - Lymphovascular invasion: Not identified   - Margins of resection: Free of tumor     B. Soft tissue, additional right chest wall margin, resection:   - Benign skeletal muscle, bone and connective tissue     C. 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 Soft tissue, right muscle final superficial margin, resection:    - Benign skeletal muscle and connective tissue     E. Soft tissue, right chest wall skin margin, resection:   - Benign skin and subcutaneous tissue     These features favor a deep, cellular fibrous histiocytoma.     Deep fibrous histiocytoma (deep counterpart of cutaneous fibrous   histiocytoma / dermatofibroma) is a rare lesion that arises in the   skeletal muscle or visceral locations. Unlike cutaneous dermatofibroma,   it is usually a  highly cellular lesion that lacks heterogeneous elements   such as giant cells, histiocytic aggregates and hemosiderin, thus   mimicking DFSP. These lesions in general are clinically indolent but   have the potential to recur at an increased frequency (varies from 22%   to 57%) and undergo metastasis in rare instances.  -        Interval history     He is generally doing up pretty well.  The only thing bothering him is gradually worsening dementia and memory issues; his mom  of dementia.  No limitations to activity. He gets out of the house every day and walks 3 miles.    He did fall and hurt his right shoulder in January and that has almost completely recovered.     They have a mystery trip in Feb.      He does not sleep much but his sleep is as usual which is not always that great.  Nevertheless he generally feels rested.  He follows DM with his PCP which he thinks is doing OK.    He got all 3 Covid vaccines.      He does have the type 2 diabetes, hyperlipidemia and hypertension.      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  He has a daughter who also had her spleen removed and there is a strong family history on his father's side.  At the time of his splenectomy, he also had an appendectomy, cholecystectomy and hernia repair.  He has also has had bilateral cataract surgery.      He has generalized anxiety disorder and has seen a psychologist.    Overall he thinks the mood is good now.        -  Background PMH, FH, SH  PMH notable for  hereditary spherocytosis, HBP, anxiety, hyperlipidemia , DM, s/p splenectomy, appy, stephanie, hernia repair, cataract removal.  FH-dementia, hereditary spherocytosis  -     On exam he appeared comfortable with normal affect.     BP (!) 143/66 (BP Location: Right arm, Patient Position: Sitting, Cuff Size: Adult Regular)   Pulse 86   Temp 98.4  F (36.9  C) (Oral)   Resp 18   Ht 1.843 m (6'  "0.56\")   Wt 87.3 kg (192 lb 8 oz)   SpO2 96%   BMI 25.71 kg/m    HEENT full EOMs  NECK no obvious goiter or mass  CHEST:  no resp distress  NEUROLOGIC:  mentation and speech normal  PSYCH: mood good      -  I reviewed the new images of 11-4-22 and there are nonspecific small lung nodules but it appears stable c/w July 2022 although the July one looks slightly larger than in March.    Formal read:  CT 11-4-22  IMPRESSION: Stable pulmonary nodules since 11/8/2022. No evidence of  chest wall recurrence.    -     We discussed the situation, and I think it is a fairly low-risk lesion.       At the time of surgery, a 2-cm protuberant lesion in the fourth intercostal space was noted.  At this time, he also had a small wedge resection of the right upper lobe for some nodularity that had been seen.  At the time of resection, a 3-cm tumor involving skeletal muscle was found without lymphovascular invasion and with negative margins.  Studies for the PDGFB gene rearrangement were negative, but could not eliminate the DFSP diagnosis re sampling.  There was no high-grade nuclear atypia, increased mitotic activity, or necrosis.  The lung biopsy was negative for malignancy.  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  This is an unusual lesion and unlike the cutaneous dermatofibroma, it usually is of high cellularity without other heterogeneous elements.  This is generally a low-grade lesion but can recur and rarely metastasize.     I told him I thought there was a fairly low risk of recurrence, but it does need some followup, as do his nonspecific lung nodules.      Given the stability we will check a CT in 6 months.    If that's OK we might a chest CT scan in another 1-2 years.  This recommendation is not, obviously, based on a lot of data, but it seems like a reasonable approach.      All of his questions were addressed and he will " call if he has others.        -  Augie Soto M.D.  Professor  Hematology, Oncology and Transplantation  -   cc:  MD Leah Freedman MD, Thoracic Surgery     Luis Velasco MD, Thoracic Surgery

## 2022-11-09 NOTE — NURSING NOTE
"Oncology Rooming Note    November 9, 2022 1:29 PM   Gerber Levine is a 77 year old male who presents for:    Chief Complaint   Patient presents with     Oncology Clinic Visit     SARCOMA OF SOFT TISSUE      Initial Vitals: BP (!) 143/66 (BP Location: Right arm, Patient Position: Sitting, Cuff Size: Adult Regular)   Pulse 86   Temp 98.4  F (36.9  C) (Oral)   Resp 18   Ht 1.843 m (6' 0.56\")   Wt 87.3 kg (192 lb 8 oz)   SpO2 96%   BMI 25.71 kg/m   Estimated body mass index is 25.71 kg/m  as calculated from the following:    Height as of this encounter: 1.843 m (6' 0.56\").    Weight as of this encounter: 87.3 kg (192 lb 8 oz). Body surface area is 2.11 meters squared.  No Pain (0) Comment: Data Unavailable   No LMP for male patient.  Allergies reviewed: Yes  Medications reviewed: Yes    Medications: Medication refills not needed today.  Pharmacy name entered into [facility]:    [facility] [place], [place] - [address] [facility] [place], [place] - [address]    Clinical concerns: None.        Johana Yeh CMA            "

## 2022-11-10 NOTE — TELEPHONE ENCOUNTER
Team, please help schedule patient for physical / DM follow up..    Joslyn Yuen, RN, BSN, PHN  [facility] [place]

## 2022-11-11 NOTE — TELEPHONE ENCOUNTER
Called patient but got voicemail so left a message that I was calling to help him schedule his appointment.    Scheduled patient and sent a [employer] message letting patient know his appointment time.    BABAK Gonzalez  [facility]

## 2022-11-14 ENCOUNTER — TELEPHONE (OUTPATIENT)
Dept: ONCOLOGY | Facility: CLINIC | Age: 77
End: 2022-11-14

## 2022-11-14 NOTE — TELEPHONE ENCOUNTER
CLINICAL NUTRITION SERVICES     Reason for Contact: Patient was contacted by phone due to requested to speak with a Dietitian on the Oncology Distress Screening tool on 11/8/22    Action: RD called patient indicating reason for phone call. Left a VM with a return call back number.     Follow up: Wait for a return phone call.    Mariia Moore RD, , LD  [facility]  [phone]

## 2022-12-04 ENCOUNTER — HEALTH MAINTENANCE LETTER (OUTPATIENT)
Age: 77
End: 2022-12-04

## 2023-01-12 ENCOUNTER — TELEPHONE (OUTPATIENT)
Dept: FAMILY MEDICINE | Facility: CLINIC | Age: 78
End: 2023-01-12

## 2023-01-12 NOTE — TELEPHONE ENCOUNTER
Patient called about getting Mariia, the dietician's phone number.    RN relayed the phone number back to the patient.    Arlette Shankar RN

## 2023-01-13 ENCOUNTER — TELEPHONE (OUTPATIENT)
Dept: ONCOLOGY | Facility: CLINIC | Age: 78
End: 2023-01-13

## 2023-01-13 NOTE — TELEPHONE ENCOUNTER
Oncology Nutrition Services:    Received a return phone call from Art 1/12/22.  He left VM indicating her desire to speak with a dietitian.   RD return call to Art today. Unable to reach him at this time.  RD left detailed VM indicating reason for phone call. Advised Art to return call to RD at his convenience.   Provided Art with RD direct contact as well as nutrition scheduling line contact.     Mariia Moore RD, , LD  [facility]  [phone]

## 2023-01-16 ENCOUNTER — MYC MEDICAL ADVICE (OUTPATIENT)
Dept: FAMILY MEDICINE | Facility: CLINIC | Age: 78
End: 2023-01-16

## 2023-01-16 DIAGNOSIS — E11.9 TYPE 2 DIABETES MELLITUS WITHOUT COMPLICATION, WITHOUT LONG-TERM CURRENT USE OF INSULIN (H): Primary | ICD-10-CM

## 2023-01-16 DIAGNOSIS — I10 HYPERTENSION GOAL BP (BLOOD PRESSURE) < 140/90: ICD-10-CM

## 2023-01-16 DIAGNOSIS — R79.89 ELEVATED VITAMIN B12 LEVEL: ICD-10-CM

## 2023-01-16 DIAGNOSIS — E78.5 HYPERLIPIDEMIA LDL GOAL <100: ICD-10-CM

## 2023-01-16 ASSESSMENT — ENCOUNTER SYMPTOMS
SORE THROAT: 0
FREQUENCY: 1
FEVER: 0
DIARRHEA: 0
NAUSEA: 0
PARESTHESIAS: 0
CONSTIPATION: 0
HEMATURIA: 0
CHILLS: 0
HEARTBURN: 0
DYSURIA: 0
HEMATOCHEZIA: 0
SHORTNESS OF BREATH: 0
DIZZINESS: 0
COUGH: 1
WEAKNESS: 1
JOINT SWELLING: 0
ARTHRALGIAS: 1
NERVOUS/ANXIOUS: 1
EYE PAIN: 0
ABDOMINAL PAIN: 0
HEADACHES: 0
MYALGIAS: 1
PALPITATIONS: 0

## 2023-01-16 ASSESSMENT — ACTIVITIES OF DAILY LIVING (ADL): CURRENT_FUNCTION: NO ASSISTANCE NEEDED

## 2023-01-16 NOTE — TELEPHONE ENCOUNTER
Routing to PCP     Wanting to put A1C lab orders in for Wednesday appointment?    Milagros High RN

## 2023-01-17 NOTE — TELEPHONE ENCOUNTER
RN replied to patient via [employer]. See message for details.     James Hood RN, BSN, PHN  [facility] [place]

## 2023-01-17 NOTE — TELEPHONE ENCOUNTER
Labs ordered and can do either after or before office visit- lab might not be ready right when he checks in but if not we can do after the visit.    Caty Yusuf, DNP, APRN, CNP

## 2023-01-18 ENCOUNTER — OFFICE VISIT (OUTPATIENT)
Dept: FAMILY MEDICINE | Facility: CLINIC | Age: 78
End: 2023-01-18
Payer: COMMERCIAL

## 2023-01-18 VITALS
RESPIRATION RATE: 16 BRPM | TEMPERATURE: 98 F | HEIGHT: 73 IN | OXYGEN SATURATION: 96 % | SYSTOLIC BLOOD PRESSURE: 130 MMHG | BODY MASS INDEX: 25.84 KG/M2 | DIASTOLIC BLOOD PRESSURE: 72 MMHG | HEART RATE: 64 BPM | WEIGHT: 195 LBS

## 2023-01-18 DIAGNOSIS — C49.3 SOFT TISSUE SARCOMA OF CHEST WALL (H): ICD-10-CM

## 2023-01-18 DIAGNOSIS — L98.9 SKIN LESION OF RIGHT ARM: ICD-10-CM

## 2023-01-18 DIAGNOSIS — R41.3 MEMORY LOSS: ICD-10-CM

## 2023-01-18 DIAGNOSIS — Z00.00 ENCOUNTER FOR MEDICARE ANNUAL WELLNESS EXAM: Primary | ICD-10-CM

## 2023-01-18 DIAGNOSIS — R91.8 PULMONARY NODULES: ICD-10-CM

## 2023-01-18 DIAGNOSIS — F32.0 MILD MAJOR DEPRESSION (H): ICD-10-CM

## 2023-01-18 DIAGNOSIS — E11.9 TYPE 2 DIABETES MELLITUS WITHOUT COMPLICATION, WITHOUT LONG-TERM CURRENT USE OF INSULIN (H): ICD-10-CM

## 2023-01-18 DIAGNOSIS — I10 HYPERTENSION GOAL BP (BLOOD PRESSURE) < 140/90: ICD-10-CM

## 2023-01-18 DIAGNOSIS — R79.89 ELEVATED VITAMIN B12 LEVEL: ICD-10-CM

## 2023-01-18 DIAGNOSIS — E78.5 HYPERLIPIDEMIA LDL GOAL <100: ICD-10-CM

## 2023-01-18 DIAGNOSIS — D58.0 HEREDITARY SPHEROCYTOSIS (H): ICD-10-CM

## 2023-01-18 DIAGNOSIS — H90.3 SENSORINEURAL HEARING LOSS, BILATERAL: ICD-10-CM

## 2023-01-18 DIAGNOSIS — Z90.81 HISTORY OF SPLENECTOMY: ICD-10-CM

## 2023-01-18 LAB
ALBUMIN SERPL BCG-MCNC: 4.3 G/DL (ref 3.5–5.2)
ALP SERPL-CCNC: 80 U/L (ref 40–129)
ALT SERPL W P-5'-P-CCNC: 19 U/L (ref 10–50)
ANION GAP SERPL CALCULATED.3IONS-SCNC: 11 MMOL/L (ref 7–15)
AST SERPL W P-5'-P-CCNC: 29 U/L (ref 10–50)
BASOPHILS # BLD AUTO: 0 10E3/UL (ref 0–0.2)
BASOPHILS NFR BLD AUTO: 0 %
BILIRUB SERPL-MCNC: 0.9 MG/DL
BUN SERPL-MCNC: 27.4 MG/DL (ref 8–23)
CALCIUM SERPL-MCNC: 9.1 MG/DL (ref 8.8–10.2)
CHLORIDE SERPL-SCNC: 104 MMOL/L (ref 98–107)
CHOLEST SERPL-MCNC: 160 MG/DL
CREAT SERPL-MCNC: 0.82 MG/DL (ref 0.67–1.17)
CREAT UR-MCNC: 79.4 MG/DL
DEPRECATED HCO3 PLAS-SCNC: 25 MMOL/L (ref 22–29)
EOSINOPHIL # BLD AUTO: 0.4 10E3/UL (ref 0–0.7)
EOSINOPHIL NFR BLD AUTO: 5 %
ERYTHROCYTE [DISTWIDTH] IN BLOOD BY AUTOMATED COUNT: 13 % (ref 10–15)
GFR SERPL CREATININE-BSD FRML MDRD: 89 ML/MIN/1.73M2
GLUCOSE SERPL-MCNC: 151 MG/DL (ref 70–99)
HBA1C MFR BLD: 7.8 % (ref 0–5.6)
HCT VFR BLD AUTO: 42.4 % (ref 40–53)
HDLC SERPL-MCNC: 68 MG/DL
HGB BLD-MCNC: 14.3 G/DL (ref 13.3–17.7)
LDLC SERPL CALC-MCNC: 70 MG/DL
LYMPHOCYTES # BLD AUTO: 2.1 10E3/UL (ref 0.8–5.3)
LYMPHOCYTES NFR BLD AUTO: 24 %
MCH RBC QN AUTO: 31.1 PG (ref 26.5–33)
MCHC RBC AUTO-ENTMCNC: 33.7 G/DL (ref 31.5–36.5)
MCV RBC AUTO: 92 FL (ref 78–100)
MICROALBUMIN UR-MCNC: <12 MG/L
MICROALBUMIN/CREAT UR: NORMAL MG/G{CREAT}
MONOCYTES # BLD AUTO: 1.5 10E3/UL (ref 0–1.3)
MONOCYTES NFR BLD AUTO: 17 %
NEUTROPHILS # BLD AUTO: 4.9 10E3/UL (ref 1.6–8.3)
NEUTROPHILS NFR BLD AUTO: 54 %
NONHDLC SERPL-MCNC: 92 MG/DL
PLATELET # BLD AUTO: 264 10E3/UL (ref 150–450)
POTASSIUM SERPL-SCNC: 4.5 MMOL/L (ref 3.4–5.3)
PROT SERPL-MCNC: 7.4 G/DL (ref 6.4–8.3)
RBC # BLD AUTO: 4.6 10E6/UL (ref 4.4–5.9)
SODIUM SERPL-SCNC: 140 MMOL/L (ref 136–145)
TRIGL SERPL-MCNC: 110 MG/DL
VIT B12 SERPL-MCNC: 1146 PG/ML (ref 232–1245)
WBC # BLD AUTO: 9 10E3/UL (ref 4–11)

## 2023-01-18 PROCEDURE — 82043 UR ALBUMIN QUANTITATIVE: CPT | Performed by: NURSE PRACTITIONER

## 2023-01-18 PROCEDURE — 83036 HEMOGLOBIN GLYCOSYLATED A1C: CPT | Performed by: NURSE PRACTITIONER

## 2023-01-18 PROCEDURE — 80061 LIPID PANEL: CPT | Performed by: NURSE PRACTITIONER

## 2023-01-18 PROCEDURE — 36415 COLL VENOUS BLD VENIPUNCTURE: CPT | Performed by: NURSE PRACTITIONER

## 2023-01-18 PROCEDURE — 99213 OFFICE O/P EST LOW 20 MIN: CPT | Mod: 25 | Performed by: NURSE PRACTITIONER

## 2023-01-18 PROCEDURE — 82570 ASSAY OF URINE CREATININE: CPT | Performed by: NURSE PRACTITIONER

## 2023-01-18 PROCEDURE — 82607 VITAMIN B-12: CPT | Performed by: NURSE PRACTITIONER

## 2023-01-18 PROCEDURE — 85025 COMPLETE CBC W/AUTO DIFF WBC: CPT | Performed by: NURSE PRACTITIONER

## 2023-01-18 PROCEDURE — 80053 COMPREHEN METABOLIC PANEL: CPT | Performed by: NURSE PRACTITIONER

## 2023-01-18 PROCEDURE — G0439 PPPS, SUBSEQ VISIT: HCPCS | Performed by: NURSE PRACTITIONER

## 2023-01-18 RX ORDER — ATORVASTATIN CALCIUM 40 MG/1
40 TABLET, FILM COATED ORAL DAILY
Qty: 90 TABLET | Refills: 3 | Status: SHIPPED | OUTPATIENT
Start: 2023-01-18 | End: 2024-01-04

## 2023-01-18 RX ORDER — TRIAMCINOLONE ACETONIDE 5 MG/G
OINTMENT TOPICAL
Qty: 15 G | Refills: 1 | Status: SHIPPED | OUTPATIENT
Start: 2023-01-18 | End: 2023-10-03

## 2023-01-18 ASSESSMENT — ENCOUNTER SYMPTOMS
SHORTNESS OF BREATH: 0
NERVOUS/ANXIOUS: 1
CONSTIPATION: 0
HEADACHES: 0
NAUSEA: 0
HEMATURIA: 0
CHILLS: 0
MYALGIAS: 1
ARTHRALGIAS: 1
WEAKNESS: 1
PARESTHESIAS: 0
FREQUENCY: 1
SORE THROAT: 0
DIARRHEA: 0
HEARTBURN: 0
FEVER: 0
JOINT SWELLING: 0
PALPITATIONS: 0
DYSURIA: 0
EYE PAIN: 0
HEMATOCHEZIA: 0
ABDOMINAL PAIN: 0
DIZZINESS: 0
COUGH: 1

## 2023-01-18 ASSESSMENT — PAIN SCALES - GENERAL: PAINLEVEL: MODERATE PAIN (4)

## 2023-01-18 ASSESSMENT — ACTIVITIES OF DAILY LIVING (ADL): CURRENT_FUNCTION: NO ASSISTANCE NEEDED

## 2023-01-18 NOTE — PATIENT INSTRUCTIONS
Patient Education   Personalized Prevention Plan  You are due for the preventive services outlined below.  Your care team is available to assist you in scheduling these services.  If you have already completed any of these items, please share that information with your care team to update in your medical record.  Health Maintenance Due   Topic Date Due     Annual Wellness Visit  10/19/2022     Basic Metabolic Panel  10/19/2022     Cholesterol Lab  10/19/2022     Kidney Microalbumin Urine Test  10/19/2022     ANNUAL REVIEW OF HM ORDERS  10/19/2022       Signs of Hearing Loss      Hearing much better with one ear can be a sign of hearing loss.   Hearing loss is a problem shared by many people. In fact, it is one of the most common health problems, particularly as people age. Most people age 65 and older have some hearing loss. By age 80, almost everyone does. Hearing loss often occurs slowly over the years. So you may not realize your hearing has gotten worse.  Have your hearing checked  Call your healthcare provider if you:    Have to strain to hear normal conversation    Have to watch other people s faces very carefully to follow what they re saying    Need to ask people to repeat what they ve said    Often misunderstand what people are saying    Turn the volume of the television or radio up so high that others complain    Feel that people are mumbling when they re talking to you    Find that the effort to hear leaves you feeling tired and irritated    Notice, when using the phone, that you hear better with one ear than the other  [employer] last reviewed this educational content on 1/1/2020 2000-2021 [employer]. All rights reserved. This information is not intended as a substitute for professional medical care. Always follow your healthcare professional's instructions.          Urinary Incontinence (Male)    Urinary incontinence means not being able to control the release of urine from the  bladder.   Causes  Common causes of urinary incontinence in men include:    Infection    Certain medicines    Aging    Poor pelvic muscle tone    Bladder spasms    Obesity    Trouble urinating and fully emptying the bladder (urinary retention)  Other things that can cause incontinence are:     Nervous system diseases    Diabetes    Sleep apnea    Urinary tract infections    Prostate surgery    Pelvic injury  Constipation and smoking have also been identified as risk factors.   Symptoms    Urge incontinence (overactive bladder). This is a sudden urge to urinate. It occurs even though there may not be much urine in the bladder. The need to urinate often during the night is common. It's due to bladder spasms.    Stress incontinence. This is urine leakage that you can't control. It can occur with sneezing, coughing, and other actions that put stress on the bladder.    Treatment  Treatment depends on what is causing the condition. Bladder infections are treated with antibiotics. Urinary retention is treated with a bladder catheter.   Home care  Follow these guidelines when caring for yourself at home:    Don't have any foods and drinks that may irritate the bladder. This includes:  ? Chocolate  ? Alcohol  ? Caffeine  ? Carbonated drinks  ? Acidic fruits and juices    Limit fluids to 6 to 8 cups a day.    Lose weight if you are overweight. This will reduce your symptoms.    If advised, do regular pelvic muscle-strengthening exercises such as Kegel exercises.    If needed, wear absorbent pads to catch urine. Change the pads often. This is for good hygiene and to prevent skin and bladder infections.    Bathe daily for good hygiene.    If an antibiotic was prescribed to treat a bladder infection, take it until it's finished. Keep taking it even if you are feeling better. This is to make sure your infection has cleared.    If a catheter was left in place, keep bacteria from getting into the collection bag. Don't disconnect  the catheter from the collection bag.    Use a leg band to secure the catheter drainage tube, so it does not pull on the catheter. Drain the collection bag when it becomes full. To do this, use the drain spout at the bottom of the bag. Don't disconnect the bag from the catheter.    Don't pull on or try to remove a catheter. The catheter must be removed by a healthcare provider.    If you smoke, stop. Ask your provider for help if you can't do this on your own.  Follow-up care  Follow up with your healthcare provider, or as advised.  When to get medical advice  Call your healthcare provider right away if any of these occur:    Fever over 100.4 F (38 C), or as directed by your provider    Bladder pain or fullness    Belly swelling, nausea, or vomiting    Back pain    Weakness, dizziness, or fainting    If a catheter was left in place, return if:  ? The catheter falls out  ? The catheter stops draining for 6 hours  ? Your urine gets cloudy or smells bad  StayWell last reviewed this educational content on 1/1/2020 2000-2021 [employer]. All rights reserved. This information is not intended as a substitute for professional medical care. Always follow your healthcare professional's instructions.

## 2023-01-18 NOTE — PROGRESS NOTES
"SUBJECTIVE:   Art is a 77 year old who presents for Preventive Visit.    Patient has been advised of split billing requirements and indicates understanding: Yes  Are you in the first 12 months of your Medicare coverage?  No    Healthy Habits:     In general, how would you rate your overall health?  Good    Frequency of exercise:  6-7 days/week    Duration of exercise:  30-45 minutes    Do you usually eat at least 4 servings of fruit and vegetables a day, include whole grains    & fiber and avoid regularly eating high fat or \"junk\" foods?  Yes    Taking medications regularly:  Yes    Ability to successfully perform activities of daily living:  No assistance needed    Home Safety:  Throw rugs in the hallway and lack of grab bars in the bathroom    Hearing Impairment:  Difficulty following a conversation in a noisy restaurant or crowded room, difficulty following dialogue in the theater, difficult to understand a speaker at a public meeting or [employer] service, need to ask people to speak up or repeat themselves, find that men's voices are easier to understand than woman's and difficulty understanding soft or whispered speech    In the past 6 months, have you been bothered by leaking of urine? Yes    In general, how would you rate your overall mental or emotional health?  Good      PHQ-2 Total Score: 1    He does report some memory loss as described by forgetting what he is doing and then has to backtrack his steps to remember what he was about to do next.  Denies any unsafe situations in regards to memory loss.  Long-term memory more intact.  We discussed neuropsych testing if this were to worsen.    Have you ever done Advance Care Planning? (For example, a Health Directive, POLST, or a discussion with a medical provider or your loved ones about your wishes): Yes, patient states has an Advance Care Planning document and will bring a copy to the clinic.      Fall risk  Fallen 2 or more times in the past year?: " Yes  Any fall with injury in the past year?: No    Cognitive Screening   1) Repeat 3 items (Leader, Season, Table)    2) Clock draw: NO- hands at correct time no numbers written  3) 3 item recall: Recalls 1 object   Results: NORMAL clock, 1-2 items recalled: COGNITIVE IMPAIRMENT LESS LIKELY    Mini-CogTM Copyright ANGELA Villeda. Licensed by the author for use in [facility]; reprinted with permission (gisele@[facility]). All rights reserved.      Do you have sleep apnea, excessive snoring or daytime drowsiness?: no    Reviewed and updated as needed this visit by clinical staff   Tobacco  Allergies  Meds  Problems  Med Hx  Surg Hx  Fam Hx          Reviewed and updated as needed this visit by Provider    Allergies  Meds  Problems  Med Hx  Surg Hx  Fam Hx         Social History     Tobacco Use     Smoking status: Never     Smokeless tobacco: Never   Substance Use Topics     Alcohol use: Yes     Comment: Occasionaly     If you drink alcohol do you typically have >3 drinks per day or >7 drinks per week? No    No flowsheet data found.    Current providers sharing in care for this patient include:   Patient Care Team:  Caty Yusuf NP as PCP - General (Nurse Practitioner - Family)  Cheryl Ward APRN CNS as Clinical Nurse Specialist (Oncology)  Augie Soto MD as Assigned Cancer Care Provider  Caty Yusuf NP as Assigned PCP    The following health maintenance items are reviewed in Epic and correct as of today:  Health Maintenance   Topic Date Due     BMP  10/19/2022     LIPID  10/19/2022     MICROALBUMIN  10/19/2022     EYE EXAM  04/18/2023     DIABETIC FOOT EXAM  05/17/2023     PHQ-9  05/17/2023     A1C  07/18/2023     MEDICARE ANNUAL WELLNESS VISIT  01/18/2024     ANNUAL REVIEW OF HM ORDERS  01/18/2024     FALL RISK ASSESSMENT  01/18/2024     DTAP/TDAP/TD IMMUNIZATION (5 - Td or Tdap) 03/29/2026     COLORECTAL CANCER SCREENING  05/12/2026     ADVANCE CARE PLANNING   01/18/2028     HEPATITIS C SCREENING  Completed     DEPRESSION ACTION PLAN  Completed     INFLUENZA VACCINE  Completed     Pneumococcal Vaccine: 65+ Years  Completed     ZOSTER IMMUNIZATION  Completed     COVID-19 Vaccine  Completed     IPV IMMUNIZATION  Aged Out     MENINGITIS IMMUNIZATION  Aged Out     BP Readings from Last 3 Encounters:   01/18/23 130/72   11/09/22 (!) 143/66   08/30/22 112/60    Wt Readings from Last 3 Encounters:   01/18/23 88.5 kg (195 lb)   11/09/22 87.3 kg (192 lb 8 oz)   08/30/22 87 kg (191 lb 12.8 oz)                  Patient Active Problem List   Diagnosis     Allergic rhinitis     Hereditary spherocytosis (H)     Dysthymic disorder     Hearing loss     HYPERLIPIDEMIA LDL GOAL <100     Advanced directives, counseling/discussion     Advance care planning     Hypertension goal BP (blood pressure) < 140/90     IVETTE (generalized anxiety disorder)     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     EYE SURGERY  2014    cataracts     HERNIA REPAIR  1976     REPAIR CHEST WALL N/A 9/21/2016    Procedure: REPAIR CHEST WALL;  Surgeon: Leah Nixon MD;  Location: [facility] OR     SURGICAL HISTORY OF -   1976    SPLENECTOMY     SURGICAL HISTORY OF -       APPENDECTOMY     SURGICAL HISTORY OF -       CHOLECYSTECTOMY     SURGICAL HISTORY OF -       HERNIAORRHAPHY     SURGICAL HISTORY OF -   1981    cholesteotoma       Social History     Tobacco Use     Smoking status: Never     Smokeless tobacco: Never    Substance Use Topics     Alcohol use: Yes     Comment: Occasionaly     Family History   Problem Relation Age of Onset     Dementia Mother      Other Cancer Mother         Skin cancer     Diabetes Father      Emphysema Father      Intellectual Disability (Mental Retardation) Sister      Diabetes Sister      Diabetes Sister      Prostate Cancer Brother      Diabetes Maternal Grandmother      Genitourinary Problems Daughter         spherocytosis, had spleen removed     C.A.D. No family hx of      Cancer - colorectal No family hx of      Hypertension No family hx of      Cerebrovascular Disease No family hx of      Breast Cancer No family hx of          Current Outpatient Medications   Medication Sig Dispense Refill     ASPIRIN 81 MG OR TABS 1 tab po QD (Once per day)       atorvastatin (LIPITOR) 40 MG tablet Take 1 tablet (40 mg) by mouth daily 90 tablet 3     blood glucose (CONTOUR NEXT TEST) test strip USE TO TEST BLOOD SUGAR 1 TIME DAILY OR AS DIRECTED 100 strip 1     blood glucose (NO BRAND SPECIFIED) lancets standard Use to test blood sugar daily and as needed 100 each 11     blood glucose calibration (ACCU-CHEK RODRI) solution USE 1 DROP AS NEEDED 3 each 3     blood glucose monitoring (ACCU-CHEK RODRI PLUS) meter device kit        blood glucose monitoring (ACCU-CHEK RODRI PLUS) test strip 1 strip by In Vitro route daily 100 each 3     blood glucose monitoring (NO BRAND SPECIFIED) meter device kit Use to test blood sugar once  daily or as directed. 1 kit 1     blood glucose monitoring (SOFTCLIX) lancets USE TO TEST BLOOD SUGAR DAILY AND AS NEEDED 100 each 1     CALCITRATE/VITAMIN D 315-200 MG-UNIT OR TABS One tab daily in the morning       Cyanocobalamin (B-12 PO) Take by mouth every morning Take 1/2 tablet daily       dextromethorphan-guaiFENesin (MUCINEX DM)  MG 12 hr tablet Take 1 tablet by mouth every 12 hours       losartan (COZAAR) 25 MG tablet Take 1 tablet (25 mg) by mouth daily 90 tablet 6      "metFORMIN (GLUCOPHAGE) 500 MG tablet Take 2 tablets (1,000 mg) by mouth daily (with breakfast) 180 tablet 6     sertraline (ZOLOFT) 50 MG tablet Take 2 tablets (100 mg) by mouth daily 180 tablet 3     triamcinolone (KENALOG) 0.5 % external ointment Use to lesion on right arm twice daily for 14 days 15 g 1     Allergies   Allergen Reactions     Oxycodone Itching     Shrimp Swelling       Review of Systems   Constitutional: Negative for chills and fever.   HENT: Positive for congestion and hearing loss. Negative for ear pain and sore throat.    Eyes: Negative for pain and visual disturbance.   Respiratory: Positive for cough. Negative for shortness of breath.    Cardiovascular: Positive for chest pain. Negative for palpitations and peripheral edema.   Gastrointestinal: Negative for abdominal pain, constipation, diarrhea, heartburn, hematochezia and nausea.   Genitourinary: Positive for frequency and urgency. Negative for dysuria, genital sores, hematuria, impotence and penile discharge.   Musculoskeletal: Positive for arthralgias and myalgias. Negative for joint swelling.   Skin: Negative for rash.   Neurological: Positive for weakness. Negative for dizziness, headaches and paresthesias.   Psychiatric/Behavioral: Negative for mood changes. The patient is nervous/anxious.        OBJECTIVE:   /72 (BP Location: Right arm, Patient Position: Sitting, Cuff Size: Adult Large)   Pulse 64   Temp 98  F (36.7  C) (Oral)   Resp 16   Ht 1.854 m (6' 1\")   Wt 88.5 kg (195 lb)   SpO2 96%   BMI 25.73 kg/m   Estimated body mass index is 25.73 kg/m  as calculated from the following:    Height as of this encounter: 1.854 m (6' 1\").    Weight as of this encounter: 88.5 kg (195 lb).  Physical Exam  GENERAL: healthy, alert and no distress  EYES: Eyes grossly normal to inspection, PERRL and conjunctivae and sclerae normal  HENT: ear canals and TM's normal, nose and mouth without ulcers or lesions  NECK: no adenopathy, no " asymmetry, masses, or scars and thyroid normal to palpation  RESP: lungs clear to auscultation - no rales, rhonchi or wheezes  CV: regular rate and rhythm, normal S1 S2, no S3 or S4, no murmur, click or rub, no peripheral edema and peripheral pulses strong  ABDOMEN: soft, nontender, no hepatosplenomegaly, no masses and bowel sounds normal  MS: no gross musculoskeletal defects noted, no edema  SKIN: there is a dry scaly erythematous plaque right upper arm  NEURO: Normal strength and tone, mentation intact and speech normal  PSYCH: mentation appears normal, affect normal/bright    Diagnostic Test Results:  Labs reviewed in Epic  Results for orders placed or performed in visit on 01/18/23   Hemoglobin A1c     Status: Abnormal   Result Value Ref Range    Hemoglobin A1C 7.8 (H) 0.0 - 5.6 %   CBC with platelets and differential     Status: Abnormal   Result Value Ref Range    WBC Count 9.0 4.0 - 11.0 10e3/uL    RBC Count 4.60 4.40 - 5.90 10e6/uL    Hemoglobin 14.3 13.3 - 17.7 g/dL    Hematocrit 42.4 40.0 - 53.0 %    MCV 92 78 - 100 fL    MCH 31.1 26.5 - 33.0 pg    MCHC 33.7 31.5 - 36.5 g/dL    RDW 13.0 10.0 - 15.0 %    Platelet Count 264 150 - 450 10e3/uL    % Neutrophils 54 %    % Lymphocytes 24 %    % Monocytes 17 %    % Eosinophils 5 %    % Basophils 0 %    Absolute Neutrophils 4.9 1.6 - 8.3 10e3/uL    Absolute Lymphocytes 2.1 0.8 - 5.3 10e3/uL    Absolute Monocytes 1.5 (H) 0.0 - 1.3 10e3/uL    Absolute Eosinophils 0.4 0.0 - 0.7 10e3/uL    Absolute Basophils 0.0 0.0 - 0.2 10e3/uL   CBC with Platelets & Differential     Status: Abnormal    Narrative    The following orders were created for panel order CBC with Platelets & Differential.  Procedure                               Abnormality         Status                     ---------                               -----------         ------                     CBC with platelets and d...[[id number]]  Abnormal            Final result                 Please view results for  these tests on the individual orders.       ASSESSMENT / PLAN:   Gerber was seen today for physical.    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 Soto, Oncology for this    Mild major depression (H)  Known issue that I take into account for their medical decisions, no current exacerbations or new concerns.    Hereditary spherocytosis (H)  Known issue that I take into account for their medical decisions, no current exacerbations or new concerns.   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    Sensorineural hearing loss, bilateral  -     Comprehensive metabolic panel  -   "   CBC with Platelets & Differential    Other orders  -     REVIEW OF HEALTH MAINTENANCE PROTOCOL ORDERS        COUNSELING:  Reviewed preventive health counseling, as reflected in patient instructions       Regular exercise       Healthy diet/nutrition      BMI:   Estimated body mass index is 25.73 kg/m  as calculated from the following:    Height as of this encounter: 1.854 m (6' 1\").    Weight as of this encounter: 88.5 kg (195 lb).         He reports that he has never smoked. He has never used smokeless tobacco.      Appropriate preventive services were discussed with this patient, including applicable screening as appropriate for cardiovascular disease, diabetes, osteopenia/osteoporosis, and glaucoma.  As appropriate for age/gender, discussed screening for colorectal cancer, prostate cancer, breast cancer, and cervical cancer. Checklist reviewing preventive services available has been given to the patient.    Reviewed patients plan of care and provided an AVS. The Intermediate Care Plan ( asthma action plan, low back pain action plan, and migraine action plan) for Gerber meets the Care Plan requirement. This Care Plan has been established and reviewed with the Patient.          Caty Yusuf NP  [facility]    Identified Health Risks:    The patient was provided with written information regarding signs of hearing loss.  Information on urinary incontinence and treatment options given to patient.  "

## 2023-01-24 DIAGNOSIS — E11.9 DIABETES MELLITUS (H): Primary | ICD-10-CM

## 2023-02-03 ENCOUNTER — VIRTUAL VISIT (OUTPATIENT)
Dept: EDUCATION SERVICES | Facility: CLINIC | Age: 78
End: 2023-02-03
Payer: COMMERCIAL

## 2023-02-03 DIAGNOSIS — E11.9 TYPE 2 DIABETES MELLITUS WITHOUT COMPLICATION, WITHOUT LONG-TERM CURRENT USE OF INSULIN (H): Primary | ICD-10-CM

## 2023-02-03 PROCEDURE — 97802 MEDICAL NUTRITION INDIV IN: CPT | Performed by: DIETITIAN, REGISTERED

## 2023-02-03 PROCEDURE — 99207 PR DROP WITH A PROCEDURE: CPT | Performed by: DIETITIAN, REGISTERED

## 2023-02-03 NOTE — LETTER
2/3/2023         RE: Gerber Levine  [address]  [facility] [address]        Dear Colleague,    Thank you for referring your patient, Gerber Levine, to the [facility] [place]. Please see a copy of my visit note below.    Medical Nutrition Therapy  Visit Type:Initial assessment and intervention    Type of Service: Telephone Visit    Originating Location (Patient Location): Home  Distant Location (Provider Location): Home  Mode of Communication:  Telephone    Telephone Visit Start Time: 8:00  Telephone Visit End Time (telephone visit stop time): 8:25    How would patient like to obtain AVS? MyChart        Gerber Levine presents today for MNT and education related to type 2 diabetes.   He is accompanied by self    ASSESSMENT:   Patient comments/concerns relating to nutrition: Has had diabetes since 1995. Taking Metformin. BG have been around 100 mg/dl - before lunch.     NUTRITION HISTORY:    Breakfast: 1 cup of coffee, Multigrain Cheerios with kellogs fruity/crunchy cereal on top, blueberries and diced 1/4 apple with milk (2%)  Lunch: sandwich - cheese OR bowl of soup with multigrain bread OR chicken chili --with small glass of milk   Dinner: Chicken Chili OR potato soup OR sandwich -- usually having cauliflower, broccoli  Snacks: tostitos -- has cut back on snacking     Misses meals? Rarely   Eats out:  seldom     Previous diet education:  Yes     Food allergies/intolerances: shrimp    EXERCISE: walking 2.5-3 miles/day    SOCIO/ECONOMIC:   Lives with: self and spouse    MEDICATIONS:  Current Outpatient Medications   Medication     ASPIRIN 81 MG OR TABS     atorvastatin (LIPITOR) 40 MG tablet     blood glucose (CONTOUR NEXT TEST) test strip     blood glucose (NO BRAND SPECIFIED) lancets standard     blood glucose calibration (ACCU-CHEK RODRI) solution     blood glucose monitoring (ACCU-CHEK RODRI PLUS) meter device kit     blood glucose monitoring (ACCU-CHEK RODRI PLUS)  test strip     blood glucose monitoring (NO BRAND SPECIFIED) meter device kit     blood glucose monitoring (SOFTCLIX) lancets     CALCITRATE/VITAMIN D 315-200 MG-UNIT OR TABS     Cyanocobalamin (B-12 PO)     dextromethorphan-guaiFENesin (MUCINEX DM)  MG 12 hr tablet     losartan (COZAAR) 25 MG tablet     metFORMIN (GLUCOPHAGE) 500 MG tablet     sertraline (ZOLOFT) 50 MG tablet     triamcinolone (KENALOG) 0.5 % external ointment     No current facility-administered medications for this visit.       LABS:  Last Basic Metabolic Panel:  Lab Results   Component Value Date     01/18/2023     06/01/2021      Lab Results   Component Value Date    POTASSIUM 4.5 01/18/2023    POTASSIUM 3.7 10/19/2021    POTASSIUM 4.6 06/01/2021     Lab Results   Component Value Date    CHLORIDE 104 01/18/2023    CHLORIDE 104 10/19/2021    CHLORIDE 105 06/01/2021     Lab Results   Component Value Date    YANI 9.1 01/18/2023    YANI 8.6 06/01/2021     Lab Results   Component Value Date    CO2 25 01/18/2023    CO2 28 10/19/2021    CO2 30 06/01/2021     Lab Results   Component Value Date    BUN 27.4 01/18/2023    BUN 23 10/19/2021    BUN 22 06/01/2021     Lab Results   Component Value Date    CR 0.82 01/18/2023    CR 0.89 06/01/2021     Lab Results   Component Value Date     01/18/2023     10/19/2021     06/01/2021       ANTHROPOMETRICS:  Vitals: There were no vitals taken for this visit.  There is no height or weight on file to calculate BMI.      Wt Readings from Last 5 Encounters:   01/18/23 88.5 kg (195 lb)   11/09/22 87.3 kg (192 lb 8 oz)   08/30/22 87 kg (191 lb 12.8 oz)   05/17/22 87.5 kg (192 lb 12.8 oz)   02/02/22 88 kg (194 lb)       Weight Change: stable   NUTRITION DIAGNOSIS: Food- and nutrition-related knowledge deficit related to limited previous nutrition education as evidenced by patient statement and new referral.    NUTRITION INTERVENTION:  Education given to support: general nutrition  guidelines, consistent meals, carb counting, labeling, fiber, behavior modification and portion control  Education Materials Provided: My Plate Planner/Choose My Plate and Carbohydrate Counting  Motivational Interviewing    PATIENT'S BEHAVIOR CHANGE GOALS:   See Patient Instructions for patient stated behavior change goals. AVS was printed and given to patient at today's appointment.    MONITOR / EVALUATE:  RD will monitor/evaluate:  Blood Glucose / A1c  Food and nutrition knowledge / skills    FOLLOW-UP:  Follow up with RD as needed.  Call RD with questions/concerns.     Chantelle Fong RD [facility]  Triage: [phone]  [phone]  Time spent in minutes: 25  Encounter: Individual

## 2023-02-03 NOTE — PROGRESS NOTES
Medical Nutrition Therapy  Visit Type:Initial assessment and intervention    Type of Service: Telephone Visit    Originating Location (Patient Location): Home  Distant Location (Provider Location): Home  Mode of Communication:  Telephone    Telephone Visit Start Time: 8:00  Telephone Visit End Time (telephone visit stop time): 8:25    How would patient like to obtain AVS? Renetta        Gerber Levine presents today for MNT and education related to type 2 diabetes.   He is accompanied by self    ASSESSMENT:   Patient comments/concerns relating to nutrition: Has had diabetes since 1995. Taking Metformin. BG have been around 100 mg/dl - before lunch.     NUTRITION HISTORY:    Breakfast: 1 cup of coffee, Multigrain Cheerios with kellogs fruity/crunchy cereal on top, blueberries and diced 1/4 apple with milk (2%)  Lunch: sandwich - cheese OR bowl of soup with multigrain bread OR chicken chili --with small glass of milk   Dinner: Chicken Chili OR potato soup OR sandwich -- usually having cauliflower, broccoli  Snacks: tostitos -- has cut back on snacking     Misses meals? Rarely   Eats out:  seldom     Previous diet education:  Yes     Food allergies/intolerances: shrimp    EXERCISE: walking 2.5-3 miles/day    SOCIO/ECONOMIC:   Lives with: self and spouse    MEDICATIONS:  Current Outpatient Medications   Medication     ASPIRIN 81 MG OR TABS     atorvastatin (LIPITOR) 40 MG tablet     blood glucose (CONTOUR NEXT TEST) test strip     blood glucose (NO BRAND SPECIFIED) lancets standard     blood glucose calibration (ACCU-CHEK RODRI) solution     blood glucose monitoring (ACCU-CHEK RODRI PLUS) meter device kit     blood glucose monitoring (ACCU-CHEK RODRI PLUS) test strip     blood glucose monitoring (NO BRAND SPECIFIED) meter device kit     blood glucose monitoring (SOFTCLIX) lancets     CALCITRATE/VITAMIN D 315-200 MG-UNIT OR TABS     Cyanocobalamin (B-12 PO)     dextromethorphan-guaiFENesin (MUCINEX DM)  MG 12 hr  tablet     losartan (COZAAR) 25 MG tablet     metFORMIN (GLUCOPHAGE) 500 MG tablet     sertraline (ZOLOFT) 50 MG tablet     triamcinolone (KENALOG) 0.5 % external ointment     No current facility-administered medications for this visit.       LABS:  Last Basic Metabolic Panel:  Lab Results   Component Value Date     01/18/2023     06/01/2021      Lab Results   Component Value Date    POTASSIUM 4.5 01/18/2023    POTASSIUM 3.7 10/19/2021    POTASSIUM 4.6 06/01/2021     Lab Results   Component Value Date    CHLORIDE 104 01/18/2023    CHLORIDE 104 10/19/2021    CHLORIDE 105 06/01/2021     Lab Results   Component Value Date    YANI 9.1 01/18/2023    YANI 8.6 06/01/2021     Lab Results   Component Value Date    CO2 25 01/18/2023    CO2 28 10/19/2021    CO2 30 06/01/2021     Lab Results   Component Value Date    BUN 27.4 01/18/2023    BUN 23 10/19/2021    BUN 22 06/01/2021     Lab Results   Component Value Date    CR 0.82 01/18/2023    CR 0.89 06/01/2021     Lab Results   Component Value Date     01/18/2023     10/19/2021     06/01/2021       ANTHROPOMETRICS:  Vitals: There were no vitals taken for this visit.  There is no height or weight on file to calculate BMI.      Wt Readings from Last 5 Encounters:   01/18/23 88.5 kg (195 lb)   11/09/22 87.3 kg (192 lb 8 oz)   08/30/22 87 kg (191 lb 12.8 oz)   05/17/22 87.5 kg (192 lb 12.8 oz)   02/02/22 88 kg (194 lb)       Weight Change: stable   NUTRITION DIAGNOSIS: Food- and nutrition-related knowledge deficit related to limited previous nutrition education as evidenced by patient statement and new referral.    NUTRITION INTERVENTION:  Education given to support: general nutrition guidelines, consistent meals, carb counting, labeling, fiber, behavior modification and portion control  Education Materials Provided: My Plate Planner/Choose My Plate and Carbohydrate Counting  Motivational Interviewing    PATIENT'S BEHAVIOR CHANGE GOALS:   See Patient  Instructions for patient stated behavior change goals. AVS was printed and given to patient at today's appointment.    MONITOR / EVALUATE:  RD will monitor/evaluate:  Blood Glucose / A1c  Food and nutrition knowledge / skills    FOLLOW-UP:  Follow up with RD as needed.  Call RD with questions/concerns.     ZOE Li [facility]  Triage: [phone]  [phone]  Time spent in minutes: 25  Encounter: Individual

## 2023-04-17 ENCOUNTER — TRANSFERRED RECORDS (OUTPATIENT)
Dept: HEALTH INFORMATION MANAGEMENT | Facility: CLINIC | Age: 78
End: 2023-04-17
Payer: COMMERCIAL

## 2023-04-17 LAB — RETINOPATHY: NEGATIVE

## 2023-05-05 ENCOUNTER — ANCILLARY PROCEDURE (OUTPATIENT)
Dept: CT IMAGING | Facility: CLINIC | Age: 78
End: 2023-05-05
Attending: INTERNAL MEDICINE
Payer: COMMERCIAL

## 2023-05-05 DIAGNOSIS — C49.3 SOFT TISSUE SARCOMA OF CHEST WALL (H): ICD-10-CM

## 2023-05-05 DIAGNOSIS — H90.3 SENSORINEURAL HEARING LOSS, BILATERAL: ICD-10-CM

## 2023-05-05 DIAGNOSIS — D58.0 HEREDITARY SPHEROCYTOSIS (H): ICD-10-CM

## 2023-05-05 DIAGNOSIS — Z90.81 HISTORY OF SPLENECTOMY: ICD-10-CM

## 2023-05-05 DIAGNOSIS — R91.8 PULMONARY NODULES: ICD-10-CM

## 2023-05-05 DIAGNOSIS — R41.3 MEMORY LOSS: ICD-10-CM

## 2023-05-05 LAB
CREAT BLD-MCNC: 0.9 MG/DL (ref 0.7–1.3)
GFR SERPL CREATININE-BSD FRML MDRD: >60 ML/MIN/1.73M2

## 2023-05-05 PROCEDURE — 71260 CT THORAX DX C+: CPT | Performed by: RADIOLOGY

## 2023-05-05 PROCEDURE — 82565 ASSAY OF CREATININE: CPT | Performed by: PATHOLOGY

## 2023-05-05 RX ORDER — IOPAMIDOL 755 MG/ML
95 INJECTION, SOLUTION INTRAVASCULAR ONCE
Status: COMPLETED | OUTPATIENT
Start: 2023-05-05 | End: 2023-05-05

## 2023-05-05 RX ADMIN — IOPAMIDOL 95 ML: 755 INJECTION, SOLUTION INTRAVASCULAR at 10:03

## 2023-05-08 NOTE — PROGRESS NOTES
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       Background  In brief, he had some URI-like symptoms in 06/2016 leading to a chest x-ray which revealed a mass leading to further evaluation.  PET/CT imaging revealed about a 2 x 1.3-cm mass extending from the right pleura into the chest wall.  He also had some small nonspecific lung nodules noted.  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  He himself noted no symptoms from this lesion.  The tumor was excised en bloc on 09/21/2016 with a 3-cm margin on each side anteriorly and posteriorly.    -  Specimen #: [id number]   Collected: 9/21/2016      FINAL DIAGNOSIS:   A. Soft tissue, right chest wall, resection:   - Cellular/deep fibrous histiocytoma; see comment   - Tumor size: 3.0 cm   - Tumor extent: Involves skeletal muscle   - Lymphovascular invasion: Not identified   - Margins of resection: Free of tumor     B. Soft tissue, additional right chest wall margin, resection:   - Benign skeletal muscle, bone and connective tissue     C. 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 Soft tissue, right muscle final superficial margin, resection:    - Benign skeletal muscle and connective tissue     E. Soft tissue, right chest wall skin margin, resection:   - Benign skin and subcutaneous tissue     These features favor a deep, cellular fibrous histiocytoma.     Deep fibrous histiocytoma (deep counterpart of cutaneous fibrous   histiocytoma / dermatofibroma) is a rare lesion that arises in the   skeletal muscle or visceral locations. Unlike cutaneous dermatofibroma,   it is usually a highly cellular lesion that lacks heterogeneous elements   such as giant cells, histiocytic aggregates  and hemosiderin, thus   mimicking DFSP. These lesions in general are clinically indolent but   have the potential to recur at an increased frequency (varies from 22%   to 57%) and undergo metastasis in rare instances.  -        Interval history    He is generally doing up pretty well but he does note gradually worsening dementia and memory issues; his mom  of dementia.      No limitations to activity. He gets out of the house every day and walks 2.5-3 miles.     They enjoyed a mystery trip in Feb went to [place].       He does not sleep much but his sleep is as usual which is not always that great.  Nevertheless he generally feels rested.     He follows DM with his PCP which he thinks is doing OK-sees PCP in .  He does have the type 2 diabetes, hyperlipidemia and hypertension.      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  He has a daughter who also had her spleen removed and there is a strong family history on his father's side.  At the time of his splenectomy, he also had an appendectomy, cholecystectomy and hernia repair.  He has also has had bilateral cataract surgery.       He has generalized anxiety disorder and has seen a psychologist.    Overall he thinks the mood is OK now and feels no need for action on that.       -  Background PMH, FH, SH  PMH notable for  hereditary spherocytosis, HBP, anxiety, hyperlipidemia , DM, s/p splenectomy, appy, stephanie, hernia repair, cataract removal.  FH-dementia, hereditary spherocytosis  -     On exam he appeared comfortable with normal affect.    HEENT full EOMs  NECK no obvious goiter or mass  CHEST:  no resp distress  NEUROLOGIC:  mentation and speech normal  PSYCH: mood good     -  Creat 0.9     -  I reviewed the new images of 23 and there are nonspecific small lung nodules but while it is complicated the RLL nodule appears to me to be stable c/w 2021.    Formal  read:  CT chest 5-5-23  IMPRESSION: Unchanged right lower lobe pulmonary nodules. Unchanged  right upper lobe wedge resection site. Bilateral renal cysts. No  evidence of chest wall recurrence.    -     We discussed the situation, and I think it is a fairly low-risk lesion.       At the time of surgery, a 2-cm protuberant lesion in the fourth intercostal space was noted.  At this time, he also had a small wedge resection of the right upper lobe for some nodularity that had been seen.  At the time of resection, a 3-cm tumor involving skeletal muscle was found without lymphovascular invasion and with negative margins.  Studies for the PDGFB gene rearrangement were negative, but could not eliminate the DFSP diagnosis re sampling.  There was no high-grade nuclear atypia, increased mitotic activity, or necrosis.  The lung biopsy was negative for malignancy.  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  This is an unusual lesion and unlike the cutaneous dermatofibroma, it usually is of high cellularity without other heterogeneous elements.  This is generally a low-grade lesion but can recur and rarely metastasize.    I told him I thought there was a fairly low risk of recurrence, but it does need some followup, as do his nonspecific lung nodules.      Given the stability we will check a CT in 9 months.  If that's OK we might a chest CT scan in another 1-2 years.  This recommendation is not, obviously, based on a lot of data, but it seems like a reasonable approach.       All of his questions were addressed and he will call if he has others.        -  Augie Soto M.D.  Professor  Hematology, Oncology and Transplantation  -   cc:  MD Leah Freedman MD, Thoracic Surgery     Luis Velasco MD, Thoracic Surgery

## 2023-05-09 ENCOUNTER — VIRTUAL VISIT (OUTPATIENT)
Dept: ONCOLOGY | Facility: CLINIC | Age: 78
End: 2023-05-09
Attending: INTERNAL MEDICINE
Payer: COMMERCIAL

## 2023-05-09 DIAGNOSIS — R91.8 PULMONARY NODULES: ICD-10-CM

## 2023-05-09 DIAGNOSIS — Z90.81 HISTORY OF SPLENECTOMY: ICD-10-CM

## 2023-05-09 DIAGNOSIS — D58.0 HEREDITARY SPHEROCYTOSIS (H): ICD-10-CM

## 2023-05-09 DIAGNOSIS — R41.3 MEMORY LOSS: ICD-10-CM

## 2023-05-09 DIAGNOSIS — C49.3 SOFT TISSUE SARCOMA OF CHEST WALL (H): Primary | ICD-10-CM

## 2023-05-09 PROCEDURE — 99214 OFFICE O/P EST MOD 30 MIN: CPT | Mod: VID | Performed by: INTERNAL MEDICINE

## 2023-05-09 NOTE — LETTER
5/9/2023         RE: Gerber Levine  [address]  [facility] [address]        Dear Colleague,    Thank you for referring your patient, Gerber Levine, to the [facility]. Please see a copy of my visit note below.    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       Background  In brief, he had some URI-like symptoms in 06/2016 leading to a chest x-ray which revealed a mass leading to further evaluation.  PET/CT imaging revealed about a 2 x 1.3-cm mass extending from the right pleura into the chest wall.  He also had some small nonspecific lung nodules noted.  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  He himself noted no symptoms from this lesion.  The tumor was excised en bloc on 09/21/2016 with a 3-cm margin on each side anteriorly and posteriorly.    -  Specimen #: [id number]   Collected: 9/21/2016      FINAL DIAGNOSIS:   A. Soft tissue, right chest wall, resection:   - Cellular/deep fibrous histiocytoma; see comment   - Tumor size: 3.0 cm   - Tumor extent: Involves skeletal muscle   - Lymphovascular invasion: Not identified   - Margins of resection: Free of tumor     B. Soft tissue, additional right chest wall margin, resection:   - Benign skeletal muscle, bone and connective tissue     C. 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 Soft tissue, right muscle final superficial margin, resection:    - Benign skeletal muscle and connective tissue     E. Soft tissue, right chest wall skin margin, resection:   - Benign skin and subcutaneous tissue     These features favor a deep, cellular fibrous histiocytoma.     Deep fibrous histiocytoma (deep counterpart of cutaneous fibrous    histiocytoma / dermatofibroma) is a rare lesion that arises in the   skeletal muscle or visceral locations. Unlike cutaneous dermatofibroma,   it is usually a highly cellular lesion that lacks heterogeneous elements   such as giant cells, histiocytic aggregates and hemosiderin, thus   mimicking DFSP. These lesions in general are clinically indolent but   have the potential to recur at an increased frequency (varies from 22%   to 57%) and undergo metastasis in rare instances.  -        Interval history    He is generally doing up pretty well but he does note gradually worsening dementia and memory issues; his mom  of dementia.      No limitations to activity. He gets out of the house every day and walks 2.5-3 miles.     They enjoyed a mystery trip in Feb went to [place].       He does not sleep much but his sleep is as usual which is not always that great.  Nevertheless he generally feels rested.     He follows DM with his PCP which he thinks is doing OK-sees PCP in .  He does have the type 2 diabetes, hyperlipidemia and hypertension.      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  He has a daughter who also had her spleen removed and there is a strong family history on his father's side.  At the time of his splenectomy, he also had an appendectomy, cholecystectomy and hernia repair.  He has also has had bilateral cataract surgery.       He has generalized anxiety disorder and has seen a psychologist.    Overall he thinks the mood is OK now and feels no need for action on that.       -  Background PMH, FH, SH  PMH notable for  hereditary spherocytosis, HBP, anxiety, hyperlipidemia , DM, s/p splenectomy, appy, stephanie, hernia repair, cataract removal.  FH-dementia, hereditary spherocytosis  -     On exam he appeared comfortable with normal affect.    HEENT full EOMs  NECK no obvious goiter or mass  CHEST:  no resp  distress  NEUROLOGIC:  mentation and speech normal  PSYCH: mood good     -  Creat 0.9     -  I reviewed the new images of 5-5-23 and there are nonspecific small lung nodules but while it is complicated the RLL nodule appears to me to be stable c/w Jan 2021.    Formal read:  CT chest 5-5-23  IMPRESSION: Unchanged right lower lobe pulmonary nodules. Unchanged  right upper lobe wedge resection site. Bilateral renal cysts. No  evidence of chest wall recurrence.    -     We discussed the situation, and I think it is a fairly low-risk lesion.       At the time of surgery, a 2-cm protuberant lesion in the fourth intercostal space was noted.  At this time, he also had a small wedge resection of the right upper lobe for some nodularity that had been seen.  At the time of resection, a 3-cm tumor involving skeletal muscle was found without lymphovascular invasion and with negative margins.  Studies for the PDGFB gene rearrangement were negative, but could not eliminate the DFSP diagnosis re sampling.  There was no high-grade nuclear atypia, increased mitotic activity, or necrosis.  The lung biopsy was negative for malignancy.  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  This is an unusual lesion and unlike the cutaneous dermatofibroma, it usually is of high cellularity without other heterogeneous elements.  This is generally a low-grade lesion but can recur and rarely metastasize.    I told him I thought there was a fairly low risk of recurrence, but it does need some followup, as do his nonspecific lung nodules.      Given the stability we will check a CT in 9 months.  If that's OK we might a chest CT scan in another 1-2 years.  This recommendation is not, obviously, based on a lot of data, but it seems like a reasonable approach.       All of his questions were addressed and he will call if he has others.        -  Augie ECHEVARRIA  EN Soto.  Professor  Hematology, Oncology and Transplantation  -   cc:  MD Leah Freedman MD, Thoracic Surgery     Luis Velasco MD, Thoracic Surgery

## 2023-06-20 DIAGNOSIS — E11.9 TYPE 2 DIABETES MELLITUS WITHOUT COMPLICATION, WITHOUT LONG-TERM CURRENT USE OF INSULIN (H): ICD-10-CM

## 2023-06-29 ENCOUNTER — TELEPHONE (OUTPATIENT)
Dept: FAMILY MEDICINE | Facility: CLINIC | Age: 78
End: 2023-06-29
Payer: COMMERCIAL

## 2023-06-29 DIAGNOSIS — E11.65 TYPE 2 DIABETES MELLITUS WITH HYPERGLYCEMIA, WITHOUT LONG-TERM CURRENT USE OF INSULIN (H): Primary | ICD-10-CM

## 2023-06-30 RX ORDER — LANCETS
EACH MISCELLANEOUS
Qty: 100 EACH | Refills: 1 | Status: SHIPPED | OUTPATIENT
Start: 2023-06-30 | End: 2024-09-25

## 2023-07-02 PROBLEM — E11.65 TYPE 2 DIABETES MELLITUS WITH HYPERGLYCEMIA, WITHOUT LONG-TERM CURRENT USE OF INSULIN (H): Status: ACTIVE | Noted: 2018-03-06

## 2023-07-02 NOTE — TELEPHONE ENCOUNTER
Refill sent.  Patient is due for 6 month diabetes check- please call to assist with scheduling.    Caty Yusuf, BLAZE, APRN, CNP

## 2023-07-24 ENCOUNTER — LAB (OUTPATIENT)
Dept: LAB | Facility: CLINIC | Age: 78
End: 2023-07-24
Payer: COMMERCIAL

## 2023-07-24 DIAGNOSIS — E11.65 TYPE 2 DIABETES MELLITUS WITH HYPERGLYCEMIA, WITHOUT LONG-TERM CURRENT USE OF INSULIN (H): ICD-10-CM

## 2023-07-24 LAB — HBA1C MFR BLD: 7.4 % (ref 0–5.6)

## 2023-07-24 PROCEDURE — 83036 HEMOGLOBIN GLYCOSYLATED A1C: CPT

## 2023-07-24 PROCEDURE — 36415 COLL VENOUS BLD VENIPUNCTURE: CPT

## 2023-10-03 ENCOUNTER — OFFICE VISIT (OUTPATIENT)
Dept: FAMILY MEDICINE | Facility: CLINIC | Age: 78
End: 2023-10-03
Payer: COMMERCIAL

## 2023-10-03 VITALS
WEIGHT: 193.8 LBS | HEIGHT: 73 IN | SYSTOLIC BLOOD PRESSURE: 124 MMHG | OXYGEN SATURATION: 97 % | TEMPERATURE: 98.1 F | BODY MASS INDEX: 25.69 KG/M2 | HEART RATE: 58 BPM | DIASTOLIC BLOOD PRESSURE: 60 MMHG | RESPIRATION RATE: 15 BRPM

## 2023-10-03 DIAGNOSIS — Z23 NEED FOR INFLUENZA VACCINATION: ICD-10-CM

## 2023-10-03 DIAGNOSIS — L98.9 SKIN LESION: ICD-10-CM

## 2023-10-03 DIAGNOSIS — E11.65 TYPE 2 DIABETES MELLITUS WITH HYPERGLYCEMIA, WITHOUT LONG-TERM CURRENT USE OF INSULIN (H): Primary | ICD-10-CM

## 2023-10-03 DIAGNOSIS — I10 HYPERTENSION GOAL BP (BLOOD PRESSURE) < 140/90: ICD-10-CM

## 2023-10-03 DIAGNOSIS — Z23 HIGH PRIORITY FOR 2019-NCOV VACCINE: ICD-10-CM

## 2023-10-03 DIAGNOSIS — F41.9 ANXIETY: ICD-10-CM

## 2023-10-03 DIAGNOSIS — F32.0 MILD MAJOR DEPRESSION (H): ICD-10-CM

## 2023-10-03 LAB — HBA1C MFR BLD: 7.1 % (ref 0–5.6)

## 2023-10-03 PROCEDURE — 99214 OFFICE O/P EST MOD 30 MIN: CPT | Mod: 25 | Performed by: NURSE PRACTITIONER

## 2023-10-03 PROCEDURE — 96127 BRIEF EMOTIONAL/BEHAV ASSMT: CPT | Performed by: NURSE PRACTITIONER

## 2023-10-03 PROCEDURE — 90480 ADMN SARSCOV2 VAC 1/ONLY CMP: CPT | Performed by: NURSE PRACTITIONER

## 2023-10-03 PROCEDURE — 36415 COLL VENOUS BLD VENIPUNCTURE: CPT | Performed by: NURSE PRACTITIONER

## 2023-10-03 PROCEDURE — 90662 IIV NO PRSV INCREASED AG IM: CPT | Performed by: NURSE PRACTITIONER

## 2023-10-03 PROCEDURE — 99207 PR FOOT EXAM NO CHARGE: CPT | Performed by: NURSE PRACTITIONER

## 2023-10-03 PROCEDURE — 91320 SARSCV2 VAC 30MCG TRS-SUC IM: CPT | Performed by: NURSE PRACTITIONER

## 2023-10-03 PROCEDURE — 83036 HEMOGLOBIN GLYCOSYLATED A1C: CPT | Performed by: NURSE PRACTITIONER

## 2023-10-03 PROCEDURE — G0008 ADMIN INFLUENZA VIRUS VAC: HCPCS | Mod: 59 | Performed by: NURSE PRACTITIONER

## 2023-10-03 RX ORDER — SERTRALINE HYDROCHLORIDE 100 MG/1
100 TABLET, FILM COATED ORAL DAILY
Status: CANCELLED | OUTPATIENT
Start: 2023-10-03

## 2023-10-03 RX ORDER — SERTRALINE HYDROCHLORIDE 100 MG/1
100 TABLET, FILM COATED ORAL DAILY
Qty: 90 TABLET | Refills: 3 | Status: SHIPPED | OUTPATIENT
Start: 2023-10-03

## 2023-10-03 RX ORDER — TRIAMCINOLONE ACETONIDE 5 MG/G
OINTMENT TOPICAL
Qty: 15 G | Refills: 1 | Status: SHIPPED | OUTPATIENT
Start: 2023-10-03 | End: 2024-02-08

## 2023-10-03 RX ORDER — SERTRALINE HYDROCHLORIDE 100 MG/1
100 TABLET, FILM COATED ORAL DAILY
COMMUNITY
End: 2023-12-25

## 2023-10-03 RX ORDER — LOSARTAN POTASSIUM 25 MG/1
25 TABLET ORAL DAILY
Qty: 90 TABLET | Refills: 3 | Status: ON HOLD | OUTPATIENT
Start: 2023-10-03 | End: 2024-04-01

## 2023-10-03 RX ORDER — BLOOD SUGAR DIAGNOSTIC
1 STRIP MISCELLANEOUS DAILY
Qty: 100 STRIP | Refills: 3 | Status: SHIPPED | OUTPATIENT
Start: 2023-10-03

## 2023-10-03 ASSESSMENT — PATIENT HEALTH QUESTIONNAIRE - PHQ9
10. IF YOU CHECKED OFF ANY PROBLEMS, HOW DIFFICULT HAVE THESE PROBLEMS MADE IT FOR YOU TO DO YOUR WORK, TAKE CARE OF THINGS AT HOME, OR GET ALONG WITH OTHER PEOPLE: SOMEWHAT DIFFICULT
SUM OF ALL RESPONSES TO PHQ QUESTIONS 1-9: 8
SUM OF ALL RESPONSES TO PHQ QUESTIONS 1-9: 8

## 2023-10-03 ASSESSMENT — PAIN SCALES - GENERAL: PAINLEVEL: NO PAIN (0)

## 2023-10-03 NOTE — PROGRESS NOTES
"  Assessment & Plan     Type 2 diabetes mellitus with hyperglycemia, without long-term current use of insulin (H)  Chronic, stable, continue current treatment.   - Hemoglobin A1c  - blood glucose (CONTOUR NEXT TEST) test strip; USE TO TEST BLOOD SUGAR 1 TIME DAILY OR AS DIRECTED  - FOOT EXAM  - metFORMIN (GLUCOPHAGE) 500 MG tablet; Take 2 tablets (1,000 mg) by mouth daily (with breakfast)  - blood glucose (ACCU-CHEK RODRI PLUS) test strip; 1 strip by In Vitro route daily    Need for influenza vaccination    - INFLUENZA VACCINE 65+ (FLUZONE HD)    High priority for 2019-nCoV vaccine    - COVID-19 12+ (2023-24) (PFIZER)    Mild major depression (H24)  Chronic, stable, continue current treatment.   - sertraline (ZOLOFT) 100 MG tablet; Take 1 tablet (100 mg) by mouth daily    Hypertension goal BP (blood pressure) < 140/90  Chronic, stable, continue current treatment.   - losartan (COZAAR) 25 MG tablet; Take 1 tablet (25 mg) by mouth daily    Anxiety  Chronic, stable, continue current treatment.   - sertraline (ZOLOFT) 100 MG tablet; Take 100 mg by mouth daily    Skin lesion  Recommend follow up with Dermatology skin check given persistent lesion right arm  - Adult Dermatology Referral; Future  - triamcinolone (KENALOG) 0.5 % external ointment; Use to lesion on left leg twice daily for 14 days    Follow-up in 4-6 months for Physical and recheck             BMI:   Estimated body mass index is 25.57 kg/m  as calculated from the following:    Height as of this encounter: 1.854 m (6' 1\").    Weight as of this encounter: 87.9 kg (193 lb 12.8 oz).         Caty Yusuf NP  [facility]    Austen Castrejon is a 78 year old, presenting for the following health issues:  Diabetes, Depression, and Imm/Inj (COVID-19 VACCINE)      History of Present Illness       Reason for visit:  Check upHe consumes 0 sweetened beverage(s) daily.He exercises with enough effort to increase his heart rate 30 to 60 " minutes per day.  He exercises with enough effort to increase his heart rate 6 days per week.   He is taking medications regularly.       Diabetes Follow-up    How often are you checking your blood sugar? One time daily  What time of day are you checking your blood sugars (select all that apply)?  Before meals  Have you had any blood sugars above 200?  No  Have you had any blood sugars below 70?  A few times   What symptoms do you notice when your blood sugar is low?  None  What concerns do you have today about your diabetes? None      BP Readings from Last 2 Encounters:   10/03/23 124/60   01/18/23 130/72     Hemoglobin A1C (%)   Date Value   10/03/2023 7.1 (H)   07/24/2023 7.4 (H)   06/01/2021 7.1 (H)   02/26/2021 7.8 (H)     LDL Cholesterol Calculated (mg/dL)   Date Value   01/18/2023 70   10/19/2021 51   10/28/2020 68   06/12/2019 64             Depression Followup  How are you doing with your depression since your last visit? Memory issues, worries about that.  He will forget things and then remember later.    Social History     Tobacco Use    Smoking status: Never    Smokeless tobacco: Never   Vaping Use    Vaping Use: Never used   Substance Use Topics    Alcohol use: Yes     Comment: Occasionaly    Drug use: No         4/9/2021    11:19 AM 5/15/2022     9:30 PM 10/3/2023     7:42 AM   PHQ   PHQ-9 Total Score 7 9 8   Q9: Thoughts of better off dead/self-harm past 2 weeks Not at all Not at all Not at all         12/2/2019     9:31 AM 4/9/2021    11:19 AM 5/15/2022     9:30 PM   IVETTE-7 SCORE   Total Score   6 (mild anxiety)   Total Score 2 7 6         10/3/2023     7:42 AM   Last PHQ-9   1.  Little interest or pleasure in doing things 1   2.  Feeling down, depressed, or hopeless 1   3.  Trouble falling or staying asleep, or sleeping too much 3   4.  Feeling tired or having little energy 3   5.  Poor appetite or overeating 0   6.  Feeling bad about yourself 0   7.  Trouble concentrating 0   8.  Moving slowly or  "restless 0   Q9: Thoughts of better off dead/self-harm past 2 weeks 0   PHQ-9 Total Score 8       Suicide Assessment Five-step Evaluation and Treatment (SAFE-T)    How many servings of fruits and vegetables do you eat daily?  2-3  On average, how many sweetened beverages do you drink each day (Examples: soda, juice, sweet tea, etc.  Do NOT count diet or artificially sweetened beverages)?   0  How many days per week do you exercise enough to make your heart beat faster? 7  How many minutes a day do you exercise enough to make your heart beat faster? 30 - 60, 3 miles   How many days per week do you miss taking your medication? 0    Spot of right bicept - has had for years       Review of Systems   Constitutional, HEENT, cardiovascular, pulmonary, gi and skin systems are negative, except as otherwise noted.      Objective    /60 (BP Location: Right arm, Patient Position: Sitting, Cuff Size: Adult Large)   Pulse 58   Temp 98.1  F (36.7  C) (Oral)   Resp 15   Ht 1.854 m (6' 1\")   Wt 87.9 kg (193 lb 12.8 oz)   SpO2 97%   BMI 25.57 kg/m    Body mass index is 25.57 kg/m .  Physical Exam   GENERAL: healthy, alert and no distress  CV: regular rates and rhythm, normal S1 S2, no S3 or S4, no murmur, click or rub, peripheral pulses strong, and no peripheral edema  SKIN: there is red patch left upper leg with subcutaneous nodule.  Right arm pink nodular lesion  PSYCH: mentation appears normal, affect flat, judgement and insight intact, and appearance well groomed  Feet:  toenail fungus, monofilament test normal bilateral    Results for orders placed or performed in visit on 10/03/23 (from the past 24 hour(s))   Hemoglobin A1c   Result Value Ref Range    Hemoglobin A1C 7.1 (H) 0.0 - 5.6 %                     "

## 2023-10-03 NOTE — NURSING NOTE
Prior to immunization administration, verified patients identity using patient s name and date of birth. Please see Immunization Activity for additional information.     Screening Questionnaire for Adult Immunization    Are you sick today?   No   Do you have allergies to medications, food, a vaccine component or latex?   No   Have you ever had a serious reaction after receiving a vaccination?   No   Do you have a long-term health problem with heart, lung, kidney, or metabolic disease (e.g., diabetes), asthma, a blood disorder, no spleen, complement component deficiency, a cochlear implant, or a spinal fluid leak?  Are you on long-term aspirin therapy?   No   Do you have cancer, leukemia, HIV/AIDS, or any other immune system problem?   No   Do you have a parent, brother, or sister with an immune system problem?   No   In the past 3 months, have you taken medications that affect  your immune system, such as prednisone, other steroids, or anticancer drugs; drugs for the treatment of rheumatoid arthritis, Crohn s disease, or psoriasis; or have you had radiation treatments?   No   Have you had a seizure, or a brain or other nervous system problem?   No   During the past year, have you received a transfusion of blood or blood    products, or been given immune (gamma) globulin or antiviral drug?   No   For women: Are you pregnant or is there a chance you could become       pregnant during the next month?   No   Have you received any vaccinations in the past 4 weeks?   No     Immunization questionnaire answers were all negative.  Covid and flu high dose       Patient instructed to remain in clinic for 15 minutes afterwards, and to report any adverse reactions.     Screening performed by Lissa Winter MA on 10/3/2023 at 10:24 AM.

## 2023-12-25 ENCOUNTER — APPOINTMENT (OUTPATIENT)
Dept: MRI IMAGING | Facility: CLINIC | Age: 78
DRG: 177 | End: 2023-12-25
Payer: COMMERCIAL

## 2023-12-25 ENCOUNTER — APPOINTMENT (OUTPATIENT)
Dept: GENERAL RADIOLOGY | Facility: CLINIC | Age: 78
DRG: 177 | End: 2023-12-25
Attending: EMERGENCY MEDICINE
Payer: COMMERCIAL

## 2023-12-25 ENCOUNTER — HOSPITAL ENCOUNTER (INPATIENT)
Facility: CLINIC | Age: 78
LOS: 1 days | Discharge: HOME | DRG: 177 | End: 2023-12-27
Attending: EMERGENCY MEDICINE | Admitting: STUDENT IN AN ORGANIZED HEALTH CARE EDUCATION/TRAINING PROGRAM
Payer: COMMERCIAL

## 2023-12-25 ENCOUNTER — APPOINTMENT (OUTPATIENT)
Dept: CT IMAGING | Facility: CLINIC | Age: 78
DRG: 177 | End: 2023-12-25
Attending: EMERGENCY MEDICINE
Payer: COMMERCIAL

## 2023-12-25 DIAGNOSIS — R41.82 ALTERED MENTAL STATUS, UNSPECIFIED ALTERED MENTAL STATUS TYPE: ICD-10-CM

## 2023-12-25 DIAGNOSIS — R53.1 RIGHT SIDED WEAKNESS: ICD-10-CM

## 2023-12-25 LAB
ALBUMIN SERPL BCG-MCNC: 4.2 G/DL (ref 3.5–5.2)
ALBUMIN UR-MCNC: NEGATIVE MG/DL
ALP SERPL-CCNC: 123 U/L (ref 40–150)
ALT SERPL W P-5'-P-CCNC: 23 U/L (ref 0–70)
ANION GAP SERPL CALCULATED.3IONS-SCNC: 7 MMOL/L (ref 7–15)
APPEARANCE UR: CLEAR
APTT PPP: 32 SECONDS (ref 22–38)
AST SERPL W P-5'-P-CCNC: 27 U/L (ref 0–45)
BASOPHILS # BLD AUTO: 0.1 10E3/UL (ref 0–0.2)
BASOPHILS NFR BLD AUTO: 1 %
BILIRUB SERPL-MCNC: 0.5 MG/DL
BILIRUB UR QL STRIP: NEGATIVE
BUN SERPL-MCNC: 30.7 MG/DL (ref 8–23)
CA-I BLD-MCNC: 5 MG/DL (ref 4.4–5.2)
CALCIUM SERPL-MCNC: 9.4 MG/DL (ref 8.8–10.2)
CHLORIDE SERPL-SCNC: 101 MMOL/L (ref 98–107)
COLOR UR AUTO: ABNORMAL
CPB POCT: NO
CREAT BLD-MCNC: 1 MG/DL (ref 0.7–1.3)
CREAT SERPL-MCNC: 1 MG/DL (ref 0.67–1.17)
DEPRECATED HCO3 PLAS-SCNC: 29 MMOL/L (ref 22–29)
EGFRCR SERPLBLD CKD-EPI 2021: 76 ML/MIN/1.73M2
EGFRCR SERPLBLD CKD-EPI 2021: >60 ML/MIN/1.73M2
EOSINOPHIL # BLD AUTO: 0.1 10E3/UL (ref 0–0.7)
EOSINOPHIL NFR BLD AUTO: 1 %
ERYTHROCYTE [DISTWIDTH] IN BLOOD BY AUTOMATED COUNT: 12.7 % (ref 10–15)
GLUCOSE BLD-MCNC: 280 MG/DL (ref 70–99)
GLUCOSE BLDC GLUCOMTR-MCNC: 248 MG/DL (ref 70–99)
GLUCOSE SERPL-MCNC: 280 MG/DL (ref 70–99)
GLUCOSE UR STRIP-MCNC: 300 MG/DL
HCO3 BLDV-SCNC: 27 MMOL/L (ref 21–28)
HCT VFR BLD AUTO: 38.7 % (ref 40–53)
HCT VFR BLD CALC: 37 % (ref 40–53)
HGB BLD-MCNC: 12.6 G/DL (ref 13.3–17.7)
HGB BLD-MCNC: 13.4 G/DL (ref 13.3–17.7)
HGB UR QL STRIP: ABNORMAL
HOLD SPECIMEN: NORMAL
IMM GRANULOCYTES # BLD: 0.1 10E3/UL
IMM GRANULOCYTES NFR BLD: 1 %
INR BLD: 1.4 (ref 2–3)
INR PPP: 1.1 (ref 0.85–1.15)
KETONES UR STRIP-MCNC: NEGATIVE MG/DL
LEUKOCYTE ESTERASE UR QL STRIP: NEGATIVE
LYMPHOCYTES # BLD AUTO: 0.8 10E3/UL (ref 0.8–5.3)
LYMPHOCYTES NFR BLD AUTO: 8 %
MAGNESIUM SERPL-MCNC: 2.1 MG/DL (ref 1.7–2.3)
MCH RBC QN AUTO: 31.7 PG (ref 26.5–33)
MCHC RBC AUTO-ENTMCNC: 34.6 G/DL (ref 31.5–36.5)
MCV RBC AUTO: 92 FL (ref 78–100)
MONOCYTES # BLD AUTO: 1.7 10E3/UL (ref 0–1.3)
MONOCYTES NFR BLD AUTO: 17 %
MUCOUS THREADS #/AREA URNS LPF: PRESENT /LPF
NEUTROPHILS # BLD AUTO: 7.4 10E3/UL (ref 1.6–8.3)
NEUTROPHILS NFR BLD AUTO: 72 %
NITRATE UR QL: NEGATIVE
NRBC # BLD AUTO: 0 10E3/UL
NRBC BLD AUTO-RTO: 0 /100
PCO2 BLDV: 41 MM HG (ref 40–50)
PH BLDV: 7.43 [PH] (ref 7.32–7.43)
PH UR STRIP: 5 [PH] (ref 5–7)
PLATELET # BLD AUTO: 281 10E3/UL (ref 150–450)
PO2 BLDV: 19 MM HG (ref 25–47)
POTASSIUM BLD-SCNC: 4.1 MMOL/L (ref 3.4–5.3)
POTASSIUM SERPL-SCNC: 4.1 MMOL/L (ref 3.4–5.3)
PROT SERPL-MCNC: 7.4 G/DL (ref 6.4–8.3)
RBC # BLD AUTO: 4.23 10E6/UL (ref 4.4–5.9)
RBC URINE: 3 /HPF
SAO2 % BLDV: 31 % (ref 94–100)
SODIUM BLD-SCNC: 140 MMOL/L (ref 135–145)
SODIUM SERPL-SCNC: 137 MMOL/L (ref 135–145)
SP GR UR STRIP: 1.02 (ref 1–1.03)
SQUAMOUS EPITHELIAL: 1 /HPF
TROPONIN T SERPL HS-MCNC: 13 NG/L
UROBILINOGEN UR STRIP-MCNC: NORMAL MG/DL
WBC # BLD AUTO: 10.1 10E3/UL (ref 4–11)
WBC URINE: 1 /HPF

## 2023-12-25 PROCEDURE — 84484 ASSAY OF TROPONIN QUANT: CPT | Performed by: EMERGENCY MEDICINE

## 2023-12-25 PROCEDURE — 82565 ASSAY OF CREATININE: CPT

## 2023-12-25 PROCEDURE — 83880 ASSAY OF NATRIURETIC PEPTIDE: CPT | Performed by: STUDENT IN AN ORGANIZED HEALTH CARE EDUCATION/TRAINING PROGRAM

## 2023-12-25 PROCEDURE — 71045 X-RAY EXAM CHEST 1 VIEW: CPT

## 2023-12-25 PROCEDURE — 70551 MRI BRAIN STEM W/O DYE: CPT

## 2023-12-25 PROCEDURE — 82330 ASSAY OF CALCIUM: CPT

## 2023-12-25 PROCEDURE — A9585 GADOBUTROL INJECTION: HCPCS | Mod: JZ | Performed by: EMERGENCY MEDICINE

## 2023-12-25 PROCEDURE — 82962 GLUCOSE BLOOD TEST: CPT

## 2023-12-25 PROCEDURE — 99291 CRITICAL CARE FIRST HOUR: CPT | Mod: G0,GC | Performed by: STUDENT IN AN ORGANIZED HEALTH CARE EDUCATION/TRAINING PROGRAM

## 2023-12-25 PROCEDURE — 85610 PROTHROMBIN TIME: CPT | Performed by: EMERGENCY MEDICINE

## 2023-12-25 PROCEDURE — 93005 ELECTROCARDIOGRAM TRACING: CPT | Performed by: EMERGENCY MEDICINE

## 2023-12-25 PROCEDURE — 85025 COMPLETE CBC W/AUTO DIFF WBC: CPT | Performed by: EMERGENCY MEDICINE

## 2023-12-25 PROCEDURE — 36415 COLL VENOUS BLD VENIPUNCTURE: CPT | Performed by: EMERGENCY MEDICINE

## 2023-12-25 PROCEDURE — 250N000009 HC RX 250: Performed by: EMERGENCY MEDICINE

## 2023-12-25 PROCEDURE — 83735 ASSAY OF MAGNESIUM: CPT | Performed by: EMERGENCY MEDICINE

## 2023-12-25 PROCEDURE — 250N000011 HC RX IP 250 OP 636: Mod: JZ | Performed by: EMERGENCY MEDICINE

## 2023-12-25 PROCEDURE — 70498 CT ANGIOGRAPHY NECK: CPT

## 2023-12-25 PROCEDURE — 250N000013 HC RX MED GY IP 250 OP 250 PS 637

## 2023-12-25 PROCEDURE — 85610 PROTHROMBIN TIME: CPT

## 2023-12-25 PROCEDURE — 82947 ASSAY GLUCOSE BLOOD QUANT: CPT | Performed by: EMERGENCY MEDICINE

## 2023-12-25 PROCEDURE — 81001 URINALYSIS AUTO W/SCOPE: CPT | Performed by: EMERGENCY MEDICINE

## 2023-12-25 PROCEDURE — 93010 ELECTROCARDIOGRAM REPORT: CPT | Performed by: EMERGENCY MEDICINE

## 2023-12-25 PROCEDURE — 70450 CT HEAD/BRAIN W/O DYE: CPT

## 2023-12-25 PROCEDURE — 84443 ASSAY THYROID STIM HORMONE: CPT | Performed by: STUDENT IN AN ORGANIZED HEALTH CARE EDUCATION/TRAINING PROGRAM

## 2023-12-25 PROCEDURE — 80307 DRUG TEST PRSMV CHEM ANLYZR: CPT | Performed by: EMERGENCY MEDICINE

## 2023-12-25 PROCEDURE — 82077 ASSAY SPEC XCP UR&BREATH IA: CPT | Performed by: EMERGENCY MEDICINE

## 2023-12-25 PROCEDURE — 70496 CT ANGIOGRAPHY HEAD: CPT

## 2023-12-25 PROCEDURE — 255N000002 HC RX 255 OP 636: Mod: JZ | Performed by: EMERGENCY MEDICINE

## 2023-12-25 PROCEDURE — 99291 CRITICAL CARE FIRST HOUR: CPT | Mod: 25 | Performed by: EMERGENCY MEDICINE

## 2023-12-25 PROCEDURE — 99285 EMERGENCY DEPT VISIT HI MDM: CPT | Mod: 25 | Performed by: EMERGENCY MEDICINE

## 2023-12-25 PROCEDURE — 85730 THROMBOPLASTIN TIME PARTIAL: CPT | Performed by: EMERGENCY MEDICINE

## 2023-12-25 RX ORDER — CLOPIDOGREL BISULFATE 75 MG/1
300 TABLET ORAL ONCE
Status: COMPLETED | OUTPATIENT
Start: 2023-12-25 | End: 2023-12-25

## 2023-12-25 RX ORDER — GADOBUTROL 604.72 MG/ML
9 INJECTION INTRAVENOUS ONCE
Status: COMPLETED | OUTPATIENT
Start: 2023-12-25 | End: 2023-12-25

## 2023-12-25 RX ORDER — IOPAMIDOL 755 MG/ML
100 INJECTION, SOLUTION INTRAVASCULAR ONCE
Status: COMPLETED | OUTPATIENT
Start: 2023-12-25 | End: 2023-12-25

## 2023-12-25 RX ADMIN — CLOPIDOGREL BISULFATE 300 MG: 75 TABLET ORAL at 21:33

## 2023-12-25 RX ADMIN — SODIUM CHLORIDE 80 ML: 9 INJECTION, SOLUTION INTRAVENOUS at 20:57

## 2023-12-25 RX ADMIN — IOPAMIDOL 64 ML: 755 INJECTION, SOLUTION INTRAVENOUS at 20:57

## 2023-12-25 RX ADMIN — GADOBUTROL 9 ML: 604.72 INJECTION INTRAVENOUS at 23:14

## 2023-12-25 ASSESSMENT — ACTIVITIES OF DAILY LIVING (ADL)
ADLS_ACUITY_SCORE: 35
ADLS_ACUITY_SCORE: 35

## 2023-12-26 ENCOUNTER — APPOINTMENT (OUTPATIENT)
Dept: OCCUPATIONAL THERAPY | Facility: CLINIC | Age: 78
DRG: 177 | End: 2023-12-26
Attending: STUDENT IN AN ORGANIZED HEALTH CARE EDUCATION/TRAINING PROGRAM
Payer: COMMERCIAL

## 2023-12-26 ENCOUNTER — APPOINTMENT (OUTPATIENT)
Dept: PHYSICAL THERAPY | Facility: CLINIC | Age: 78
DRG: 177 | End: 2023-12-26
Attending: STUDENT IN AN ORGANIZED HEALTH CARE EDUCATION/TRAINING PROGRAM
Payer: COMMERCIAL

## 2023-12-26 PROBLEM — R53.1 RIGHT SIDED WEAKNESS: Status: ACTIVE | Noted: 2023-12-26

## 2023-12-26 LAB
AMPHETAMINES UR QL SCN: NORMAL
BARBITURATES UR QL SCN: NORMAL
BENZODIAZ UR QL SCN: NORMAL
BZE UR QL SCN: NORMAL
CANNABINOIDS UR QL SCN: NORMAL
ETHANOL SERPL-MCNC: <0.01 G/DL
FENTANYL UR QL: NORMAL
FLUAV RNA SPEC QL NAA+PROBE: NEGATIVE
FLUBV RNA RESP QL NAA+PROBE: NEGATIVE
GLUCOSE BLDC GLUCOMTR-MCNC: 175 MG/DL (ref 70–99)
GLUCOSE BLDC GLUCOMTR-MCNC: 199 MG/DL (ref 70–99)
GLUCOSE BLDC GLUCOMTR-MCNC: 205 MG/DL (ref 70–99)
GLUCOSE BLDC GLUCOMTR-MCNC: 208 MG/DL (ref 70–99)
GLUCOSE BLDC GLUCOMTR-MCNC: 221 MG/DL (ref 70–99)
GLUCOSE BLDC GLUCOMTR-MCNC: 235 MG/DL (ref 70–99)
HOLD SPECIMEN: NORMAL
MAGNESIUM SERPL-MCNC: 2.1 MG/DL (ref 1.7–2.3)
NT-PROBNP SERPL-MCNC: 670 PG/ML (ref 0–1800)
OPIATES UR QL SCN: NORMAL
PCP QUAL URINE (ROCHE): NORMAL
POTASSIUM SERPL-SCNC: 4.5 MMOL/L (ref 3.4–5.3)
RSV RNA SPEC NAA+PROBE: NEGATIVE
SARS-COV-2 RNA RESP QL NAA+PROBE: POSITIVE
TSH SERPL DL<=0.005 MIU/L-ACNC: 2.05 UIU/ML (ref 0.3–4.2)

## 2023-12-26 PROCEDURE — 120N000002 HC R&B MED SURG/OB UMMC

## 2023-12-26 PROCEDURE — 84132 ASSAY OF SERUM POTASSIUM: CPT | Performed by: STUDENT IN AN ORGANIZED HEALTH CARE EDUCATION/TRAINING PROGRAM

## 2023-12-26 PROCEDURE — 87637 SARSCOV2&INF A&B&RSV AMP PRB: CPT | Performed by: STUDENT IN AN ORGANIZED HEALTH CARE EDUCATION/TRAINING PROGRAM

## 2023-12-26 PROCEDURE — 97166 OT EVAL MOD COMPLEX 45 MIN: CPT | Mod: GO

## 2023-12-26 PROCEDURE — 82962 GLUCOSE BLOOD TEST: CPT

## 2023-12-26 PROCEDURE — 97161 PT EVAL LOW COMPLEX 20 MIN: CPT | Mod: GP

## 2023-12-26 PROCEDURE — 97535 SELF CARE MNGMENT TRAINING: CPT | Mod: GO

## 2023-12-26 PROCEDURE — 83735 ASSAY OF MAGNESIUM: CPT | Performed by: STUDENT IN AN ORGANIZED HEALTH CARE EDUCATION/TRAINING PROGRAM

## 2023-12-26 PROCEDURE — 36415 COLL VENOUS BLD VENIPUNCTURE: CPT | Performed by: STUDENT IN AN ORGANIZED HEALTH CARE EDUCATION/TRAINING PROGRAM

## 2023-12-26 PROCEDURE — 250N000013 HC RX MED GY IP 250 OP 250 PS 637: Performed by: STUDENT IN AN ORGANIZED HEALTH CARE EDUCATION/TRAINING PROGRAM

## 2023-12-26 PROCEDURE — 250N000012 HC RX MED GY IP 250 OP 636 PS 637: Performed by: STUDENT IN AN ORGANIZED HEALTH CARE EDUCATION/TRAINING PROGRAM

## 2023-12-26 PROCEDURE — 99222 1ST HOSP IP/OBS MODERATE 55: CPT | Performed by: STUDENT IN AN ORGANIZED HEALTH CARE EDUCATION/TRAINING PROGRAM

## 2023-12-26 RX ORDER — ASPIRIN 81 MG/1
81 TABLET, CHEWABLE ORAL DAILY
Status: DISCONTINUED | OUTPATIENT
Start: 2023-12-26 | End: 2023-12-27 | Stop reason: HOSPADM

## 2023-12-26 RX ORDER — ACETAMINOPHEN 650 MG/1
650 SUPPOSITORY RECTAL EVERY 4 HOURS PRN
Status: DISCONTINUED | OUTPATIENT
Start: 2023-12-26 | End: 2023-12-27 | Stop reason: HOSPADM

## 2023-12-26 RX ORDER — CALCIUM CARBONATE 500 MG/1
1000 TABLET, CHEWABLE ORAL 4 TIMES DAILY PRN
Status: DISCONTINUED | OUTPATIENT
Start: 2023-12-26 | End: 2023-12-27 | Stop reason: HOSPADM

## 2023-12-26 RX ORDER — LOSARTAN POTASSIUM 25 MG/1
25 TABLET ORAL DAILY
Status: DISCONTINUED | OUTPATIENT
Start: 2023-12-26 | End: 2023-12-27 | Stop reason: HOSPADM

## 2023-12-26 RX ORDER — SERTRALINE HYDROCHLORIDE 100 MG/1
100 TABLET, FILM COATED ORAL DAILY
Status: DISCONTINUED | OUTPATIENT
Start: 2023-12-26 | End: 2023-12-27 | Stop reason: HOSPADM

## 2023-12-26 RX ORDER — ACETAMINOPHEN 325 MG/1
650 TABLET ORAL EVERY 4 HOURS PRN
Status: DISCONTINUED | OUTPATIENT
Start: 2023-12-26 | End: 2023-12-27 | Stop reason: HOSPADM

## 2023-12-26 RX ORDER — AMOXICILLIN 250 MG
2 CAPSULE ORAL 2 TIMES DAILY PRN
Status: DISCONTINUED | OUTPATIENT
Start: 2023-12-26 | End: 2023-12-27 | Stop reason: HOSPADM

## 2023-12-26 RX ORDER — NICOTINE POLACRILEX 4 MG
15-30 LOZENGE BUCCAL
Status: DISCONTINUED | OUTPATIENT
Start: 2023-12-26 | End: 2023-12-27 | Stop reason: HOSPADM

## 2023-12-26 RX ORDER — DEXTROSE MONOHYDRATE 25 G/50ML
25-50 INJECTION, SOLUTION INTRAVENOUS
Status: DISCONTINUED | OUTPATIENT
Start: 2023-12-26 | End: 2023-12-27 | Stop reason: HOSPADM

## 2023-12-26 RX ORDER — ATORVASTATIN CALCIUM 40 MG/1
40 TABLET, FILM COATED ORAL DAILY
Status: DISCONTINUED | OUTPATIENT
Start: 2023-12-26 | End: 2023-12-27 | Stop reason: HOSPADM

## 2023-12-26 RX ORDER — LIDOCAINE 40 MG/G
CREAM TOPICAL
Status: DISCONTINUED | OUTPATIENT
Start: 2023-12-26 | End: 2023-12-27 | Stop reason: HOSPADM

## 2023-12-26 RX ORDER — AMOXICILLIN 250 MG
1 CAPSULE ORAL 2 TIMES DAILY PRN
Status: DISCONTINUED | OUTPATIENT
Start: 2023-12-26 | End: 2023-12-27 | Stop reason: HOSPADM

## 2023-12-26 RX ADMIN — ATORVASTATIN CALCIUM 40 MG: 40 TABLET, FILM COATED ORAL at 08:35

## 2023-12-26 RX ADMIN — INSULIN ASPART 2 UNITS: 100 INJECTION, SOLUTION INTRAVENOUS; SUBCUTANEOUS at 17:45

## 2023-12-26 RX ADMIN — INSULIN ASPART 2 UNITS: 100 INJECTION, SOLUTION INTRAVENOUS; SUBCUTANEOUS at 21:42

## 2023-12-26 RX ADMIN — INSULIN ASPART 1 UNITS: 100 INJECTION, SOLUTION INTRAVENOUS; SUBCUTANEOUS at 08:38

## 2023-12-26 RX ADMIN — INSULIN ASPART 2 UNITS: 100 INJECTION, SOLUTION INTRAVENOUS; SUBCUTANEOUS at 12:30

## 2023-12-26 RX ADMIN — INSULIN ASPART 2 UNITS: 100 INJECTION, SOLUTION INTRAVENOUS; SUBCUTANEOUS at 05:49

## 2023-12-26 RX ADMIN — Medication 1000 MCG: at 08:33

## 2023-12-26 RX ADMIN — LOSARTAN POTASSIUM 25 MG: 25 TABLET, FILM COATED ORAL at 08:33

## 2023-12-26 RX ADMIN — ACETAMINOPHEN 650 MG: 325 TABLET, FILM COATED ORAL at 20:00

## 2023-12-26 RX ADMIN — ASPIRIN 81 MG CHEWABLE TABLET 81 MG: 81 TABLET CHEWABLE at 08:33

## 2023-12-26 ASSESSMENT — ACTIVITIES OF DAILY LIVING (ADL)
ADLS_ACUITY_SCORE: 46
ADLS_ACUITY_SCORE: 35
ADLS_ACUITY_SCORE: 48
ADLS_ACUITY_SCORE: 46
ADLS_ACUITY_SCORE: 48
ADLS_ACUITY_SCORE: 46
ADLS_ACUITY_SCORE: 39
ADLS_ACUITY_SCORE: 46
ADLS_ACUITY_SCORE: 46
ADLS_ACUITY_SCORE: 42

## 2023-12-26 NOTE — PHARMACY
Pharmacy Stroke Code Response    Pharmacist responded as part of the Stroke Code Team activation to patient care area ED. The Stroke Team determined that the patient was not a candidate for IV thrombolytic therapy and the pharmacy team was dismissed at 2130 by Dr. Hood.    DARRYL DE LA TORRE [facility]

## 2023-12-26 NOTE — ED NOTES
[id number], O2 sats noted in the high 80s. 2L of  O2 applied via NC. O2 stable in the low 90's. BP stable. Pt A/O to self. Pt able to follow verbal command. Pt is assist of 1 for all transferring and repositioning. Pt calm and cooperative. [facility] Wife, Adilia contacted and informed. Pt direct phone numbers provided to Adilia.

## 2023-12-26 NOTE — ED TRIAGE NOTES
Right sided weakness, unsteady gait.  Altered mental status.     Triage Assessment (Adult)       Row Name 12/25/23 2031          Triage Assessment    Airway WDL WDL        Respiratory WDL    Respiratory WDL WDL        Skin Circulation/Temperature WDL    Skin Circulation/Temperature WDL WDL        Cardiac WDL    Cardiac WDL WDL        Cognitive/Neuro/Behavioral WDL    Cognitive/Neuro/Behavioral WDL WDL

## 2023-12-26 NOTE — PLAN OF CARE
"Goal Outcome Evaluation:      Plan of Care Reviewed With: patient    Overall Patient Progress: no changeOverall Patient Progress: no change     Patient admitted to unit 12/26/23 for signs of stoke symptoms - ruled out. Patient positive for COVID 19, contributing to confusion and s/s.      VS: /61 (BP Location: Left arm, Patient Position: Supine, Cuff Size: Adult Regular)   Pulse 64   Temp 98.6  F (37  C) (Oral)   Resp 20   Ht 1.854 m (6' 1\")   Wt 90.1 kg (198 lb 11.2 oz)   SpO2 98%   BMI 26.22 kg/m       O2: 95% on 1 LPM.   Output: Incontinent at times of bowel and bladder, brief in place but encouraged to call to use bathroom.   Last BM: 12/25/23 -per patient.   Activity: SBA to bathroom.     Skin: Visible skin intact.   Pain: 0/10 pain.   CMS: Alert and oriented to self. Disoriented to place, situation and time.   Dressing: None.   Diet: Regular, tolerating well. No complaints of nausea or vomiting. BG with meals.    LDA: R and L PIV - SL   Equipment: Personal belongings.   Plan: Monitor sighs and symptoms of delirium.   Additional Info:        "

## 2023-12-26 NOTE — PROGRESS NOTES
12/26/23 1300   Appointment Info   Signing Clinician's Name / Credentials (OT) Yolanda Puga, OTR/L   Rehab Comments (OT) COVID +   Living Environment   People in Home spouse   Current Living Arrangements house   Home Accessibility stairs to enter home;stairs within home   Number of Stairs, Main Entrance 2   Stair Railings, Main Entrance railing on left side (ascending)   Number of Stairs, Within Home, Primary greater than 10 stairs   Stair Railings, Within Home, Primary railings safe and in good condition   Living Environment Comments Pt able to live on main level with WIS   Self-Care   Usual Activity Tolerance good   Current Activity Tolerance moderate   Equipment Currently Used at Home none   Fall history within last six months yes   Number of times patient has fallen within last six months 1   Activity/Exercise/Self-Care Comment ind with I/ADL at baseline, walks 3 miles daily   General Information   Onset of Illness/Injury or Date of Surgery 12/25/23   Referring Physician Dr. Simmons   Patient/Family Therapy Goal Statement (OT) none stated   Additional Occupational Profile Info/Pertinent History of Current Problem per chart, 78 year old male admitted on 12/25/2023. He has a history of hereditary spherocytosis, HTN, hearing loss, diabetes and depression presenting for acute altered mental status with right sided weakness.   Existing Precautions/Restrictions fall   Limitations/Impairments safety/cognitive   Left Upper Extremity (Weight-bearing Status) full weight-bearing (FWB)   Right Upper Extremity (Weight-bearing Status) full weight-bearing (FWB)   Left Lower Extremity (Weight-bearing Status) full weight-bearing (FWB)   Right Lower Extremity (Weight-bearing Status) full weight-bearing (FWB)   General Observations and Info COVID +   Cognitive Status Examination   Orientation Status orientation to person, place and time   Affect/Mental Status (Cognitive) confused   Follows Commands WFL;repetition of directions  required   Safety Deficit awareness of need for assistance;insight into deficits/self-awareness   Cognitive Status Comments per chart review, neuropsych in 2018 and 2019, weaknesses consistent with mild compromise of the patient's frontal lobes, executive functioning and learning new information. Neuropsych thought depression was likely contributing.   Cognitive Screens/Assessments   Cognitive Assessments Completed [facility] Mental Status Exam (UMS):  Total Score out of /30 11/30   SLUMS Norms 1-20 equals dementia   SLUMS Domains assessed: orientation, memory, attention, executive functions   SLUMS Interpretation Pt had difficulty with attention, working memory, recall, short term memory, executive function. Per chart review, pt was assessed by neuropsych in 2018 and 2019, weaknesses consistent with mild compromise of the patient's frontal lobes, executive functioning and learning new information. Neuropsych thought depression was likely contributing. Recommend that pt have 24/7 supervision for safety and assist with financial management, medicaiotn management, driving, cooking and all higher level tasks.   Visual Perception   Visual Impairment/Limitations corrective lenses full-time   Sensory   Sensory Quick Adds sensation intact   Pain Assessment   Patient Currently in Pain No   Posture   Posture not impaired   Range of Motion Comprehensive   General Range of Motion no range of motion deficits identified   Strength Comprehensive (MMT)   Comment, General Manual Muscle Testing (MMT) Assessment generalized weakness   Muscle Tone Assessment   Muscle Tone Quick Adds No deficits were identified   Coordination   Upper Extremity Coordination No deficits were identified   Bed Mobility   Bed Mobility supine-sit;sit-supine   Supine-Sit [place] (Bed Mobility) supervision   Sit-Supine [place] (Bed Mobility) supervision   Transfers   Transfers sit-stand transfer;toilet transfer   Sit-Stand  Transfer   Sit-Stand [place] (Transfers) supervision   Toilet Transfer   [place] Level (Toilet Transfer) supervision   Balance   Balance Assessment no deficits identified   Activities of Daily Living   BADL Assessment/Intervention lower body dressing;toileting   Lower Body Dressing Assessment/Training   [place] Level (Lower Body Dressing) supervision   Toileting   [place] Level (Toileting) supervision   Clinical Impression   Criteria for Skilled Therapeutic Interventions Met (OT) Yes, treatment indicated   OT Diagnosis decreased ADL ind   Influenced by the following impairments COVID   OT Problem List-Impairments impacting ADL problems related to;cognition   Assessment of Occupational Performance 3-5 Performance Deficits   Identified Performance Deficits financial management, medication management, driving   Planned Therapy Interventions (OT) cognition;ADL retraining   Clinical Decision Making Complexity (OT) detailed assessment/moderate complexity   Risk & Benefits of therapy have been explained evaluation/treatment results reviewed;patient;spouse/significant other   OT Total Evaluation Time   OT Eval, Moderate Complexity Minutes (40675) 20   OT Goals   Therapy Frequency (OT) 3 times/week   OT Predicted Duration/Target Date for Goal Attainment 12/29/23   OT Goals Cognition;OT Goal 1;Hygiene/Grooming   OT: Hygiene/Grooming independent   OT: Cognitive Patient/caregiver will verbalize understanding of cognitive assessment results/recommendations as needed for safe discharge planning   OT: Goal 1 Pt will complete medication management task with SBA.   Interventions   Interventions Quick Adds Self-Care/Home Management   Self-Care/Home Management   Self-Care/Home Mgmt/ADL, Compensatory, Meal Prep Minutes (65668) 23   Symptoms Noted During/After Treatment (Meal Preparation/Planning Training) none   Treatment Detail/Skilled Intervention Pt supine in bed upon OT arrival and agreeable to therapy. Educ on  role of OT. Educ on score of SLUMS. Educ in depth on safety concerns with higher level tasks including  but not limited to driving, financial management, medication management. Pt and Pt's wife verbalize understanding. Educ on further neuropsych testing if cog does not clear when pt no longer has COVID. Per wife's request, educ on safety at home including AE. All questions answered within scope of OT. Pt left supine in bed with needs in reach and wife present.   OT Discharge Planning   OT Plan cog tasks (medication set-up, financial management, tasks on phone, ect.)   OT Discharge Recommendation (DC Rec) home with assist   OT Rationale for DC Rec Pt scored 11/30 on the SLUMS. Per chart review, pt was assessed by neuropsych in 2018 and 2019, weaknesses consistent with mild compromise of the patient's frontal lobes, executive functioning and learning new information. Neuropsych thought depression was likely contributing. Recommend that pt have 24/7 supervision for safety and assist with financial management, medicaiotn management, driving, cooking and all higher level tasks.   OT Brief overview of current status 11/30 SLUMS   Total Session Time   Timed Code Treatment Minutes 23   Total Session Time (sum of timed and untimed services) 43

## 2023-12-26 NOTE — ED NOTES
[facility]   ED Nurse to Floor Handoff     Gerber Levine is a 78 year old male who speaks English and lives with a spouse,  in a home  They arrived in the ED by car from emergency room    ED Chief Complaint: Stroke Symptoms    ED Dx;   Final diagnoses:   Altered mental status, unspecified altered mental status type   Right sided weakness         Needed?: No    Allergies:   Allergies   Allergen Reactions    Oxycodone Itching    Shrimp Swelling   .  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    Type II or unspecified type diabetes mellitus without mention of complication, not stated as uncontrolled     diet controlled      Baseline Mental status: alert to self, confused  Current Mental Status changes: other alert to self     Infection present or suspected this encounter: no  Sepsis suspected: No  Isolation type: No active isolations  Patient tested for COVID 19 prior to admission: NO     Activity level - Baseline/Home:  Stand with Assist  Activity Level - Current:   Stand with Assist    Bariatric equipment needed?: No    In the ED these meds were given:   Medications   iopamidol (ISOVUE-370) solution 100 mL (64 mLs Intravenous $Given 12/25/23 2057)   sodium chloride 0.9 % bag 500 mL for CT scan flush use (80 mLs Intravenous $Given 12/25/23 2057)   clopidogrel (PLAVIX) tablet 300 mg (300 mg Oral $Given 12/25/23 2133)   gadobutrol (GADAVIST) injection 9 mL (9 mLs Intravenous $Given 12/25/23 2314)       Drips running?  No    Home pump  No    Current LDAs  Peripheral IV  12/25/23 Left Hand (Active)   Site Assessment WDL 12/25/23 2038   Line Status Saline locked 12/25/23 2038   Dressing Gauze 12/25/23 2038   Dressing Status clean;dry;intact 12/25/23 2038   Number of days: 1       Peripheral IV 12/25/23 Anterior;Distal;Right Upper arm (Active)   Site Assessment WDL 12/25/23 2038   Line Status Saline locked 12/25/23 2038   Dressing Status clean;dry;intact 12/25/23 2038   Dressing Intervention New dressing  12/25/23 2038   Number of days: 1       Peripheral IV 09/23/16 Right;Anterior Lower forearm (Active)   Number of days: 2650       Incision/Surgical Site 09/21/16 Right Chest (Active)   Number of days: 2652       Labs results:   Labs Ordered and Resulted from Time of ED Arrival to Time of ED Departure   COMPREHENSIVE METABOLIC PANEL - Abnormal       Result Value    Sodium 137      Potassium 4.1      Carbon Dioxide (CO2) 29      Anion Gap 7      Urea Nitrogen 30.7 (*)     Creatinine 1.00      GFR Estimate 76      Calcium 9.4      Chloride 101      Glucose 280 (*)     Alkaline Phosphatase 123      AST 27      ALT 23      Protein Total 7.4      Albumin 4.2      Bilirubin Total 0.5     GLUCOSE BY METER - Abnormal    GLUCOSE BY METER POCT 248 (*)    ROUTINE UA WITH MICROSCOPIC REFLEX TO CULTURE - Abnormal    Color Urine Light Yellow      Appearance Urine Clear      Glucose Urine 300 (*)     Bilirubin Urine Negative      Ketones Urine Negative      Specific Gravity Urine 1.020      Blood Urine Moderate (*)     pH Urine 5.0      Protein Albumin Urine Negative      Urobilinogen Urine Normal      Nitrite Urine Negative      Leukocyte Esterase Urine Negative      Mucus Urine Present (*)     RBC Urine 3 (*)     WBC Urine 1      Squamous Epithelials Urine 1     ISTAT GASES ELECTROLYTES ICA GLUCOSE VENOUS POCT - Abnormal    CPB Applied No      Hematocrit POCT 37 (*)     Calcium, Ionized Whole Blood POCT 5.0      Glucose Whole Blood POCT 280 (*)     Bicarbonate Venous POCT 27      Hemoglobin POCT  12.6 (*)     Potassium POCT 4.1      Sodium POCT 140      pCO2 Venous POCT 41      pO2 Venous POCT 19 (*)     pH Venous POCT 7.43      O2 Sat, Venous POCT 31 (*)    ISTAT INR POCT - Abnormal    INR POCT 1.4 (*)    CBC WITH PLATELETS AND DIFFERENTIAL - Abnormal    WBC Count 10.1      RBC Count 4.23 (*)     Hemoglobin 13.4      Hematocrit 38.7 (*)     MCV 92      MCH 31.7      MCHC 34.6      RDW 12.7      Platelet Count 281      % Neutrophils 72      % Lymphocytes 8      % Monocytes 17      % Eosinophils 1      % Basophils 1      % Immature Granulocytes 1      NRBCs per 100 WBC 0      Absolute Neutrophils 7.4      Absolute Lymphocytes 0.8      Absolute Monocytes 1.7 (*)     Absolute Eosinophils 0.1      Absolute Basophils 0.1      Absolute Immature Granulocytes 0.1      Absolute NRBCs 0.0     GLUCOSE BY METER - Abnormal    GLUCOSE BY METER POCT 208 (*)    INR - Normal    INR 1.10     PARTIAL THROMBOPLASTIN TIME - Normal    aPTT 32     TROPONIN T, HIGH SENSITIVITY - Normal    Troponin T, High Sensitivity 13     ISTAT CREATININE POCT - Normal    Creatinine POCT 1.0      GFR, ESTIMATED POCT >60     MAGNESIUM - Normal    Magnesium 2.1     ETHYL ALCOHOL LEVEL - Normal    Alcohol ethyl <0.01     URINE DRUG SCREEN PANEL - Normal    Amphetamines Urine Screen Negative      Barbituates Urine Screen Negative      Benzodiazepine Urine Screen Negative      Cannabinoids Urine Screen Negative      Cocaine Urine Screen Negative      Fentanyl Qual Urine Screen Negative      Opiates Urine Screen Negative      PCP Urine Screen Negative     NT PROBNP INPATIENT - Normal    N terminal Pro BNP Inpatient 670     GLUCOSE MONITOR NURSING POCT   INFLUENZA A/B, RSV, & SARS-COV2 PCR       Imaging Studies:   Recent Results (from the past 24 hour(s))   CT Head w/o Contrast    Narrative    EXAM: CT HEAD W/O CONTRAST  LOCATION: [facility]  DATE/TIME: 12/25/2023 8:49 PM CST    INDICATION: Weakness and  confusion.  COMPARISON: None available at time of dictation  TECHNIQUE: Routine CT Head without IV contrast. Multiplanar reformats. Dose reduction techniques were used.    FINDINGS:   INTRACRANIAL CONTENTS: No acute intracranial hemorrhage. No CT evidence of acute infarct. Sequelae of mild chronic microangiopathy. Mild cerebral volume loss without hydrocephalus. No extra-axial fluid collections.  Patent basal cisterns.     VISUALIZED ORBITS/SINUSES/MASTOIDS: Postoperative change of bilateral lenses, otherwise the orbits are unremarkable. The mild paranasal sinus mucosal thickening. The temporal bone structures are well-aerated.     BONES/SOFT TISSUES: The calvarium and skull base are unremarkable.       Impression    IMPRESSION:     1. Senescent changes and sequelae of chronic microangiopathy without acute intracranial abnormality.    - - - - - - - - - - - - - - - - - - - - - - - - - - - - - - - - - - - - - - - - - - - - - - - - - - - - - - - - - - - - - - - - - - - - - - - - - - - -   The results above were discussed with Dr Hood on 12/25/2023 9:09 PM CST by Dr. Poncho Murphy.  - - - - - - - - - - - - - - - - - - - - - - - - - - - - - - - - - - - - - - - - - - - - - - - - - - - - - - - - - - - - - - - - - - - - - - - - - - - -   XR Chest 1 View    Narrative    EXAM: XR CHEST 1 VIEW  LOCATION: [facility]  DATE: 12/25/2023    INDICATION: Altered mental status.  COMPARISON: 10/09/2016.      Impression    IMPRESSION: Small lung volumes and crowding of pulmonary vasculature. Possible mild pulmonary vascular congestion. No pleural effusion or pneumothorax.    Unchanged scarring and postsurgical change at the right upper and midlung. Previous partial right rib resection.    Cardiomediastinal silhouette is normal. Atherosclerosis of the thoracic aorta.   CTA Head Neck with Contrast    Narrative    EXAM: CTA HEAD NECK W CONTRAST  LOCATION: [facility]  [facility]  DATE: 12/25/2023    INDICATION: Weakness and confusion  COMPARISON: CT head dated 12/25/2023.  CONTRAST: 64 mL Isovue 370  TECHNIQUE: Head and neck CT angiogram with IV contrast. Axial helical CT images of the head and neck vessels obtained during the arterial phase of intravenous contrast administration. Axial 2D reconstructed images and multiplanar 3D MIP reconstructed   images of the head and neck vessels were performed by the technologist. Dose reduction techniques were used. All stenosis measurements made according to NASCET criteria unless otherwise specified.    FINDINGS:   HEAD CTA:  ANTERIOR CIRCULATION: No stenosis/occlusion, aneurysm, or high flow vascular malformation. Hypoplastic A1 segment of the left anterior cerebral artery. The anterior communicating artery is patent. Fetal origin of the posterior cerebral arteries.     POSTERIOR CIRCULATION: No stenosis/occlusion, aneurysm, or high flow vascular malformation. Right dominant vertebrobasilar circulation. The fenestrated basilar artery is unremarkable and gives rise to patent superior cerebellar arteries and hypoplastic   P1 segments of the posterior cerebral arteries.     DURAL VENOUS SINUSES: Patent.    NECK CTA:  RIGHT CAROTID: Normal course and persist caliber of the common carotid artery. Atherosclerotic disease at the carotid artery bifurcation without stenosis or dissection.    LEFT CAROTID: Normal course and persist caliber of the common carotid artery. Normal course and persist caliber of the extracranial internal carotid artery without evidence of stenosis or dissection.    VERTEBRAL ARTERIES: Redemonstrated right vertebral artery which joins at the C3-C4 vertebral body level and forms a dominant right vertebral artery. The left vertebral artery arises record from the aortic arch and is congenitally hypoplastic. No stenosis   or dissection.    AORTIC ARCH: The left vertebral artery arises directly from the aortic  arch. The origins of the great vessels are unremarkable without significant stenosis.    NONVASCULAR STRUCTURES: There are no masses or enlarged lymph nodes in the neck. The submandibular, parotid, and thyroid glands are unremarkable. Multilevel degenerative changes without high-grade spinal canal stenosis or neural foraminal narrowing. No   aggressive osseous lesions. The right lateral chest wall resection changes. The visualized lungs are clear.      Impression    IMPRESSION:    HEAD CTA:  1. Variant anatomy, otherwise unremarkable intracranial vasculature.    NECK CTA:  1. Variant anatomy, otherwise unremarkable neck vasculature.     - - - - - - - - - - - - - - - - - - - - - - - - - - - - - - - - - - - - - - - - - - - - - - - - - - - - - - - - - - - - - - - - - - - - - - - - - - - -   The results above were discussed with Dr Hood on 12/25/2023 9:33 PM CST by Dr. Poncho Murphy.  - - - - - - - - - - - - - - - - - - - - - - - - - - - - - - - - - - - - - - - - - - - - - - - - - - - - - - - - - - - - - - - - - - - - - - - - - - - -     MR Brain w/o Contrast    Narrative    EXAM: MR BRAIN W/O CONTRAST  LOCATION: [facility]  DATE: 12/25/2023    INDICATION: Weakness and confusion. Possible stroke.  COMPARISON: CT head 12/25/2023  TECHNIQUE: Routine multiplanar multisequence head MRI without intravenous contrast.    FINDINGS:  Motion artifact limits aspects of exam.    INTRACRANIAL CONTENTS: No acute or subacute infarct. No mass, acute hemorrhage, or extra-axial fluid collections. Scattered nonspecific T2/FLAIR hyperintensities within the cerebral white matter most consistent with mild chronic microvascular ischemic   change. Mild to moderate generalized cerebral atrophy. No hydrocephalus. Normal position of the cerebellar tonsils.     SELLA: No abnormality accounting for technique.    OSSEOUS STRUCTURES/SOFT TISSUES: Normal marrow signal. The major intracranial  "vascular flow voids are maintained.     ORBITS: No abnormality accounting for technique.     SINUSES/MASTOIDS: Mild to moderate mucosal thickening scattered about the paranasal sinuses. No middle ear or mastoid effusion.       Impression    IMPRESSION:  1.  No acute infarct.  2.  Age-related changes described above.         Recent vital signs:   /54   Pulse 71   Temp 99.6  F (37.6  C) (Oral)   Resp 21   Ht 1.854 m (6' 1\")   Wt 90.1 kg (198 lb 11.2 oz)   SpO2 92%   BMI 26.22 kg/m      [place] Coma Scale Score: 15 (12/26/23 0240)       Cardiac Rhythm: Normal Sinus  Pt needs tele? Yes  Skin/wound Issues: None    Code Status:    Pain control: fair    Nausea control: N/A    Abnormal labs/tests/findings requiring intervention: refer to labs and other interventions     Family present during ED course? Yes   Family Comments/Social Situation comments:     Tasks needing completion:refer to MD pk Tiwari, RN  [phone] [place] ED  [phone] [facility] ED     "

## 2023-12-26 NOTE — ED PROVIDER NOTES
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  The patient presents to the emergency department today accompanied by his wife who provides most of the history.  She states that the patient has seemed less energetic than usual all day.  She also notes that he seems to be having some difficulty walking, veering to the right.  His speech has not been normal either and he has seemed quite confused.  She states that he attempted to drive their car home and he was driving somewhat erratically and she became quite concerned and told him to pull over, which he was reluctant to do.  The patient does not have any prior history of stroke.  His wife thinks he may be on some blood thinning medication, possibly Coumadin, but notes that all of his medications should be listed in our medical record.  I do not see any blood thinning medication other than 81 mg of aspirin daily.    The patient's wife notes that at baseline, the patient is alert and oriented x 4 and is able to ambulate independently without difficulty.  She states that the patient has commented that he feels he is having some memory difficulties lately, however, workup has been negative and she and others have not picked up on any of these symptoms.    Last known well was approximately 2 hours prior to arrival per his wife.    I have reviewed the Medications, Allergies, Past Medical and Surgical History, and Social History in the [employer] system.    Past Medical History:   Diagnosis Date    Allergic rhinitis, cause unspecified     Cancer (H) 06/14/2016    Depressive disorder     Dysthymic disorder      Essential hypertension, benign 10/14/2015    Hereditary spherocytosis (H24)     NONSPECIFIC MEDICAL HISTORY 5/02    DEMENTIA W/U NEGATIVE    PONV (postoperative nausea and vomiting)     Pure hypercholesterolemia     Soft tissue sarcoma of chest wall (H) 7/13/2016    Type II or unspecified type diabetes mellitus without mention of complication, not stated as uncontrolled     diet controlled     Past Surgical History:   Procedure Laterality Date    ABDOMEN SURGERY  1976    Spleen removed    APPENDECTOMY  1976    BIOPSY  2016    BRONCHOSCOPY FLEXIBLE N/A 9/21/2016    Procedure: BRONCHOSCOPY FLEXIBLE;  Surgeon: Leah Nixon MD;  Location: [facility] OR    CHOLECYSTECTOMY  1976    COLONOSCOPY  2006    ENT SURGERY  Colestiatoma removal    1981?    EYE SURGERY  2014    cataracts    HERNIA REPAIR  1976    REPAIR CHEST WALL N/A 9/21/2016    Procedure: REPAIR CHEST WALL;  Surgeon: Leah Nixon MD;  Location: [facility] OR    SURGICAL HISTORY OF -   1976    SPLENECTOMY    SURGICAL HISTORY OF -       APPENDECTOMY    SURGICAL HISTORY OF -       CHOLECYSTECTOMY    SURGICAL HISTORY OF -       HERNIAORRHAPHY    SURGICAL HISTORY OF -   1981    cholesteotoma     No current facility-administered medications for this encounter.     Current Outpatient Medications   Medication    ASPIRIN 81 MG OR TABS    atorvastatin (LIPITOR) 40 MG tablet    blood glucose (ACCU-CHEK RODRI PLUS) test strip    blood glucose (CONTOUR NEXT TEST) test strip    blood glucose (NO BRAND SPECIFIED) lancets standard    blood glucose calibration (ACCU-CHEK RODRI) solution    blood glucose monitoring (ACCU-CHEK RODRI PLUS) meter device kit    blood glucose monitoring (NO BRAND SPECIFIED) meter device kit    CALCITRATE/VITAMIN D 315-200 MG-UNIT OR TABS    Cyanocobalamin (B-12 PO)    dextromethorphan-guaiFENesin (MUCINEX DM)  MG 12 hr tablet    losartan (COZAAR) 25 MG tablet    metFORMIN (GLUCOPHAGE) 500 MG tablet    Microlet Lancets MISC    sertraline (ZOLOFT) 100 MG  tablet    sertraline (ZOLOFT) 100 MG tablet    sertraline (ZOLOFT) 50 MG tablet    triamcinolone (KENALOG) 0.5 % external ointment     Allergies   Allergen Reactions    Oxycodone Itching    Shrimp Swelling     Past medical history, past surgical history, medications, and allergies were reviewed with the patient. Additional pertinent items: None    Social History     Socioeconomic History    Marital status:      Spouse name: Denisha    Number of children: 2    Years of education: 14    Highest education level: Not on file   Occupational History    Not on file   Tobacco Use    Smoking status: Never    Smokeless tobacco: Never   Vaping Use    Vaping Use: Never used   Substance and Sexual Activity    Alcohol use: Yes     Comment: Occasionaly    Drug use: No    Sexual activity: Not Currently     Partners: Female     Birth control/protection: None   Other Topics Concern    Parent/sibling w/ CABG, MI or angioplasty before 65F 55M? No   Social History Narrative    Dairy/d 3-4 servings/d.     Caffeine 2-3 servings/d    Exercise 6 x week walk    Sunscreen used - Yes    Seatbelts used - Yes    Working smoke/CO detectors in the home - Yes    Guns stored in the home - Yes    Self Breast Exams - NA    Self Testicular Exam -Yes    Eye Exam up to date - Yes    Dental Exam up to date - Yes    Pap Smear up to date - NA    Mammogram up to date - NA    PSA up to date - unknown    Dexa Scan up to date - NA    Flex Sig / Colonoscopy up to date - Yes    Immunizations up to date - Yes    Abuse: Current or Past(Physical, Sexual or Emotional)- No    Do you feel safe in your environment - Yes    KWABENA VELASQUEZ LPN.    06        Mom  at age 89 from Alzheimer    DAd  at age 56 from pneumonia/COPD     for 46 years    Sibling: one brother  in accident.  Sister  at age 33 from kidney failure w/ down syndrome.  Two other brothers alive and well.    Two adult children in good health.     Social Determinants of Health      Financial Resource Strain: Low Risk  (9/30/2023)    Financial Resource Strain     Within the past 12 months, have you or your family members you live with been unable to get utilities (heat, electricity) when it was really needed?: No   Food Insecurity: Low Risk  (9/30/2023)    Food Insecurity     Within the past 12 months, did you worry that your food would run out before you got money to buy more?: No     Within the past 12 months, did the food you bought just not last and you didn t have money to get more?: No   Transportation Needs: Low Risk  (9/30/2023)    Transportation Needs     Within the past 12 months, has lack of transportation kept you from medical appointments, getting your medicines, non-medical meetings or appointments, work, or from getting things that you need?: No   Physical Activity: Not on file   Stress: Not on file   Social Connections: Not on file   Interpersonal Safety: Low Risk  (10/3/2023)    Interpersonal Safety     Do you feel physically and emotionally safe where you currently live?: Yes     Within the past 12 months, have you been hit, slapped, kicked or otherwise physically hurt by someone?: No     Within the past 12 months, have you been humiliated or emotionally abused in other ways by your partner or ex-partner?: No   Housing Stability: Low Risk  (9/30/2023)    Housing Stability     Do you have housing? : Yes     Are you worried about losing your housing?: No     Social history was reviewed with the patient. Additional pertinent items: None    Review of Systems  A medically appropriate review of systems was performed with pertinent positives and negatives noted in the HPI, and all other systems negative.    Physical Exam          General: Well nourished, well developed, NAD  HEENT: EOMI, anicteric. NCAT, MMM  Neck: no jugular venous distension, supple, nl ROM  Cardiac: Regular rate and rhythm. No murmurs, rubs, or gallops. Normal S1, S2.  Intact peripheral pulses  Pulm: CTAB, no  stridor, wheezes, rales, rhonchi  Skin: Warm and dry to the touch.  No rash  Extremities: No LE edema, no cyanosis, w/w/p  Neuro: Alert, no facial droop, CN II-XII intact, strength 5/5 aside from in right lower extremity where strength is 4 out of 5 and drift is present, no left lower extremity drift, sensation intact throughout, unsteady gait/veers to right, no nystagmus, mild ataxia in right upper extremity, otherwise coordination normal as tested on finger-to-nose, heel-to-shin, and rapid alternating movements. PERRL, EOMI, visual fields intact.  No pronator drift.  No dysarthria noted.  However, patient seems to have mild aphasia/word finding difficulties versus confusion/altered mental status affecting ability to respond.    National [employer] Stroke Scale  Exam Interval: Baseline   Score    Level of consciousness: (0)   Alert, keenly responsive    LOC questions: (1)   Answers one question correctly    LOC commands: (0)   Performs both tasks correctly    Best gaze: (0)   Normal    Visual: (0)   No visual loss    Facial palsy: (0)   Normal symmetrical movements    Motor arm (left): (0)   No drift    Motor arm (right): (0)   No drift    Motor leg (left): (0)   No drift    Motor leg (right): (1)   Drift    Limb ataxia: (1)   Present in one limb    Sensory: (0)   Normal- no sensory loss    Best language: (1)   Mild to moderate aphasia    Dysarthria: (0)   Normal    Extinction and inattention: (0)   No abnormality        Total Score:  4         ED Course        Procedures                      EKG Interpretation:      Interpreted by Miriam Hood MD  Time reviewed: 2029  Symptoms at time of EKG: Weakness  Rhythm: normal sinus   Rate: normal, 83 bpm  LVH  Axis: normal  Ectopy: none  Conduction: normal  ST Segments/ T Waves: No ST-T wave changes  Q Waves: none  Comparison to prior: Unchanged from 6/14/2016 other than interval development of LVH    Clinical Impression: abnormal EKG            Labs  Ordered and Resulted from Time of ED Arrival to Time of ED Departure   COMPREHENSIVE METABOLIC PANEL - Abnormal       Result Value    Sodium 137      Potassium 4.1      Carbon Dioxide (CO2) 29      Anion Gap 7      Urea Nitrogen 30.7 (*)     Creatinine 1.00      GFR Estimate 76      Calcium 9.4      Chloride 101      Glucose 280 (*)     Alkaline Phosphatase 123      AST 27      ALT 23      Protein Total 7.4      Albumin 4.2      Bilirubin Total 0.5     GLUCOSE BY METER - Abnormal    GLUCOSE BY METER POCT 248 (*)    ROUTINE UA WITH MICROSCOPIC REFLEX TO CULTURE - Abnormal    Color Urine Light Yellow      Appearance Urine Clear      Glucose Urine 300 (*)     Bilirubin Urine Negative      Ketones Urine Negative      Specific Gravity Urine 1.020      Blood Urine Moderate (*)     pH Urine 5.0      Protein Albumin Urine Negative      Urobilinogen Urine Normal      Nitrite Urine Negative      Leukocyte Esterase Urine Negative      Mucus Urine Present (*)     RBC Urine 3 (*)     WBC Urine 1      Squamous Epithelials Urine 1     ISTAT GASES ELECTROLYTES ICA GLUCOSE VENOUS POCT - Abnormal    CPB Applied No      Hematocrit POCT 37 (*)     Calcium, Ionized Whole Blood POCT 5.0      Glucose Whole Blood POCT 280 (*)     Bicarbonate Venous POCT 27      Hemoglobin POCT 12.6 (*)     Potassium POCT 4.1      Sodium POCT 140      pCO2 Venous POCT 41      pO2 Venous POCT 19 (*)     pH Venous POCT 7.43      O2 Sat, Venous POCT 31 (*)    ISTAT INR POCT - Abnormal    INR POCT 1.4 (*)    CBC WITH PLATELETS AND DIFFERENTIAL - Abnormal    WBC Count 10.1      RBC Count 4.23 (*)     Hemoglobin 13.4      Hematocrit 38.7 (*)     MCV 92      MCH 31.7      MCHC 34.6      RDW 12.7      Platelet Count 281      % Neutrophils 72      % Lymphocytes 8      % Monocytes 17      % Eosinophils 1      % Basophils 1      % Immature Granulocytes 1      NRBCs per 100 WBC 0      Absolute Neutrophils 7.4      Absolute Lymphocytes 0.8      Absolute Monocytes 1.7  (*)     Absolute Eosinophils 0.1      Absolute Basophils 0.1      Absolute Immature Granulocytes 0.1      Absolute NRBCs 0.0     INR - Normal    INR 1.10     PARTIAL THROMBOPLASTIN TIME - Normal    aPTT 32     TROPONIN T, HIGH SENSITIVITY - Normal    Troponin T, High Sensitivity 13     ISTAT CREATININE POCT - Normal    Creatinine POCT 1.0      GFR, ESTIMATED POCT >60     MAGNESIUM - Normal    Magnesium 2.1     ETHYL ALCOHOL LEVEL - Normal    Alcohol ethyl <0.01     GLUCOSE MONITOR NURSING POCT   URINE DRUG SCREEN PANEL            Results for orders placed or performed during the hospital encounter of 12/25/23 (from the past 24 hour(s))   Glucose by meter   Result Value Ref Range    GLUCOSE BY METER POCT 248 (H) 70 - 99 mg/dL   [facility]    Narrative    The following orders were created for panel order [facility].  Procedure                               Abnormality         Status                     ---------                               -----------         ------                     Extra Blue Top Tube[[id number]]                              Final result               Extra Red Top Tube[[id number]]                               Final result               Extra Green Top (Lithium...[[id number]]                      Final result               Extra Purple Top Tube[[id number]]                            Final result                 Please view results for these tests on the individual orders.   Extra Blue Top Tube   Result Value Ref Range    Hold Specimen JIC    Extra Red Top Tube   Result Value Ref Range    Hold Specimen JIC    Extra Green Top (Lithium Heparin) Tube   Result Value Ref Range    Hold Specimen JIC    Extra Purple Top Tube   Result Value Ref Range    Hold Specimen JIC    CBC with Platelets & Differential    Narrative    The following orders were created for panel order CBC with Platelets & Differential.  Procedure                               Abnormality         Status                     ---------                                -----------         ------                     CBC with platelets and d...[[id number]]  Abnormal            Final result                 Please view results for these tests on the individual orders.   INR   Result Value Ref Range    INR 1.10 0.85 - 1.15   Partial thromboplastin time   Result Value Ref Range    aPTT 32 22 - 38 Seconds   Troponin T, High Sensitivity   Result Value Ref Range    Troponin T, High Sensitivity 13 <=22 ng/L   Comprehensive metabolic panel   Result Value Ref Range    Sodium 137 135 - 145 mmol/L    Potassium 4.1 3.4 - 5.3 mmol/L    Carbon Dioxide (CO2) 29 22 - 29 mmol/L    Anion Gap 7 7 - 15 mmol/L    Urea Nitrogen 30.7 (H) 8.0 - 23.0 mg/dL    Creatinine 1.00 0.67 - 1.17 mg/dL    GFR Estimate 76 >60 mL/min/1.73m2    Calcium 9.4 8.8 - 10.2 mg/dL    Chloride 101 98 - 107 mmol/L    Glucose 280 (H) 70 - 99 mg/dL    Alkaline Phosphatase 123 40 - 150 U/L    AST 27 0 - 45 U/L    ALT 23 0 - 70 U/L    Protein Total 7.4 6.4 - 8.3 g/dL    Albumin 4.2 3.5 - 5.2 g/dL    Bilirubin Total 0.5 <=1.2 mg/dL   CBC with platelets and differential   Result Value Ref Range    WBC Count 10.1 4.0 - 11.0 10e3/uL    RBC Count 4.23 (L) 4.40 - 5.90 10e6/uL    Hemoglobin 13.4 13.3 - 17.7 g/dL    Hematocrit 38.7 (L) 40.0 - 53.0 %    MCV 92 78 - 100 fL    MCH 31.7 26.5 - 33.0 pg    MCHC 34.6 31.5 - 36.5 g/dL    RDW 12.7 10.0 - 15.0 %    Platelet Count 281 150 - 450 10e3/uL    % Neutrophils 72 %    % Lymphocytes 8 %    % Monocytes 17 %    % Eosinophils 1 %    % Basophils 1 %    % Immature Granulocytes 1 %    NRBCs per 100 WBC 0 <1 /100    Absolute Neutrophils 7.4 1.6 - 8.3 10e3/uL    Absolute Lymphocytes 0.8 0.8 - 5.3 10e3/uL    Absolute Monocytes 1.7 (H) 0.0 - 1.3 10e3/uL    Absolute Eosinophils 0.1 0.0 - 0.7 10e3/uL    Absolute Basophils 0.1 0.0 - 0.2 10e3/uL    Absolute Immature Granulocytes 0.1 <=0.4 10e3/uL    Absolute NRBCs 0.0 10e3/uL   Magnesium   Result Value Ref Range    Magnesium 2.1 1.7  - 2.3 mg/dL   Ethyl Alcohol Level   Result Value Ref Range    Alcohol ethyl <0.01 <=0.01 g/dL   Creatinine POCT   Result Value Ref Range    Creatinine POCT 1.0 0.7 - 1.3 mg/dL    GFR, ESTIMATED POCT >60 >60 mL/min/1.73m2   iStat Gases Electrolytes ICA Glucose Venous, POCT   Result Value Ref Range    CPB Applied No     Hematocrit POCT 37 (L) 40 - 53 %    Calcium, Ionized Whole Blood POCT 5.0 4.4 - 5.2 mg/dL    Glucose Whole Blood POCT 280 (H) 70 - 99 mg/dL    Bicarbonate Venous POCT 27 21 - 28 mmol/L    Hemoglobin POCT 12.6 (L) 13.3 - 17.7 g/dL    Potassium POCT 4.1 3.4 - 5.3 mmol/L    Sodium POCT 140 135 - 145 mmol/L    pCO2 Venous POCT 41 40 - 50 mm Hg    pO2 Venous POCT 19 (L) 25 - 47 mm Hg    pH Venous POCT 7.43 7.32 - 7.43    O2 Sat, Venous POCT 31 (L) 94 - 100 %   iStat INR, POCT   Result Value Ref Range    INR POCT 1.4 (L) 2.0 - 3.0   CT Head w/o Contrast    Narrative    EXAM: CT HEAD W/O CONTRAST  LOCATION: [facility]  DATE/TIME: 12/25/2023 8:49 PM CST    INDICATION: Weakness and confusion.  COMPARISON: None available at time of dictation  TECHNIQUE: Routine CT Head without IV contrast. Multiplanar reformats. Dose reduction techniques were used.    FINDINGS:   INTRACRANIAL CONTENTS: No acute intracranial hemorrhage. No CT evidence of acute infarct. Sequelae of mild chronic microangiopathy. Mild cerebral volume loss without hydrocephalus. No extra-axial fluid collections.  Patent basal cisterns.     VISUALIZED ORBITS/SINUSES/MASTOIDS: Postoperative change of bilateral lenses, otherwise the orbits are unremarkable. The mild paranasal sinus mucosal thickening. The temporal bone structures are well-aerated.     BONES/SOFT TISSUES: The calvarium and skull base are unremarkable.       Impression    IMPRESSION:     1. Senescent changes and sequelae of chronic microangiopathy without acute intracranial abnormality.    - - - - - - - - - - - - - - - - - - - - - - - - - - -  - - - - - - - - - - - - - - - - - - - - - - - - - - - - - - - - - - - - - - - - - - - - - - - - -   The results above were discussed with Dr Hood on 12/25/2023 9:09 PM CST by Dr. Poncho Murphy.  - - - - - - - - - - - - - - - - - - - - - - - - - - - - - - - - - - - - - - - - - - - - - - - - - - - - - - - - - - - - - - - - - - - - - - - - - - - -   XR Chest 1 View    Narrative    EXAM: XR CHEST 1 VIEW  LOCATION: [facility]  DATE: 12/25/2023    INDICATION: Altered mental status.  COMPARISON: 10/09/2016.      Impression    IMPRESSION: Small lung volumes and crowding of pulmonary vasculature. Possible mild pulmonary vascular congestion. No pleural effusion or pneumothorax.    Unchanged scarring and postsurgical change at the right upper and midlung. Previous partial right rib resection.    Cardiomediastinal silhouette is normal. Atherosclerosis of the thoracic aorta.   CTA Head Neck with Contrast    Narrative    EXAM: CTA HEAD NECK W CONTRAST  LOCATION: [facility]  DATE: 12/25/2023    INDICATION: Weakness and confusion  COMPARISON: CT head dated 12/25/2023.  CONTRAST: 64 mL Isovue 370  TECHNIQUE: Head and neck CT angiogram with IV contrast. Axial helical CT images of the head and neck vessels obtained during the arterial phase of intravenous contrast administration. Axial 2D reconstructed images and multiplanar 3D MIP reconstructed   images of the head and neck vessels were performed by the technologist. Dose reduction techniques were used. All stenosis measurements made according to NASCET criteria unless otherwise specified.    FINDINGS:   HEAD CTA:  ANTERIOR CIRCULATION: No stenosis/occlusion, aneurysm, or high flow vascular malformation. Hypoplastic A1 segment of the left anterior cerebral artery. The anterior communicating artery is patent. Fetal origin of the posterior cerebral arteries.     POSTERIOR CIRCULATION: No  stenosis/occlusion, aneurysm, or high flow vascular malformation. Right dominant vertebrobasilar circulation. The fenestrated basilar artery is unremarkable and gives rise to patent superior cerebellar arteries and hypoplastic   P1 segments of the posterior cerebral arteries.     DURAL VENOUS SINUSES: Patent.    NECK CTA:  RIGHT CAROTID: Normal course and persist caliber of the common carotid artery. Atherosclerotic disease at the carotid artery bifurcation without stenosis or dissection.    LEFT CAROTID: Normal course and persist caliber of the common carotid artery. Normal course and persist caliber of the extracranial internal carotid artery without evidence of stenosis or dissection.    VERTEBRAL ARTERIES: Redemonstrated right vertebral artery which joins at the C3-C4 vertebral body level and forms a dominant right vertebral artery. The left vertebral artery arises record from the aortic arch and is congenitally hypoplastic. No stenosis   or dissection.    AORTIC ARCH: The left vertebral artery arises directly from the aortic arch. The origins of the great vessels are unremarkable without significant stenosis.    NONVASCULAR STRUCTURES: There are no masses or enlarged lymph nodes in the neck. The submandibular, parotid, and thyroid glands are unremarkable. Multilevel degenerative changes without high-grade spinal canal stenosis or neural foraminal narrowing. No   aggressive osseous lesions. The right lateral chest wall resection changes. The visualized lungs are clear.      Impression    IMPRESSION:    HEAD CTA:  1. Variant anatomy, otherwise unremarkable intracranial vasculature.    NECK CTA:  1. Variant anatomy, otherwise unremarkable neck vasculature.     - - - - - - - - - - - - - - - - - - - - - - - - - - - - - - - - - - - - - - - - - - - - - - - - - - - - - - - - - - - - - - - - - - - - - - - - - - - -   The results above were discussed with Dr Hood on 12/25/2023 9:33 PM CST by Dr. Poncho Murphy.  -  - - - - - - - - - - - - - - - - - - - - - - - - - - - - - - - - - - - - - - - - - - - - - - - - - - - - - - - - - - - - - - - - - - - - - - - - - - -     UA with Microscopic reflex to Culture    Specimen: Urine, Midstream   Result Value Ref Range    Color Urine Light Yellow Colorless, Straw, Light Yellow, Yellow    Appearance Urine Clear Clear    Glucose Urine 300 (A) Negative mg/dL    Bilirubin Urine Negative Negative    Ketones Urine Negative Negative mg/dL    Specific Gravity Urine 1.020 1.003 - 1.035    Blood Urine Moderate (A) Negative    pH Urine 5.0 5.0 - 7.0    Protein Albumin Urine Negative Negative mg/dL    Urobilinogen Urine Normal Normal, 2.0 mg/dL    Nitrite Urine Negative Negative    Leukocyte Esterase Urine Negative Negative    Mucus Urine Present (A) None Seen /LPF    RBC Urine 3 (H) <=2 /HPF    WBC Urine 1 <=5 /HPF    Squamous Epithelials Urine 1 <=1 /HPF    Narrative    Urine Culture not indicated   Urine Drug Screen    Narrative    The following orders were created for panel order Urine Drug Screen.  Procedure                               Abnormality         Status                     ---------                               -----------         ------                     Urine Drug Screen Panel[[id number]]                          In process                   Please view results for these tests on the individual orders.   MR Brain w/o Contrast    Narrative    EXAM: MR BRAIN W/O CONTRAST  LOCATION: [facility]  DATE: 12/25/2023    INDICATION: Weakness and confusion. Possible stroke.  COMPARISON: CT head 12/25/2023  TECHNIQUE: Routine multiplanar multisequence head MRI without intravenous contrast.    FINDINGS:  Motion artifact limits aspects of exam.    INTRACRANIAL CONTENTS: No acute or subacute infarct. No mass, acute hemorrhage, or extra-axial fluid collections. Scattered nonspecific T2/FLAIR hyperintensities within the cerebral white matter most  consistent with mild chronic microvascular ischemic   change. Mild to moderate generalized cerebral atrophy. No hydrocephalus. Normal position of the cerebellar tonsils.     SELLA: No abnormality accounting for technique.    OSSEOUS STRUCTURES/SOFT TISSUES: Normal marrow signal. The major intracranial vascular flow voids are maintained.     ORBITS: No abnormality accounting for technique.     SINUSES/MASTOIDS: Mild to moderate mucosal thickening scattered about the paranasal sinuses. No middle ear or mastoid effusion.       Impression    IMPRESSION:  1.  No acute infarct.  2.  Age-related changes described above.         Labs, vital signs, and imaging studies were reviewed by me.    Medications   iopamidol (ISOVUE-370) solution 100 mL (64 mLs Intravenous $Given 12/25/23 2057)   sodium chloride 0.9 % bag 500 mL for CT scan flush use (80 mLs Intravenous $Given 12/25/23 2057)   clopidogrel (PLAVIX) tablet 300 mg (300 mg Oral $Given 12/25/23 2133)   gadobutrol (GADAVIST) injection 9 mL (9 mLs Intravenous $Given 12/25/23 2314)       Assessments & Plan (with Medical Decision Making)   Gerber Levine is a 78 year old male who presents to the emergency department with altered mental status and difficulty ambulating.  Symptoms began 2 hours ago.  Patient was activated as a level 1 stroke code upon arrival to the emergency department and labs, CT of the head, CT angiogram of the head and neck were ordered to further evaluate the patient. Differential diagnosis includes CVA/TIA, ICH, glucose/electrolyte/other metabolic abnormality, underlying infectious process (UTI, pneumonia, etc), drug ingestion/side effects, seizure, thyroid dysfunction, conversion disorder.  Accu-Chek upon arrival to the emergency department is 248.    EKG shows normal sinus rhythm.    I-STAT CG 8 reveals pH 7.426, pCO2 41.1, pO2 19, bicarb 27, SpO2 31%, sodium 140, potassium 4.1, ionized calcium 5.0, glucose 280, hematocrit 37%, hemoglobin 12.6.   I-STAT creatinine is 1.0.  I-STAT INR is 1.4.    2035 patient was discussed with stroke neurology fellow, they will consult, may need to see the patient via telestroke machine.    Head CT shows no acute bleed. This was d/w radiology at 2110.     CT angiogram of the head and neck shows no large vessel occlusion or aneurysm, This was d/w radiology at 2131    Laboratory workup is remarkable for CMP showing glucose 280, BUN 30.7, troponin 13, PTT 32, INR 1.1, CBC showing hematocrit 38.7, normal hemoglobin, normal white blood cell count.  Magnesium within normal limits. ETOH negative.    Chest x-ray shows possible mild pulmonary vascular congestion, no other acute findings.    Stroke code was de-escalated as upon further investigation, patient's last known well was 2:30 PM and he is outside of the window for thrombolytics.    Urinalysis does not appear consistent with UTI.    MRI shows no acute infarct    Critical care was performed.   Critical Care Addendum  My initial assessment, based on my focused history and physical exam, established a high suspicion that Gerber Levine has altered mental status and focal neurologic deficits , which requires immediate intervention, and therefore he is critically ill.     After the initial assessment, the care team initiated multiple lab tests, consulted with stroke neurology, and performed head CTs  to provide stabilization care. Due to the critical nature of this patient, I reassessed nursing observations, vital signs, physical exam, review of cardiac rhythm monitor, 12 lead ECG analysis, interpretation of labs and imaging, mental status, and neurologic status multiple times prior to his disposition.     Time also spent performing documentation, discussion with family to obtain medical information for decision making, reviewing test results, discussion with consultants, and coordination of care.     Critical care time (excluding teaching time and procedures): 35 minutes.      Medical Decision Making  The patient's presentation was of high complexity (an acute health issue posing potential threat to life or bodily function).    The patient's evaluation involved:  ordering and/or review of 3+ test(s) in this encounter (see separate area of note for details)  independent interpretation of testing performed by another health professional (CTs)  discussion of management or test interpretation with another health professional (stroke neurology)    The patient's management necessitated high risk (a decision regarding hospitalization).    CT images were personally reviewed by me, I agree with the radiology reads.    Patient was discussed with stroke neurology, they reviewed patient's brain MRI and agree with radiology read, no evidence for acute infarct.  They recommend admission to internal medicine.  May consider repeating MRI with contrast under sedation if no other cause of patient's altered mental status is identified and patient does not return to his baseline.  This may help identify if there is a small brain mass or other etiology of patient's symptoms.  No need to pursue this immediately at this time.    I have reviewed the nursing notes.    I have reviewed the findings, diagnosis, plan and need for follow up with the patient.    Patient to be admitted for further management. Plan was discussed with patient who understands and agrees with plan.    New Prescriptions    No medications on file       Final diagnoses:   Altered mental status, unspecified altered mental status type   Right sided weakness       STEVEN HOOD MD  12/25/2023   [facility] EMERGENCY DEPARTMENT       Steven Hood MD  12/26/23 0015       Steven Hood MD  12/26/23 0042

## 2023-12-26 NOTE — PROGRESS NOTES
Brief note:    Update Recommendations:    - No Infarct seen on MRI Brain  - Recommend toxic/metabolic work up for his symptoms of mental status change.  - No further stroke work up recommended.    Lillian Mustafa MD  Vascular Neurology fellow

## 2023-12-26 NOTE — PROGRESS NOTES
Patient seen and examined.    He is awake alert on conversant.  Feels malaise.  Patient normally walks 3 miles a day according to his wife and has since a couple of days feeling generally weak.  MRI of the brain is negative for acute stroke.  He does not have any fever at this point.  No leukocytosis.  UA does not show any infection.  No electrolyte normalities.  Normal serum creatinine level.  COVID-positive.  Has SARS-CoV-2 examination with booster doses given in the past.  Currently on 1 L of oxygen satting 97%.  PT and OT eval is pending.  Patient lives at home with his wife.    Pending PT OT eval, anticipate patient will discharge home tomorrow if there are no barriers.    Discussed with the patient's wife and daughter who was present at the bedside.

## 2023-12-26 NOTE — PLAN OF CARE
"  Problem: Adult Inpatient Plan of Care  Goal: Plan of Care Review  Description: The Plan of Care Review/Shift note should be completed every shift.  The Outcome Evaluation is a brief statement about your assessment that the patient is improving, declining, or no change.  This information will be displayed automatically on your shift  note.  Outcome: Not Progressing  Goal: Patient-Specific Goal (Individualized)  Description: You can add care plan individualizations to a care plan. Examples of Individualization might be:  \"Parent requests to be called daily at 9am for status\", \"I have a hard time hearing out of my right ear\", or \"Do not touch me to wake me up as it startles  me\".  Outcome: Not Progressing  Goal: Absence of Hospital-Acquired Illness or Injury  Outcome: Not Progressing  Goal: Optimal Comfort and Wellbeing  Outcome: Not Progressing  Goal: Readiness for Transition of Care  Outcome: Not Progressing  Intervention: Mutually Develop Transition Plan  Recent Flowsheet Documentation  Taken 12/26/2023 0400 by Venus Henley RN  Equipment Currently Used at Home: walker, standard     Problem: Fall Injury Risk  Goal: Absence of Fall and Fall-Related Injury  Outcome: Not Progressing     Problem: Gas Exchange Impaired  Goal: Optimal Gas Exchange  Outcome: Not Progressing     Problem: Confusion Chronic  Goal: Optimal Cognitive Function  Outcome: Not Progressing   Goal Outcome Evaluation:  Blood pressure 116/54, pulse 71, temperature 99.6  F (37.6  C), temperature source Oral, resp. rate 21, height 1.854 m (6' 1\"), weight 90.1 kg (198 lb 11.2 oz), SpO2 92%.  Pt admitted at 8MS alert disoriented to time, place, situation oriented to person and self. Intermittent confusion however pt can still answer some questions properly. Pt's hearing aid was sent home with his wife. Pt can understand and answer questions properly. Wear glasses. No numbness and tingling reported. Weakness on RUE. Pt is on 2 L NC infrequent " non productive cough. No SOB noted. Pt can be up ad chasidy and transfer from stretcher to bed in very short distance with stand by assist. Pt is not oob however walker and gaitbelt is on bedside within his reach. Incontinent bowel and bladder. Pt has urinal on the bedside. PIV patent and WNL. Skin dry clean and intact. Skin assessment witness with SARAH Hood.

## 2023-12-26 NOTE — H&P
[facility]    History and Physical - Hospitalist Service       Date of Admission:  12/25/2023    Assessment & Plan      Gerber Levine is a 78 year old male admitted on 12/25/2023. He has a history of hereditary spherocytosis, HTN, hearing loss, diabetes and depression presenting for acute altered mental status with right sided weakness.    Acute metabolic encephalopathy  Initial concern for stroke in the ED, though CTA and MRI without evidence. Was loaded with plavix empirically, received aspirin. Admitted for work up of metabolic encephalopathy. Infectious work up negative thus far including chest xray and urinalysis. Patient is on a selective serotonin reuptake inhibitor, unable to reach wife tonight to see if he takes this regularly, either the medication itself or withdrawal could lead to delirium. UDS/EtOH negative. Of note, patient wears hearing aids but they lost charge today, could lead to worsening delirium as well. Family brought them home to charge and will bring back tomorrow. Contrast portion of MRI stopped short due to movement, radiology and neurology felt confident enough to de-escalate stroke code with what was obtained, but if he fails to clear, consider repeat MRI w/w/o contrast under sedation.   - Hold sertraline  - Add on TSH pending  - F/U urine culture  - Ordered COVID/Flu/RSV swab  - Continue PTA aspirin, do not continue plavix    Depression  - Hold sertraline, will need to clarify with patient's wife if he could have missed any doses or accidentally doubled up, since the medication itself and withdrawal could contribute to altered mental status     HTN  - continue PTA Losartan    HLD  - Continue PTA atorvastatin and asa    DM2  - Hold metformin, could resume if remains well tomorrow  - MSSI    Hearing loss  Chronic, no acute change. Hearing aids lost charge on day of presentation. Family will bring back in AM. Can contribute to delirium.  "    History of memory problems  Seen by neuropsych in 2018 and 2019, weaknesses consistent with mild compromise of the patient's frontal lobes, executive functioning and learning new information. Neuropsych thought depression was likely contributing. Included here as background, nothing to do this admission.        Diet: Advance as tolerated    DVT Prophylaxis: Low Risk/Ambulatory with no VTE prophylaxis indicated  Parks Catheter: Not present  Lines: None     Cardiac Monitoring: None  Code Status:  Full    Clinically Significant Risk Factors Present on Admission               # Coagulation Defect: INR = 1.4 (Ref range: 2.0 - 3.0) and/or PTT = 32 Seconds (Ref range: 22 - 38 Seconds), will monitor for bleeding  # Drug Induced Platelet Defect: home medication list includes an antiplatelet medication   # Hypertension: Noted on problem list     # DMII: A1C = 7.1 % (Ref range: 0.0 - 5.6 %) within past 6 months    # Overweight: Estimated body mass index is 26.22 kg/m  as calculated from the following:    Height as of this encounter: 1.854 m (6' 1\").    Weight as of this encounter: 90.1 kg (198 lb 11.2 oz).              Disposition Plan      Expected Discharge Date: 12/28/2023                  Ramiro Simmons MD  Hospitalist Service  [facility]  Securely message with [employer] (more info)  Text page via [facility] Paging/Directory     ______________________________________________________________________    Chief Complaint   Altered mental status    History is obtained from the patient's daughter, ED provider and chart review    History of Present Illness   Gerber Levine is a 78 year old male with a history of hereditary spherocytosis, HTN, hearing loss, diabetes and depression presenting for acute altered mental status with right sided weakness. Patient was at baseline on [place] witnessed by his family. Family noted that patient seemed less energetic than usual during " the day with difficulty speaking and confusion. Initial concern for stroke in the ED, though CTA and MRI without evidence. Was loaded with plavix empirically, received aspirin. Admitted for work up of metabolic encephalopathy. Infectious work up negative thus far including chest xray and urinalysis. Patient is on a selective serotonin reuptake inhibitor, unable to reach wife tonight to see if he takes this regularly, either the medication itself or withdrawal could lead to delirium. UDS/EtOH negative. Of note, patient wears hearing aids but they lost charge today, could lead to worsening delirium as well. Family brought them home to charge and will bring back tomorrow. Contrast portion of MRI stopped short due to movement, radiology and neurology felt confident enough to de-escalate stroke code with what was obtained.    Seen by neuropsych in 2018 and 2019, weaknesses consistent with mild compromise of the patient's frontal lobes, executive functioning and learning new information. Neuropsych thought depression was likely contributing.       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    EYE SURGERY  2014    cataracts    HERNIA REPAIR      REPAIR CHEST WALL N/A 2016    Procedure: REPAIR CHEST WALL;  Surgeon: Leah Nixon MD;  Location:  OR    SURGICAL HISTORY OF -       SPLENECTOMY    SURGICAL HISTORY OF -       APPENDECTOMY    SURGICAL HISTORY OF -       CHOLECYSTECTOMY    SURGICAL HISTORY OF -       HERNIAORRHAPHY    SURGICAL HISTORY OF -       cholesteotoma       Prior to Admission Medications   Prior to Admission Medications   Prescriptions Last Dose Informant Patient Reported? Taking?   ASPIRIN 81 MG OR TABS  Self No No   Si tab po QD (Once per day)   CALCITRATE/VITAMIN D 315-200 MG-UNIT OR TABS  Self Yes No   Sig: One tab daily in the morning   Cyanocobalamin (B-12 PO)  Self Yes No   Sig: Take by mouth every morning Take 1/2 tablet daily   Microlet Lancets MISC   No No   Sig: USE TO TEST BLOOD SUGAR DAILY AND AS NEEDED   atorvastatin (LIPITOR) 40 MG tablet   No No   Sig: Take 1 tablet (40 mg) by mouth daily   blood glucose (ACCU-CHEK RODRI PLUS) test strip   No No   Si strip by In Vitro route daily   blood glucose (CONTOUR NEXT TEST) test strip   No No   Sig: USE TO TEST BLOOD SUGAR 1 TIME DAILY OR AS DIRECTED   blood glucose (NO BRAND SPECIFIED) lancets standard   No No   Sig: Use to test blood sugar daily and as needed   losartan (COZAAR) 25 MG tablet   No No   Sig: Take 1 tablet (25 mg) by mouth daily   metFORMIN (GLUCOPHAGE) 500 MG tablet   No No   Sig: Take 2 tablets (1,000 mg) by mouth daily (with breakfast)   sertraline (ZOLOFT) 100 MG tablet   No No   Sig: Take 1 tablet (100 mg) by mouth daily   triamcinolone (KENALOG) 0.5 % external ointment   No No   Sig: Use to lesion on left arm twice daily for 14 days      Facility-Administered Medications: None      ------------------------------------------------------------------------     Physical Exam   Vital Signs: Temp: 97.8  F (36.6  C) Temp  src: Oral BP: 127/44 Pulse: 76   Resp: 20 SpO2: 95 % O2 Device: Nasal cannula Oxygen Delivery: 2 LPM  Weight: 198 lbs 11.2 oz    Constitutional: sitting in bed, comfortable, no apparent distress  HEENT: normocephalic, atraumatic, extraocular muscles intact, pupils equal, round, and reactive to light.   CV: regular rate and rhythm, no murmurs rubs or gallops  Resp: clear to auscultation bilaterally  Abd: soft, non-tender, non-distended, no hepatosplenomegaly or masses palpated  MSK: no lower extremity edema, normal range of motion  Neuro: alert and oriented to person and place but not time, no focal neurological deficits, cranial nerves intact,  strength and flexion/extension at elbow 5/5 bilaterally.       Data     I have personally reviewed the following data over the past 24 hrs:    10.1  \   12.6 (L)   / 281     140 101 30.7 (H) /  208 (H)   4.1 29 1.0 \     ALT: 23 AST: 27 AP: 123 TBILI: 0.5   ALB: 4.2 TOT PROTEIN: 7.4 LIPASE: N/A     Trop: 13 BNP: 670     TSH: 2.05 T4: N/A A1C: N/A     INR:  1.4 (L) PTT:  32   D-dimer:  N/A Fibrinogen:  N/A       Imaging results reviewed over the past 24 hrs:   Recent Results (from the past 24 hour(s))   CT Head w/o Contrast    Narrative    EXAM: CT HEAD W/O CONTRAST  LOCATION: [facility]  DATE/TIME: 12/25/2023 8:49 PM CST    INDICATION: Weakness and confusion.  COMPARISON: None available at time of dictation  TECHNIQUE: Routine CT Head without IV contrast. Multiplanar reformats. Dose reduction techniques were used.    FINDINGS:   INTRACRANIAL CONTENTS: No acute intracranial hemorrhage. No CT evidence of acute infarct. Sequelae of mild chronic microangiopathy. Mild cerebral volume loss without hydrocephalus. No extra-axial fluid collections.  Patent basal cisterns.     VISUALIZED ORBITS/SINUSES/MASTOIDS: Postoperative change of bilateral lenses, otherwise the orbits are unremarkable. The mild paranasal sinus mucosal thickening.  The temporal bone structures are well-aerated.     BONES/SOFT TISSUES: The calvarium and skull base are unremarkable.       Impression    IMPRESSION:     1. Senescent changes and sequelae of chronic microangiopathy without acute intracranial abnormality.    - - - - - - - - - - - - - - - - - - - - - - - - - - - - - - - - - - - - - - - - - - - - - - - - - - - - - - - - - - - - - - - - - - - - - - - - - - - -   The results above were discussed with Dr Hood on 12/25/2023 9:09 PM CST by Dr. Poncho Murphy.  - - - - - - - - - - - - - - - - - - - - - - - - - - - - - - - - - - - - - - - - - - - - - - - - - - - - - - - - - - - - - - - - - - - - - - - - - - - -   XR Chest 1 View    Narrative    EXAM: XR CHEST 1 VIEW  LOCATION: [facility]  DATE: 12/25/2023    INDICATION: Altered mental status.  COMPARISON: 10/09/2016.      Impression    IMPRESSION: Small lung volumes and crowding of pulmonary vasculature. Possible mild pulmonary vascular congestion. No pleural effusion or pneumothorax.    Unchanged scarring and postsurgical change at the right upper and midlung. Previous partial right rib resection.    Cardiomediastinal silhouette is normal. Atherosclerosis of the thoracic aorta.   CTA Head Neck with Contrast    Narrative    EXAM: CTA HEAD NECK W CONTRAST  LOCATION: [facility]  DATE: 12/25/2023    INDICATION: Weakness and confusion  COMPARISON: CT head dated 12/25/2023.  CONTRAST: 64 mL Isovue 370  TECHNIQUE: Head and neck CT angiogram with IV contrast. Axial helical CT images of the head and neck vessels obtained during the arterial phase of intravenous contrast administration. Axial 2D reconstructed images and multiplanar 3D MIP reconstructed   images of the head and neck vessels were performed by the technologist. Dose reduction techniques were used. All stenosis measurements made according to NASCET criteria unless otherwise  specified.    FINDINGS:   HEAD CTA:  ANTERIOR CIRCULATION: No stenosis/occlusion, aneurysm, or high flow vascular malformation. Hypoplastic A1 segment of the left anterior cerebral artery. The anterior communicating artery is patent. Fetal origin of the posterior cerebral arteries.     POSTERIOR CIRCULATION: No stenosis/occlusion, aneurysm, or high flow vascular malformation. Right dominant vertebrobasilar circulation. The fenestrated basilar artery is unremarkable and gives rise to patent superior cerebellar arteries and hypoplastic   P1 segments of the posterior cerebral arteries.     DURAL VENOUS SINUSES: Patent.    NECK CTA:  RIGHT CAROTID: Normal course and persist caliber of the common carotid artery. Atherosclerotic disease at the carotid artery bifurcation without stenosis or dissection.    LEFT CAROTID: Normal course and persist caliber of the common carotid artery. Normal course and persist caliber of the extracranial internal carotid artery without evidence of stenosis or dissection.    VERTEBRAL ARTERIES: Redemonstrated right vertebral artery which joins at the C3-C4 vertebral body level and forms a dominant right vertebral artery. The left vertebral artery arises record from the aortic arch and is congenitally hypoplastic. No stenosis   or dissection.    AORTIC ARCH: The left vertebral artery arises directly from the aortic arch. The origins of the great vessels are unremarkable without significant stenosis.    NONVASCULAR STRUCTURES: There are no masses or enlarged lymph nodes in the neck. The submandibular, parotid, and thyroid glands are unremarkable. Multilevel degenerative changes without high-grade spinal canal stenosis or neural foraminal narrowing. No   aggressive osseous lesions. The right lateral chest wall resection changes. The visualized lungs are clear.      Impression    IMPRESSION:    HEAD CTA:  1. Variant anatomy, otherwise unremarkable intracranial vasculature.    NECK CTA:  1. Variant  anatomy, otherwise unremarkable neck vasculature.     - - - - - - - - - - - - - - - - - - - - - - - - - - - - - - - - - - - - - - - - - - - - - - - - - - - - - - - - - - - - - - - - - - - - - - - - - - - -   The results above were discussed with Dr Hood on 12/25/2023 9:33 PM CST by Dr. Poncho Murphy.  - - - - - - - - - - - - - - - - - - - - - - - - - - - - - - - - - - - - - - - - - - - - - - - - - - - - - - - - - - - - - - - - - - - - - - - - - - - -     MR Brain w/o Contrast    Narrative    EXAM: MR BRAIN W/O CONTRAST  LOCATION: [facility]  DATE: 12/25/2023    INDICATION: Weakness and confusion. Possible stroke.  COMPARISON: CT head 12/25/2023  TECHNIQUE: Routine multiplanar multisequence head MRI without intravenous contrast.    FINDINGS:  Motion artifact limits aspects of exam.    INTRACRANIAL CONTENTS: No acute or subacute infarct. No mass, acute hemorrhage, or extra-axial fluid collections. Scattered nonspecific T2/FLAIR hyperintensities within the cerebral white matter most consistent with mild chronic microvascular ischemic   change. Mild to moderate generalized cerebral atrophy. No hydrocephalus. Normal position of the cerebellar tonsils.     SELLA: No abnormality accounting for technique.    OSSEOUS STRUCTURES/SOFT TISSUES: Normal marrow signal. The major intracranial vascular flow voids are maintained.     ORBITS: No abnormality accounting for technique.     SINUSES/MASTOIDS: Mild to moderate mucosal thickening scattered about the paranasal sinuses. No middle ear or mastoid effusion.       Impression    IMPRESSION:  1.  No acute infarct.  2.  Age-related changes described above.

## 2023-12-26 NOTE — CONSULTS
"[facility]    Stroke Consult Note    Reason for Consult: Stroke Code     Chief Complaint: Stroke Symptoms      HPI  Gerber Levine is a 78 year old male with history of DM2, HLD, hereditary spherocytosis presents with confusion and rigth sided leaning noted by wife with LKW- 2.30 pm, 12/25/23. On ED exam, noted to have right UE ataxia and RLE drift with word finding difficulty.    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  - Load with Plavix 300 mg once and continue with ASA 81 mg daily    The Stroke Staff is Dr. Carlson.    Lillian Mustafa MD  Vascular Neurology Fellow    To page me or covering stroke neurology team member, click here: [facility]COM  Choose \"On Call\" tab at top, then select \"NEUROLOGY/ALL SITES\" from middle drop-down box, press Enter, then look for \"stroke\" or \"telestroke\" for your site.  ______________________________________________________    Stroke Scales    NIHSS  1a. Level of Consciousness 0-->Alert, keenly responsive   1b. LOC Questions 0-->Answers both questions correctly   1c. LOC Commands 0-->Performs both tasks correctly   2.   Best Gaze 0-->Normal   3.   Visual 0-->No visual loss   4.   Facial Palsy 1-->Minor paralysis (flattened nasolabial fold, asymmetry on smiling)   5a. Motor Arm, Left 0-->No drift, limb holds 90 (or 45) degrees for full 10 secs   5b. Motor Arm, Right 0-->No drift, limb holds 90 (or 45) degrees for full 10 secs   6a. Motor Leg, Left 0-->No drift, leg holds 30 degree position for " full 5 secs   6b. Motor Leg, right 0-->No drift, leg holds 30 degree position for full 5 secs   7.   Limb Ataxia 0-->Absent   8.   Sensory 0-->Normal, no sensory loss   9.   Best Language 0-->No aphasia, normal   10. Dysarthria 0-->Normal   11. Extinction and Inattention  0-->No abnormality   Total 1 (12/25/23 2111)       Stroke Code Data Data   Stroke Code Data  (for stroke code with tele)  Stroke code activated 12/25/23 2026   First stroke provider response 12/25/23 2027   Video start time 12/25/23 2059   Video end time        Last known normal 12/25/23 1830   Time of discovery (or onset of symptoms)  12/25/23 1830   Head CT read by Stroke Neuro Provider 12/25/23 2059   Was stroke code de-escalated?         Yes     Telestroke Service Details  Type of service telemedicine diagnostic assessment of acute neurological changes   Reason telemedicine is appropriate patient requires assessment with a specialist for diagnosis and treatment of neurological symptoms   Mode of transmission secure interactive audio and video communication per [place]   Originating site (patient location) [facility]    Distant site (provider location) Provider remote site

## 2023-12-26 NOTE — PROGRESS NOTES
"PT EVAL ONLY       12/26/23 1400   Appointment Info   Signing Clinician's Name / Credentials (PT) Chantelle Echols DPT   Living Environment   People in Home spouse   Current Living Arrangements house   Home Accessibility stairs to enter home;stairs within home   Number of Stairs, Main Entrance 2   Number of Stairs, Within Home, Primary greater than 10 stairs  (x1 flight but does not need to perform)   Stair Railings, Within Home, Primary railings safe and in good condition   Transportation Anticipated family or friend will provide   Living Environment Comments spouse can provide 24/7 assist at home if needed   Self-Care   Usual Activity Tolerance excellent   Current Activity Tolerance moderate   Regular Exercise Yes   Activity/Exercise Type walking   Exercise Amount/Frequency daily  (3 miles daily walking)   Fall history within last six months no   Activity/Exercise/Self-Care Comment IND at baseline, very active   General Information   Onset of Illness/Injury or Date of Surgery 12/26/23   Referring Physician Ramiro Simmons   Pertinent History of Current Problem (include personal factors and/or comorbidities that impact the POC) per chart review, \"Gerber Levine is a 78 year old male admitted on 12/25/2023. He has a history of hereditary spherocytosis, HTN, hearing loss, diabetes and depression presenting for acute altered mental status with right sided weakness.\" Pt with negavtive neurologic workup   Existing Precautions/Restrictions no active hip ABD   General Observations spouse present, pt on RA   Cognition   Affect/Mental Status (Cognition) WNL   Orientation Status (Cognition) oriented x 3   Follows Commands (Cognition) WNL   Pain Assessment   Patient Currently in Pain No   Integumentary/Edema   Integumentary/Edema no deficits were identifed   Posture    Posture Kyphosis   Range of Motion (ROM)   Range of Motion ROM is WNL   Strength (Manual Muscle Testing)   Strength (Manual Muscle Testing) strength is WNL "   Strength Comments strong 5/5 B LEs   Bed Mobility   Comment, (Bed Mobility) IND   Transfers   Comment, (Transfers) IND   Gait/Stairs (Locomotion)   Comment, (Gait/Stairs) pt ambulates x100' in room with no AD (had been using FWW up until now during admission). Pt slow but steady, very slight x1 LOB he was able to correct with moderate path deviation, overall steady on feet after this in room   Balance   Balance Comments able to lean over bed to adjust covers without UE support   Sensory Examination   Sensory Perception patient reports no sensory changes   Clinical Impression   Criteria for Skilled Therapeutic Intervention Evaluation only   PT Diagnosis (PT) acute deconditioning   Influenced by the following impairments acute illness   Functional limitations due to impairments return to exercise, high endurance PLOF   Clinical Presentation (PT Evaluation Complexity) stable   Clinical Presentation Rationale PMH, clinician impression   Clinical Decision Making (Complexity) low complexity   Risk & Benefits of therapy have been explained evaluation/treatment results reviewed;care plan/treatment goals reviewed;risks/benefits reviewed;current/potential barriers reviewed;participants voiced agreement with care plan;participants included;patient;spouse/significant other   Clinical Impression Comments pt overall near baseline, appeared to have acute decline in function yeaterday but negative medical workup other than COVID and has otherwise returned to to near normal baseline. Mobilizing up IND, has 24/7 support from wife. Briefly spoke with pt and spouse about easing into return to prior exercise level activity   PT Total Evaluation Time   PT Eval, Low Complexity Minutes (58896) 10   PT Discharge Planning   PT Discharge Recommendation (DC Rec) home with assist   PT Rationale for DC Rec pt overall near baseline, up IND in room, no concerns for ability to perform stairs and can stay on main level if needed. Spouse can  assista s much as needed. Pt presents consistent with acute illness having ~24 hrs weakness but feeling much better today as reflected in physical PT exam as well.   PT Brief overview of current status IND   Total Session Time   Total Session Time (sum of timed and untimed services) 10

## 2023-12-27 ENCOUNTER — TELEPHONE (OUTPATIENT)
Dept: FAMILY MEDICINE | Facility: CLINIC | Age: 78
End: 2023-12-27
Payer: COMMERCIAL

## 2023-12-27 VITALS
TEMPERATURE: 98.1 F | RESPIRATION RATE: 16 BRPM | WEIGHT: 198.7 LBS | BODY MASS INDEX: 26.33 KG/M2 | SYSTOLIC BLOOD PRESSURE: 125 MMHG | DIASTOLIC BLOOD PRESSURE: 69 MMHG | HEIGHT: 73 IN | OXYGEN SATURATION: 97 % | HEART RATE: 63 BPM

## 2023-12-27 LAB
GLUCOSE BLDC GLUCOMTR-MCNC: 146 MG/DL (ref 70–99)
GLUCOSE BLDC GLUCOMTR-MCNC: 159 MG/DL (ref 70–99)
GLUCOSE BLDC GLUCOMTR-MCNC: 163 MG/DL (ref 70–99)
MAGNESIUM SERPL-MCNC: 2.4 MG/DL (ref 1.7–2.3)
POTASSIUM SERPL-SCNC: 3.8 MMOL/L (ref 3.4–5.3)

## 2023-12-27 PROCEDURE — 250N000013 HC RX MED GY IP 250 OP 250 PS 637: Performed by: STUDENT IN AN ORGANIZED HEALTH CARE EDUCATION/TRAINING PROGRAM

## 2023-12-27 PROCEDURE — 84132 ASSAY OF SERUM POTASSIUM: CPT | Performed by: STUDENT IN AN ORGANIZED HEALTH CARE EDUCATION/TRAINING PROGRAM

## 2023-12-27 PROCEDURE — 83735 ASSAY OF MAGNESIUM: CPT | Performed by: STUDENT IN AN ORGANIZED HEALTH CARE EDUCATION/TRAINING PROGRAM

## 2023-12-27 PROCEDURE — 36415 COLL VENOUS BLD VENIPUNCTURE: CPT | Performed by: STUDENT IN AN ORGANIZED HEALTH CARE EDUCATION/TRAINING PROGRAM

## 2023-12-27 PROCEDURE — 99239 HOSP IP/OBS DSCHRG MGMT >30: CPT | Performed by: INTERNAL MEDICINE

## 2023-12-27 RX ADMIN — INSULIN ASPART 1 UNITS: 100 INJECTION, SOLUTION INTRAVENOUS; SUBCUTANEOUS at 02:29

## 2023-12-27 RX ADMIN — LOSARTAN POTASSIUM 25 MG: 25 TABLET, FILM COATED ORAL at 08:14

## 2023-12-27 RX ADMIN — Medication 1000 MCG: at 08:14

## 2023-12-27 RX ADMIN — ACETAMINOPHEN 650 MG: 325 TABLET, FILM COATED ORAL at 08:14

## 2023-12-27 RX ADMIN — INSULIN ASPART 1 UNITS: 100 INJECTION, SOLUTION INTRAVENOUS; SUBCUTANEOUS at 08:15

## 2023-12-27 RX ADMIN — ATORVASTATIN CALCIUM 40 MG: 40 TABLET, FILM COATED ORAL at 08:14

## 2023-12-27 RX ADMIN — INSULIN ASPART 1 UNITS: 100 INJECTION, SOLUTION INTRAVENOUS; SUBCUTANEOUS at 04:17

## 2023-12-27 RX ADMIN — ASPIRIN 81 MG CHEWABLE TABLET 81 MG: 81 TABLET CHEWABLE at 08:14

## 2023-12-27 ASSESSMENT — ACTIVITIES OF DAILY LIVING (ADL)
ADLS_ACUITY_SCORE: 48

## 2023-12-27 NOTE — DISCHARGE SUMMARY
Medicine Discharge Summary       Gerber Levine MRN# [id number]   YOB: 1945 Age: 78 year old     Date of Admission:  12/25/2023  Date of Discharge:  12/27/2023  Admitting Physician:  No admitting provider for patient encounter.  Discharge Physician:  Gabriela Wilde  Discharging Service:  Hospital Medicine     Primary Provider: Caty Yusuf              Discharge Diagnosis:     COVID-19 infection  Generalized weakness         Consultations:   Neurology  Physical and completion of therapy         Hospital Course       Gerber Levine is a 78 year old male admitted on 12/25/2023. He has a history of hereditary spherocytosis, HTN, hearing loss, diabetes and depression presenting for acute altered mental status with right sided weakness.     Acute metabolic encephalopathy  Initial concern for stroke in the ED, though CTA and MRI without evidence. Was loaded with plavix empirically, received aspirin. Admitted for work up of metabolic encephalopathy. Infectious work up negative thus far including chest xray and urinalysis. Patient is on a selective serotonin reuptake inhibitor, unable to reach wife tonight to see if he takes this regularly, either the medication itself or withdrawal could lead to delirium. UDS/EtOH negative. Of note, patient wears hearing aids but they lost charge today, could lead to worsening delirium as well. Family brought them home to charge and will bring back tomorrow. Contrast portion of MRI stopped short due to movement, radiology and neurology felt confident enough to de-escalate stroke code.  Patient's acute encephalopathy resolved.  It was likely caused by viral syndrome due to COVID infection.  Patient did not have any lower extremities nor did he have any electrolyte or  Renal function impairment.             Depression  -   Altered mental status improved, continue sertraline, w    Hypertension- continue PTA Losartan     HLD  - Continue PTA atorvastatin and asa    "  DM2  - Hold metformin, could resume if remains well tomorrow  - MSSI     Hearing loss  Chronic, no acute change. Hearing aids lost charge on day of presentation. Family will bring back in AM. Can contribute to delirium.      History of memory problems  Seen by neuropsych in 2018 and 2019, weaknesses consistent with mild compromise of the patient's frontal lobes, executive functioning and learning new information. Neuropsych thought depression was likely contributing. Included here as background, nothing to care for him.    Patient is advised to wear mask while around other people for 10 days from the day Tested positive when he is around other people.  Follow-up with his primary care provider.      On Exam ;   Alert and oriented . No acute distress  Vital signs:  Temp: 98.1  F (36.7  C) Temp src: Oral BP: 125/69 Pulse: 63   Resp: 16 SpO2: 97 % O2 Device: Nasal cannula Oxygen Delivery: 1 LPM Height: 185.4 cm (6' 1\") Weight: 90.1 kg (198 lb 11.2 oz)  Estimated body mass index is 26.22 kg/m  as calculated from the following:    Height as of this encounter: 1.854 m (6' 1\").    Weight as of this encounter: 90.1 kg (198 lb 11.2 oz).  Neck ; supple , no JVD  Chest ; equal BS bilaterally , no rales or rhonchi   CVS; RRR, no murmur /rubs or gallops  GI ; soft abdomen, non tender, BS positive  Ext ; no edema , no cynosis  Neuro ; CN 2 to 12 grossly intact , No Facial asymmetry noticed  .  Psych ; appropriate mood and effect  Skin ; no rash or purpura on exposed skin     ROUTINE IP LABS (Last four results)  BMP  Recent Labs   Lab 12/27/23  0813 12/27/23  0802 12/27/23  0416 12/27/23  0217 12/26/23  2142 12/26/23  0838 12/26/23  0745 12/26/23  0246 12/25/23 2035 12/25/23 2033 12/25/23 2029   NA  --   --   --   --   --   --   --   --  140  --  137   POTASSIUM  --  3.8  --   --   --   --  4.5  --  4.1  --  4.1   CHLORIDE  --   --   --   --   --   --   --   --   --   --  101   YANI  --   --   --   --   --   --   --   --   --   " --  9.4   CO2  --   --   --   --   --   --   --   --   --   --  29   BUN  --   --   --   --   --   --   --   --   --   --  30.7*   CR  --   --   --   --   --   --   --   --   --  1.0 1.00   *  --  146* 163* 205*   < >  --    < > 280*  --  280*    < > = values in this interval not displayed.     CBC  Recent Labs   Lab 12/25/23 2035 12/25/23 2029   WBC  --  10.1   RBC  --  4.23*   HGB 12.6* 13.4   HCT 37* 38.7*   MCV  --  92   MCH  --  31.7   MCHC  --  34.6   RDW  --  12.7   PLT  --  281     INR  Recent Labs   Lab 12/25/23 2035 12/25/23 2029   INR 1.4* 1.10                    Discharge Medications:     Current Discharge Medication List        CONTINUE these medications which have NOT CHANGED    Details   ASPIRIN 81 MG OR TABS 1 tab po QD (Once per day)      atorvastatin (LIPITOR) 40 MG tablet Take 1 tablet (40 mg) by mouth daily  Qty: 90 tablet, Refills: 3    Comments: Profile Rx: patient will contact pharmacy when needed  Associated Diagnoses: Hyperlipidemia LDL goal <100      !! blood glucose (ACCU-CHEK RODRI PLUS) test strip 1 strip by In Vitro route daily  Qty: 100 strip, Refills: 3    Associated Diagnoses: Type 2 diabetes mellitus with hyperglycemia, without long-term current use of insulin (H)      !! blood glucose (CONTOUR NEXT TEST) test strip USE TO TEST BLOOD SUGAR 1 TIME DAILY OR AS DIRECTED  Qty: 100 strip, Refills: 3    Associated Diagnoses: Type 2 diabetes mellitus with hyperglycemia, without long-term current use of insulin (H)      blood glucose (NO BRAND SPECIFIED) lancets standard Use to test blood sugar daily and as needed  Qty: 100 each, Refills: 11    Associated Diagnoses: Type 2 diabetes mellitus without complication, without long-term current use of insulin (H)      CALCITRATE/VITAMIN D 315-200 MG-UNIT OR TABS One tab daily in the morning      Cyanocobalamin (B-12 PO) Take by mouth every morning Take 1/2 tablet daily      losartan (COZAAR) 25 MG tablet Take 1 tablet (25 mg) by mouth  daily  Qty: 90 tablet, Refills: 3    Comments: Profile Rx: patient will contact pharmacy when needed  Associated Diagnoses: Hypertension goal BP (blood pressure) < 140/90      metFORMIN (GLUCOPHAGE) 500 MG tablet Take 2 tablets (1,000 mg) by mouth daily (with breakfast)  Qty: 180 tablet, Refills: 3    Comments: Profile Rx: patient will contact pharmacy when needed  Associated Diagnoses: Type 2 diabetes mellitus with hyperglycemia, without long-term current use of insulin (H)      Microlet Lancets MISC USE TO TEST BLOOD SUGAR DAILY AND AS NEEDED  Qty: 100 each, Refills: 1      sertraline (ZOLOFT) 100 MG tablet Take 1 tablet (100 mg) by mouth daily  Qty: 90 tablet, Refills: 3    Comments: Just needs Sertraline and Test Strips today 10/3/23  Associated Diagnoses: Mild major depression (H24)      triamcinolone (KENALOG) 0.5 % external ointment Use to lesion on left arm twice daily for 14 days  Qty: 15 g, Refills: 1    Associated Diagnoses: Skin lesion       !! - Potential duplicate medications found. Please discuss with provider.               Discharge Instructions and Follow-Up:     Discharge Procedure Orders   Reason for your hospital stay   Order Comments: Generalized weakness, COVID 19 infection     Activity   Order Comments: Your activity upon discharge: activity as tolerated     Order Specific Question Answer Comments   Is discharge order? Yes      Adult [facility] Follow-up and recommended labs and tests   Order Comments: Follow up with primary care provider, Caty Yusuf, within 7 days for hospital follow- up.  No follow up labs or test are needed.      Appointments on [place] and/or [facility] (with [facility] or [facility] provider or service). Call [phone] if you haven't heard regarding these appointments within 7 days of discharge.     Diet   Order Comments: Follow this diet upon discharge: Orders Placed This Encounter      Regular Diet Adult     Order Specific Question Answer Comments   Is discharge  order? Yes          Reason for your hospital stay    Generalized weakness, COVID 19 infection     Activity    Your activity upon discharge: activity as tolerated     Adult [facility] Follow-up and recommended labs and tests    Follow up with primary care provider, Caty Yusuf, within 7 days for hospital follow- up.  No follow up labs or test are needed.      Appointments on [place] and/or [facility] (with [facility] or [facility] provider or service). Call [phone] if you haven't heard regarding these appointments within 7 days of discharge.     Diet    Follow this diet upon discharge: Orders Placed This Encounter      Regular Diet Adult                Discharge Disposition:   34 minutes spent in discharge, including >50% in counseling and coordination of care, medication review and plan of care recommended on follow up. Questions were answered to satisfaction       John Larios MD  Internal Medicine Hospitalist & Staff Physician  [facility]

## 2023-12-27 NOTE — PLAN OF CARE
"Goal Outcome Evaluation:     Plan of Care Reviewed With: patient    Overall Patient Progress: improvingOverall Patient Progress: improving    Outcome Evaluation: Pt denies any SOB during this shift. Pt on 1 LPM, continous pulse ox, O2 94%. Pt reports \"I am feeling so much better then I was yesturday\". No episodes of incont during this shift. Some intermittent confusion but pt has be able to verbilize his needs and call whe appropriate.      "

## 2023-12-27 NOTE — PLAN OF CARE
"Goal Outcome Evaluation:      Plan of Care Reviewed With: patient    Overall Patient Progress: improvingOverall Patient Progress: improving     Patient admitted to unit 12/26/23 for signs of stoke symptoms - ruled out. Patient positive for COVID 19, contributing to confusion and s/s. Discharge this shift to home.      VS: /69 (BP Location: Left arm)   Pulse 63   Temp 98.1  F (36.7  C) (Oral)   Resp 16   Ht 1.854 m (6' 1\")   Wt 90.1 kg (198 lb 11.2 oz)   SpO2 97%   BMI 26.22 kg/m       O2: >90% on RA no complaints of SOB or chest pain.   Output: Voiding adequate amounts w/o pain or difficulty to bathroom.   Last BM:    Activity: SBA by wife to bathroom.    Skin: Visible skin intact.   Pain: 0/10 pain   CMS: Alert and oriented X4. Confused at times.   Dressing: None.   Diet: Regular, tolerating well. No complaints of nausea or vomiting. BG with meals   LDA: None.   Equipment: Personal belongings.   Plan: Discharge home this shift.   Additional Info:      DISCHARGE SUMMARY    Pt discharging to: Home  Transportation: Family  AVS given and discussed: Yes  Stoplight Tool given and discussed: NA  Medications given: NA  Belongings returned: NA  Comments:     "

## 2023-12-27 NOTE — PLAN OF CARE
"  Problem: Adult Inpatient Plan of Care  Goal: Plan of Care Review  Description: The Plan of Care Review/Shift note should be completed every shift.  The Outcome Evaluation is a brief statement about your assessment that the patient is improving, declining, or no change.  This information will be displayed automatically on your shift  note.  Outcome: Progressing  Goal: Patient-Specific Goal (Individualized)  Description: You can add care plan individualizations to a care plan. Examples of Individualization might be:  \"Parent requests to be called daily at 9am for status\", \"I have a hard time hearing out of my right ear\", or \"Do not touch me to wake me up as it startles  me\".  Outcome: Progressing  Goal: Absence of Hospital-Acquired Illness or Injury  Outcome: Progressing  Intervention: Identify and Manage Fall Risk  Recent Flowsheet Documentation  Taken 12/27/2023 0537 by Venus Henley RN  Safety Promotion/Fall Prevention:   activity supervised   clutter free environment maintained   increased rounding and observation   increase visualization of patient   lighting adjusted   mobility aid in reach   nonskid shoes/slippers when out of bed   room door open   safety round/check completed   supervised activity  Intervention: Prevent Skin Injury  Recent Flowsheet Documentation  Taken 12/27/2023 0537 by Venus Henley RN  Body Position: position changed independently  Intervention: Prevent and Manage VTE (Venous Thromboembolism) Risk  Recent Flowsheet Documentation  Taken 12/27/2023 0537 by Venus Henley RN  VTE Prevention/Management: SCDs (sequential compression devices) off  Goal: Optimal Comfort and Wellbeing  Outcome: Progressing  Goal: Readiness for Transition of Care  Outcome: Progressing     Problem: Fall Injury Risk  Goal: Absence of Fall and Fall-Related Injury  Outcome: Progressing  Intervention: Identify and Manage Contributors  Recent Flowsheet Documentation  Taken 12/27/2023 " "0537 by Venus Henley RN  Medication Review/Management: medications reviewed  Intervention: Promote Injury-Free Environment  Recent Flowsheet Documentation  Taken 12/27/2023 0537 by Venus Henley RN  Safety Promotion/Fall Prevention:   activity supervised   clutter free environment maintained   increased rounding and observation   increase visualization of patient   lighting adjusted   mobility aid in reach   nonskid shoes/slippers when out of bed   room door open   safety round/check completed   supervised activity     Problem: Gas Exchange Impaired  Goal: Optimal Gas Exchange  Outcome: Progressing  Intervention: Optimize Oxygenation and Ventilation  Recent Flowsheet Documentation  Taken 12/27/2023 0537 by Venus Henley RN  Head of Bed (HOB) Positioning: HOB at 20-30 degrees     Problem: Confusion Chronic  Goal: Optimal Cognitive Function  Outcome: Progressing  Intervention: Minimize Injury Risk and Provide Safety  Recent Flowsheet Documentation  Taken 12/27/2023 0537 by Venus Henley RN  Enhanced Safety Measures: room near unit station  Intervention: Minimize and Manage Confusion  Recent Flowsheet Documentation  Taken 12/27/2023 0537 by Venus Henley RN  Sensory Stimulation Regulation:   quiet environment promoted   care clustered   lighting decreased  Reorientation Measures: reorientation provided   Goal Outcome Evaluation:  Blood pressure 115/70, pulse 63, temperature 98.7  F (37.1  C), temperature source Oral, resp. rate 16, height 1.854 m (6' 1\"), weight 90.1 kg (198 lb 11.2 oz), SpO2 94%.  Pt alert disoriented to time. Intermittent confusion. Pt is on 2 L NC infrequent non productive cough. Pt is not compliance to use NC and need reinforcement. Bladder continent bowel incontinent. Pt can ambulate to bathroom with stand by assist however bed alarm is on for safety and fall precaution. PIV patent WNL with saline locked                      "

## 2023-12-27 NOTE — TELEPHONE ENCOUNTER
Reason for Call:  Appointment Request    Patient requesting this type of appt:  Hospital/ED Follow-Up     Requested provider: Caty Yusuf    Reason patient unable to be scheduled: Not within requested timeframe    When does patient want to be seen/preferred time: 3-7 days    Comments: Wife requesting to be seen earlier then 1/16/24. Pt discharge 12/27/23 and is covid positive.    Could we send this information to you in [facility] or would you prefer to receive a phone call?:   Patient would prefer a phone call   Okay to leave a detailed message?: Yes at Cell number on file:    Telephone Information:   Mobile [phone]       Call taken on 12/27/2023 at 10:54 AM by Fahad Chamorro

## 2023-12-27 NOTE — PLAN OF CARE
Occupational Therapy Discharge Summary    Reason for therapy discharge:    Discharged to home.    Progress towards therapy goal(s). See goals on Care Plan in [facility] electronic health record for goal details.  Goals partially met.  Barriers to achieving goals:   discharge from facility.    Therapy recommendation(s):    Recommend 24/7 supervision initially given cognitive deficits. If deficits persist, may benefit from full cognitive/neuropsych testing and ongoing OT.

## 2023-12-27 NOTE — TELEPHONE ENCOUNTER
Called patient and rescheduled to a sooner appointment time.    BABAK Gonzalez  [facility]

## 2023-12-28 ENCOUNTER — PATIENT OUTREACH (OUTPATIENT)
Dept: CARE COORDINATION | Facility: CLINIC | Age: 78
End: 2023-12-28
Payer: COMMERCIAL

## 2023-12-28 NOTE — PROGRESS NOTES
[facility]: [facility]: Post-Discharge Note  SITUATION                                                      Admission:    Admission Date: 12/25/23   Reason for Admission: Altered mental status, unspecified altered mental status type  Right sided weakness  Discharge:   Discharge Date: 12/27/23  Discharge Diagnosis: COVID-19 infection  Generalized weakness    BACKGROUND                                                      Per hospital discharge summary and inpatient provider notes:    Gerber Levine is a 78 year old male with a past medical history of depression/anxiety, hypertension, hereditary spherocytosis, hypercholesterolemia, 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  The patient presents to the emergency department today accompanied by his wife who provides most of the history.  She states that the patient has seemed less energetic than usual all day.  She also notes that he seems to be having some difficulty walking, veering to the right.  His speech has not been normal either and he has seemed quite confused.  She states that he attempted to drive their car home and he was driving somewhat erratically and she became quite concerned and told him to pull over, which he was reluctant to do.  The patient does not have any prior history of stroke.  His wife thinks he may be on some blood thinning medication, possibly Coumadin, but notes that all of his medications should be listed in our medical record.  I do not see any blood thinning medication other than 81 mg of aspirin daily.     The patient's wife notes that at baseline, the patient is alert and oriented x 4 and is able to ambulate independently without difficulty.  She states that the patient has commented that he feels he is having some memory difficulties lately, however, workup  has been negative and she and others have not picked up on any of these symptoms.     Last known well was approximately 2 hours prior to arrival per his wife.     I have reviewed the Medications, Allergies, Past Medical and Surgical History, and Social History in the Epic system.      ASSESSMENT           Discharge Assessment  How are you doing now that you are home?: Patient states he is doing better.  How are your symptoms? (Red Flag symptoms escalate to triage hotline per guidelines): Improved  Do you feel your condition is stable enough to be safe at home until your provider visit?: Yes  Does the patient have their discharge instructions? : Yes  Does the patient have questions regarding their discharge instructions? : No  Were you started on any new medications or were there changes to any of your previous medications? : No  Does the patient have all of their medications?: Yes  Do you have questions regarding any of your medications? : No  Do you have all of your needed medical supplies or equipment (DME)?  (i.e. oxygen tank, CPAP, cane, etc.): Yes  Discharge follow-up appointment scheduled within 14 calendar days? : Yes  Discharge Follow Up Appointment Date: 01/04/23  Discharge Follow Up Appointment Scheduled with?: Primary Care Provider         Post-op (Clinicians Only)  Did the patient have surgery or a procedure: No        PLAN                                                      Outpatient Plan:  Follow up with primary care provider, Caty Yusuf, within 7 days for hospital follow- up.  No follow up labs or test are needed.       Appointments on [place] and/or [facility] (with [facility] or [facility] provider or service). Call [phone] if you haven't heard regarding these appointments within 7 days of discharge.      Future Appointments   Date Time Provider Department Center   1/4/2024  2:00 PM Caty Yusuf NP [facility]   2/6/2024  9:00 AM Augie Soto MD [facility]         For any  urgent concerns, please contact our 24 hour nurse triage line: [phone] ([phone])         Haley Burrows RN

## 2023-12-29 ENCOUNTER — NURSE TRIAGE (OUTPATIENT)
Dept: FAMILY MEDICINE | Facility: CLINIC | Age: 78
End: 2023-12-29
Payer: COMMERCIAL

## 2023-12-29 ENCOUNTER — MYC MEDICAL ADVICE (OUTPATIENT)
Dept: FAMILY MEDICINE | Facility: CLINIC | Age: 78
End: 2023-12-29
Payer: COMMERCIAL

## 2023-12-29 NOTE — TELEPHONE ENCOUNTER
RN COVID TREATMENT VISIT  12/29/23    The patient has been triaged and does not require a higher level of care.    Gerber Levine  78 year old  Current weight? 198    Has the patient been seen by a primary care provider at an [facility] or [facility] within the past two years? Yes.   Have you been in close proximity to/do you have a known exposure to a person with a confirmed case of influenza? No.     General treatment eligibility:  Date of positive COVID test (PCR or at home)?  12/26/2023    Are you or have you been hospitalized for this COVID-19 infection? Yes.  Patient informed they do not qualify for treatment and should schedule a hospital follow-up appointment with their primary care provider if not already scheduled.   Patient and wife report patient stable upon return home, has hospital followup appt setup 1/4/24 and will seek medical attention if status changes prior to appt.  Michelle Acosta RN        Reason for Disposition   [1] HIGH RISK patient (e.g., weak immune system, age > 64 years, obesity with BMI 30 or higher, pregnant, chronic lung disease or other chronic medical condition) AND [2] COVID symptoms (e.g., cough, fever)  (Exceptions: Already seen by PCP and no new or worsening symptoms.)    Additional Information   Negative: SEVERE difficulty breathing (e.g., struggling for each breath, speaks in single words)   Negative: Difficult to awaken or acting confused (e.g., disoriented, slurred speech)   Negative: Bluish (or gray) lips or face now   Negative: Shock suspected (e.g., cold/pale/clammy skin, too weak to stand, low BP, rapid pulse)   Negative: Sounds like a life-threatening emergency to the triager   Negative: [1] Diagnosed or suspected COVID-19 AND [2] symptoms lasting 3 or more weeks   Negative: [1] COVID-19 exposure AND [2] no symptoms   Negative: COVID-19 vaccine reaction suspected (e.g., fever, headache, muscle aches) occurring 1 to 3 days after getting  vaccine   Negative: COVID-19 vaccine, questions about   Negative: [1] Lives with someone known to have influenza (flu test positive) AND [2] flu-like symptoms (e.g., cough, runny nose, sore throat, SOB; with or without fever)   Negative: [1] Possible COVID-19 symptoms AND [2] triager concerned about severity of symptoms or other causes   Negative: COVID-19 and breastfeeding, questions about   Negative: SEVERE or constant chest pain or pressure  (Exception: Mild central chest pain, present only when coughing.)   Negative: MODERATE difficulty breathing (e.g., speaks in phrases, SOB even at rest, pulse 100-120)   Negative: [1] Headache AND [2] stiff neck (can't touch chin to chest)   Negative: Oxygen level (e.g., pulse oximetry) 90 percent or lower   Negative: Chest pain or pressure  (Exception: MILD central chest pain, present only when coughing.)   Negative: [1] Drinking very little AND [2] dehydration suspected (e.g., no urine > 12 hours, very dry mouth, very lightheaded)   Negative: Patient sounds very sick or weak to the triager   Negative: MILD difficulty breathing (e.g., minimal/no SOB at rest, SOB with walking, pulse <100)   Negative: Fever > 103 F (39.4 C)   Negative: [1] Fever > 101 F (38.3 C) AND [2] age > 60 years   Negative: [1] Fever > 100.0 F (37.8 C) AND [2] bedridden (e.g., CVA, chronic illness, recovering from surgery)   Negative: Oxygen level (e.g., pulse oximetry) 91 to 94 percent    Protocols used: Coronavirus (COVID-19) Diagnosed or [id number]

## 2023-12-29 NOTE — TELEPHONE ENCOUNTER
RN replied to patient via [employer]. See message for details.     James Hood RN, BSN, PHN  [facility]: [place]

## 2023-12-29 NOTE — TELEPHONE ENCOUNTER
Patient and wife informed patient does not qualify for Paxlovid treatment.  Patient and wife report patient stable upon return home.  Has hospital followup appt setup 1/4/24 and patient will seek medical attention sooner if status changes prior to appt.  Michelle Acosta RN

## 2024-01-04 ENCOUNTER — OFFICE VISIT (OUTPATIENT)
Dept: FAMILY MEDICINE | Facility: CLINIC | Age: 79
End: 2024-01-04
Payer: COMMERCIAL

## 2024-01-04 ENCOUNTER — PATIENT OUTREACH (OUTPATIENT)
Dept: CARE COORDINATION | Facility: CLINIC | Age: 79
End: 2024-01-04

## 2024-01-04 ENCOUNTER — MYC MEDICAL ADVICE (OUTPATIENT)
Dept: FAMILY MEDICINE | Facility: CLINIC | Age: 79
End: 2024-01-04

## 2024-01-04 VITALS
WEIGHT: 190 LBS | OXYGEN SATURATION: 97 % | BODY MASS INDEX: 25.18 KG/M2 | DIASTOLIC BLOOD PRESSURE: 62 MMHG | TEMPERATURE: 96.7 F | RESPIRATION RATE: 16 BRPM | HEIGHT: 73 IN | SYSTOLIC BLOOD PRESSURE: 118 MMHG | HEART RATE: 68 BPM

## 2024-01-04 DIAGNOSIS — R41.3 MEMORY LOSS: ICD-10-CM

## 2024-01-04 DIAGNOSIS — Z09 HOSPITAL DISCHARGE FOLLOW-UP: Primary | ICD-10-CM

## 2024-01-04 DIAGNOSIS — E78.5 HYPERLIPIDEMIA LDL GOAL <100: ICD-10-CM

## 2024-01-04 DIAGNOSIS — M62.81 GENERALIZED MUSCLE WEAKNESS: ICD-10-CM

## 2024-01-04 DIAGNOSIS — E11.65 TYPE 2 DIABETES MELLITUS WITH HYPERGLYCEMIA, WITHOUT LONG-TERM CURRENT USE OF INSULIN (H): ICD-10-CM

## 2024-01-04 DIAGNOSIS — U07.1 INFECTION DUE TO 2019 NOVEL CORONAVIRUS: ICD-10-CM

## 2024-01-04 PROCEDURE — 99213 OFFICE O/P EST LOW 20 MIN: CPT | Performed by: NURSE PRACTITIONER

## 2024-01-04 RX ORDER — RESPIRATORY SYNCYTIAL VIRUS VACCINE 120MCG/0.5
0.5 KIT INTRAMUSCULAR ONCE
Qty: 1 EACH | Refills: 0 | Status: CANCELLED | OUTPATIENT
Start: 2024-01-04 | End: 2024-01-04

## 2024-01-04 RX ORDER — ATORVASTATIN CALCIUM 40 MG/1
40 TABLET, FILM COATED ORAL DAILY
Qty: 90 TABLET | Refills: 3 | Status: SHIPPED | OUTPATIENT
Start: 2024-01-04

## 2024-01-04 ASSESSMENT — PATIENT HEALTH QUESTIONNAIRE - PHQ9
SUM OF ALL RESPONSES TO PHQ QUESTIONS 1-9: 7
SUM OF ALL RESPONSES TO PHQ QUESTIONS 1-9: 7
10. IF YOU CHECKED OFF ANY PROBLEMS, HOW DIFFICULT HAVE THESE PROBLEMS MADE IT FOR YOU TO DO YOUR WORK, TAKE CARE OF THINGS AT HOME, OR GET ALONG WITH OTHER PEOPLE: NOT DIFFICULT AT ALL

## 2024-01-04 ASSESSMENT — PAIN SCALES - GENERAL: PAINLEVEL: NO PAIN (0)

## 2024-01-04 NOTE — PROGRESS NOTES
"  Assessment & Plan     Hospital discharge follow-up    Infection due to 2019 novel coronavirus  Resolved.    Generalized muscle weakness  Improved.    Memory loss  Recommend follow up for neuropsychology evaluation and patient/wife are agreeable.  - Adult Neuropsychology  Referral; Future    Type 2 diabetes mellitus with hyperglycemia, without long-term current use of insulin (H)  Known issue that I take into account for their medical decisions, no current exacerbations or new concerns.     Hyperlipidemia LDL goal <100  Known issue that I take into account for their medical decisions, no current exacerbations or new concerns.   - atorvastatin (LIPITOR) 40 MG tablet; Take 1 tablet (40 mg) by mouth daily         MED REC REQUIRED  Post Medication Reconciliation Status: discharge medications reconciled, continue medications without change  BMI:   Estimated body mass index is 25.41 kg/m  as calculated from the following:    Height as of this encounter: 1.842 m (6' 0.5\").    Weight as of this encounter: 86.2 kg (190 lb).         Caty Yusuf NP  [facility]   Cash is a 78 year old, presenting for the following health issues:  Hospital F/U        1/4/2024     1:59 PM   Additional Questions   Roomed by Adela DARNELL         12/28/2023     2:15 PM   Post Discharge Outreach   Admission Date 12/25/2023   Reason for Admission Altered mental status, unspecified altered mental status type  Right sided weakness   Discharge Date 12/27/2023   Discharge Diagnosis COVID-19 infection  Generalized weakness   How are you doing now that you are home? Patient states he is doing better.   How are your symptoms? (Red Flag symptoms escalate to triage hotline per guidelines) Improved   Do you feel your condition is stable enough to be safe at home until your provider visit? Yes   Does the patient have their discharge instructions?  Yes   Does the patient have questions regarding their " discharge instructions?  No   Were you started on any new medications or were there changes to any of your previous medications?  No   Does the patient have all of their medications? Yes   Do you have questions regarding any of your medications?  No   Do you have all of your needed medical supplies or equipment (DME)?  (i.e. oxygen tank, CPAP, cane, etc.) Yes   Discharge follow-up appointment scheduled within 14 calendar days?  Yes   Discharge Follow Up Appointment Date 1/4/2023   Discharge Follow Up Appointment Scheduled with? Primary Care Provider     Was your hospitalization related to COVID-19? YES   How are you feeling today? Much better  In the past 24 hours have you had shortness of breath when speaking, walking, or climbing stairs? I don't have breathing problems  Do you have a cough? Yes, I have a cough but it's not worse  When is the last time you had a fever greater than 100? Neve   Are you having any other symptoms? None   Do you have any other stressors you would like to discuss with your provider? No             Was the patient in the ICU or did the patient experience delirium during hospitalization?  No        Problems taking medications regularly:  None  Medication changes since discharge: None  Problems adhering to non-medication therapy:  None    Summary of hospitalization:  [facility] discharge summary reviewed  Diagnostic Tests/Treatments reviewed.  Follow up needed: none  Other Healthcare Providers Involved in Patient s Care:         None  Update since discharge: improved.         Plan of care communicated with patient and patient and wife           Christmas morning he walked 3 miles.  Aspirin 81 mg  Blood sugar 195      Review of Systems   Constitutional, HEENT, cardiovascular, pulmonary, gi and gu systems are negative, except as otherwise noted.      Objective    /62 (BP Location: Left arm, Patient Position: Sitting, Cuff Size: Adult Large)   Pulse 68   Temp (!) 96.7  " F (35.9  C) (Tympanic)   Resp 16   Ht 1.842 m (6' 0.5\")   Wt 86.2 kg (190 lb)   SpO2 97%   BMI 25.41 kg/m    Body mass index is 25.41 kg/m .  Physical Exam   GENERAL: healthy, alert and no distress  RESP: lungs clear to auscultation - no rales, rhonchi or wheezes  CV: regular rates and rhythm, normal S1 S2, no S3 or S4, and no murmur, click or rub  PSYCH: mentation appears normal, affect normal/bright    Admission on 12/25/2023, Discharged on 12/27/2023   Component Date Value Ref Range Status    Hold Specimen 12/25/2023 JIC   Final    Hold Specimen 12/25/2023 JIC   Final    Hold Specimen 12/25/2023 JIC   Final    Hold Specimen 12/25/2023 JIC   Final    INR 12/25/2023 1.10  0.85 - 1.15 Final    aPTT 12/25/2023 32  22 - 38 Seconds Final    Troponin T, High Sensitivity 12/25/2023 13  <=22 ng/L Final    Either a High Sensitivity Troponin T baseline (0 hours) value = 100 ng/L, or an increase in High Sensitivity Troponin T = 7 ng/L at 2 hours compared to 0 hours (2-0 hours), suggests myocardial injury, and urgent clinical attention is required.    If the 2-0 hours increase is <7 ng/L, a High Sensitivity Troponin T result above gender-specific reference ranges warrants further evaluation.   Recommendations for further evaluation include correlation with clinical decision-making tool (e.g., HEART), a 3rd High Sensitivity Troponin T test 2 hours after the 2nd (a 20% change from baseline would represent concern), admission for observation, close PCC/cardiology follow-up, or urgent outpatient provocative testing.    Sodium 12/25/2023 137  135 - 145 mmol/L Final    Reference intervals for this test were updated on 09/26/2023 to more accurately reflect our healthy population. There may be differences in the flagging of prior results with similar values performed with this method. Interpretation of those prior results can be made in the context of the updated reference intervals.     Potassium 12/25/2023 4.1  3.4 - 5.3 " mmol/L Final    Carbon Dioxide (CO2) 12/25/2023 29  22 - 29 mmol/L Final    Anion Gap 12/25/2023 7  7 - 15 mmol/L Final    Urea Nitrogen 12/25/2023 30.7 (H)  8.0 - 23.0 mg/dL Final    Creatinine 12/25/2023 1.00  0.67 - 1.17 mg/dL Final    GFR Estimate 12/25/2023 76  >60 mL/min/1.73m2 Final    Calcium 12/25/2023 9.4  8.8 - 10.2 mg/dL Final    Chloride 12/25/2023 101  98 - 107 mmol/L Final    Glucose 12/25/2023 280 (H)  70 - 99 mg/dL Final    Alkaline Phosphatase 12/25/2023 123  40 - 150 U/L Final    Reference intervals for this test were updated on 11/14/2023 to more accurately reflect our healthy population. There may be differences in the flagging of prior results with similar values performed with this method. Interpretation of those prior results can be made in the context of the updated reference intervals.    AST 12/25/2023 27  0 - 45 U/L Final    Reference intervals for this test were updated on 6/12/2023 to more accurately reflect our healthy population. There may be differences in the flagging of prior results with similar values performed with this method. Interpretation of those prior results can be made in the context of the updated reference intervals.    ALT 12/25/2023 23  0 - 70 U/L Final    Reference intervals for this test were updated on 6/12/2023 to more accurately reflect our healthy population. There may be differences in the flagging of prior results with similar values performed with this method. Interpretation of those prior results can be made in the context of the updated reference intervals.      Protein Total 12/25/2023 7.4  6.4 - 8.3 g/dL Final    Albumin 12/25/2023 4.2  3.5 - 5.2 g/dL Final    Bilirubin Total 12/25/2023 0.5  <=1.2 mg/dL Final    GLUCOSE BY METER POCT 12/25/2023 248 (H)  70 - 99 mg/dL Final    Color Urine 12/25/2023 Light Yellow  Colorless, Straw, Light Yellow, Yellow Final    Appearance Urine 12/25/2023 Clear  Clear Final    Glucose Urine 12/25/2023 300 (A)  Negative  mg/dL Final    Bilirubin Urine 12/25/2023 Negative  Negative Final    Ketones Urine 12/25/2023 Negative  Negative mg/dL Final    Specific Gravity Urine 12/25/2023 1.020  1.003 - 1.035 Final    Blood Urine 12/25/2023 Moderate (A)  Negative Final    pH Urine 12/25/2023 5.0  5.0 - 7.0 Final    Protein Albumin Urine 12/25/2023 Negative  Negative mg/dL Final    Urobilinogen Urine 12/25/2023 Normal  Normal, 2.0 mg/dL Final    Nitrite Urine 12/25/2023 Negative  Negative Final    Leukocyte Esterase Urine 12/25/2023 Negative  Negative Final    Mucus Urine 12/25/2023 Present (A)  None Seen /LPF Final    RBC Urine 12/25/2023 3 (H)  <=2 /HPF Final    WBC Urine 12/25/2023 1  <=5 /HPF Final    Squamous Epithelials Urine 12/25/2023 1  <=1 /HPF Final    Creatinine POCT 12/25/2023 1.0  0.7 - 1.3 mg/dL Final    GFR, ESTIMATED POCT 12/25/2023 >60  >60 mL/min/1.73m2 Final    CPB Applied 12/25/2023 No   Final    Hematocrit POCT 12/25/2023 37 (L)  40 - 53 % Final    Calcium, Ionized Whole Blood POCT 12/25/2023 5.0  4.4 - 5.2 mg/dL Final    Glucose Whole Blood POCT 12/25/2023 280 (H)  70 - 99 mg/dL Final    Bicarbonate Venous POCT 12/25/2023 27  21 - 28 mmol/L Final    Hemoglobin POCT 12/25/2023 12.6 (L)  13.3 - 17.7 g/dL Final    Potassium POCT 12/25/2023 4.1  3.4 - 5.3 mmol/L Final    Sodium POCT 12/25/2023 140  135 - 145 mmol/L Final    pCO2 Venous POCT 12/25/2023 41  40 - 50 mm Hg Final    pO2 Venous POCT 12/25/2023 19 (L)  25 - 47 mm Hg Final    pH Venous POCT 12/25/2023 7.43  7.32 - 7.43 Final    O2 Sat, Venous POCT 12/25/2023 31 (L)  94 - 100 % Final    INR POCT 12/25/2023 1.4 (L)  2.0 - 3.0 Final    WBC Count 12/25/2023 10.1  4.0 - 11.0 10e3/uL Final    RBC Count 12/25/2023 4.23 (L)  4.40 - 5.90 10e6/uL Final    Hemoglobin 12/25/2023 13.4  13.3 - 17.7 g/dL Final    Hematocrit 12/25/2023 38.7 (L)  40.0 - 53.0 % Final    MCV 12/25/2023 92  78 - 100 fL Final    MCH 12/25/2023 31.7  26.5 - 33.0 pg Final    MCHC 12/25/2023 34.6  31.5 -  36.5 g/dL Final    RDW 12/25/2023 12.7  10.0 - 15.0 % Final    Platelet Count 12/25/2023 281  150 - 450 10e3/uL Final    % Neutrophils 12/25/2023 72  % Final    % Lymphocytes 12/25/2023 8  % Final    % Monocytes 12/25/2023 17  % Final    % Eosinophils 12/25/2023 1  % Final    % Basophils 12/25/2023 1  % Final    % Immature Granulocytes 12/25/2023 1  % Final    NRBCs per 100 WBC 12/25/2023 0  <1 /100 Final    Absolute Neutrophils 12/25/2023 7.4  1.6 - 8.3 10e3/uL Final    Absolute Lymphocytes 12/25/2023 0.8  0.8 - 5.3 10e3/uL Final    Absolute Monocytes 12/25/2023 1.7 (H)  0.0 - 1.3 10e3/uL Final    Absolute Eosinophils 12/25/2023 0.1  0.0 - 0.7 10e3/uL Final    Absolute Basophils 12/25/2023 0.1  0.0 - 0.2 10e3/uL Final    Absolute Immature Granulocytes 12/25/2023 0.1  <=0.4 10e3/uL Final    Absolute NRBCs 12/25/2023 0.0  10e3/uL Final    Magnesium 12/25/2023 2.1  1.7 - 2.3 mg/dL Final    Alcohol ethyl 12/25/2023 <0.01  <=0.01 g/dL Final    Amphetamines Urine 12/25/2023 Screen Negative  Screen Negative Final    Cutoff for a negative amphetamine is less than 500 ng/mL.    Barbituates Urine 12/25/2023 Screen Negative  Screen Negative Final    Cutoff for a negative barbiturate is less than 200 ng/mL.    Benzodiazepine Urine 12/25/2023 Screen Negative  Screen Negative Final    Cutoff for a negative benzodiazepine is less than 100 ng/mL.    Cannabinoids Urine 12/25/2023 Screen Negative  Screen Negative Final    Cutoff for a negative cannabinoid is less than 50 ng/mL.    Cocaine Urine 12/25/2023 Screen Negative  Screen Negative Final    Cutoff for a negative cocaine is less than 300 ng/mL.    Fentanyl Qual Urine 12/25/2023 Screen Negative  Screen Negative Final    Cutoff for negative fentanyl is less than 5 ng/mL.    Opiates Urine 12/25/2023 Screen Negative  Screen Negative Final    Cutoff for a negative opiate is less than 300 ng/mL.    PCP Urine 12/25/2023 Screen Negative  Screen Negative Final    Cutoff for a negative  PCP is less than 25 ng/mL.    N terminal Pro BNP Inpatient 12/25/2023 670  0 - 1,800 pg/mL Final    Reference range shown and results flagged as abnormal are suggested inpatient cut points for confirming diagnosis if CHF in an acute setting. Establishing a baseline value for each individual patient is useful for follow-up. An inpatient or emergency department NT-proPBNP <300 pg/mL effectively rules out acute CHF, with 99% negative predictive value.    The outpatient non-acute reference range for ruling out CHF is:  0-125 pg/mL (age 18 to less than 75)  0-450 pg/mL (age 75 yrs and older)     GLUCOSE BY METER POCT 12/26/2023 208 (H)  70 - 99 mg/dL Final    TSH 12/25/2023 2.05  0.30 - 4.20 uIU/mL Final    GLUCOSE BY METER POCT 12/26/2023 221 (H)  70 - 99 mg/dL Final    Influenza A PCR 12/26/2023 Negative  Negative Final    Influenza B PCR 12/26/2023 Negative  Negative Final    RSV PCR 12/26/2023 Negative  Negative Final    SARS CoV2 PCR 12/26/2023 Positive (A)  Negative Final    POSITIVE: SARS-CoV-2 (COVID-19) RNA detected, presumed positive.    Potassium 12/26/2023 4.5  3.4 - 5.3 mmol/L Final    Magnesium 12/26/2023 2.1  1.7 - 2.3 mg/dL Final    Hold Specimen 12/26/2023 JIC   Final    GLUCOSE BY METER POCT 12/26/2023 175 (H)  70 - 99 mg/dL Final    GLUCOSE BY METER POCT 12/26/2023 199 (H)  70 - 99 mg/dL Final    GLUCOSE BY METER POCT 12/26/2023 235 (H)  70 - 99 mg/dL Final    GLUCOSE BY METER POCT 12/26/2023 205 (H)  70 - 99 mg/dL Final    GLUCOSE BY METER POCT 12/27/2023 146 (H)  70 - 99 mg/dL Final    Potassium 12/27/2023 3.8  3.4 - 5.3 mmol/L Final    Magnesium 12/27/2023 2.4 (H)  1.7 - 2.3 mg/dL Final    GLUCOSE BY METER POCT 12/27/2023 163 (H)  70 - 99 mg/dL Final    GLUCOSE BY METER POCT 12/27/2023 159 (H)  70 - 99 mg/dL Final                     Answers submitted by the patient for this visit:  Patient Health Questionnaire (Submitted on 1/4/2024)  If you checked off any problems, how difficult have these  problems made it for you to do your work, take care of things at home, or get along with other people?: Not difficult at all  PHQ9 TOTAL SCORE: 7

## 2024-01-04 NOTE — TELEPHONE ENCOUNTER
RN replied to patient via [employer]. See message for details.     James Hood RN, BSN, PHN  [facility]: [place]

## 2024-01-04 NOTE — TELEPHONE ENCOUNTER
RN replied to patient via [employer]. See message for details.     James Hood RN, BSN, PHN  [facility]: [place]

## 2024-01-18 ENCOUNTER — PATIENT OUTREACH (OUTPATIENT)
Dept: CARE COORDINATION | Facility: CLINIC | Age: 79
End: 2024-01-18
Payer: COMMERCIAL

## 2024-02-02 ENCOUNTER — ANCILLARY PROCEDURE (OUTPATIENT)
Dept: CT IMAGING | Facility: CLINIC | Age: 79
End: 2024-02-02
Attending: INTERNAL MEDICINE
Payer: COMMERCIAL

## 2024-02-02 ENCOUNTER — LAB (OUTPATIENT)
Dept: LAB | Facility: CLINIC | Age: 79
End: 2024-02-02
Payer: COMMERCIAL

## 2024-02-02 DIAGNOSIS — Z90.81 HISTORY OF SPLENECTOMY: ICD-10-CM

## 2024-02-02 DIAGNOSIS — D58.0 HEREDITARY SPHEROCYTOSIS (H): ICD-10-CM

## 2024-02-02 DIAGNOSIS — R91.8 PULMONARY NODULES: ICD-10-CM

## 2024-02-02 DIAGNOSIS — E11.65 TYPE 2 DIABETES MELLITUS WITH HYPERGLYCEMIA, WITHOUT LONG-TERM CURRENT USE OF INSULIN (H): ICD-10-CM

## 2024-02-02 DIAGNOSIS — C49.3 SOFT TISSUE SARCOMA OF CHEST WALL (H): ICD-10-CM

## 2024-02-02 DIAGNOSIS — R41.3 MEMORY LOSS: ICD-10-CM

## 2024-02-02 LAB
ANION GAP SERPL CALCULATED.3IONS-SCNC: 8 MMOL/L (ref 7–15)
BUN SERPL-MCNC: 21.1 MG/DL (ref 8–23)
CALCIUM SERPL-MCNC: 9.2 MG/DL (ref 8.8–10.2)
CHLORIDE SERPL-SCNC: 101 MMOL/L (ref 98–107)
CREAT BLD-MCNC: 0.8 MG/DL (ref 0.7–1.3)
CREAT SERPL-MCNC: 0.78 MG/DL (ref 0.67–1.17)
DEPRECATED HCO3 PLAS-SCNC: 27 MMOL/L (ref 22–29)
EGFRCR SERPLBLD CKD-EPI 2021: 90 ML/MIN/1.73M2
EGFRCR SERPLBLD CKD-EPI 2021: >60 ML/MIN/1.73M2
GLUCOSE SERPL-MCNC: 180 MG/DL (ref 70–99)
HBA1C MFR BLD: 8.7 %
POTASSIUM SERPL-SCNC: 4.3 MMOL/L (ref 3.4–5.3)
SODIUM SERPL-SCNC: 136 MMOL/L (ref 135–145)

## 2024-02-02 PROCEDURE — 82565 ASSAY OF CREATININE: CPT | Performed by: PATHOLOGY

## 2024-02-02 PROCEDURE — 36415 COLL VENOUS BLD VENIPUNCTURE: CPT | Performed by: PATHOLOGY

## 2024-02-02 PROCEDURE — 99000 SPECIMEN HANDLING OFFICE-LAB: CPT | Performed by: PATHOLOGY

## 2024-02-02 PROCEDURE — 71260 CT THORAX DX C+: CPT | Performed by: RADIOLOGY

## 2024-02-02 PROCEDURE — 80048 BASIC METABOLIC PNL TOTAL CA: CPT | Performed by: PATHOLOGY

## 2024-02-02 PROCEDURE — 83036 HEMOGLOBIN GLYCOSYLATED A1C: CPT | Performed by: NURSE PRACTITIONER

## 2024-02-02 RX ORDER — IOPAMIDOL 755 MG/ML
93 INJECTION, SOLUTION INTRAVASCULAR ONCE
Status: COMPLETED | OUTPATIENT
Start: 2024-02-02 | End: 2024-02-02

## 2024-02-02 RX ADMIN — IOPAMIDOL 93 ML: 755 INJECTION, SOLUTION INTRAVASCULAR at 09:20

## 2024-02-02 NOTE — DISCHARGE INSTRUCTIONS

## 2024-02-05 NOTE — PROGRESS NOTES
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       Background  In brief, he had some URI-like symptoms in 06/2016 leading to a chest x-ray which revealed a mass leading to further evaluation.  PET/CT imaging revealed about a 2 x 1.3-cm mass extending from the right pleura into the chest wall.  He also had some small nonspecific lung nodules noted.  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  He himself noted no symptoms from this lesion.  The tumor was excised en bloc on 09/21/2016 with a 3-cm margin on each side anteriorly and posteriorly.    -  Specimen #: [id number]   Collected: 9/21/2016      FINAL DIAGNOSIS:   A. Soft tissue, right chest wall, resection:   - Cellular/deep fibrous histiocytoma; see comment   - Tumor size: 3.0 cm   - Tumor extent: Involves skeletal muscle   - Lymphovascular invasion: Not identified   - Margins of resection: Free of tumor     B. Soft tissue, additional right chest wall margin, resection:   - Benign skeletal muscle, bone and connective tissue     C. 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 Soft tissue, right muscle final superficial margin, resection:    - Benign skeletal muscle and connective tissue     E. Soft tissue, right chest wall skin margin, resection:   - Benign skin and subcutaneous tissue     These features favor a deep, cellular fibrous histiocytoma.     Deep fibrous histiocytoma (deep counterpart of cutaneous fibrous   histiocytoma / dermatofibroma) is a rare lesion that arises in the   skeletal muscle or visceral locations. Unlike cutaneous dermatofibroma,   it is usually a highly cellular lesion that lacks heterogeneous elements   such as giant cells,  histiocytic aggregates and hemosiderin, thus   mimicking DFSP. These lesions in general are clinically indolent but   have the potential to recur at an increased frequency (varies from 22%   to 57%) and undergo metastasis in rare instances.  -        Interval history     On 23 he was seen for a possible CVA that was felt to be an acute encephalopathy felt related to a covid infection.    He recovered from the COVID completely.  He is generally doing up pretty well but he does note gradually worsening dementia and memory issues; his mom  of dementia.      No limitations to activity. He gets out of the house every day and walks 2.5 miles.     They enjoyed a mystery trip last Feb went to [place].   Going to [facility] [place] .     He does not sleep much but his sleep is as usual which is not always that great.  Nevertheless he generally feels rested.      We discussed mood at length and whether he thinks he is depressed and he thinks he is not.  He notes he has always been anxious under certain conditions.  He does not think it is a problem.    He follows DM with his PCP.  He notes since COVID his glucose has been a lot higher even up to 200 and he sees his PCP tomorrow on that..  He does have the type 2 diabetes, hyperlipidemia and hypertension.        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  He has a daughter who also had her spleen removed and there is a strong family history on his father's side.  At the time of his splenectomy, he also had an appendectomy, cholecystectomy and hernia repair.  He has also has had bilateral cataract surgery.       He has generalized anxiety disorder and has seen a psychologist.    Overall he thinks the mood is OK now and feels no need for action on that.        -  Background PMH, FH, SH  PMH notable for  hereditary spherocytosis, HBP, anxiety, hyperlipidemia , DM, s/p splenectomy, appy, stephanie,  hernia repair, cataract removal.  FH-dementia, hereditary spherocytosis  -     On exam he appeared comfortable with normal affect  HEENT full EOMs  NECK no obvious goiter or mass  CHEST:  no resp distress  NEUROLOGIC:  mentation and speech normal  PSYCH: mood good      -  On 2-2-24 Creat 0.78, Ca 9,2, A1c 8.7     -  The images of 5-5-23 showed nonspecific small lung nodules but while it is complicated the RLL nodule appears to me to be stable c/w Jan 2021    Formal read:  CT chest 5-5-23  IMPRESSION: Unchanged right lower lobe pulmonary nodules. Unchanged  right upper lobe wedge resection site. Bilateral renal cysts. No  evidence of chest wall recurrence.      I reviewed the new images of 2-2-24 and there are nonspecific small lung nodules but while it is complicated the RLL nodule appears slightly larger with some calcification.    Formal read:  CT chest 2-2-24  IMPRESSION: Increase in size of a right lower lobe pulmonary nodule  which contains a central focus of calcification and has adjacent  satellite nodularity. This is indeterminant and may represent  granulomatous disease but neoplasm cannot be excluded. Continued  follow-up is recommended.      CT head 12-25-23                                                               IMPRESSION:   1. Senescent changes and sequelae of chronic microangiopathy without acute intracranial abnormality    MRI brain 12-25-23    IMPRESSION:  1.  No acute infarct.  2.  Age-related changes described above.      -     We discussed the situation, and I think it is a fairly low-risk lesion.       At the time of surgery, a 2-cm protuberant lesion in the fourth intercostal space was noted.  At this time, he also had a small wedge resection of the right upper lobe for some nodularity that had been seen.  At the time of resection, a 3-cm tumor involving skeletal muscle was found without lymphovascular invasion and with negative margins.  Studies for the PDGFB gene rearrangement were  negative, but could not eliminate the DFSP diagnosis re sampling.  There was no high-grade nuclear atypia, increased mitotic activity, or necrosis.  The lung biopsy was negative for malignancy.  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  This is an unusual lesion and unlike the cutaneous dermatofibroma, it usually is of high cellularity without other heterogeneous elements.  This is generally a low-grade lesion but can recur and rarely metastasize.    I told him I thought there was a fairly low risk of recurrence, but it does need some followup, as do his nonspecific lung nodules.      Given the overall appearance we will check a CT in Oct.  If that's OK we might a chest CT scan in another 1-2 years.  This recommendation is not, obviously, based on a lot of data, but it seems like a reasonable approach.  It is perhaps possible that the COVID augmented the imaging properties of the nodule and we will see what it looks like next time.    All of his questions were addressed and he will call if he has others.        -  Augie Soto M.D.  Professor  Hematology, Oncology and Transplantation  -   cc:  MD Leah Freedman MD, Thoracic Surgery     Luis Velasco MD, Thoracic Surgery

## 2024-02-06 ENCOUNTER — VIRTUAL VISIT (OUTPATIENT)
Dept: ONCOLOGY | Facility: CLINIC | Age: 79
End: 2024-02-06
Attending: INTERNAL MEDICINE
Payer: COMMERCIAL

## 2024-02-06 DIAGNOSIS — D58.0 HEREDITARY SPHEROCYTOSIS (H): ICD-10-CM

## 2024-02-06 DIAGNOSIS — R73.09 ELEVATED HEMOGLOBIN A1C: ICD-10-CM

## 2024-02-06 DIAGNOSIS — R41.3 MEMORY LOSS: ICD-10-CM

## 2024-02-06 DIAGNOSIS — R91.8 PULMONARY NODULES: ICD-10-CM

## 2024-02-06 DIAGNOSIS — C49.3 SOFT TISSUE SARCOMA OF CHEST WALL (H): Primary | ICD-10-CM

## 2024-02-06 PROCEDURE — 99214 OFFICE O/P EST MOD 30 MIN: CPT | Mod: 95 | Performed by: INTERNAL MEDICINE

## 2024-02-06 NOTE — LETTER
2/6/2024         RE: Gerber Levine  [address]  [facility] [id number]        Dear Colleague,    Thank you for referring your patient, Gerber Levine, to the [facility]. Please see a copy of my visit note below.    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       Background  In brief, he had some URI-like symptoms in 06/2016 leading to a chest x-ray which revealed a mass leading to further evaluation.  PET/CT imaging revealed about a 2 x 1.3-cm mass extending from the right pleura into the chest wall.  He also had some small nonspecific lung nodules noted.  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  He himself noted no symptoms from this lesion.  The tumor was excised en bloc on 09/21/2016 with a 3-cm margin on each side anteriorly and posteriorly.    -  Specimen #: [id number]   Collected: 9/21/2016      FINAL DIAGNOSIS:   A. Soft tissue, right chest wall, resection:   - Cellular/deep fibrous histiocytoma; see comment   - Tumor size: 3.0 cm   - Tumor extent: Involves skeletal muscle   - Lymphovascular invasion: Not identified   - Margins of resection: Free of tumor     B. Soft tissue, additional right chest wall margin, resection:   - Benign skeletal muscle, bone and connective tissue     C. 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 Soft tissue, right muscle final superficial margin, resection:    - Benign skeletal muscle and connective tissue     E. Soft tissue, right chest wall skin margin, resection:   - Benign skin and subcutaneous tissue     These features favor a deep, cellular fibrous histiocytoma.     Deep fibrous histiocytoma (deep counterpart of  cutaneous fibrous   histiocytoma / dermatofibroma) is a rare lesion that arises in the   skeletal muscle or visceral locations. Unlike cutaneous dermatofibroma,   it is usually a highly cellular lesion that lacks heterogeneous elements   such as giant cells, histiocytic aggregates and hemosiderin, thus   mimicking DFSP. These lesions in general are clinically indolent but   have the potential to recur at an increased frequency (varies from 22%   to 57%) and undergo metastasis in rare instances.  -        Interval history     On 23 he was seen for a possible CVA that was felt to be an acute encephalopathy felt related to a covid infection.    He recovered from the COVID completely.  He is generally doing up pretty well but he does note gradually worsening dementia and memory issues; his mom  of dementia.      No limitations to activity. He gets out of the house every day and walks 2.5 miles.     They enjoyed a mystery trip last Feb went to [place].   Going to [facility] [place] .     He does not sleep much but his sleep is as usual which is not always that great.  Nevertheless he generally feels rested.      We discussed mood at length and whether he thinks he is depressed and he thinks he is not.  He notes he has always been anxious under certain conditions.  He does not think it is a problem.    He follows DM with his PCP.  He notes since COVID his glucose has been a lot higher even up to 200 and he sees his PCP tomorrow on that..  He does have the type 2 diabetes, hyperlipidemia and hypertension.        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  He has a daughter who also had her spleen removed and there is a strong family history on his father's side.  At the time of his splenectomy, he also had an appendectomy, cholecystectomy and hernia repair.  He has also has had bilateral cataract surgery.       He has generalized  anxiety disorder and has seen a psychologist.    Overall he thinks the mood is OK now and feels no need for action on that.        -  Background PMH, FH, SH  PMH notable for  hereditary spherocytosis, HBP, anxiety, hyperlipidemia , DM, s/p splenectomy, appy, stephanie, hernia repair, cataract removal.  FH-dementia, hereditary spherocytosis  -     On exam he appeared comfortable with normal affect  HEENT full EOMs  NECK no obvious goiter or mass  CHEST:  no resp distress  NEUROLOGIC:  mentation and speech normal  PSYCH: mood good      -  On 2-2-24 Creat 0.78, Ca 9,2, A1c 8.7     -  The images of 5-5-23 showed nonspecific small lung nodules but while it is complicated the RLL nodule appears to me to be stable c/w Jan 2021    Formal read:  CT chest 5-5-23  IMPRESSION: Unchanged right lower lobe pulmonary nodules. Unchanged  right upper lobe wedge resection site. Bilateral renal cysts. No  evidence of chest wall recurrence.      I reviewed the new images of 2-2-24 and there are nonspecific small lung nodules but while it is complicated the RLL nodule appears slightly larger with some calcification.    Formal read:  CT chest 2-2-24  IMPRESSION: Increase in size of a right lower lobe pulmonary nodule  which contains a central focus of calcification and has adjacent  satellite nodularity. This is indeterminant and may represent  granulomatous disease but neoplasm cannot be excluded. Continued  follow-up is recommended.      CT head 12-25-23                                                               IMPRESSION:   1. Senescent changes and sequelae of chronic microangiopathy without acute intracranial abnormality    MRI brain 12-25-23    IMPRESSION:  1.  No acute infarct.  2.  Age-related changes described above.      -     We discussed the situation, and I think it is a fairly low-risk lesion.       At the time of surgery, a 2-cm protuberant lesion in the fourth intercostal space was noted.  At this time, he also had a small  wedge resection of the right upper lobe for some nodularity that had been seen.  At the time of resection, a 3-cm tumor involving skeletal muscle was found without lymphovascular invasion and with negative margins.  Studies for the PDGFB gene rearrangement were negative, but could not eliminate the DFSP diagnosis re sampling.  There was no high-grade nuclear atypia, increased mitotic activity, or necrosis.  The lung biopsy was negative for malignancy.  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  This is an unusual lesion and unlike the cutaneous dermatofibroma, it usually is of high cellularity without other heterogeneous elements.  This is generally a low-grade lesion but can recur and rarely metastasize.    I told him I thought there was a fairly low risk of recurrence, but it does need some followup, as do his nonspecific lung nodules.      Given the overall appearance we will check a CT in Oct.  If that's OK we might a chest CT scan in another 1-2 years.  This recommendation is not, obviously, based on a lot of data, but it seems like a reasonable approach.  It is perhaps possible that the COVID augmented the imaging properties of the nodule and we will see what it looks like next time.    All of his questions were addressed and he will call if he has others.        -  Augie Soto M.D.  Professor  Hematology, Oncology and Transplantation  -   cc:  MD Leah Freedman MD, Thoracic Surgery     Luis Velasco MD, Thoracic Surgery

## 2024-02-06 NOTE — NURSING NOTE
Is the patient currently in the state of [place]? YES    Visit mode:VIDEO    If the visit is dropped, the patient can be reconnected by: VIDEO VISIT: Send to e-mail at: [email or web address]    Will anyone else be joining the visit? NO  (If patient encounters technical issues they should call [phone]    How would you like to obtain your AVS? MyChart    Are changes needed to the allergy or medication list? No    Reason for visit: FISH OLIVIER

## 2024-02-08 ENCOUNTER — OFFICE VISIT (OUTPATIENT)
Dept: FAMILY MEDICINE | Facility: CLINIC | Age: 79
End: 2024-02-08
Payer: COMMERCIAL

## 2024-02-08 VITALS
RESPIRATION RATE: 16 BRPM | WEIGHT: 195.2 LBS | DIASTOLIC BLOOD PRESSURE: 70 MMHG | HEIGHT: 73 IN | HEART RATE: 62 BPM | BODY MASS INDEX: 25.87 KG/M2 | OXYGEN SATURATION: 96 % | SYSTOLIC BLOOD PRESSURE: 130 MMHG | TEMPERATURE: 98.2 F

## 2024-02-08 DIAGNOSIS — L98.9 SKIN LESION: ICD-10-CM

## 2024-02-08 DIAGNOSIS — I10 HYPERTENSION GOAL BP (BLOOD PRESSURE) < 140/90: ICD-10-CM

## 2024-02-08 DIAGNOSIS — Z00.00 ENCOUNTER FOR MEDICARE ANNUAL WELLNESS EXAM: Primary | ICD-10-CM

## 2024-02-08 DIAGNOSIS — E78.5 HYPERLIPIDEMIA LDL GOAL <100: ICD-10-CM

## 2024-02-08 DIAGNOSIS — E11.65 TYPE 2 DIABETES MELLITUS WITH HYPERGLYCEMIA, WITHOUT LONG-TERM CURRENT USE OF INSULIN (H): ICD-10-CM

## 2024-02-08 DIAGNOSIS — R41.3 MEMORY LOSS: ICD-10-CM

## 2024-02-08 DIAGNOSIS — F33.0 MILD RECURRENT MAJOR DEPRESSION (H): ICD-10-CM

## 2024-02-08 PROCEDURE — 99214 OFFICE O/P EST MOD 30 MIN: CPT | Mod: 25 | Performed by: NURSE PRACTITIONER

## 2024-02-08 PROCEDURE — 82570 ASSAY OF URINE CREATININE: CPT | Performed by: NURSE PRACTITIONER

## 2024-02-08 PROCEDURE — G0439 PPPS, SUBSEQ VISIT: HCPCS | Performed by: NURSE PRACTITIONER

## 2024-02-08 PROCEDURE — 82043 UR ALBUMIN QUANTITATIVE: CPT | Performed by: NURSE PRACTITIONER

## 2024-02-08 RX ORDER — TRIAMCINOLONE ACETONIDE 5 MG/G
OINTMENT TOPICAL
Qty: 15 G | Refills: 1 | Status: SHIPPED | OUTPATIENT
Start: 2024-02-08

## 2024-02-08 RX ORDER — RESPIRATORY SYNCYTIAL VIRUS VACCINE 120MCG/0.5
0.5 KIT INTRAMUSCULAR ONCE
Qty: 1 EACH | Refills: 0 | Status: CANCELLED | OUTPATIENT
Start: 2024-02-08 | End: 2024-02-08

## 2024-02-08 SDOH — HEALTH STABILITY: PHYSICAL HEALTH: ON AVERAGE, HOW MANY DAYS PER WEEK DO YOU ENGAGE IN MODERATE TO STRENUOUS EXERCISE (LIKE A BRISK WALK)?: 7 DAYS

## 2024-02-08 ASSESSMENT — PAIN SCALES - GENERAL: PAINLEVEL: NO PAIN (0)

## 2024-02-08 ASSESSMENT — PATIENT HEALTH QUESTIONNAIRE - PHQ9
SUM OF ALL RESPONSES TO PHQ QUESTIONS 1-9: 7
10. IF YOU CHECKED OFF ANY PROBLEMS, HOW DIFFICULT HAVE THESE PROBLEMS MADE IT FOR YOU TO DO YOUR WORK, TAKE CARE OF THINGS AT HOME, OR GET ALONG WITH OTHER PEOPLE: NOT DIFFICULT AT ALL
SUM OF ALL RESPONSES TO PHQ QUESTIONS 1-9: 7

## 2024-02-08 ASSESSMENT — SOCIAL DETERMINANTS OF HEALTH (SDOH): HOW OFTEN DO YOU GET TOGETHER WITH FRIENDS OR RELATIVES?: ONCE A WEEK

## 2024-02-08 NOTE — PROGRESS NOTES
"Preventive Care Visit  [facility]  Caty Yusuf NP, Nurse Practitioner - Family  Feb 8, 2024    Assessment & Plan     Encounter for Medicare annual wellness exam    Skin lesion  I recommend patient to follow-up with dermatology for full skin check.  He has a lesion on right upper arm today that he states has been intermittent but never fully goes away.  - triamcinolone (KENALOG) 0.5 % external ointment; Use to lesion on arm twice daily for 14 days  - Adult Dermatology  Referral; Future    Type 2 diabetes mellitus with hyperglycemia, without long-term current use of insulin (H)  Uncontrolled   - Albumin Random Urine Quantitative with Creat Ratio; Future  - Increase to metFORMIN (GLUCOPHAGE) 500 MG tablet; Take 2 tablets (1,000 mg) by mouth daily (with breakfast) And take 1 tablet by mouth with dinner  - Albumin Random Urine Quantitative with Creat Ratio  -Continue to do self-monitoring blood sugars as he is already doing.  - Follow-up in 3 months for recheck    Memory loss  He has neuropsychological testing scheduled several months out for further evaluation.    HYPERLIPIDEMIA LDL GOAL <100  Chronic, stable, continue current treatment.     Hypertension goal BP (blood pressure) < 140/90  .chonicst     Mild major depression recurrent (H)  Chronic, stable, continue current treatment.         BMI  Estimated body mass index is 26.11 kg/m  as calculated from the following:    Height as of this encounter: 1.842 m (6' 0.5\").    Weight as of this encounter: 88.5 kg (195 lb 3.2 oz).       Counseling  Appropriate preventive services were discussed with this patient, including applicable screening as appropriate for fall prevention, nutrition, physical activity, Tobacco-use cessation, weight loss and cognition.  Checklist reviewing preventive services available has been given to the patient.  Reviewed patient's diet, addressing concerns and/or questions.   He is at risk for psychosocial " distress and has been provided with information to reduce risk.   The patient was provided with written information regarding signs of hearing loss.   The patient's PHQ-9 score is consistent with mild depression. He was provided with information regarding depression.           Austen Castrejon is a 78 year old, presenting for the following:  Wellness Visit (Fasting )        2/8/2024     7:56 AM   Additional Questions   Accompanied by wife         Health Care Directive  Patient has a Health Care Directive on file      History of Present Illness       Reason for visit:  Follow on my covid issue  Symptoms include:  None  Symptom intensity:  Mild  Symptom progression:  Improving  Had these symptoms before:  No  What makes it worse:  No  What makes it better:  No, I feel fine. He exercises with enough effort to increase his heart rate 6 days per week.     Memory loss - Forgetfulness, not remembering things as well.  Will sometimes forget what he does.  Slower at jigsaw puzzles, even sometimes gives up.  Driving still by himself.  He has neuropsych testing scheduled but this is not for several months out.    Older brother is 87 and doing well.  Mother and brother out east had dementia.    Diabetes Follow-up    How often are you checking your blood sugar? Tests before lunch.  His blood sugars have been elevated since he had COVID illness.  Jam on toast otherwise not much sugar.  Does eat fruits.  But not much added sugar except for cake for his birth celebration.      BP Readings from Last 2 Encounters:   02/08/24 130/70   01/04/24 118/62     Hemoglobin A1C (%)   Date Value   02/02/2024 8.7 (H)   10/03/2023 7.1 (H)   06/01/2021 7.1 (H)   02/26/2021 7.8 (H)     LDL Cholesterol Calculated (mg/dL)   Date Value   01/18/2023 70   10/19/2021 51   10/28/2020 68   06/12/2019 64             2/8/2024   General Health   How would you rate your overall physical health? Good   Feel stress (tense, anxious, or unable to sleep) Only a  little   (!) STRESS CONCERN      2/8/2024   Nutrition   Diet: Regular (no restrictions)         2/8/2024   Exercise   Days per week of moderate/strenous exercise 7 days         2/8/2024   Social Factors   Frequency of gathering with friends or relatives Once a week   Worry food won't last until get money to buy more No   Food not last or not have enough money for food? No   Do you have housing?  Yes   Are you worried about losing your housing? No   Lack of transportation? No   Unable to get utilities (heat,electricity)? No         2/8/2024   Fall Risk   Fallen 2 or more times in the past year? No    No   Trouble with walking or balance? No    Yes          2/8/2024   Activities of Daily Living- Home Safety   Needs help with the following daily activites None of the above   Safety concerns in the home None of the above         2/8/2024   Dental   Dentist two times every year? Yes         2/8/2024   Hearing Screening   Hearing concerns? (!) I NEED TO ASK PEOPLE TO SPEAK UP OR REPEAT THEMSELVES.         2/8/2024   Driving Risk Screening   Patient/family members have concerns about driving No         2/8/2024   General Alertness/Fatigue Screening   Have you been more tired than usual lately? No         2/8/2024   Urinary Incontinence Screening   Bothered by leaking urine in past 6 months No             Today's PHQ-9 Score:       2/8/2024     7:48 AM   PHQ-9 SCORE   PHQ-9 Total Score MyChart 7 (Mild depression)   PHQ-9 Total Score 7         2/8/2024   Substance Use   Alcohol more than 3/day or more than 7/wk No   Do you have a current opioid prescription? No   How severe/bad is pain from 1 to 10? 2/10   Do you use any other substances recreationally? No     Social History     Tobacco Use    Smoking status: Never    Smokeless tobacco: Never   Vaping Use    Vaping Use: Never used   Substance Use Topics    Alcohol use: Yes     Comment: Occasionaly    Drug use: No               Reviewed and updated as needed this visit by  Provider     Meds  Problems  Med Hx  Surg Hx  Fam Hx            BP Readings from Last 3 Encounters:   02/08/24 130/70   01/04/24 118/62   12/27/23 125/69    Wt Readings from Last 3 Encounters:   02/08/24 88.5 kg (195 lb 3.2 oz)   01/04/24 86.2 kg (190 lb)   12/25/23 90.1 kg (198 lb 11.2 oz)                  Patient Active Problem List   Diagnosis    Allergic rhinitis    Hereditary spherocytosis (H24)    Dysthymic disorder    Hearing loss    HYPERLIPIDEMIA LDL GOAL <100    Advanced directives, counseling/discussion    Advance care planning    Hypertension goal BP (blood pressure) < 140/90    IVETTE (generalized anxiety disorder)    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    EYE SURGERY  2014    cataracts    HERNIA REPAIR  1976    REPAIR CHEST WALL N/A 9/21/2016    Procedure: REPAIR CHEST WALL;  Surgeon: Leah Nixon MD;  Location: [facility] OR    SURGICAL HISTORY OF -   1976    SPLENECTOMY    SURGICAL HISTORY OF -       APPENDECTOMY    SURGICAL HISTORY OF -       CHOLECYSTECTOMY    SURGICAL HISTORY OF -       HERNIAORRHAPHY    SURGICAL HISTORY OF -   1981    cholesteotoma       Social History     Tobacco Use    Smoking status: Never    Smokeless tobacco: Never   Substance Use Topics    Alcohol use: Yes     Comment: Occasionaly     Family History   Problem Relation Age of Onset    Dementia Mother     Other Cancer Mother         Skin  cancer    Diabetes Father     Emphysema Father     Intellectual Disability (Mental Retardation) Sister     Diabetes Sister     Diabetes Sister     Prostate Cancer Brother     Diabetes Maternal Grandmother     Genitourinary Problems Daughter         spherocytosis, had spleen removed    C.A.D. No family hx of     Cancer - colorectal No family hx of     Hypertension No family hx of     Cerebrovascular Disease No family hx of     Breast Cancer No family hx of          Current Outpatient Medications   Medication Sig Dispense Refill    ASPIRIN 81 MG OR TABS 1 tab po QD (Once per day)      atorvastatin (LIPITOR) 40 MG tablet Take 1 tablet (40 mg) by mouth daily 90 tablet 3    blood glucose (ACCU-CHEK RODRI PLUS) test strip 1 strip by In Vitro route daily 100 strip 3    blood glucose (CONTOUR NEXT TEST) test strip USE TO TEST BLOOD SUGAR 1 TIME DAILY OR AS DIRECTED 100 strip 3    blood glucose (NO BRAND SPECIFIED) lancets standard Use to test blood sugar daily and as needed 100 each 11    CALCITRATE/VITAMIN D 315-200 MG-UNIT OR TABS One tab daily in the morning      Cyanocobalamin (B-12 PO) Take by mouth every morning Take 1/2 tablet daily      losartan (COZAAR) 25 MG tablet Take 1 tablet (25 mg) by mouth daily 90 tablet 3    metFORMIN (GLUCOPHAGE) 500 MG tablet Take 2 tablets (1,000 mg) by mouth daily (with breakfast) 180 tablet 3    Microlet Lancets MISC USE TO TEST BLOOD SUGAR DAILY AND AS NEEDED 100 each 1    sertraline (ZOLOFT) 100 MG tablet Take 1 tablet (100 mg) by mouth daily 90 tablet 3    triamcinolone (KENALOG) 0.5 % external ointment Use to lesion on left arm twice daily for 14 days 15 g 1     Allergies   Allergen Reactions    Oxycodone Itching    Shrimp Swelling     Current providers sharing in care for this patient include:  Patient Care Team:  Caty Yusuf NP as PCP - General (Nurse Practitioner - Family)  Cheryl Ward APRN CNS as Clinical Nurse Specialist (Oncology)  Augie Soto  "MD Jerome as Assigned Cancer Care Provider  Caty Yusuf NP as Assigned PCP  Alfonzo Lara, PhD LP as MD (Neuropsychology)    The following health maintenance items are reviewed in Epic and correct as of today:  Health Maintenance   Topic Date Due    RSV VACCINE (Pregnancy & 60+) (1 - 1-dose 60+ series) Never done    LIPID  01/18/2024    MICROALBUMIN  01/18/2024    ANNUAL REVIEW OF HM ORDERS  01/18/2024    EYE EXAM  04/17/2024    A1C  08/02/2024    DIABETIC FOOT EXAM  10/03/2024    BMP  02/02/2025    MEDICARE ANNUAL WELLNESS VISIT  02/08/2025    FALL RISK ASSESSMENT  02/08/2025    PHQ-9  02/08/2025    DTAP/TDAP/TD IMMUNIZATION (2 - Td or Tdap) 03/29/2026    COLORECTAL CANCER SCREENING  05/12/2026    ADVANCE CARE PLANNING  01/18/2028    DEPRESSION ACTION PLAN  Completed    INFLUENZA VACCINE  Completed    Pneumococcal Vaccine: 65+ Years  Completed    ZOSTER IMMUNIZATION  Completed    COVID-19 Vaccine  Completed    IPV IMMUNIZATION  Aged Out    HPV IMMUNIZATION  Aged Out    MENINGITIS IMMUNIZATION  Aged Out    RSV MONOCLONAL ANTIBODY  Aged Out     Review of Systems       Objective    Exam  /70 (BP Location: Right arm, Patient Position: Sitting, Cuff Size: Adult Large)   Pulse 62   Temp 98.2  F (36.8  C) (Oral)   Resp 16   Ht 1.842 m (6' 0.5\")   Wt 88.5 kg (195 lb 3.2 oz)   SpO2 96%   BMI 26.11 kg/m     Estimated body mass index is 26.11 kg/m  as calculated from the following:    Height as of this encounter: 1.842 m (6' 0.5\").    Weight as of this encounter: 88.5 kg (195 lb 3.2 oz).    Physical Exam  GENERAL: alert and no distress  EYES: Eyes grossly normal to inspection, PERRL and conjunctivae and sclerae normal  HENT: ear canals and TM's normal, nose and mouth without ulcers or lesions  NECK: no adenopathy, no asymmetry, masses, or scars  RESP: lungs clear to auscultation - no rales, rhonchi or wheezes  CV: regular rate and rhythm, normal S1 S2, no S3 or S4, no murmur, click or rub, no " peripheral edema  ABDOMEN: soft, nontender, no hepatosplenomegaly, no masses and bowel sounds normal  MS: no gross musculoskeletal defects noted, no edema  SKIN: There is a pink papule on right upper arm with scant bleeding  NEURO: Normal strength and tone, mentation intact and speech normal  PSYCH: mentation appears normal, affect normal/bright        2/8/2024   Mini Cog   Clock Draw Score 0 Abnormal   3 Item Recall 3 objects recalled   Mini Cog Total Score 3            Signed Electronically by: Caty Yusuf NP

## 2024-02-08 NOTE — PATIENT INSTRUCTIONS
"Eating Healthy Foods: Care Instructions  With every meal, you can make healthy food choices. Try to eat a variety of fruits, vegetables, whole grains, lean proteins, and low-fat dairy products. This can help you get the right balance of nutrients, including vitamins and minerals. Small changes add up over time. You can start by adding one healthy food to your meals each day.    Try to make half your plate fruits and vegetables, one-fourth whole grains, and one-fourth lean proteins. Try including dairy with your meals.   Eat more fruits and vegetables. Try to have them with most meals and snacks.   Foods for healthy eating    Fruits    These can be fresh, frozen, canned, or dried.  Try to choose whole fruit rather than fruit juice.  Eat a variety of colors.    Vegetables    These can be fresh, frozen, canned, or dried.  Beans, peas, and lentils count too.    Whole grains    Choose whole-grain breads, cereals, and noodles.  Try brown rice.    Lean proteins    These can include lean meat, poultry, fish, and eggs.  You can also have tofu, beans, peas, lentils, nuts, and seeds.    Dairy    Try milk, yogurt, and cheese.  Choose low-fat or fat-free when you can.  If you need to, use lactose-free milk or fortified plant-based milk products, such as soy milk.    Water    Drink water when you're thirsty.  Limit sugar-sweetened drinks, including soda, fruit drinks, and sports drinks.  Where can you learn more?  Go to https://www.[employer].net/patiented  Enter T756 in the search box to learn more about \"Eating Healthy Foods: Care Instructions.\"  Current as of: February 28, 2023               Content Version: 13.8    3738-4353 [employer].   Care instructions adapted under license by your healthcare professional. If you have questions about a medical condition or this instruction, always ask your healthcare professional. [employer] disclaims any warranty or liability for your use of this " information.      Nutrition for Older Adults: Care Instructions  Overview     Good nutrition is important at any age. But it is especially important for older adults. Eating a healthy diet helps keep your body strong. And it can help lower your risk for disease.  As you get older, your body needs more of certain nutrients. These include vitamin B12, calcium, and vitamin D. But it may be harder for you to get these and other important nutrients. This could be for many reasons. You may not feel as hungry as you used to. Or you could have problems with your teeth or mouth that make it hard to chew. Or you may not enjoy planning and preparing meals, especially if you live alone.  Talk with your doctor if you want help getting the most nutrition from what you eat. The doctor may have you work with a dietitian to help you plan meals.  Follow-up care is a key part of your treatment and safety. Be sure to make and go to all appointments, and call your doctor if you are having problems. It's also a good idea to know your test results and keep a list of the medicines you take.  How can you care for yourself at home?  To stay healthy  Eat a variety of foods. The more you vary the foods you eat, the more vitamins, minerals, and other nutrients you get.  Take a multivitamin every day. Choose one with about 100% of the daily value (DV) for vitamins and minerals. Do not take more than 100% of the daily value for any vitamin or mineral unless your doctor tells you to. Talk with your doctor if you are not sure which multivitamin is right for you.  Eat lots of fruits and vegetables. Fresh, frozen, or no-salt canned vegetables and fruits in their own juice or light syrup are good choices.  Include foods that are high in vitamin B12 in your diet. Good choices are fortified breakfast cereal, nonfat or low-fat milk and other dairy products, meat, poultry, fish, and eggs.  Get enough calcium and vitamin D. Good choices include nonfat or  low-fat milk, cheese, and yogurt. Other good options are tofu, orange juice with added calcium, and some leafy green vegetables, such as timmy greens and kale. If you don't use milk products, talk to your doctor about calcium and vitamin D supplements.  Eat protein foods every day. Good choices include lean meat, fish, poultry, eggs, and cheese. Other good options are cooked beans, peanut butter, and nuts and seeds.  Choose whole grains for half of the grains you eat. Look for 100% whole wheat bread, whole-grain cereals, brown rice, and other whole grains.  If you have constipation  Eat high-fiber foods every day. These include fruits, vegetables, cooked dried beans, and whole grains.  Drink plenty of fluids. If you have kidney, heart, or liver disease and have to limit fluids, talk with your doctor before you increase the amount of fluids you drink.  Ask your doctor if stool softeners may help keep your bowels regular.  If you have mouth problems that make chewing hard  Pick canned or cooked fruits and vegetables. These are often softer.  Chop or shred meat, poultry, and fish. Add sauce or gravy to the meat to help keep it moist.  Pick other protein foods that are soft. These include cheese, peanut butter, cooked beans, cottage cheese, and eggs.  If you have trouble shopping for yourself  Ask a local food store to deliver groceries to your home.  Contact a volunteer center and ask for help.  Ask a family member or neighbor to help you.  If you have trouble preparing meals  If you are able, take a cooking class.  Use a microwave oven to cook TV dinners and other frozen or prepared foods.  Take part in group meal programs. You can find these through senior citizen programs.  Have meals brought to your home. Your community may offer programs that deliver meals, such as Meals on Wheels.  If your appetite is poor  Try eating smaller amounts of food more often. For example, eat 4 or 5 small meals a day instead of 1 or  "2 large meals.  Eat with family and friends. Or take part in group meal programs offered through volunteer programs. Eating with others may help your appetite. And it helps you be more social.  Ask your doctor if your medicines could cause appetite or taste problems. If so, ask about changing medicines.  Add spices and herbs to increase the flavor of food.  If you think you are depressed, ask your doctor for help. Depression can affect your appetite. And it can make it hard to do everyday activities like grocery shopping and making meals. Treatment can help.  When should you call for help?  Watch closely for changes in your health, and be sure to contact your doctor if you have any problems.  Where can you learn more?  Go to https://www.[employer].net/patiented  Enter L643 in the search box to learn more about \"Nutrition for Older Adults: Care Instructions.\"  Current as of: February 26, 2023               Content Version: 13.8    9802-9235 [employer].   Care instructions adapted under license by your healthcare professional. If you have questions about a medical condition or this instruction, always ask your healthcare professional. [employer] disclaims any warranty or liability for your use of this information.      Hearing Loss: Care Instructions  Overview     Hearing loss is a sudden or slow decrease in how well you hear. It can range from slight to profound. Permanent hearing loss can occur with aging. It also can happen when you are exposed long-term to loud noise. Examples include listening to loud music, riding motorcycles, or being around other loud machines.  Hearing loss can affect your work and home life. It can make you feel lonely or depressed. You may feel that you have lost your independence. But hearing aids and other devices can help you hear better and feel connected to others.  Follow-up care is a key part of your treatment and safety. Be sure to make and go to all " appointments, and call your doctor if you are having problems. It's also a good idea to know your test results and keep a list of the medicines you take.  How can you care for yourself at home?  Avoid loud noises whenever possible. This helps keep your hearing from getting worse.  Always wear hearing protection around loud noises.  Wear a hearing aid as directed.  A professional can help you pick a hearing aid that will work best for you.  You can also get hearing aids over the counter for mild to moderate hearing loss.  Have hearing tests as your doctor suggests. They can show whether your hearing has changed. Your hearing aid may need to be adjusted.  Use other devices as needed. These may include:  Telephone amplifiers and hearing aids that can connect to a television, stereo, radio, or microphone.  Devices that use lights or vibrations. These alert you to the doorbell, a ringing telephone, or a baby monitor.  Television closed-captioning. This shows the words at the bottom of the screen. Most new TVs can do this.  TTY (text telephone). This lets you type messages back and forth on the telephone instead of talking or listening. These devices are also called TDD. When messages are typed on the keyboard, they are sent over the phone line to a receiving TTY. The message is shown on a monitor.  Use text messaging, social media, and email if it is hard for you to communicate by telephone.  Try to learn a listening technique called speechreading. It is not lipreading. You pay attention to people's gestures, expressions, posture, and tone of voice. These clues can help you understand what a person is saying. Face the person you are talking to, and have them face you. Make sure the lighting is good. You need to see the other person's face clearly.  Think about counseling if you need help to adjust to your hearing loss.  When should you call for help?  Watch closely for changes in your health, and be sure to contact your  "doctor if:    You think your hearing is getting worse.     You have new symptoms, such as dizziness or nausea.   Where can you learn more?  Go to https://www.[employer].net/patiented  Enter R798 in the search box to learn more about \"Hearing Loss: Care Instructions.\"  Current as of: February 28, 2023               Content Version: 13.8    9829-0723 [employer].   Care instructions adapted under license by your healthcare professional. If you have questions about a medical condition or this instruction, always ask your healthcare professional. [employer] disclaims any warranty or liability for your use of this information.      Learning About Stress  What is stress?     Stress is your body's response to a hard situation. Your body can have a physical, emotional, or mental response. Stress is a fact of life for most people, and it affects everyone differently. What causes stress for you may not be stressful for someone else.  A lot of things can cause stress. You may feel stress when you go on a job interview, take a test, or run a race. This kind of short-term stress is normal and even useful. It can help you if you need to work hard or react quickly. For example, stress can help you finish an important job on time.  Long-term stress is caused by ongoing stressful situations or events. Examples of long-term stress include long-term health problems, ongoing problems at work, or conflicts in your family. Long-term stress can harm your health.  How does stress affect your health?  When you are stressed, your body responds as though you are in danger. It makes hormones that speed up your heart, make you breathe faster, and give you a burst of energy. This is called the fight-or-flight stress response. If the stress is over quickly, your body goes back to normal and no harm is done.  But if stress happens too often or lasts too long, it can have bad effects. Long-term stress can make you more " likely to get sick, and it can make symptoms of some diseases worse. If you tense up when you are stressed, you may develop neck, shoulder, or low back pain. Stress is linked to high blood pressure and heart disease.  Stress also harms your emotional health. It can make you bermeo, tense, or depressed. Your relationships may suffer, and you may not do well at work or school.  What can you do to manage stress?  You can try these things to help manage stress:   Do something active. Exercise or activity can help reduce stress. Walking is a great way to get started. Even everyday activities such as housecleaning or yard work can help.  Try yoga or mateus chi. These techniques combine exercise and meditation. You may need some training at first to learn them.  Do something you enjoy. For example, listen to music or go to a movie. Practice your hobby or do volunteer work.  Meditate. This can help you relax, because you are not worrying about what happened before or what may happen in the future.  Do guided imagery. Imagine yourself in any setting that helps you feel calm. You can use online videos, books, or a teacher to guide you.  Do breathing exercises. For example:  From a standing position, bend forward from the waist with your knees slightly bent. Let your arms dangle close to the floor.  Breathe in slowly and deeply as you return to a standing position. Roll up slowly and lift your head last.  Hold your breath for just a few seconds in the standing position.  Breathe out slowly and bend forward from the waist.  Let your feelings out. Talk, laugh, cry, and express anger when you need to. Talking with supportive friends or family, a counselor, or a yemi leader about your feelings is a healthy way to relieve stress. Avoid discussing your feelings with people who make you feel worse.  Write. It may help to write about things that are bothering you. This helps you find out how much stress you feel and what is causing it.  "When you know this, you can find better ways to cope.  What can you do to prevent stress?  You might try some of these things to help prevent stress:  Manage your time. This helps you find time to do the things you want and need to do.  Get enough sleep. Your body recovers from the stresses of the day while you are sleeping.  Get support. Your family, friends, and community can make a difference in how you experience stress.  Limit your news feed. Avoid or limit time on social media or news that may make you feel stressed.  Do something active. Exercise or activity can help reduce stress. Walking is a great way to get started.  Where can you learn more?  Go to https://www.[employer]pp.net/patiented  Enter N032 in the search box to learn more about \"Learning About Stress.\"  Current as of: February 26, 2023               Content Version: 13.8    0918-4324 [employer].   Care instructions adapted under license by your healthcare professional. If you have questions about a medical condition or this instruction, always ask your healthcare professional. [employer] disclaims any warranty or liability for your use of this information.      Learning About Depression Screening  What is depression screening?  Depression screening is a way to see if you have depression symptoms. It may be done by a doctor or counselor. It's often part of a routine checkup. That's because your mental health is just as important as your physical health.  Depression is a mental health condition that affects how you feel, think, and act. You may:  Have less energy.  Lose interest in your daily activities.  Feel sad and grouchy for a long time.  Depression is very common. It affects people of all ages.  Many things can lead to depression. Some people become depressed after they have a stroke or find out they have a major illness like cancer or heart disease. The death of a loved one or a breakup may lead to depression. It " "can run in families. Most experts believe that a combination of inherited genes and stressful life events can cause it.  What happens during screening?  You may be asked to fill out a form about your depression symptoms. You and the doctor will discuss your answers. The doctor may ask you more questions to learn more about how you think, act, and feel.  What happens after screening?  If you have symptoms of depression, your doctor will talk to you about your options.  Doctors usually treat depression with medicines or counseling. Often, combining the two works best. Many people don't get help because they think that they'll get over the depression on their own. But people with depression may not get better unless they get treatment.  The cause of depression is not well understood. There may be many factors involved. But if you have depression, it's not your fault.  A serious symptom of depression is thinking about death or suicide. If you or someone you care about talks about this or about feeling hopeless, get help right away.  It's important to know that depression can be treated. Medicine, counseling, and self-care may help.  Where can you learn more?  Go to https://www.[employer]here.net/patiented  Enter T185 in the search box to learn more about \"Learning About Depression Screening.\"  Current as of: June 25, 2023               Content Version: 13.8    9183-6599 [employer].   Care instructions adapted under license by your healthcare professional. If you have questions about a medical condition or this instruction, always ask your healthcare professional. [employer] disclaims any warranty or liability for your use of this information.      Preventive Care Advice   This is general advice given by our system to help you stay healthy. However, your care team may have specific advice just for you. Please talk to your care team about your preventive care needs.  Nutrition  Eat 5 or more " servings of fruits and vegetables each day.  Try wheat bread, brown rice and whole grain pasta (instead of white bread, rice, and pasta).  Get enough calcium and vitamin D. Check the label on foods and aim for 100% of the RDA (recommended daily allowance).  Lifestyle  Exercise at least 150 minutes each week  (30 minutes a day, 5 days a week).  Do muscle strengthening activities 2 days a week. These help control your weight and prevent disease.  No smoking.  Wear sunscreen to prevent skin cancer.  Have a dental exam and cleaning every 6 months.  Yearly exams  See your health care team every year to talk about:  Any changes in your health.  Any medicines your care team has prescribed.  Preventive care, family planning, and ways to prevent chronic diseases.  Shots (vaccines)   HPV shots (up to age 26), if you've never had them before.  Hepatitis B shots (up to age 59), if you've never had them before.  COVID-19 shot: Get this shot when it's due.  Flu shot: Get a flu shot every year.  Tetanus shot: Get a tetanus shot every 10 years.  Pneumococcal, hepatitis A, and RSV shots: Ask your care team if you need these based on your risk.  Shingles shot (for age 50 and up)  General health tests  Diabetes screening:  Starting at age 35, Get screened for diabetes at least every 3 years.  If you are younger than age 35, ask your care team if you should be screened for diabetes.  Cholesterol test: At age 39, start having a cholesterol test every 5 years, or more often if advised.  Bone density scan (DEXA): At age 50, ask your care team if you should have this scan for osteoporosis (brittle bones).  Hepatitis C: Get tested at least once in your life.  STIs (sexually transmitted infections)  Before age 24: Ask your care team if you should be screened for STIs.  After age 24: Get screened for STIs if you're at risk. You are at risk for STIs (including HIV) if:  You are sexually active with more than one person.  You don't use condoms  every time.  You or a partner was diagnosed with a sexually transmitted infection.  If you are at risk for HIV, ask about PrEP medicine to prevent HIV.  Get tested for HIV at least once in your life, whether you are at risk for HIV or not.  Cancer screening tests  Cervical cancer screening: If you have a cervix, begin getting regular cervical cancer screening tests starting at age 21.  Breast cancer scan (mammogram): If you've ever had breasts, begin having regular mammograms starting at age 40. This is a scan to check for breast cancer.  Colon cancer screening: It is important to start screening for colon cancer at age 45.  Have a colonoscopy test every 10 years (or more often if you're at risk) Or, ask your provider about stool tests like a FIT test every year or Cologuard test every 3 years.  To learn more about your testing options, visit:   [email or web address]/[email or web address].  For help making a decision, visit:   https://bit.[employer]/oo93342.  Prostate cancer screening test: If you have a prostate, ask your care team if a prostate cancer screening test (PSA) at age 55 is right for you.  Lung cancer screening: If you are a current or former smoker ages 50 to 80, ask your care team if ongoing lung cancer screenings are right for you.  For informational purposes only. Not to replace the advice of your health care provider. Copyright   2023 [employer]. All rights reserved. Clinically reviewed by the [facility]. [employer] [id number] - REV 01/24.

## 2024-02-08 NOTE — Clinical Note
Please call to help patient get scheduled for diabetes visit (send to lab for A1C) with me in 3 months

## 2024-02-09 LAB
CREAT UR-MCNC: 104 MG/DL
MICROALBUMIN UR-MCNC: <12 MG/L
MICROALBUMIN/CREAT UR: NORMAL MG/G{CREAT}

## 2024-03-06 ENCOUNTER — TRANSFERRED RECORDS (OUTPATIENT)
Dept: HEALTH INFORMATION MANAGEMENT | Facility: CLINIC | Age: 79
End: 2024-03-06
Payer: COMMERCIAL

## 2024-03-07 PROBLEM — C44.91 BASAL CELL CARCINOMA (BCC): Status: ACTIVE | Noted: 2024-03-07

## 2024-03-20 ENCOUNTER — TRANSFERRED RECORDS (OUTPATIENT)
Dept: HEALTH INFORMATION MANAGEMENT | Facility: CLINIC | Age: 79
End: 2024-03-20
Payer: COMMERCIAL

## 2024-03-25 ENCOUNTER — TELEPHONE (OUTPATIENT)
Dept: FAMILY MEDICINE | Facility: CLINIC | Age: 79
End: 2024-03-25
Payer: COMMERCIAL

## 2024-03-25 ENCOUNTER — TELEPHONE (OUTPATIENT)
Dept: NEUROPSYCHOLOGY | Facility: CLINIC | Age: 79
End: 2024-03-25
Payer: COMMERCIAL

## 2024-03-25 ENCOUNTER — APPOINTMENT (OUTPATIENT)
Dept: CT IMAGING | Facility: CLINIC | Age: 79
DRG: 193 | End: 2024-03-25
Attending: STUDENT IN AN ORGANIZED HEALTH CARE EDUCATION/TRAINING PROGRAM
Payer: COMMERCIAL

## 2024-03-25 ENCOUNTER — HOSPITAL ENCOUNTER (INPATIENT)
Facility: CLINIC | Age: 79
LOS: 6 days | Discharge: HOME | DRG: 193 | End: 2024-04-01
Attending: STUDENT IN AN ORGANIZED HEALTH CARE EDUCATION/TRAINING PROGRAM | Admitting: INTERNAL MEDICINE
Payer: COMMERCIAL

## 2024-03-25 ENCOUNTER — APPOINTMENT (OUTPATIENT)
Dept: GENERAL RADIOLOGY | Facility: CLINIC | Age: 79
DRG: 193 | End: 2024-03-25
Attending: INTERNAL MEDICINE
Payer: COMMERCIAL

## 2024-03-25 DIAGNOSIS — D72.829 LEUKOCYTOSIS, UNSPECIFIED TYPE: ICD-10-CM

## 2024-03-25 DIAGNOSIS — I50.31 ACUTE DIASTOLIC HEART FAILURE (H): ICD-10-CM

## 2024-03-25 DIAGNOSIS — H10.33 ACUTE BACTERIAL CONJUNCTIVITIS OF BOTH EYES: ICD-10-CM

## 2024-03-25 DIAGNOSIS — I10 HYPERTENSION GOAL BP (BLOOD PRESSURE) < 140/90: ICD-10-CM

## 2024-03-25 DIAGNOSIS — E11.65 TYPE 2 DIABETES MELLITUS WITH HYPERGLYCEMIA, WITHOUT LONG-TERM CURRENT USE OF INSULIN (H): ICD-10-CM

## 2024-03-25 DIAGNOSIS — Z11.52 ENCOUNTER FOR SCREENING FOR COVID-19: ICD-10-CM

## 2024-03-25 DIAGNOSIS — J18.9 COMMUNITY ACQUIRED PNEUMONIA OF RIGHT MIDDLE LOBE OF LUNG: ICD-10-CM

## 2024-03-25 DIAGNOSIS — R26.9 ABNORMAL GAIT: Primary | ICD-10-CM

## 2024-03-25 DIAGNOSIS — R41.82 ALTERED MENTAL STATUS, UNSPECIFIED ALTERED MENTAL STATUS TYPE: ICD-10-CM

## 2024-03-25 LAB
ALBUMIN SERPL BCG-MCNC: 3.9 G/DL (ref 3.5–5.2)
ALBUMIN UR-MCNC: 100 MG/DL
ALP SERPL-CCNC: 104 U/L (ref 40–150)
ALT SERPL W P-5'-P-CCNC: 21 U/L (ref 0–70)
ANION GAP SERPL CALCULATED.3IONS-SCNC: 10 MMOL/L (ref 7–15)
APPEARANCE UR: CLEAR
AST SERPL W P-5'-P-CCNC: 21 U/L (ref 0–45)
ATRIAL RATE - MUSE: 76 BPM
BASOPHILS # BLD AUTO: 0.1 10E3/UL (ref 0–0.2)
BASOPHILS NFR BLD AUTO: 0 %
BILIRUB SERPL-MCNC: 1.1 MG/DL
BILIRUB UR QL STRIP: NEGATIVE
BUN SERPL-MCNC: 17.5 MG/DL (ref 8–23)
CALCIUM SERPL-MCNC: 9.4 MG/DL (ref 8.8–10.2)
CHLORIDE SERPL-SCNC: 98 MMOL/L (ref 98–107)
COLOR UR AUTO: YELLOW
CREAT SERPL-MCNC: 0.8 MG/DL (ref 0.67–1.17)
DEPRECATED HCO3 PLAS-SCNC: 29 MMOL/L (ref 22–29)
DIASTOLIC BLOOD PRESSURE - MUSE: NORMAL MMHG
EGFRCR SERPLBLD CKD-EPI 2021: 89 ML/MIN/1.73M2
EOSINOPHIL # BLD AUTO: 0 10E3/UL (ref 0–0.7)
EOSINOPHIL NFR BLD AUTO: 0 %
ERYTHROCYTE [DISTWIDTH] IN BLOOD BY AUTOMATED COUNT: 12.8 % (ref 10–15)
GLUCOSE BLDC GLUCOMTR-MCNC: 236 MG/DL (ref 70–99)
GLUCOSE SERPL-MCNC: 277 MG/DL (ref 70–99)
GLUCOSE UR STRIP-MCNC: >=1000 MG/DL
HBA1C MFR BLD: 9.4 %
HCT VFR BLD AUTO: 38.5 % (ref 40–53)
HGB BLD-MCNC: 13.1 G/DL (ref 13.3–17.7)
HGB UR QL STRIP: ABNORMAL
HYALINE CASTS: 3 /LPF
IMM GRANULOCYTES # BLD: 0.1 10E3/UL
IMM GRANULOCYTES NFR BLD: 0 %
INR PPP: 1.19 (ref 0.85–1.15)
INTERPRETATION ECG - MUSE: NORMAL
KETONES UR STRIP-MCNC: NEGATIVE MG/DL
LACTATE SERPL-SCNC: 1.2 MMOL/L (ref 0.7–2)
LEUKOCYTE ESTERASE UR QL STRIP: NEGATIVE
LYMPHOCYTES # BLD AUTO: 2.6 10E3/UL (ref 0.8–5.3)
LYMPHOCYTES NFR BLD AUTO: 15 %
MCH RBC QN AUTO: 30.3 PG (ref 26.5–33)
MCHC RBC AUTO-ENTMCNC: 34 G/DL (ref 31.5–36.5)
MCV RBC AUTO: 89 FL (ref 78–100)
MONOCYTES # BLD AUTO: 2.8 10E3/UL (ref 0–1.3)
MONOCYTES NFR BLD AUTO: 17 %
MUCOUS THREADS #/AREA URNS LPF: PRESENT /LPF
NEUTROPHILS # BLD AUTO: 11.2 10E3/UL (ref 1.6–8.3)
NEUTROPHILS NFR BLD AUTO: 68 %
NITRATE UR QL: NEGATIVE
NRBC # BLD AUTO: 0 10E3/UL
NRBC BLD AUTO-RTO: 0 /100
P AXIS - MUSE: 28 DEGREES
PH UR STRIP: 5.5 [PH] (ref 5–7)
PLATELET # BLD AUTO: 283 10E3/UL (ref 150–450)
POTASSIUM SERPL-SCNC: 3.9 MMOL/L (ref 3.4–5.3)
PR INTERVAL - MUSE: 188 MS
PROCALCITONIN SERPL IA-MCNC: 0.15 NG/ML
PROT SERPL-MCNC: 7.5 G/DL (ref 6.4–8.3)
QRS DURATION - MUSE: 100 MS
QT - MUSE: 398 MS
QTC - MUSE: 447 MS
R AXIS - MUSE: -9 DEGREES
RBC # BLD AUTO: 4.33 10E6/UL (ref 4.4–5.9)
RBC URINE: 8 /HPF
SODIUM SERPL-SCNC: 137 MMOL/L (ref 135–145)
SP GR UR STRIP: 1.02 (ref 1–1.03)
SYSTOLIC BLOOD PRESSURE - MUSE: NORMAL MMHG
T AXIS - MUSE: 30 DEGREES
UROBILINOGEN UR STRIP-MCNC: 0.2 MG/DL
VENTRICULAR RATE- MUSE: 76 BPM
WBC # BLD AUTO: 16.7 10E3/UL (ref 4–11)
WBC URINE: 1 /HPF

## 2024-03-25 PROCEDURE — 82962 GLUCOSE BLOOD TEST: CPT

## 2024-03-25 PROCEDURE — 71045 X-RAY EXAM CHEST 1 VIEW: CPT

## 2024-03-25 PROCEDURE — 83036 HEMOGLOBIN GLYCOSYLATED A1C: CPT | Performed by: INTERNAL MEDICINE

## 2024-03-25 PROCEDURE — 93005 ELECTROCARDIOGRAM TRACING: CPT | Performed by: STUDENT IN AN ORGANIZED HEALTH CARE EDUCATION/TRAINING PROGRAM

## 2024-03-25 PROCEDURE — 85025 COMPLETE CBC W/AUTO DIFF WBC: CPT | Performed by: STUDENT IN AN ORGANIZED HEALTH CARE EDUCATION/TRAINING PROGRAM

## 2024-03-25 PROCEDURE — 99285 EMERGENCY DEPT VISIT HI MDM: CPT | Mod: 25 | Performed by: STUDENT IN AN ORGANIZED HEALTH CARE EDUCATION/TRAINING PROGRAM

## 2024-03-25 PROCEDURE — 99284 EMERGENCY DEPT VISIT MOD MDM: CPT | Mod: 25 | Performed by: STUDENT IN AN ORGANIZED HEALTH CARE EDUCATION/TRAINING PROGRAM

## 2024-03-25 PROCEDURE — 70450 CT HEAD/BRAIN W/O DYE: CPT

## 2024-03-25 PROCEDURE — 96360 HYDRATION IV INFUSION INIT: CPT | Performed by: STUDENT IN AN ORGANIZED HEALTH CARE EDUCATION/TRAINING PROGRAM

## 2024-03-25 PROCEDURE — 250N000012 HC RX MED GY IP 250 OP 636 PS 637: Performed by: INTERNAL MEDICINE

## 2024-03-25 PROCEDURE — 85610 PROTHROMBIN TIME: CPT | Performed by: STUDENT IN AN ORGANIZED HEALTH CARE EDUCATION/TRAINING PROGRAM

## 2024-03-25 PROCEDURE — 36415 COLL VENOUS BLD VENIPUNCTURE: CPT | Performed by: STUDENT IN AN ORGANIZED HEALTH CARE EDUCATION/TRAINING PROGRAM

## 2024-03-25 PROCEDURE — G0378 HOSPITAL OBSERVATION PER HR: HCPCS

## 2024-03-25 PROCEDURE — 99222 1ST HOSP IP/OBS MODERATE 55: CPT | Performed by: INTERNAL MEDICINE

## 2024-03-25 PROCEDURE — 87635 SARS-COV-2 COVID-19 AMP PRB: CPT | Performed by: INTERNAL MEDICINE

## 2024-03-25 PROCEDURE — 84145 PROCALCITONIN (PCT): CPT | Performed by: INTERNAL MEDICINE

## 2024-03-25 PROCEDURE — 93010 ELECTROCARDIOGRAM REPORT: CPT | Performed by: STUDENT IN AN ORGANIZED HEALTH CARE EDUCATION/TRAINING PROGRAM

## 2024-03-25 PROCEDURE — 250N000011 HC RX IP 250 OP 636: Performed by: INTERNAL MEDICINE

## 2024-03-25 PROCEDURE — 87040 BLOOD CULTURE FOR BACTERIA: CPT | Performed by: INTERNAL MEDICINE

## 2024-03-25 PROCEDURE — 81001 URINALYSIS AUTO W/SCOPE: CPT | Performed by: STUDENT IN AN ORGANIZED HEALTH CARE EDUCATION/TRAINING PROGRAM

## 2024-03-25 PROCEDURE — 87581 M.PNEUMON DNA AMP PROBE: CPT | Performed by: INTERNAL MEDICINE

## 2024-03-25 PROCEDURE — 36415 COLL VENOUS BLD VENIPUNCTURE: CPT | Performed by: INTERNAL MEDICINE

## 2024-03-25 PROCEDURE — 250N000013 HC RX MED GY IP 250 OP 250 PS 637: Performed by: STUDENT IN AN ORGANIZED HEALTH CARE EDUCATION/TRAINING PROGRAM

## 2024-03-25 PROCEDURE — 250N000013 HC RX MED GY IP 250 OP 250 PS 637: Performed by: INTERNAL MEDICINE

## 2024-03-25 PROCEDURE — 96374 THER/PROPH/DIAG INJ IV PUSH: CPT

## 2024-03-25 PROCEDURE — 80053 COMPREHEN METABOLIC PANEL: CPT | Performed by: STUDENT IN AN ORGANIZED HEALTH CARE EDUCATION/TRAINING PROGRAM

## 2024-03-25 PROCEDURE — 96361 HYDRATE IV INFUSION ADD-ON: CPT | Performed by: STUDENT IN AN ORGANIZED HEALTH CARE EDUCATION/TRAINING PROGRAM

## 2024-03-25 PROCEDURE — 83605 ASSAY OF LACTIC ACID: CPT | Performed by: STUDENT IN AN ORGANIZED HEALTH CARE EDUCATION/TRAINING PROGRAM

## 2024-03-25 PROCEDURE — 258N000003 HC RX IP 258 OP 636: Performed by: STUDENT IN AN ORGANIZED HEALTH CARE EDUCATION/TRAINING PROGRAM

## 2024-03-25 RX ORDER — TRIAMCINOLONE ACETONIDE 5 MG/G
OINTMENT TOPICAL 2 TIMES DAILY
Status: DISCONTINUED | OUTPATIENT
Start: 2024-03-25 | End: 2024-04-01 | Stop reason: HOSPADM

## 2024-03-25 RX ORDER — CEFTRIAXONE 1 G/1
1 INJECTION, POWDER, FOR SOLUTION INTRAMUSCULAR; INTRAVENOUS EVERY 24 HOURS
Status: DISCONTINUED | OUTPATIENT
Start: 2024-03-25 | End: 2024-03-27

## 2024-03-25 RX ORDER — SERTRALINE HYDROCHLORIDE 100 MG/1
100 TABLET, FILM COATED ORAL DAILY
Status: DISCONTINUED | OUTPATIENT
Start: 2024-03-26 | End: 2024-04-01 | Stop reason: HOSPADM

## 2024-03-25 RX ORDER — AMOXICILLIN 250 MG
1 CAPSULE ORAL 2 TIMES DAILY PRN
Status: DISCONTINUED | OUTPATIENT
Start: 2024-03-25 | End: 2024-04-01 | Stop reason: HOSPADM

## 2024-03-25 RX ORDER — ASPIRIN 81 MG/1
81 TABLET ORAL DAILY
Status: DISCONTINUED | OUTPATIENT
Start: 2024-03-26 | End: 2024-04-01 | Stop reason: HOSPADM

## 2024-03-25 RX ORDER — CALCIUM CARBONATE 500 MG/1
1000 TABLET, CHEWABLE ORAL 4 TIMES DAILY PRN
Status: DISCONTINUED | OUTPATIENT
Start: 2024-03-25 | End: 2024-04-01 | Stop reason: HOSPADM

## 2024-03-25 RX ORDER — NICOTINE POLACRILEX 4 MG
15-30 LOZENGE BUCCAL
Status: DISCONTINUED | OUTPATIENT
Start: 2024-03-25 | End: 2024-04-01 | Stop reason: HOSPADM

## 2024-03-25 RX ORDER — ATORVASTATIN CALCIUM 40 MG/1
40 TABLET, FILM COATED ORAL DAILY
Status: DISCONTINUED | OUTPATIENT
Start: 2024-03-26 | End: 2024-04-01 | Stop reason: HOSPADM

## 2024-03-25 RX ORDER — SODIUM CHLORIDE, SODIUM LACTATE, POTASSIUM CHLORIDE, CALCIUM CHLORIDE 600; 310; 30; 20 MG/100ML; MG/100ML; MG/100ML; MG/100ML
INJECTION, SOLUTION INTRAVENOUS CONTINUOUS
Status: DISCONTINUED | OUTPATIENT
Start: 2024-03-25 | End: 2024-03-26

## 2024-03-25 RX ORDER — LOSARTAN POTASSIUM 25 MG/1
25 TABLET ORAL DAILY
Status: DISCONTINUED | OUTPATIENT
Start: 2024-03-26 | End: 2024-03-30

## 2024-03-25 RX ORDER — DEXTROSE MONOHYDRATE 25 G/50ML
25-50 INJECTION, SOLUTION INTRAVENOUS
Status: DISCONTINUED | OUTPATIENT
Start: 2024-03-25 | End: 2024-04-01 | Stop reason: HOSPADM

## 2024-03-25 RX ORDER — AZITHROMYCIN 500 MG/5ML
500 INJECTION, POWDER, LYOPHILIZED, FOR SOLUTION INTRAVENOUS EVERY 24 HOURS
Status: DISCONTINUED | OUTPATIENT
Start: 2024-03-25 | End: 2024-03-29

## 2024-03-25 RX ORDER — POLYMYXIN B SULFATE AND TRIMETHOPRIM 1; 10000 MG/ML; [USP'U]/ML
2 SOLUTION OPHTHALMIC EVERY 6 HOURS
Status: DISCONTINUED | OUTPATIENT
Start: 2024-03-25 | End: 2024-03-26

## 2024-03-25 RX ORDER — LIDOCAINE 40 MG/G
CREAM TOPICAL
Status: DISCONTINUED | OUTPATIENT
Start: 2024-03-25 | End: 2024-04-01 | Stop reason: HOSPADM

## 2024-03-25 RX ORDER — AMOXICILLIN 250 MG
2 CAPSULE ORAL 2 TIMES DAILY PRN
Status: DISCONTINUED | OUTPATIENT
Start: 2024-03-25 | End: 2024-04-01 | Stop reason: HOSPADM

## 2024-03-25 RX ADMIN — INSULIN ASPART 1 UNITS: 100 INJECTION, SOLUTION INTRAVENOUS; SUBCUTANEOUS at 23:48

## 2024-03-25 RX ADMIN — SODIUM CHLORIDE 1000 ML: 9 INJECTION, SOLUTION INTRAVENOUS at 17:46

## 2024-03-25 RX ADMIN — Medication 1 TABLET: at 23:47

## 2024-03-25 RX ADMIN — CEFTRIAXONE SODIUM 1 G: 1 INJECTION, POWDER, FOR SOLUTION INTRAMUSCULAR; INTRAVENOUS at 23:56

## 2024-03-25 RX ADMIN — POLYMYXIN B SULFATE AND TRIMETHOPRIM 2 DROP: 10000; 1 SOLUTION OPHTHALMIC at 20:42

## 2024-03-25 ASSESSMENT — ACTIVITIES OF DAILY LIVING (ADL)
ADLS_ACUITY_SCORE: 38
ADLS_ACUITY_SCORE: 38
ADLS_ACUITY_SCORE: 36
ADLS_ACUITY_SCORE: 38
ADLS_ACUITY_SCORE: 38
ADLS_ACUITY_SCORE: 45
ADLS_ACUITY_SCORE: 38

## 2024-03-25 ASSESSMENT — COLUMBIA-SUICIDE SEVERITY RATING SCALE - C-SSRS
6. HAVE YOU EVER DONE ANYTHING, STARTED TO DO ANYTHING, OR PREPARED TO DO ANYTHING TO END YOUR LIFE?: NO
2. HAVE YOU ACTUALLY HAD ANY THOUGHTS OF KILLING YOURSELF IN THE PAST MONTH?: NO
1. IN THE PAST MONTH, HAVE YOU WISHED YOU WERE DEAD OR WISHED YOU COULD GO TO SLEEP AND NOT WAKE UP?: NO

## 2024-03-25 NOTE — TELEPHONE ENCOUNTER
I received a message that Mr. Levine's wife had called with concerns about changes in his wellbeing.  I called her back and spoke with her over the phone.  She stated that he has had more difficulties with his hearing and has had some changes in behavior.  I specifically asked whether these were acute changes or if they have been slow and gradual.  She indicated that the changes have been slow and gradual.  She noted that he seems to have less interest in engaging in activities that he had previously enjoyed.  I told her that we would certainly have a close look at his thinking skills and his mood and his upcoming appointment with me next month.  I told her that if she notices acute changes or he engages in behaviors that are concerning for his safety, she should get him into an emergency department or urgent care right away. She expressed thanks for the call.     I look forward to seeing the patient and his wife in clinic next month.    Alfonzo Lara, Ph.D., L.P., ABPP  Board Certified in Clinical Neuropsychology   / Licensed Psychologist [id number]

## 2024-03-25 NOTE — TELEPHONE ENCOUNTER
I do recommend they are seen in ER since this sounds like a change in his mental status. Many acute things like UTI, electrolyte abnormality can cause neuro symptoms and can be life threatening and stroke requires timely intervention.

## 2024-03-25 NOTE — TELEPHONE ENCOUNTER
RN called patient/family and relayed provider's message. Patient/family verbalized understanding.     She will have her neighbor drive them to ED now. She will take him now.     Arlette Shankar RN, BSN  [facility]: [place]

## 2024-03-25 NOTE — ED PROVIDER NOTES
ED Provider Note  [facility]      History     Chief Complaint   Patient presents with    Gait Disturbance     Patient usually walks 3 miles a day and today he could hardly walk, and was wobbly. His wife says she called him three times while she was at work, and he never picked up (which is unlike him). She states that he is acting confused. Patient does take aspirin, and is diabetic. She states last night he was irritable and did not sleep much last night. Wife is very tearful talking about him.     In triage patient is able to lift arms/legs 5 out of 5. He is following directions. Patient is alert and oriented    Eye Pain     Patient has left eye pain that started two days ago, pink/red/swollen. He feels it may be a little off in vision.      The history is provided by the patient, medical records and the spouse.     Gerber Levine is a 79 year old male with history of DMII, HTN, hearing loss, and depression as well as memory changes who is brought to the ED by his wife due to concern for confusion and gait instability. Patient was admitted here at the end of December with acute AMS and right sided weakness and determined to have metabolic encephalopathy likely due to viral syndrome 2/2 Covid infection. There was some concern that his sertraline was contributing to his delirium, but he is still taking this. His wife reports that in the past 4-5 days he has been more confused. She also feels his gait is unsteady. He does agree that he has recently been more unsteady walking. She states that these symptoms seem similar to those which prompted his recent admission.    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    EYE SURGERY  2014    cataracts    HERNIA REPAIR  1976    REPAIR CHEST WALL N/A 9/21/2016    Procedure: REPAIR CHEST WALL;  Surgeon: Leah Nixon MD;  Location: [facility] OR    SURGICAL HISTORY OF -   1976    SPLENECTOMY    SURGICAL HISTORY OF -       APPENDECTOMY    SURGICAL HISTORY OF -       CHOLECYSTECTOMY    SURGICAL HISTORY OF -       HERNIAORRHAPHY    SURGICAL HISTORY OF -   1981    cholesteotoma     ASPIRIN 81 MG OR TABS  atorvastatin (LIPITOR) 40 MG tablet  blood glucose (ACCU-CHEK RODRI PLUS) test strip  blood glucose (CONTOUR NEXT TEST) test strip  blood glucose (NO BRAND SPECIFIED) lancets standard  CALCITRATE/VITAMIN D 315-200 MG-UNIT OR TABS  Cyanocobalamin (B-12 PO)  losartan (COZAAR) 25 MG tablet  metFORMIN (GLUCOPHAGE) 500 MG tablet  Microlet Lancets MISC  sertraline (ZOLOFT) 100 MG tablet  triamcinolone (KENALOG) 0.5 % external ointment      Allergies   Allergen Reactions    Oxycodone Itching    Shrimp Swelling     Family History  Family History   Problem Relation Age of Onset    Dementia Mother     Other Cancer Mother         Skin cancer    Diabetes Father     Emphysema Father     Intellectual Disability (Mental Retardation) Sister     Diabetes Sister     Dementia Brother     Prostate Cancer Brother     Diabetes Maternal Grandmother     Genitourinary Problems Daughter          spherocytosis, had spleen removed    C.A.D. No family hx of     Cancer - colorectal No family hx of     Hypertension No family hx of     Cerebrovascular Disease No family hx of     Breast Cancer No family hx of      Social History   Social History     Tobacco Use    Smoking status: Never    Smokeless tobacco: Never   Vaping Use    Vaping Use: Never used   Substance Use Topics    Alcohol use: Yes     Comment: Occasionaly    Drug use: No         A medically appropriate review of systems was performed with pertinent positives and negatives noted in the HPI, and all other systems negative.    Physical Exam   BP: 132/74  Pulse: 80  Temp: 98.6  F (37  C)  Resp: 18  SpO2: 96 %  Physical Exam  Constitutional:       General: He is not in acute distress.     Appearance: Normal appearance. He is not toxic-appearing.   HENT:      Head: Atraumatic.   Eyes:      General: No scleral icterus.     Extraocular Movements: Extraocular movements intact.      Conjunctiva/sclera: Conjunctivae normal.      Pupils: Pupils are equal, round, and reactive to light.   Cardiovascular:      Rate and Rhythm: Normal rate.      Heart sounds: Normal heart sounds.   Pulmonary:      Effort: Pulmonary effort is normal. No respiratory distress.      Breath sounds: Normal breath sounds. No stridor. No wheezing, rhonchi or rales.   Chest:      Chest wall: No tenderness.   Abdominal:      General: There is no distension.      Palpations: Abdomen is soft. There is no mass.      Tenderness: There is no abdominal tenderness. There is no guarding or rebound.      Hernia: No hernia is present.   Musculoskeletal:         General: No swelling, tenderness, deformity or signs of injury.      Cervical back: Neck supple.      Right lower leg: No edema.      Left lower leg: No edema.   Skin:     General: Skin is warm.      Capillary Refill: Capillary refill takes less than 2 seconds.   Neurological:      Mental Status: He is alert.      Cranial Nerves: No cranial  nerve deficit.      Sensory: No sensory deficit.      Motor: No weakness.      Coordination: Coordination normal.      Gait: Gait normal.         ED Course, Procedures, & Data      Procedures            EKG Interpretation:      Interpreted by Ronan Gordon MD  Time reviewed:1716   Symptoms at time of EKG: confusion, gait instability   Rhythm: Normal sinus   Rate: 76 bpm  Axis: Normal  Ectopy: None  Conduction: Normal  ST Segments/ T Waves: No ST-T wave changes and No acute ischemic changes  Q Waves: None  Comparison to prior: 12/25/23    Clinical Impression: no acute ischemia            No results found for any visits on 03/25/24.  Medications - No data to display  Labs Ordered and Resulted from Time of ED Arrival to Time of ED Departure - No data to display  No orders to display          Critical care was not performed.     Medical Decision Making  The patient's presentation was of moderate complexity (an undiagnosed new problem with uncertain diagnosis).    The patient's evaluation involved:  an assessment requiring an independent historian (see separate area of note for details)  review of external note(s) from 2 sources (see separate area of note for details)    The patient's management necessitated high risk (a decision regarding hospitalization).    Assessment & Plan      Patient was brought in by his wife and daughter for intermittent confusion worsening over the last few days.  Here in the emergency room patient had a reassuring exam, appeared slightly altered, but was A/O x 4 with no acute complaints and a steady gait on ambulation.  Workup here is only significant for leukocytosis to 16, however given patient's intermittent confusion, unexplained leukocytosis, although he has reassuring labs otherwise and reassuring vital signs will plan to admit patient for observation overnight to rule out an indolent sepsis or other etiology of his altered mental status.  Consider CT abdomen pelvis, but deferred this  workup at this point because he had no abdominal pains or complaints.  Patient signed out to inpatient medicine hospitalist who accepted patient for admission.    I have reviewed the nursing notes. I have reviewed the findings, diagnosis, plan and need for follow up with the patient.    New Prescriptions    No medications on file       Final diagnoses:   None   I, Armida Davis, am serving as a trained medical scribe to document services personally performed by Ronan Gordon MD, based on the provider's statements to me.     IRonan MD, was physically present and have reviewed and verified the accuracy of this note documented by Armida Davis.      Ronan Gordon MD  [facility] EMERGENCY DEPARTMENT  3/25/2024     Ronan Gordon MD  03/25/24 2004

## 2024-03-25 NOTE — ED TRIAGE NOTES
Patient usually walks 3 miles a day and today he could hardly walk, and was wobbly. His wife says she called him three times while she was at work, and he never picked up (which is unlike him). She states that he is acting confused. Patient does take aspirin, and is diabetic. She states last night he was irritable and did not sleep much last night. Wife is very tearful talking about him.     In triage patient is able to lift arms/legs 5 out of 5. He is following directions. Patient is alert and oriented x4.     Triage Assessment (Adult)       Row Name 03/25/24 1947          Triage Assessment    Airway WDL WDL        Respiratory WDL    Respiratory WDL WDL        Skin Circulation/Temperature WDL    Skin Circulation/Temperature WDL WDL        Cardiac WDL    Cardiac WDL WDL        Peripheral/Neurovascular WDL    Peripheral Neurovascular WDL WDL        Cognitive/Neuro/Behavioral WDL    Cognitive/Neuro/Behavioral WDL WDL

## 2024-03-25 NOTE — TELEPHONE ENCOUNTER
Pt's spouse, Denisha calling.    She is looking for input from PCP, Dr. Yusuf.    This morning pt was acting really weird like he was on [place] day when he ended up in the hospital. He seemed disorientated. When she asks him a question, he looks at her like he isn't there. Doesn't seem like he is understanding.     Per Denisha, pt has hx of some memory changes/possible impairment.    She talked to Dr. Lara (neuropsychology) today and he advised ED for any acute changes or if pt engages in behaviors that are concerning for his safety.    Denisha just came home from work and pt was sitting up stairs in his jacket and under ware and keeps rubbing his eye. She states that he had snow-blowed one lorenza and then she thinks that he had some urinary incontinence so that is why he took his pants off when he got in the house.    They are seeing Dr. Lara in April for further testing.     She denies noting any stroke like symptoms such as changes in speech or new weakness on one side of the body. Pt is able to walk just fine.     She states that pt was not responding to any of her text messages this morning.   He doesn't check the things that he used to. He used to make a list of house maintenance and regularly check things, he doesn't do this anymore.   States that pt has expressed to her that he is scared of developing Alzheimer's due to family history.   They have seen doctors in the past for depression, but symptoms haven't seemed to improve.  They live together at home.    Writer encouraged ED for any severe or worsening confusion or if she does not feel that she can re-direct patient or safely care for him in the home. Notified Denisha that message will be sent to covering provider's since PCP is out of office today.    Haley Park RN

## 2024-03-26 ENCOUNTER — APPOINTMENT (OUTPATIENT)
Dept: SPEECH THERAPY | Facility: CLINIC | Age: 79
DRG: 193 | End: 2024-03-26
Attending: INTERNAL MEDICINE
Payer: COMMERCIAL

## 2024-03-26 ENCOUNTER — APPOINTMENT (OUTPATIENT)
Dept: PHYSICAL THERAPY | Facility: CLINIC | Age: 79
DRG: 193 | End: 2024-03-26
Attending: INTERNAL MEDICINE
Payer: COMMERCIAL

## 2024-03-26 ENCOUNTER — APPOINTMENT (OUTPATIENT)
Dept: OCCUPATIONAL THERAPY | Facility: CLINIC | Age: 79
DRG: 193 | End: 2024-03-26
Attending: INTERNAL MEDICINE
Payer: COMMERCIAL

## 2024-03-26 ENCOUNTER — APPOINTMENT (OUTPATIENT)
Dept: CT IMAGING | Facility: CLINIC | Age: 79
DRG: 193 | End: 2024-03-26
Attending: INTERNAL MEDICINE
Payer: COMMERCIAL

## 2024-03-26 PROBLEM — J18.9 COMMUNITY ACQUIRED PNEUMONIA: Status: ACTIVE | Noted: 2024-03-26

## 2024-03-26 LAB
C PNEUM DNA SPEC QL NAA+PROBE: NOT DETECTED
ERYTHROCYTE [DISTWIDTH] IN BLOOD BY AUTOMATED COUNT: 12.9 % (ref 10–15)
FLUAV H1 2009 PAND RNA SPEC QL NAA+PROBE: NOT DETECTED
FLUAV H1 RNA SPEC QL NAA+PROBE: NOT DETECTED
FLUAV H3 RNA SPEC QL NAA+PROBE: NOT DETECTED
FLUAV RNA SPEC QL NAA+PROBE: NOT DETECTED
FLUBV RNA SPEC QL NAA+PROBE: NOT DETECTED
GLUCOSE BLDC GLUCOMTR-MCNC: 161 MG/DL (ref 70–99)
GLUCOSE BLDC GLUCOMTR-MCNC: 254 MG/DL (ref 70–99)
GLUCOSE BLDC GLUCOMTR-MCNC: 287 MG/DL (ref 70–99)
GLUCOSE BLDC GLUCOMTR-MCNC: 357 MG/DL (ref 70–99)
HADV DNA SPEC QL NAA+PROBE: NOT DETECTED
HCOV PNL SPEC NAA+PROBE: NOT DETECTED
HCT VFR BLD AUTO: 35.3 % (ref 40–53)
HGB BLD-MCNC: 12.3 G/DL (ref 13.3–17.7)
HMPV RNA SPEC QL NAA+PROBE: NOT DETECTED
HPIV1 RNA SPEC QL NAA+PROBE: NOT DETECTED
HPIV2 RNA SPEC QL NAA+PROBE: NOT DETECTED
HPIV3 RNA SPEC QL NAA+PROBE: NOT DETECTED
HPIV4 RNA SPEC QL NAA+PROBE: NOT DETECTED
M PNEUMO DNA SPEC QL NAA+PROBE: NOT DETECTED
MCH RBC QN AUTO: 30.6 PG (ref 26.5–33)
MCHC RBC AUTO-ENTMCNC: 34.8 G/DL (ref 31.5–36.5)
MCV RBC AUTO: 88 FL (ref 78–100)
PLATELET # BLD AUTO: 248 10E3/UL (ref 150–450)
RBC # BLD AUTO: 4.02 10E6/UL (ref 4.4–5.9)
RSV RNA SPEC QL NAA+PROBE: NOT DETECTED
RSV RNA SPEC QL NAA+PROBE: NOT DETECTED
RV+EV RNA SPEC QL NAA+PROBE: NOT DETECTED
SARS-COV-2 RNA RESP QL NAA+PROBE: NEGATIVE
WBC # BLD AUTO: 19.9 10E3/UL (ref 4–11)

## 2024-03-26 PROCEDURE — 82962 GLUCOSE BLOOD TEST: CPT

## 2024-03-26 PROCEDURE — 258N000003 HC RX IP 258 OP 636: Performed by: INTERNAL MEDICINE

## 2024-03-26 PROCEDURE — 36415 COLL VENOUS BLD VENIPUNCTURE: CPT | Performed by: INTERNAL MEDICINE

## 2024-03-26 PROCEDURE — 97165 OT EVAL LOW COMPLEX 30 MIN: CPT | Mod: GO

## 2024-03-26 PROCEDURE — 250N000011 HC RX IP 250 OP 636: Performed by: INTERNAL MEDICINE

## 2024-03-26 PROCEDURE — 71250 CT THORAX DX C-: CPT | Mod: 26 | Performed by: RADIOLOGY

## 2024-03-26 PROCEDURE — 99223 1ST HOSP IP/OBS HIGH 75: CPT | Performed by: STUDENT IN AN ORGANIZED HEALTH CARE EDUCATION/TRAINING PROGRAM

## 2024-03-26 PROCEDURE — 97161 PT EVAL LOW COMPLEX 20 MIN: CPT | Mod: GP

## 2024-03-26 PROCEDURE — G0378 HOSPITAL OBSERVATION PER HR: HCPCS

## 2024-03-26 PROCEDURE — 92610 EVALUATE SWALLOWING FUNCTION: CPT | Mod: GN

## 2024-03-26 PROCEDURE — 250N000013 HC RX MED GY IP 250 OP 250 PS 637: Performed by: INTERNAL MEDICINE

## 2024-03-26 PROCEDURE — 97530 THERAPEUTIC ACTIVITIES: CPT | Mod: GP

## 2024-03-26 PROCEDURE — 96375 TX/PRO/DX INJ NEW DRUG ADDON: CPT

## 2024-03-26 PROCEDURE — 120N000002 HC R&B MED SURG/OB UMMC

## 2024-03-26 PROCEDURE — 96372 THER/PROPH/DIAG INJ SC/IM: CPT | Performed by: INTERNAL MEDICINE

## 2024-03-26 PROCEDURE — 250N000013 HC RX MED GY IP 250 OP 250 PS 637: Performed by: STUDENT IN AN ORGANIZED HEALTH CARE EDUCATION/TRAINING PROGRAM

## 2024-03-26 PROCEDURE — 99233 SBSQ HOSP IP/OBS HIGH 50: CPT | Performed by: INTERNAL MEDICINE

## 2024-03-26 PROCEDURE — 96361 HYDRATE IV INFUSION ADD-ON: CPT

## 2024-03-26 PROCEDURE — 85027 COMPLETE CBC AUTOMATED: CPT | Performed by: INTERNAL MEDICINE

## 2024-03-26 PROCEDURE — 99418 PROLNG IP/OBS E/M EA 15 MIN: CPT | Performed by: INTERNAL MEDICINE

## 2024-03-26 PROCEDURE — 71250 CT THORAX DX C-: CPT

## 2024-03-26 PROCEDURE — 92526 ORAL FUNCTION THERAPY: CPT | Mod: GN

## 2024-03-26 RX ORDER — METFORMIN HCL 500 MG
1000 TABLET, EXTENDED RELEASE 24 HR ORAL
Status: DISCONTINUED | OUTPATIENT
Start: 2024-03-26 | End: 2024-04-01 | Stop reason: HOSPADM

## 2024-03-26 RX ORDER — GLIPIZIDE 2.5 MG/1
2.5 TABLET, EXTENDED RELEASE ORAL
Status: DISCONTINUED | OUTPATIENT
Start: 2024-03-26 | End: 2024-03-27

## 2024-03-26 RX ORDER — METFORMIN HCL 500 MG
500 TABLET, EXTENDED RELEASE 24 HR ORAL EVERY MORNING
Status: DISCONTINUED | OUTPATIENT
Start: 2024-03-26 | End: 2024-04-01 | Stop reason: HOSPADM

## 2024-03-26 RX ORDER — POLYMYXIN B SULFATE AND TRIMETHOPRIM 1; 10000 MG/ML; [USP'U]/ML
2 SOLUTION OPHTHALMIC 4 TIMES DAILY
Status: DISCONTINUED | OUTPATIENT
Start: 2024-03-26 | End: 2024-04-01 | Stop reason: HOSPADM

## 2024-03-26 RX ORDER — ACETAMINOPHEN 325 MG/1
650 TABLET ORAL EVERY 6 HOURS PRN
Status: DISCONTINUED | OUTPATIENT
Start: 2024-03-26 | End: 2024-04-01 | Stop reason: HOSPADM

## 2024-03-26 RX ORDER — ENOXAPARIN SODIUM 100 MG/ML
40 INJECTION SUBCUTANEOUS EVERY 24 HOURS
Status: DISCONTINUED | OUTPATIENT
Start: 2024-03-26 | End: 2024-04-01 | Stop reason: HOSPADM

## 2024-03-26 RX ORDER — METFORMIN HCL 500 MG
1000 TABLET, EXTENDED RELEASE 24 HR ORAL 2 TIMES DAILY WITH MEALS
Status: DISCONTINUED | OUTPATIENT
Start: 2024-03-26 | End: 2024-03-26

## 2024-03-26 RX ADMIN — LOSARTAN POTASSIUM 25 MG: 25 TABLET, FILM COATED ORAL at 10:10

## 2024-03-26 RX ADMIN — Medication 1 TABLET: at 10:09

## 2024-03-26 RX ADMIN — CEFTRIAXONE SODIUM 1 G: 1 INJECTION, POWDER, FOR SOLUTION INTRAMUSCULAR; INTRAVENOUS at 22:26

## 2024-03-26 RX ADMIN — GLIPIZIDE 2.5 MG: 2.5 TABLET, FILM COATED, EXTENDED RELEASE ORAL at 10:33

## 2024-03-26 RX ADMIN — ACETAMINOPHEN 650 MG: 325 TABLET, FILM COATED ORAL at 06:50

## 2024-03-26 RX ADMIN — TRIAMCINOLONE ACETONIDE: 5 OINTMENT TOPICAL at 10:21

## 2024-03-26 RX ADMIN — SODIUM CHLORIDE, POTASSIUM CHLORIDE, SODIUM LACTATE AND CALCIUM CHLORIDE: 600; 310; 30; 20 INJECTION, SOLUTION INTRAVENOUS at 12:59

## 2024-03-26 RX ADMIN — Medication 1 TABLET: at 19:56

## 2024-03-26 RX ADMIN — METFORMIN HYDROCHLORIDE 1000 MG: 500 TABLET, EXTENDED RELEASE ORAL at 17:02

## 2024-03-26 RX ADMIN — AZITHROMYCIN MONOHYDRATE 500 MG: 500 INJECTION, POWDER, LYOPHILIZED, FOR SOLUTION INTRAVENOUS at 01:01

## 2024-03-26 RX ADMIN — POLYMYXIN B SULFATE AND TRIMETHOPRIM 2 DROP: 10000; 1 SOLUTION OPHTHALMIC at 06:50

## 2024-03-26 RX ADMIN — POLYMYXIN B SULFATE AND TRIMETHOPRIM 2 DROP: 10000; 1 SOLUTION OPHTHALMIC at 19:56

## 2024-03-26 RX ADMIN — POLYMYXIN B SULFATE AND TRIMETHOPRIM 2 DROP: 10000; 1 SOLUTION OPHTHALMIC at 01:00

## 2024-03-26 RX ADMIN — ENOXAPARIN SODIUM 40 MG: 40 INJECTION SUBCUTANEOUS at 10:09

## 2024-03-26 RX ADMIN — SERTRALINE HYDROCHLORIDE 100 MG: 100 TABLET ORAL at 10:10

## 2024-03-26 RX ADMIN — TRIAMCINOLONE ACETONIDE: 5 OINTMENT TOPICAL at 19:57

## 2024-03-26 RX ADMIN — POLYMYXIN B SULFATE AND TRIMETHOPRIM 2 DROP: 10000; 1 SOLUTION OPHTHALMIC at 12:23

## 2024-03-26 RX ADMIN — ASPIRIN 81 MG: 81 TABLET, COATED ORAL at 10:10

## 2024-03-26 RX ADMIN — ACETAMINOPHEN 650 MG: 325 TABLET, FILM COATED ORAL at 01:01

## 2024-03-26 RX ADMIN — ATORVASTATIN CALCIUM 40 MG: 40 TABLET, FILM COATED ORAL at 10:09

## 2024-03-26 RX ADMIN — SODIUM CHLORIDE, POTASSIUM CHLORIDE, SODIUM LACTATE AND CALCIUM CHLORIDE 100 ML/HR: 600; 310; 30; 20 INJECTION, SOLUTION INTRAVENOUS at 01:00

## 2024-03-26 RX ADMIN — METFORMIN HYDROCHLORIDE 500 MG: 500 TABLET, EXTENDED RELEASE ORAL at 10:09

## 2024-03-26 ASSESSMENT — ACTIVITIES OF DAILY LIVING (ADL)
ADLS_ACUITY_SCORE: 44
ADLS_ACUITY_SCORE: 45
ADLS_ACUITY_SCORE: 45
ADLS_ACUITY_SCORE: 44
ADLS_ACUITY_SCORE: 44
ADLS_ACUITY_SCORE: 45
ADLS_ACUITY_SCORE: 45
ADLS_ACUITY_SCORE: 44
ADLS_ACUITY_SCORE: 45
ADLS_ACUITY_SCORE: 44
ADLS_ACUITY_SCORE: 44
ADLS_ACUITY_SCORE: 45
ADLS_ACUITY_SCORE: 45
ADLS_ACUITY_SCORE: 44
ADLS_ACUITY_SCORE: 45
ADLS_ACUITY_SCORE: 44
ADLS_ACUITY_SCORE: 45
ADLS_ACUITY_SCORE: 44
ADLS_ACUITY_SCORE: 45
ADLS_ACUITY_SCORE: 44
ADLS_ACUITY_SCORE: 44
ADLS_ACUITY_SCORE: 45
ADLS_ACUITY_SCORE: 44

## 2024-03-26 NOTE — PLAN OF CARE
Goal Outcome Evaluation:      Plan of Care Reviewed With: patient    Overall Patient Progress: improvingOverall Patient Progress: improving    Pt is alert and oriented x 4, intermittent confusion, incontinent with bladder, regular diet thin liquids, takes medications whole, assist of one with gait belt tranfers. BS checks QID with coverages, denied pain nor discomfort when asked, Lactated  Ringers is discontinued per MD order. will continue plan of care

## 2024-03-26 NOTE — PROGRESS NOTES
[facility]    Medicine Progress Note - Hospitalist Service, GOLD TEAM 17    Date of Admission:  3/25/2024    Assessment & Plan   Gerber Levine is a 79 year old male admitted on 3/25/2024. He has a h/o T2DM, HTN, hearing loss wears hearing aid, hereditary spherocytosis, status post splenectomy, STM loss and depression.  He lives with his wife.  He was hospitalized at  12/26 - 12/27/23 for acute metabolic encephalopathy due to COVID-19 infection +/- side effect of sertraline.  Stroke workup was negative at that time.  He was brought to [facility] ED today by his wife and daughter for evaluation of confusion and generalized weakness.  Pt usually walks 3 miles but has been wobbly today.  He walked 3 miles 2 days ago and was able to shovel snow a bit yesterday.  Wife thinks he is confused and has declining short-term memory problem.  Daughter thinks he is depressed, not interested in things he normally enjoys.  He has chills but no fever.  No urinary or bowel symptoms.  Denies cough, SOB, HA, neck stiffness or focal weakness.  His initial vital signs were temp 98.6  F, /74, HR 80, RR 18 and 96% sat on RA.  WBC 16.7 with left shift, creatinine 0.8, glucose 277, LFTs wnl, and UA grossly negative for infection.  CT head without contrast negative for acute intracranial pathology.  Admitted for further management of pneumonia, increased confusion and weakness.     RML, RUL CAP, Asplenia:  CXR showed right lower lobe pneumonia, started on empiric IV ceftriaxone and azithromycin, to include coverage for encapsulated bacteria given asplenia.  Procalcitonin normal, blood cultures obtained and pending.  Respiratory viral panel negative.  Has robust leukocytosis response, but fevers trending down and oxygenating adequately on room air.  Continues to have nonproductive cough, but denies dyspnea.  Given recently identified nonspecific pulmonary nodules and the  concerning appearance of the infiltrates on CXR, obtained HRCT 3/26, shows right upper and right middle lobe pneumonia, but given prior history of right chest wall histiocytoma resection recommend follow-up.  Already has a repeat CT planned with oncology who monitors his history of histiocytoma and hereditary spherocytosis, planned for 10/2024.  Appetite, nutritional intake improving.  -Continue IV ceftriaxone, azithromycin  -Follow-up blood cultures  -Hep-Lock IV fluids  -PT, OT  -CBC, BMP 3/27  -planned repeat chest CT 10/2024 with oncology  -Speech therapy to assess swallow for possible subclinical aspiration, although patient and wife deny difficulties with swallowing.    Hereditary spherocytosis, s/p Splenectomy ~ 1976:  Followed by heme-onc.  At risk for encapsulated bacterial infection.  -Ceftriaxone, azithromycin as above  -Await blood culture results    Cognitive impairment with progressive STM loss, acute metabolic encephalopathy due to pneumonia:  Patient lives with his wife.  Previously underwent neuropsychology evaluation several years ago.  Wife has noted progressive short-term memory loss and increased depression and poor sleep over the past 6-7 months.  Particularly accelerated when he was hospitalized with COVID with associated metabolic encephalopathy 3 months ago.  Brain MRI 12/25/2023 showed no acute findings, with mild to moderate generalized cerebral atrophy but no hydrocephalus.  Head CT 3/26/2024 again shows mild generalized volume loss, no hydrocephalus but no acute findings.  Wife also has noted shuffling gait, but no other parkinsonian symptoms or signs on exam, and no recent falls.  Acute encephalopathy clearing, patient fully oriented during today's visit.  -PT, OT  -Has planned neuropsychology assessment in the coming month  -Treat underlying depression, insomnia    Depression, insomnia:  As above, wife and daughter have noticed progressive depression, difficulty sleeping for the past  6-7 months.  Has been on Zoloft.  Also with progressive STM loss as discussed above, particularly accelerated after hospitalized with COVID and encephalopathy 3 months ago.  Patient very pleasant on exam.  Acute encephalopathy resolving.  Denies suicidal ideation or intent.  -Continue PTA Zoloft  -Psychiatry consult pending    Weakness, Gait Instability:  Very active, walks 3 miles at baseline.  Acute weakness, gait instability likely due to acute pneumonia.  Wife has noted shuffling gait, but has no other Parkinson features on exam.  -PT, OT    DM Type 2, uncontrolled:  A1c 9.4 on 3/25/2024, previously 8.7 on 2/2/2024.  Prior to that had been under reasonable control.  Had been hospitalized with COVID 3 months ago, now with community-acquired pneumonia.  Eating well.  -Increase PTA metformin to XR, 500 mg every morning, 1000 mg at bedtime  -Glucotrol XL 2.5 mg daily    HTN, HLD:  BP stable, euvolemic on exam.  Eating well.  -Continue PTA Cozaar with hold parameters  -Continue PTA Lipitor, ASA    Bacterial conjunctivitis:  Started on topical antibiotic PTA.  Initially involved left eye, now also spreading to the right eye.  No purulent drainage.  -Change PTA polymyxin b-trimethopri to both eyes    Hearing Impaired:  Wears hearing aids.    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:  -As above, has planned repeat chest CT 10/2024 with oncology        Diet: Moderate Consistent Carb (60 g CHO per Meal) Diet    DVT Prophylaxis: Enoxaparin (Lovenox) SQ  Parks Catheter: Not present  Lines: None     Cardiac Monitoring: None  Code Status: Full Code      Clinically Significant Risk Factors Present on Admission               # Coagulation Defect: INR = 1.19 (Ref range: 0.85 - 1.15) and/or PTT = N/A, will monitor for bleeding  # Drug Induced Platelet Defect: home medication list includes an antiplatelet medication   # Hypertension: Noted on problem list    "  # DMII: A1C = 9.4 % (Ref range: <5.7 %) within past 6 months               Disposition Plan     Expected Discharge Date: 03/26/2024                    Ryne Lim MD  Hospitalist Service, GOLD TEAM 17  [facility]  Securely message with [employer] (more info)  Text page via [facility] Paging/Directory   See signed in provider for up to date coverage information  ______________________________________________________________________    Interval History   Companied by his wife.  She reports a history of possible cognitive impairment and has noted short-term memory loss progressing over the past 6-7 months, particularly since he was hospitalized with COVID and acute encephalopathy 3 months ago.  He had undergone neuropsychology testing several years ago, and has been referred back to them with plans for full assessment in the coming month.  She is also noted shuffling gait at times, but no falls.  Also noting increased depression over the past 6-7 months and poor sleep.  The patient, he wakes frequently because he \"thinks about things\".  His appetite is okay, eating and drinking well now.  Denies any nausea, vomiting, diarrhea or constipation.  Has an ongoing cough, but improving and nonproductive.  Denies any chills or night sweats.  Denies dyspnea.  Denies any dysuria or urgency.  Typically has nocturia 1 time per night.  Denies any pain.    Physical Exam   Vital Signs: Temp: 98.9  F (37.2  C) Temp src: Oral BP: 109/64 Pulse: 79   Resp: 18 SpO2: 90 % O2 Device: None (Room air)    Weight: 166 lbs 10.68 oz  General: Alert and oriented x 4.  Pleasant.  Speech, affect normal.  HEENT: Left greater than right eye conjunctivitis.  Oropharynx clear.  No cervical or supraclavicular lymphadenopathy appreciated.  Chest: Positive rales in the right lower and mid lung and anteriorly, no wheezes.  Left lung CTA.  No rhonchi.  CV: RRR.  No murmurs.  Abdomen: NABS.  Soft, nontender, " nondistended.  Extremities: No edema  Neuro: CN II through XII grossly intact.  Strength 5/5 symmetrically.  No significant tremors.  No cogwheeling rigidity.  No bradykinesia.    65 MINUTES SPENT BY ME on the date of service doing chart review, history, exam, documentation & further activities per the note.      Data      CMP  Recent Labs   Lab 03/26/24  1217 03/26/24  0644 03/25/24  2346 03/25/24  1739   NA  --   --   --  137   POTASSIUM  --   --   --  3.9   CHLORIDE  --   --   --  98   CO2  --   --   --  29   ANIONGAP  --   --   --  10   * 254* 236* 277*   BUN  --   --   --  17.5   CR  --   --   --  0.80   GFRESTIMATED  --   --   --  89   YANI  --   --   --  9.4   PROTTOTAL  --   --   --  7.5   ALBUMIN  --   --   --  3.9   BILITOTAL  --   --   --  1.1   ALKPHOS  --   --   --  104   AST  --   --   --  21   ALT  --   --   --  21     CBC  Recent Labs   Lab 03/26/24  0753 03/25/24  1739   WBC 19.9* 16.7*   RBC 4.02* 4.33*   HGB 12.3* 13.1*   HCT 35.3* 38.5*   MCV 88 89   MCH 30.6 30.3   MCHC 34.8 34.0   RDW 12.9 12.8    283     INR  Recent Labs   Lab 03/25/24  1739   INR 1.19*     Arterial Blood GasNo lab results found in last 7 days.Venous Blood Gas  No lab results found in last 7 days.  Hemoglobin A1C   Date Value Ref Range Status   03/25/2024 9.4 (H) <5.7 % Final     Comment:     Normal <5.7%   Prediabetes 5.7-6.4%    Diabetes 6.5% or higher     Note: Adopted from ADA consensus guidelines.   02/02/2024 8.7 (H) <5.7 % Final     Comment:     Normal <5.7%   Prediabetes 5.7-6.4%    Diabetes 6.5% or higher     Note: Adopted from ADA consensus guidelines.   10/03/2023 7.1 (H) 0.0 - 5.6 % Final     Comment:     Normal <5.7%   Prediabetes 5.7-6.4%    Diabetes 6.5% or higher     Note: Adopted from ADA consensus guidelines.   06/01/2021 7.1 (H) 0 - 5.6 % Final     Comment:     Normal <5.7% Prediabetes 5.7-6.4%  Diabetes 6.5% or higher - adopted from ADA   consensus guidelines.     02/26/2021 7.8 (H) 0 - 5.6 %  Final     Comment:     Normal <5.7% Prediabetes 5.7-6.4%  Diabetes 6.5% or higher - adopted from ADA   consensus guidelines.     10/28/2020 6.8 (H) 0 - 5.6 % Final     Comment:     Normal <5.7% Prediabetes 5.7-6.4%  Diabetes 6.5% or higher - adopted from ADA   consensus guidelines.       Results for orders placed or performed during the hospital encounter of 03/25/24   Head CT w/o contrast    Narrative    EXAM: CT HEAD W/O CONTRAST  LOCATION: [facility]  DATE: 3/25/2024    INDICATION: Altered mental status  COMPARISON: MRI brain 12/25/2023  TECHNIQUE: Routine CT Head without IV contrast. Multiplanar reformats. Dose reduction techniques were used.    FINDINGS:  INTRACRANIAL CONTENTS: No intracranial hemorrhage, extraaxial collection, or mass effect.  No CT evidence of acute infarct. Normal parenchymal attenuation for age. Mild generalized volume loss. No hydrocephalus.     VISUALIZED ORBITS/SINUSES/MASTOIDS: Prior bilateral cataract surgery. Visualized portions of the orbits are otherwise unremarkable. Mild to moderate mucosal thickening scattered about the paranasal sinuses. No middle ear or mastoid effusion.    BONES/SOFT TISSUES: No acute abnormality.      Impression    IMPRESSION:  1.  Mild age-related changes without acute intracranial abnormality.    XR Chest Port 1 View    Narrative    EXAM: XR CHEST PORT 1 VIEW  LOCATION: [facility]  DATE: 3/25/2024    INDICATION: Altered MS w  leukocytosis.  Eval for pneumonia  COMPARISON: 12/25/2023.      Impression    IMPRESSION: Cardiac silhouette is within normal limits. Increased patchy opacities throughout the right lower lung concerning for pneumonia. No pleural effusion or pneumothorax. Remote postsurgical changes of the right lung and ribs.   CT Chest w/o Contrast    Narrative    EXAMINATION: Chest CT  3/26/2024 1:10 PM    CLINICAL HISTORY: right lower lung pneumonia, h/o  pulmonary nodules.  Assess for worsening nodules, lung abscess.    COMPARISON: CT to 2/2/2024.    TECHNIQUE: CT imaging obtained through the chest without contrast.  Axial, coronal, and sagittal reconstructions and axial MIP reformatted  images are reviewed.     FINDINGS:  Mediastinum: The visualized thyroid is unremarkable. The heart is  normal in size. No significant  pericardial effusion. Moderate  coronary artery calcifications. The ascending aorta and main pulmonary  artery are normal in caliber. Several prominent mediastinal lymph  nodes are increased from prior. No lymphadenopathy by size criteria.  Small hiatal hernia.    Lungs: Trace pleural effusions. No pneumothorax. Layering secretions  in the mainstem bronchi. Mild lower lobe predominant bronchial wall  thickening with scattered mucous plugging at the lung bases. Mild  bronchiectasis of the right middle and upper lobes, similar to prior.  New crazy paving pattern in the residual right upper and right middle  lobe consisting of groundglass opacities with intralobular septal  thickening as well as several patchy airspace opacities. Peripheral  consolidative opacities in the right lower lobe, suboptimally  evaluated due to respiratory motion artifact.  Multiple pulmonary nodules are not significantly changed, for example:  - Stable solid nodule in the right lower lobe measuring 15 x 9 mm  (series 4 image 172) with satellite nodularity, new since 2021.   - Stable 8 mm solid nodule at the right lower lobe lung base (series 4  image 228), but slowly increasing from 2019.    Bones and soft tissues: Stable postsurgical changes of partial right  chest wall resection with mesh reconstruction and rib osteotomies.    Upper Abdomen: Limited evaluation of the upper abdomen. Partially  visualized large right exophytic renal cyst and small left renal sinus  cysts.      Impression    IMPRESSION:   1. New groundglass and consolidative opacities with interlobular  septal  thickening in the right upper and middle lobes. Findings are  favored to represent infectious/inflammatory acute interstitial  pneumonia. However given the postsurgical changes of the adjacent  right chest wall for resection of a soft tissue mass, pulmonary  lymphatic metastases is a possibility. Recommend close attention on  short-term follow-up and consider tissue biopsy if findings persist.  2. Stable scattered solid pulmonary nodules. Attention on follow-up.  3. Trace pleural effusions.    I have personally reviewed the examination and initial interpretation  and I agree with the findings.    TIM BALTAZAR MD         SYSTEM ID:  [id number]

## 2024-03-26 NOTE — PROGRESS NOTES
" [facility]  OUTPATIENT OCCUPATIONAL THERAPY  EVALUATION  PLAN OF TREATMENT FOR OUTPATIENT REHABILITATION  (COMPLETE FOR INITIAL CLAIMS ONLY)  Patient's Last Name, First Name, M.I.  YOB: 1945  Gerber Levine                          Provider's Name  [facility] Medical Record No.  [id number]                             Onset Date:  03/26/24   Start of Care Date:  03/26/24   Type:     ___PT   _X_OT   ___SLP Medical Diagnosis:  acute metablic encephalopathy                    OT Diagnosis:  decreased safety and I with ADL Visits from SOC:  1     See note for plan of treatment, functional goals and certification details    I CERTIFY THE NEED FOR THESE SERVICES FURNISHED UNDER        THIS PLAN OF TREATMENT AND WHILE UNDER MY CARE     (Physician co-signature of this document indicates review and certification of the therapy plan).                        03/26/24 1316   Appointment Info   Signing Clinician's Name / Credentials (OT) Tiffanie Solorio MS, OTR/L, CLT   Quick Adds   Quick Adds Certification   Living Environment   People in Home spouse   Current Living Arrangements house   Transportation Anticipated family or friend will provide   Living Environment Comments Pt lives with his wife in a house. Pt is able to remain on the main level with a WIS.   Self-Care   Usual Activity Tolerance good   Current Activity Tolerance moderate   Fall history within last six months no   Activity/Exercise/Self-Care Comment Pt ind with ADL at baseline, walks 3 miles daily.   Instrumental Activities of Daily Living (IADL)   IADL Comments Pt ind with IADL, wife can assist as needed.   General Information   Onset of Illness/Injury or Date of Surgery 03/26/24   Referring Physician Ryne Lim MD   Patient/Family Therapy Goal Statement (OT) \"take a nap\"   Additional Occupational Profile Info/Pertinent History of Current Problem Per chart: \"Gerber " "NIGHAT Levine is a 79 year old male admitted on 3/25/2024. He has a h/o T2DM, HTN, hearing loss wears hearing aid, hereditary spherocytosis and depression.  He lives with his wife.  He was hospitalized at  12/26 - 12/27/23 for acute metabolic encephalopathy due to COVID-19 infection +/- side effect of sertraline.  Stroke workup was negative at that time.  He was brought to [facility] ED today by his wife and daughter for evaluation of confusion and generalized weakness.  Pt usually walks 3 miles but has been wobbly today.  He walked 3 miles 2 days ago and was able to shovel snow a bit yesterday.  Wife thinks he is confused and has declining short-term memory problem.  Daughter thinks he is depressed, not interested in things he normally enjoys.  He has chills but no fever.  No urinary or bowel symptoms.  Denies cough, SOB, HA, neck stiffness or focal weakness.  His initial vital signs were temp 98.6  F, /74, HR 80, RR 18 and 96% sat on RA.  WBC 16.7 with left shift, creatinine 0.8, glucose 277, LFTs wnl, and UA grossly negative for infection.  CT head without contrast negative for acute intracranial pathology.  Admitted under observation status for gentle hydration and overnight monitoring.\"   Existing Precautions/Restrictions fall   General Observations and Info Activity: up with A   Cognitive Status Examination   Orientation Status orientation to person, place and time   Cognitive Status Comments Pt selecting appropriate items for presented task without cues. Pt able to independently sequence simple ADL. Pt notes feeling \"tired\" and \"foggy\" not at baseline and not up to his usual crossword puzzle. Pt does think cognition is improved from yesterday   Visual Perception   Visual Impairment/Limitations corrective lenses full-time   Pain Assessment   Patient Currently in Pain No   Range of Motion Comprehensive   Comment, General Range of Motion BUE WFL   Strength Comprehensive (MMT)   Comment, General " Manual Muscle Testing (MMT) Assessment generalized deconditioning   Bed Mobility   Comment (Bed Mobility) ind   Transfers   Transfer Comments CGA no AD   Balance   Balance Comments CGA no AD   Activities of Daily Living   BADL Assessment/Intervention lower body dressing;toileting;grooming   Lower Body Dressing Assessment/Training   [place] Level (Lower Body Dressing) minimum assist (75% patient effort)   Grooming Assessment/Training   [place] Level (Grooming) supervision   Comment, (Grooming) pt standing at sink for o/f hygiene. Pt ind selects appropriate items for task   Toileting   [place] Level (Toileting) supervision   Clinical Impression   Criteria for Skilled Therapeutic Interventions Met (OT) Yes, treatment indicated   OT Diagnosis decreased safety and I with ADL   OT Problem List-Impairments impacting ADL cognition;balance;strength   Assessment of Occupational Performance 1-3 Performance Deficits   Identified Performance Deficits IADL, dressing, bathing   Planned Therapy Interventions (OT) ADL retraining;IADL retraining;cognition;transfer training   Clinical Decision Making Complexity (OT) problem focused assessment/low complexity   Risk & Benefits of therapy have been explained evaluation/treatment results reviewed;care plan/treatment goals reviewed;risks/benefits reviewed;current/potential barriers reviewed;participants voiced agreement with care plan;participants included;patient;spouse/significant other   Clinical Impression Comments Pt presents near baseline but with some deconditioning and cognitive changes affecting safety and I with ADL. Will benefit from skilled OT to monitor and address cognition and educated in CS for safety as needed.   OT Total Evaluation Time   OT Eval, Low Complexity Minutes (38365) 18   Therapy Certification   Medical Diagnosis acute metablic encephalopathy   Start of Care Date 03/26/24   Certification date from 03/26/24   Certification date to 04/02/24   OT  Goals   Therapy Frequency (OT) 3 times/week   OT Predicted Duration/Target Date for Goal Attainment 04/02/24   OT Goals Cognition;Home Management;Lower Body Dressing   OT: Lower Body Dressing Independent   OT: Home Management Independent;with light demand household tasks   OT: Cognitive Patient/caregiver will verbalize understanding of cognitive assessment results/recommendations as needed for safe discharge planning   OT Discharge Planning   OT Plan monitor cognition and screen as needed, cognitive activities within ADL, medbox, light IADL   OT Discharge Recommendation (DC Rec) home with assist   OT Rationale for DC Rec Pt appears near baseline, has support from family at home. Will continue to monitor cognition; if it does not clear, may need overisght for complex IADL. Of note, pt has neuropsych appointment next month which should give a bigger picture of overall cognition.   OT Brief overview of current status supervision for safety   Total Session Time   Total Session Time (sum of timed and untimed services) 18

## 2024-03-26 NOTE — UTILIZATION REVIEW
[facility] Utilization Review  Admission Status; Secondary Review Determination     Admission Date: 3/25/2024  4:54 PM      Under the authority of the Utilization Management Committee, the utilization review process indicated a secondary review on the above patient.  The review outcome is based on review of the medical records, discussions with staff, and applying clinical experience noted on the date of the review.        (X)      Inpatient Status Appropriate - This patient's medical care is consistent with medical management for inpatient care and reasonable inpatient medical practice.          RATIONALE FOR DETERMINATION   79-year-old male with history of diabetes mellitus, hypertension, hearing loss, wears hearing aids, admitted with altered mental status, had difficulty with ambulation and was wobbly, has been acting confused, takes aspirin and is diabetic, was irritable and did not sleep much last night, patient found to have acute metabolic encephalopathy with generalized weakness due to right lower lobe community-acquired pneumonia.  Started on gentle IV fluid hydration and IV antibiotics.  CT head unremarkable, urinalysis unremarkable.  Patient started on IV ceftriaxone and Zithromax with worsening leukocytosis.  Complex patient who needs close monitoring in the hospital and ongoing interventions and is at risk of acute decompensation, recommend change to inpatient status, communicated to       The severity of illness, intensity of service provided, expected LOS and risk for adverse outcome make the care complex, high risk and appropriate for hospital admission.The patient requires hospital based medical care which is anticipated to require a stay of 2 or more midnights.        The information on this document is developed by the utilization review team in order for the business office to ensure compliance.  This only denotes the appropriateness of proper admission status and does not  reflect the quality of care rendered.              Sincerely,       Navya Moran MD  Physician Advisor  Utilization Review-[place]    Phone: [phone]

## 2024-03-26 NOTE — PROGRESS NOTES
6MS ADMISSION    D: Patient admitted/transferred from ED via wheelchair for AMS.     I: Upon arrival to the unit patient was oriented to room, unit, and call light. Patient s height, weight, and vital signs were obtained. Allergies reviewed and allergy band applied. Provider notified of patient s arrival on the unit. Adult AVS completed. Head to toe assessment completed. Education assessment completed. Care plan initiated.    A: Vital signs stable upon admission. Patient rates pain at 0. Two RN skin assessment not completed. Significant Skin Findings include stitches behind left ear and a red sore on posterior right ear.      P: Continue to monitor patient s vital signs  and intervene as needed. Continue with plan of care. Notify provider with any concerns or changes in patient status.

## 2024-03-26 NOTE — H&P
[facility]    History and Physical - Hospitalist Service, GOLD TEAM        Date of Admission:  3/25/2024    Assessment & Plan   Gerber Levine is a 79 year old male admitted on 3/25/2024. He has a h/o T2DM, HTN, hearing loss wears hearing aid, hereditary spherocytosis and depression.  He lives with his wife.  He was hospitalized at  12/26 - 12/27/23 for acute metabolic encephalopathy due to COVID-19 infection +/- side effect of sertraline.  Stroke workup was negative at that time.  He was brought to [facility] ED today by his wife and daughter for evaluation of confusion and generalized weakness.  Pt usually walks 3 miles but has been wobbly today.  He walked 3 miles 2 days ago and was able to shovel snow a bit yesterday.  Wife thinks he is confused and has declining short-term memory problem.  Daughter thinks he is depressed, not interested in things he normally enjoys.  He has chills but no fever.  No urinary or bowel symptoms.  Denies cough, SOB, HA, neck stiffness or focal weakness.  His initial vital signs were temp 98.6  F, /74, HR 80, RR 18 and 96% sat on RA.  WBC 16.7 with left shift, creatinine 0.8, glucose 277, LFTs wnl, and UA grossly negative for infection.  CT head without contrast negative for acute intracranial pathology.  Admitted under observation status for gentle hydration and overnight monitoring    Acute metabolic encephalopathy and generalized weakness due to RLL CAP  ---   Pt apparently was confused w/ decline in short-term memory  ---   Unsteady on his feet today, per wife  ---   Worsening depression, per daughter  ---   Alert and oriented x 2 (self and place) during my evaluation  ---   He has a nonfocal neurologic exam  ---   CT head w/o contrast negative for acute intracranial pathology  ---   UA grossly negative for infection  ---   CXR showed RLL pneumonia  ---   Start gentle IVF hydration and IV antibiotics (see  below)  ---   PT/OT consulted    RLL CAP  ---   Pt with acute metabolic encephalopathy but no pulmonary symptoms  ---   Leukocytosis present with WBC 16.7  ---   CXR showed increased patchy opacities throughout the right lower lung concerning for pneumonia  ---   Blood cx x 2 ordered  ---   SARS-CoV-2 and respiratory panel PCR ordered  ---   Procalcitonin level ordered  ---   Afebrile and HD stable  ---   On RA  ---   Start gentle hydration  ---   Start IV ceftriaxone and IV azithromycin    Depression  ---   Patient's daughter feels patient is more depressed recently  ---   Continue PTA sertraline 100 mg daily  ---   Consult with psychiatry    T2DM  ---   Random glucose was elevated at 277  ---   Check Hgba1c  ---   Continue PTA metformin 1000 mg qam  ---   Start medium intensity NovoLog sliding scale insulin    HLD  ---   Resume PTA ASA and atorvastatin    Left eye bacterial conjunctivitis  ---   Resume PTA polymyxin b-trimethoprim    HTN  ---   Resume PTA losartan 25 mg daily        Diet: Moderate Consistent Carb (60 g CHO per Meal) Diet    DVT Prophylaxis: Pneumatic Compression Devices  Parks Catheter: Not present  Lines: None     Cardiac Monitoring: None  Code Status: Full Code    Disposition Plan   Anticipate discharge to home in 1 to 2 days.  Criteria for discharge include was improved confusion and gait disturbance          Reshma Vera MD  Hospitalist Service, [facility]  Securely message with [employer] (more info)  Text page via [facility] Paging/Directory   See signed in provider for up to date coverage information    ______________________________________________________________________    Chief Complaint   Confusion and generalized weakness    History is obtained from the pt, pt's wife and pt's daughter    History of Present Illness   Gerber Levine is a 78 yo gentleman w/ h/o T2DM, HTN, hearing loss wears hearing aid, hereditary spherocytosis and  depression.  He lives with his wife.  He was hospitalized at  12/26 - 12/27/23 for acute metabolic encephalopathy due to COVID-19 infection +/- side effect of sertraline.  Stroke workup was negative at that time.  He was brought to [facility] ED today by his wife and daughter for evaluation of confusion and generalized weakness.  Pt usually walks 3 miles but has been wobbly today.  He walked 3 miles 2 days ago and was able to shovel snow a bit yesterday.  Wife thinks he is confused and has declining short-term memory problem.  Daughter thinks he is depressed, not interested in things he normally enjoys.  He has chills but no fever.  No urinary or bowel symptoms.  Denies cough, SOB, HA, neck stiffness or focal weakness.  His initial vital signs were temp 98.6  F, /74, HR 80, RR 18 and 96% sat on RA.  WBC 16.7 with left shift, creatinine 0.8, glucose 277, LFTs wnl, and UA grossly negative for infection.  CT head without contrast negative for acute intracranial pathology.  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    EYE SURGERY      cataracts    HERNIA REPAIR      REPAIR CHEST WALL N/A 2016    Procedure: REPAIR CHEST WALL;  Surgeon: Laeh Nixon MD;  Location: [facility] OR    SURGICAL HISTORY OF -       SPLENECTOMY    SURGICAL HISTORY OF -       APPENDECTOMY    SURGICAL HISTORY OF -       CHOLECYSTECTOMY    SURGICAL HISTORY OF -       HERNIAORRHAPHY    SURGICAL HISTORY OF -       cholesteotoma       Prior to Admission Medications   Prior to Admission Medications   Prescriptions Last Dose Informant Patient Reported? Taking?   ASPIRIN 81 MG OR TABS 3/25/2024 at am Self No Yes   Si tab po QD (Once per day)   CALCITRATE/VITAMIN D 315-200 MG-UNIT OR TABS  Self Yes Yes   Sig: One tab daily in the morning   Cyanocobalamin (B-12 PO)  Self Yes Yes   Sig: Take by mouth every morning Take 1/2 tablet daily   Microlet Lancets MISC   No No   Sig: USE TO TEST BLOOD SUGAR DAILY AND AS NEEDED   atorvastatin (LIPITOR) 40 MG tablet 3/25/2024 at am  No Yes   Sig: Take 1 tablet (40 mg) by mouth daily   blood glucose (ACCU-CHEK RODRI PLUS) test strip   No No   Si strip by In Vitro route daily   blood glucose (CONTOUR NEXT TEST) test strip   No No   Sig: USE TO TEST BLOOD SUGAR 1 TIME DAILY OR AS DIRECTED   blood glucose (NO BRAND SPECIFIED) lancets standard   No No   Sig: Use to test blood sugar daily and as needed   losartan (COZAAR) 25 MG tablet 3/25/2024 at am  No Yes   Sig: Take 1 tablet (25 mg) by mouth daily   metFORMIN (GLUCOPHAGE) 500 MG tablet 3/25/2024 at am  No Yes   Sig: Take 2 tablets (1,000 mg) by mouth daily (with breakfast) And take 1 tablet by mouth with dinner   sertraline (ZOLOFT) 100 MG tablet 3/25/2024 at am  No Yes   Sig: Take 1 tablet (100 mg) by mouth daily   triamcinolone (KENALOG) 0.5 % external ointment 3/25/2024 at am  No Yes   Sig: Use to lesion on left arm twice daily for 14 days   Patient taking  differently: Use to lesion on left arm daily      Facility-Administered Medications: None        Review of Systems   The 10 point Review of Systems is negative other than noted in the HPI or here.     Social History   I have reviewed this patient's social history and updated it with pertinent information if needed.  Social History     Tobacco Use    Smoking status: Never    Smokeless tobacco: Never   Vaping Use    Vaping Use: Never used   Substance Use Topics    Alcohol use: Yes     Comment: Occasionaly    Drug use: No       Family History   I have reviewed this patient's family history and updated it with pertinent information if needed.  Family History   Problem Relation Age of Onset    Dementia Mother     Other Cancer Mother         Skin cancer    Diabetes Father     Emphysema Father     Intellectual Disability (Mental Retardation) Sister     Diabetes Sister     Dementia Brother     Prostate Cancer Brother     Diabetes Maternal Grandmother     Genitourinary Problems Daughter         spherocytosis, had spleen removed    C.A.D. No family hx of     Cancer - colorectal No family hx of     Hypertension No family hx of     Cerebrovascular Disease No family hx of     Breast Cancer No family hx of        Allergies   Allergen Reactions    Oxycodone Itching    Shrimp Swelling       Physical Exam   Vital Signs: Temp: 98.6  F (37  C) Temp src: Oral BP: 132/74 Pulse: 80   Resp: 18 SpO2: 96 %      Weight: 0 lbs 0 oz  General: aao x 2 (self and place), NAD.  HEENT:  NC/AT, bilateral conjunctival injection and dry crusted yellowish drainage noted on both eyes, neck supple  CVS:  NL s 1 and s2, no m/r/g.  Lungs:  CTA B/L.   Abd:  Soft, + bs, NT, no rebound or gaurding, no fluid shift.  Ext:  No c/c.  Lymph:  No edema.  Neuro:  Nonfocal.  Musculoskeletal: No calf tenderness to palpation.    Skin:  No rash.  Psychiatry:  Mood and affect appropriate.    Medical Decision Making       Data     I have personally reviewed the following  data over the past 24 hrs:    16.7 (H)  \   13.1 (L)   / 283     137 98 17.5 /  277 (H)   3.9 29 0.80 \     ALT: 21 AST: 21 AP: 104 TBILI: 1.1   ALB: 3.9 TOT PROTEIN: 7.5 LIPASE: N/A     TSH: N/A T4: N/A A1C: 9.4 (H)     Procal: N/A CRP: N/A Lactic Acid: 1.2       INR:  1.19 (H) PTT:  N/A   D-dimer:  N/A Fibrinogen:  N/A       Imaging results reviewed over the past 24 hrs:   Recent Results (from the past 24 hour(s))   Head CT w/o contrast    Narrative    EXAM: CT HEAD W/O CONTRAST  LOCATION: [facility]  DATE: 3/25/2024    INDICATION: Altered mental status  COMPARISON: MRI brain 12/25/2023  TECHNIQUE: Routine CT Head without IV contrast. Multiplanar reformats. Dose reduction techniques were used.    FINDINGS:  INTRACRANIAL CONTENTS: No intracranial hemorrhage, extraaxial collection, or mass effect.  No CT evidence of acute infarct. Normal parenchymal attenuation for age. Mild generalized volume loss. No hydrocephalus.     VISUALIZED ORBITS/SINUSES/MASTOIDS: Prior bilateral cataract surgery. Visualized portions of the orbits are otherwise unremarkable. Mild to moderate mucosal thickening scattered about the paranasal sinuses. No middle ear or mastoid effusion.    BONES/SOFT TISSUES: No acute abnormality.      Impression    IMPRESSION:  1.  Mild age-related changes without acute intracranial abnormality.    XR Chest Port 1 View    Narrative    EXAM: XR CHEST PORT 1 VIEW  LOCATION: [facility]  DATE: 3/25/2024    INDICATION: Altered MS w  leukocytosis.  Eval for pneumonia  COMPARISON: 12/25/2023.      Impression    IMPRESSION: Cardiac silhouette is within normal limits. Increased patchy opacities throughout the right lower lung concerning for pneumonia. No pleural effusion or pneumothorax. Remote postsurgical changes of the right lung and ribs.

## 2024-03-26 NOTE — CONSULTS
Initial Psychiatric Consult   Consult date: March 26, 2024         Reason for Consult, requesting source:    Reason for Consult: Depression  Requesting source: Reshma Vera    Labs and imaging reviewed.     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 He was recently hospitalized in December of 2023 with acute encephalopathy d/t Covid-19 +/- sertraline side effects. His wife and daughter feel he has had declining short term memory, increased confusion, some irritability, and weakness recently. His daughter feels he has worsening depression with little gulshan in previous interests. He does have a neuropsychiatric testing scheduled on 4/24/24. Work-up thus far has revealed RLL pneumonia and negative acute intracranial pathology per CT and treatment course has included IV antibiotics and rehydration. Psychiatry consulted to discuss depression.     -He reports he came into the hospital because his wife wanted him to, he otherwise feels fine  -He feels like his memory has been getting worse  -Has had issues sleeping, says this is a chronic issue, gets 5-6 hours/night  -He is able to still go for his daily 2-3 mile walks although less so recently d/t weather, still able to adequately groom/take care of himself  -He says he has had depression for at least the past 20 years, has been on sertraline for a while, although he isn't sure if it does anything for him  -Denies SI/HI  -Denies AH/    Information from his wife:  -Has noticed decline since hospitalization from December, feels he never fully got back to his baseline  - She notes more short  term memory loss, less activity, abnormal behaviors for him such as snowblowing one line through the yard then stopping  -Has noticed overall slowing of movements (such as getting up from chair) and shuffling gait.   -Says he isn't as interested in talking to friends, is more introverted   -She says he hasn't been making financial mistakes or getting lost in familiar areas          Past Psychiatric History:   Past Diagnoses: dysthymic disorder, MDD (mild), IVETTE, borderline cognitive impairment  Medication Trials: sertraline, trazodone, prozac, paxil, wellbutrin, zaleplon  Past/Current Providers: Caty Yusuf (PCP)  Psychiatric Hospitalizations: (#, most recent, reason): none  Suicide Attempts: (#, methods, most recent): none  Self Injury: (method, most recent): none  History of Trauma: none  Past Psychosis: Y/N  Past Liyah: Y/N  Outpatient Programs: (IOP, PHP, DBT, etc): none  Civil Commitment: none  ECT: none        Substance Use and History:   Significant use history or problems: none  Inpatient Treatment: none  Outpatient Treatment: none  Medication trials: none  Medical Consequences: none  Legal Consequences: none    Social History     Tobacco Use    Smoking status: Never    Smokeless tobacco: Never   Substance Use Topics    Alcohol use: Yes     Comment: Occasionaly           Past Medical History:   PAST MEDICAL HISTORY:   Past Medical History:   Diagnosis Date    Allergic rhinitis, cause unspecified     Basal cell carcinoma (BCC) 03/07/2024    Cancer (H) 06/14/2016    Depressive disorder     Dysthymic disorder     Essential hypertension, benign 10/14/2015    Hereditary spherocytosis (H24)     NONSPECIFIC MEDICAL HISTORY 05/2002    DEMENTIA W/U NEGATIVE    PONV (postoperative nausea and vomiting)     Pure hypercholesterolemia     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    EYE SURGERY  2014    cataracts    HERNIA REPAIR  1976    REPAIR CHEST WALL N/A 9/21/2016    Procedure: REPAIR CHEST WALL;  Surgeon: Leah Nixon MD;  Location: [facility] OR    SURGICAL HISTORY OF -   1976    SPLENECTOMY    SURGICAL HISTORY OF -       APPENDECTOMY    SURGICAL HISTORY OF -       CHOLECYSTECTOMY    SURGICAL HISTORY OF -       HERNIAORRHAPHY    SURGICAL HISTORY OF -   1981    cholesteotoma             Family History:   FAMILY HISTORY:   Family History   Problem Relation Age of Onset    Dementia Mother     Other Cancer Mother         Skin cancer    Diabetes Father     Emphysema Father     Intellectual Disability (Mental Retardation) Sister     Diabetes Sister     Dementia Brother     Prostate Cancer Brother     Diabetes Maternal Grandmother     Genitourinary Problems Daughter         spherocytosis, had spleen removed    C.A.D. 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           Medications:      aspirin  81 mg Oral Daily    atorvastatin  40 mg Oral Daily    azithromycin  500 mg Intravenous Q24H    calcium citrate-vitamin D  1 tablet Oral BID    cefTRIAXone  1 g Intravenous Q24H    enoxaparin  ANTICOAGULANT  40 mg Subcutaneous Q24H    glipiZIDE  2.5 mg Oral Daily with breakfast    insulin aspart  1-7 Units Subcutaneous TID AC    insulin aspart  1-5 Units Subcutaneous At Bedtime    losartan  25 mg Oral Daily    metFORMIN  1,000 mg Oral Daily with supper    metFORMIN  500 mg Oral QAM    polymixin b-trimethoprim  2 drop Left Eye Q6H    sertraline  100 mg Oral Daily    sodium chloride (PF)  3 mL Intracatheter Q8H    triamcinolone   Topical BID              Allergies:     Allergies   Allergen Reactions    Oxycodone Itching    Shrimp Swelling          Labs:     Recent Results (from the past 48 hour(s))   EKG 12 lead    Collection Time: 03/25/24  5:15 PM   Result Value Ref Range    Systolic Blood Pressure  mmHg    Diastolic Blood Pressure  mmHg    Ventricular Rate 76 BPM    Atrial Rate 76 BPM    MN Interval 188 ms    QRS Duration 100 ms     ms    QTc 447 ms    P Axis 28 degrees    R AXIS -9 degrees    T Axis 30 degrees    Interpretation ECG       Sinus rhythm  Normal ECG  Unconfirmed report - interpretation of this ECG is computer generated - see medical record for final interpretation  Confirmed by - EMERGENCY ROOM, PHYSICIAN ([id number]),  JETHRO BELLE ([id number]) on 3/25/2024 10:03:42 PM     Comprehensive metabolic panel    Collection Time: 03/25/24  5:39 PM   Result Value Ref Range    Sodium 137 135 - 145 mmol/L    Potassium 3.9 3.4 - 5.3 mmol/L    Carbon Dioxide (CO2) 29 22 - 29 mmol/L    Anion Gap 10 7 - 15 mmol/L    Urea Nitrogen 17.5 8.0 - 23.0 mg/dL    Creatinine 0.80 0.67 - 1.17 mg/dL    GFR Estimate 89 >60 mL/min/1.73m2    Calcium 9.4 8.8 - 10.2 mg/dL    Chloride 98 98 - 107 mmol/L    Glucose 277 (H) 70 - 99 mg/dL    Alkaline Phosphatase 104 40 - 150 U/L    AST 21 0 - 45 U/L    ALT 21 0 - 70 U/L    Protein Total 7.5 6.4 - 8.3 g/dL    Albumin 3.9 3.5 - 5.2 g/dL    Bilirubin Total 1.1 <=1.2 mg/dL   INR    Collection Time: 03/25/24  5:39 PM   Result Value Ref Range    INR 1.19 (H) 0.85 - 1.15    Lactic acid whole blood    Collection Time: 03/25/24  5:39 PM   Result Value Ref Range    Lactic Acid 1.2 0.7 - 2.0 mmol/L   CBC with platelets and differential    Collection Time: 03/25/24  5:39 PM   Result Value Ref Range    WBC Count 16.7 (H) 4.0 - 11.0 10e3/uL    RBC Count 4.33 (L) 4.40 - 5.90 10e6/uL    Hemoglobin 13.1 (L) 13.3 - 17.7 g/dL    Hematocrit 38.5 (L) 40.0 - 53.0 %    MCV 89 78 - 100 fL    MCH 30.3 26.5 - 33.0 pg    MCHC 34.0 31.5 - 36.5 g/dL    RDW 12.8 10.0 - 15.0 %    Platelet Count 283 150 - 450 10e3/uL    % Neutrophils 68 %    % Lymphocytes 15 %    % Monocytes 17 %    % Eosinophils 0 %    % Basophils 0 %    % Immature Granulocytes 0 %    NRBCs per 100 WBC 0 <1 /100    Absolute Neutrophils 11.2 (H) 1.6 - 8.3 10e3/uL    Absolute Lymphocytes 2.6 0.8 - 5.3 10e3/uL    Absolute Monocytes 2.8 (H) 0.0 - 1.3 10e3/uL    Absolute Eosinophils 0.0 0.0 - 0.7 10e3/uL    Absolute Basophils 0.1 0.0 - 0.2 10e3/uL    Absolute Immature Granulocytes 0.1 <=0.4 10e3/uL    Absolute NRBCs 0.0 10e3/uL   Hemoglobin A1c    Collection Time: 03/25/24  5:39 PM   Result Value Ref Range    Hemoglobin A1C 9.4 (H) <5.7 %   Procalcitonin    Collection Time: 03/25/24  5:39 PM   Result Value Ref Range    Procalcitonin 0.15 <0.50 ng/mL   UA with Microscopic reflex to Culture    Collection Time: 03/25/24  5:40 PM    Specimen: Urine, Clean Catch   Result Value Ref Range    Color Urine Yellow Colorless, Straw, Light Yellow, Yellow    Appearance Urine Clear Clear    Glucose Urine >=1000 (A) Negative mg/dL    Bilirubin Urine Negative Negative    Ketones Urine Negative Negative mg/dL    Specific Gravity Urine 1.020 1.003 - 1.035    Blood Urine Moderate (A) Negative    pH Urine 5.5 5.0 - 7.0    Protein Albumin Urine 100 (A) Negative mg/dL    Urobilinogen Urine 0.2 0.2, 1.0 mg/dL    Nitrite Urine Negative Negative    Leukocyte Esterase Urine Negative Negative    Mucus Urine Present (A) None Seen /LPF    RBC Urine 8 (H) <=2 /HPF    WBC  Urine 1 <=5 /HPF    Hyaline Casts Urine 3 (H) <=2 /LPF   Glucose by meter    Collection Time: 03/25/24 11:46 PM   Result Value Ref Range    GLUCOSE BY METER POCT 236 (H) 70 - 99 mg/dL   Asymptomatic COVID-19 Virus (Coronavirus) by PCR Nasopharyngeal    Collection Time: 03/25/24 11:52 PM    Specimen: Nasopharyngeal; Swab   Result Value Ref Range    SARS CoV2 PCR Negative Negative   Respiratory Panel PCR    Collection Time: 03/25/24 11:52 PM    Specimen: Nasopharyngeal; Swab   Result Value Ref Range    Adenovirus Not Detected Not Detected    Coronavirus Not Detected Not Detected    Human Metapneumovirus Not Detected Not Detected    Human Rhin/Enterovirus Not Detected Not Detected    Influenza A Not Detected Not Detected    Influenza A, H1 Not Detected Not Detected    Influenza A 2009 H1N1 Not Detected Not Detected    Influenza A, H3 Not Detected Not Detected    Influenza B Not Detected Not Detected    Parainfluenza Virus 1 Not Detected Not Detected    Parainfluenza Virus 2 Not Detected Not Detected    Parainfluenza Virus 3 Not Detected Not Detected    Parainfluenza Virus 4 Not Detected Not Detected    Respiratory Syncytial Virus A Not Detected Not Detected    Respiratory Syncytial Virus B Not Detected Not Detected    Chlamydia Pneumoniae Not Detected Not Detected    Mycoplasma Pneumoniae Not Detected Not Detected   Glucose by meter    Collection Time: 03/26/24  6:44 AM   Result Value Ref Range    GLUCOSE BY METER POCT 254 (H) 70 - 99 mg/dL   CBC with platelets    Collection Time: 03/26/24  7:53 AM   Result Value Ref Range    WBC Count 19.9 (H) 4.0 - 11.0 10e3/uL    RBC Count 4.02 (L) 4.40 - 5.90 10e6/uL    Hemoglobin 12.3 (L) 13.3 - 17.7 g/dL    Hematocrit 35.3 (L) 40.0 - 53.0 %    MCV 88 78 - 100 fL    MCH 30.6 26.5 - 33.0 pg    MCHC 34.8 31.5 - 36.5 g/dL    RDW 12.9 10.0 - 15.0 %    Platelet Count 248 150 - 450 10e3/uL          Physical and Psychiatric Examination:     /54 (BP Location: Right arm, Patient  Position: Sitting, Cuff Size: Adult Regular)   Pulse 70   Temp 97.9  F (36.6  C) (Oral)   Resp 18   Wt 75.6 kg (166 lb 10.7 oz)   SpO2 92%   BMI 22.29 kg/m    Weight is 166 lbs 10.68 oz  Body mass index is 22.29 kg/m .    Physical Exam:  I have reviewed the physical exam as documented by by the medical team and agree with findings and assessment and have no additional findings to add at this time.         MSE:   Appearance: awake, alert and adequately groomed  Attitude:  cooperative  Eye Contact:  good  Mood:  good  Affect:  mood congruent and slightly restricted  Speech:  clear, coherent  Psychomotor Behavior:  no evidence of tardive dyskinesia, dystonia, or tics  Muscle strength and tone: Possible cogwheeling bilateral UE. No resting tremor noted.   Thought Process:  logical and linear  Associations:  no loose associations  Thought Content:  no evidence of suicidal ideation or homicidal ideation, no auditory hallucinations present, and no visual hallucinations present  Insight:  fair  Judgement:  fair  Oriented to:  time, person, and place  Attention Span and Concentration:  limited  Recent and Remote Memory:  limited         DSM-5 Diagnosis:     Unspecified cognitive impairment  Rule out major neurocognitive disorder, mild prolonged delirium          Assessment:     Gerber Levine is a 79 year old male with past psychiatric history of dysthmic disorder, IVETTE, and borderline cognitive impairment from neuropsych testing 6 years ago and medical history of HTN, T2DM, hereditary spherocytosis who was admitted to [facility] on 3/25/24 with acute metabolic encephalopathy and RLL pneumonia. He was recently hospitalized in December of 2023 with acute encephalopathy d/t Covid-19 +/- sertraline side effects.    On interview today, Art is cooperative and reports feeling well overall, though does endorse some memory problems and mildly low mood. His wife thinks he has not recovered to his baseline after hospitalization for  Covid and delirium in December. She notes he is apathetic and less interested in taking care of tasks around the house, which is unlike him. This patient has likely underlying neurocognitive disorder with recent significant delirium, now with active infection again. Counseled some people do not return to baseline after significant delirium but with medical treatment of PNA could expect further improvement. He has neuropsych testing already scheduled next month. Acute depressive episode less likely, will not adjust medications. Denies SI.     Additionally, his wife noticed he is much slower and at times has a shuffling gait. She noted significant difficulty getting out of a chair. Will place referral to neurology in chart for evaluation of his gait and bradykinesia.           Summary of Recommendations:     Medications: No changes. Keep 100 mg Sertraline.  Follow-Up: Encouraged to see neuropsych on 4/24. Will put in a referral to neurology      Krystal Salazar, MS3  Evaluated and reviewed by Dr. Turner Lyons.    I was present with the medical student who participated in the service and in the documentation of the services provided. I have verified the history and personally performed the physical exam and medical decision making, as documented by the student and edited by me     Turner Lyons MD    Department of Psychiatry  Please Vocera if questions

## 2024-03-26 NOTE — PROGRESS NOTES
CXR was done after pt's wife left for home    I informed pt's wife that pt has RLL pneumonia    Wife said pt has been coughing a lot and possibly has been having pleuritic CP    Wife also said lately pt has been dragging his feet when he walks      He is currently on abx.  Remained on RA      Reshma Vera MD.   Hospitalist.  [phone], pager.

## 2024-03-26 NOTE — PHARMACY-ADMISSION MEDICATION HISTORY
Pharmacist Admission Medication History    Admission medication history is complete. The information provided in this note is only as accurate as the sources available at the time of the update.    Information Source(s): Patient, Family member, and CareEverywhere/SureScripts via in-person      Medication History Completed By: Ambreen Colon RPH 3/25/2024 9:25 PM    PTA Med List   Medication Sig Last Dose    ASPIRIN 81 MG OR TABS 1 tab po QD (Once per day) 3/25/2024 at am    atorvastatin (LIPITOR) 40 MG tablet Take 1 tablet (40 mg) by mouth daily 3/25/2024 at am    CALCITRATE/VITAMIN D 315-200 MG-UNIT OR TABS One tab daily in the morning     Cyanocobalamin (B-12 PO) Take by mouth every morning Take 1/2 tablet daily     losartan (COZAAR) 25 MG tablet Take 1 tablet (25 mg) by mouth daily 3/25/2024 at am    metFORMIN (GLUCOPHAGE) 500 MG tablet Take 2 tablets (1,000 mg) by mouth daily (with breakfast) And take 1 tablet by mouth with dinner 3/25/2024 at am    sertraline (ZOLOFT) 100 MG tablet Take 1 tablet (100 mg) by mouth daily 3/25/2024 at am    triamcinolone (KENALOG) 0.5 % external ointment Use to lesion on left arm twice daily for 14 days (Patient taking differently: Use to lesion on left arm daily) 3/25/2024 at am

## 2024-03-26 NOTE — PROGRESS NOTES
"   03/26/24 1100   Appointment Info   Signing Clinician's Name / Credentials (PT) Chantelle Echols DPT   Quick Adds   Quick Adds Certification   Living Environment   People in Home spouse   Current Living Arrangements house   Home Accessibility stairs to enter home;stairs within home   Number of Stairs, Main Entrance 2   Number of Stairs, Within Home, Primary greater than 10 stairs  (does not need to perform)   Stair Railings, Within Home, Primary railings safe and in good condition   Transportation Anticipated family or friend will provide   Living Environment Comments lives with spouse, able to live on main floor   Self-Care   Usual Activity Tolerance excellent   Current Activity Tolerance moderate   Regular Exercise Yes   Activity/Exercise Type walking   Exercise Amount/Frequency daily   Equipment Currently Used at Home none   Fall history within last six months no   Activity/Exercise/Self-Care Comment IND at baseline with all cares and mobility, walks 3 miles daily typically, spouse able to assist as needed at discharge   General Information   Onset of Illness/Injury or Date of Surgery 03/25/24   Referring Physician Ryne Lim MD   Pertinent History of Current Problem (include personal factors and/or comorbidities that impact the POC) per chart review, \"Gerber Levine is a 79 year old male admitted on 3/25/2024. He has a h/o T2DM, HTN, hearing loss wears hearing aid, hereditary spherocytosis and depression.  He lives with his wife.  He was hospitalized at  12/26 - 12/27/23 for acute metabolic encephalopathy due to COVID-19 infection +/- side effect of sertraline.  Stroke workup was negative at that time.  He was brought to [facility] ED today by his wife and daughter for evaluation of confusion and generalized weakness.  Pt usually walks 3 miles but has been wobbly today.  He walked 3 miles 2 days ago and was able to shovel snow a bit yesterday.  Wife thinks he is confused and has " "declining short-term memory problem.  Daughter thinks he is depressed, not interested in things he normally enjoys.  He has chills but no fever.  No urinary or bowel symptoms.  Denies cough, SOB, HA, neck stiffness or focal weakness.  His initial vital signs were temp 98.6  F, /74, HR 80, RR 18 and 96% sat on RA.  WBC 16.7 with left shift, creatinine 0.8, glucose 277, LFTs wnl, and UA grossly negative for infection.  CT head without contrast negative for acute intracranial pathology.  Admitted under observation status for gentle hydration and overnight monitoring\"   Existing Precautions/Restrictions fall   General Observations spouse present in room   Cognition   Affect/Mental Status (Cognition) confused   Follows Commands (Cognition) WNL   Cognitive Status Comments mild confusion, needing cues/pathing to room, repeated qusetions but functionally able to carry conversation   Pain Assessment   Patient Currently in Pain No   Integumentary/Edema   Integumentary/Edema no deficits were identifed   Posture    Posture Protracted shoulders   Range of Motion (ROM)   ROM Comment WNL   Strength (Manual Muscle Testing)   Strength Comments B LEs strong 5/5   Bed Mobility   Comment, (Bed Mobility) Nathalie supine>sit to EOB   Transfers   Comment, (Transfers) SBA STS to FWW   Gait/Stairs (Locomotion)   Comment, (Gait/Stairs) pt ambulates without AD in room and CGA, no overt LOB but mild unsteadiness   Balance   Balance Comments see gait, needs at least single UE support for marching in place and functional standing activity   Sensory Examination   Sensory Perception patient reports no sensory changes   Clinical Impression   Criteria for Skilled Therapeutic Intervention Yes, treatment indicated   PT Diagnosis (PT) impaired functional mboility   Influenced by the following impairments reduced activity tolerance, impaired balance   Functional limitations due to impairments gait, stairs   Clinical Presentation (PT Evaluation " Complexity) stable   Clinical Presentation Rationale PMH, clinician impression   Clinical Decision Making (Complexity) low complexity   Planned Therapy Interventions (PT) balance training;bed mobility training;gait training;home exercise program;neuromuscular re-education;patient/family education;stair training;strengthening;transfer training;progressive activity/exercise;risk factor education;home program guidelines   Risk & Benefits of therapy have been explained evaluation/treatment results reviewed;care plan/treatment goals reviewed;risks/benefits reviewed;current/potential barriers reviewed;participants voiced agreement with care plan;participants included;patient;spouse/significant other   Clinical Impression Comments mobilizing fair, slightly below baseline   PT Total Evaluation Time   PT Eval, Low Complexity Minutes (97810) 8   Therapy Certification   Start of care date 03/26/24   Certification date from 03/26/24   Certification date to 03/29/24   Medical Diagnosis pneumonia   Physical Therapy Goals   PT Frequency 3x/week   PT Predicted Duration/Target Date for Goal Attainment 03/30/24   PT Goals Bed Mobility;Transfers;Gait;Stairs   PT: Bed Mobility Independent;Supine to/from sit   PT: Transfers Independent;Sit to/from stand;Bed to/from chair   PT: Gait Independent;Greater than 200 feet   PT: Stairs Supervision/stand-by assist;2 stairs   Interventions   Interventions Quick Adds Therapeutic Activity   Therapeutic Activity   Therapeutic Activities: dynamic activities to improve functional performance Minutes (13517) 10   Symptoms Noted During/After Treatment Fatigue   Treatment Detail/Skilled Intervention educated pt and spouse on importnace of OOB activity in setting of PNA and preventio of deconditioning during admission. Agreeable for hallway ambulation and up to chair after session. Pt ambulate no AD x300' with CGA- mild initial path deviation and small LOB but able to self correct, improves to SBA with  ambulation. Up to chair end of session with all cares in reach, VSS on RA. Handoff to RN to finish cares that PT interrupted for session.   PT Discharge Planning   PT Plan balance, steps   PT Discharge Recommendation (DC Rec) home with assist   PT Rationale for DC Rec pt slightly below baseline with balance and reduced activity tolerance. He is very IND and active at baseline so anticipate good recovery for home with medical tx and additional 1-2 sessions PT. Good support at home from spouse.   PT Brief overview of current status Ax1 with gait belt   Total Session Time   Timed Code Treatment Minutes 10   Total Session Time (sum of timed and untimed services) 18     [facility]  OUTPATIENT PHYSICAL THERAPY EVALUATION  PLAN OF TREATMENT FOR OUTPATIENT REHABILITATION  (COMPLETE FOR INITIAL CLAIMS ONLY)  Patient's Last Name, First Name, M.I.  YOB: 1945  Gerber Levine                        Provider's Name  [facility] Medical Record No.  [id number]                             Onset Date:  03/25/24   Start of Care Date:  (P) 03/26/24   Type:     _X_PT   ___OT   ___SLP Medical Diagnosis:  (P) pneumonia              PT Diagnosis:  impaired functional mboility Visits from SOC:  1     See note for plan of treatment, functional goals and certification details    I CERTIFY THE NEED FOR THESE SERVICES FURNISHED UNDER        THIS PLAN OF TREATMENT AND WHILE UNDER MY CARE     (Physician co-signature of this document indicates review and certification of the therapy plan).

## 2024-03-26 NOTE — PROGRESS NOTES
"Clinical Swallow Evaluation- Speech Language Pathology     03/26/24 1700   Appointment Info   Signing Clinician's Name / Credentials (SLP) BRANDAN Wallace, Student Clinician   Student Supervision Direct supervision provided   General Information   Onset of Illness/Injury or Date of Surgery 03/25/24   Referring Physician Ryne Lim MD   Pertinent History of Current Problem Per chart review, \"Gerber Levine is a 79 year old male admitted on 3/25/2024. He has a h/o T2DM, HTN, hearing loss wears hearing aid, hereditary spherocytosis and depression.  He lives with his wife.  He was hospitalized at  12/26 - 12/27/23 for acute metabolic encephalopathy due to COVID-19 infection +/- side effect of sertraline.  Stroke workup was negative at that time.  He was brought to [facility] ED today by his wife and daughter for evaluation of confusion and generalized weakness.\" Clinical swallow evaluation completed per MD order.   General Observations Pt alert and cooperative. Pt's wife and daughter present.   Type of Evaluation   Type of Evaluation Swallow Evaluation   Oral Motor   Oral Musculature generally intact   Structural Abnormalities none present   Mucosal Quality adequate   Dentition (Oral Motor)   Dentition (Oral Motor) natural dentition;adequate dentition   Facial Symmetry (Oral Motor)   Facial Symmetry (Oral Motor) WNL   Lip Function (Oral Motor)   Lip Range of Motion (Oral Motor) WNL   Comment, Lip Function (Oral Motor) Lip function was WNL   Tongue Function (Oral Motor)   Tongue ROM (Oral Motor) WNL   Comment, Tongue Function (Oral Motor) Lingual function was WNL   Jaw Function (Oral Motor)   Jaw Function (Oral Motor) WNL   Vocal Quality/Secretion Management (Oral Motor)   Vocal Quality (Oral Motor) WNL   Secretion Management (Oral Motor) WNL   Comment, Vocal Quality/Secretion Management (Oral Motor) Vocal quality is clear.   General Swallowing Observations   Past History of Dysphagia None " per patient report. None per cursory review of EMR.   Respiratory Support room air   Current Diet/Method of Nutritional Intake (General Swallowing Observations, NIS) regular diet;thin liquids (level 0)   Swallowing Evaluation Clinical swallow evaluation   Clinical Swallow Evaluation   Feeding Assistance no assistance needed   Clinical Swallow Evaluation Textures Trialed thin liquids;pureed;solid foods   Clinical Swallow Eval: Thin Liquid Texture Trial   Mode of Presentation, Thin Liquids cup;straw;self-fed   Volume of Liquid or Food Presented <4 ounces   Oral Phase of Swallow WFL   Pharyngeal Phase of Swallow intact   Diagnostic Statement Subjectively, swallow response appeared timely. No overt clinical s/sx of aspiration observed.   Clinical Swallow Evaluation: Puree Solid Texture Trial   Mode of Presentation, Puree spoon   Volume of Puree Presented 2 ounces   Oral Phase, Puree WFL   Pharyngeal Phase, Puree intact   Diagnostic Statement Oropharyngeal swallow function appeared WNL with puree consistency. No overt clinical s/sx of aspiration observed. Pt followed cues for smaller bites, spouse endorses pt takes large bites at home too.   Clinical Swallow Evaluation: Solid Food Texture Trial   Mode of Presentation self-fed   Volume Presented 1/2 cracker   Oral Phase WFL   Pharyngeal Phase intact   Diagnostic Statement Patient demonstrated effective and timely mastication and good oral clearance. No overt clinical s/sx of aspiration observed.   Esophageal Phase of Swallow   Patient reports or presents with symptoms of esophageal dysphagia No   Swallowing Recommendations   Diet Consistency Recommendations regular diet;thin liquids (level 0)   Supervision Level for Intake patient independent   Mode of Delivery Recommendations bolus size, small;slow rate of intake   Monitoring/Assistance Required (Eating/Swallowing) stop eating activities when fatigue is present   Recommended Feeding/Eating Techniques (Swallow Eval)  maintain upright sitting position for eating;minimize distractions during oral intake   Medication Administration Recommendations, Swallowing (SLP) As tolerated   Instrumental Assessment Recommendations VFSS (videofluoroscopic swallowing study)   Comment, Swallowing Recommendations No overt s/sx of aspiration observed at bedside, although recommend VFSS to assess aspiration risk given CT indicating RLL pneumonia (concerning for possible silent aspiration events).   General Therapy Interventions   Planned Therapy Interventions Dysphagia Treatment   Dysphagia treatment Instruction of safe swallow strategies   Clinical Impression   Criteria for Skilled Therapeutic Interventions Met (SLP Eval) Yes, treatment indicated   Risks & Benefits of therapy have been explained evaluation/treatment results reviewed;care plan/treatment goals reviewed;risks/benefits reviewed;current/potential barriers reviewed;participants voiced agreement with care plan;participants included;patient;spouse/significant other;daughter   Clinical Impression Comments Clinical swallow evaluation completed per provider order. No overt s/sx of aspiration observed with any consistencies at bedside. At this time, pt appears appropriate for diet of Regular textures and thin liquids with use of the safe swallowing precautions listed above. Recommend VFSS to assess silent aspiration risk given pt's current RLL pneumonia. Speech tx will continue to follow.   SLP Total Evaluation Time   Eval: oral/pharyngeal swallow function, clinical swallow Minutes (04576) 10   SLP Goals   Therapy Frequency (SLP Eval) 5 times/week   SLP Predicted Duration/Target Date for Goal Attainment 04/02/24   SLP Goals Swallow   SLP: Safely tolerate diet without signs/symptoms of aspiration Easy to chew diet;Thin liquids;With use of swallow precautions;Independently   Interventions   Interventions Quick Adds Swallowing Dysfunction   Swallowing Dysfunction &/or Oral Function for Feeding    Treatment of Swallowing Dysfunction &/or Oral Function for Feeding Minutes (09468) 8   Symptoms Noted During/After Treatment None   Treatment Detail/Skilled Intervention Provided education to pt and family regarding anatomy/physiology of the swallowing mechanism and association of aspiration with RLL pneumonia. Explained recommendation for VFSS to rule out silent aspiration risk and pt and family in agreement with recommendation.   SLP Discharge Planning   SLP Plan VFSS   SLP Discharge Recommendation home   SLP Rationale for DC Rec dependent on VFSS results   SLP Brief overview of current status  Patient appears appropriate for diet of Regular textures and thin liquids at this time. Pt should sit fully upright (preferably in chair) for all intake, eat slowly, take small bites/sips, and chew foods thoroughly. Speech tx will follow for VFSS.   Total Session Time   Total Session Time (sum of timed and untimed services) 18

## 2024-03-26 NOTE — PLAN OF CARE
Problem: Adult Inpatient Plan of Care  Goal: Absence of Hospital-Acquired Illness or Injury  Intervention: Prevent Skin Injury  Recent Flowsheet Documentation  Taken 3/26/2024 0000 by Rob Cleveland, RN  Body Position: sitting up in bed     Problem: Fall Injury Risk  Goal: Absence of Fall and Fall-Related Injury  Outcome: Progressing     Problem: Comorbidity Management  Goal: Blood Glucose Levels Within Targeted Range  Outcome: Not Progressing  Goal: Blood Pressure in Desired Range  Outcome: Progressing     Problem: Pneumonia  Goal: Fluid Balance  Outcome: Progressing  Goal: Resolution of Infection Signs and Symptoms  Outcome: Not Progressing  Goal: Effective Oxygenation and Ventilation  Outcome: Not Progressing  Intervention: Optimize Oxygenation and Ventilation  Recent Flowsheet Documentation  Taken 3/26/2024 0000 by Rob Cleveland, RN  Head of Bed (HOB) Positioning: HOB at 20-30 degrees       VS:  Temp: (!) 102.4  F (39.1  C) Temp src: Oral BP: 122/61 Pulse: 85   Resp: 19 SpO2: 90 % O2 Device: None (Room air)       O2: SpO2 > 90 and stable on RA.  Diminished l/s  + dry cough  Denies chest pain and SOB.    Output:  incontinent   Last BM:  03/25/2024 per patient  BS active / passing flatus.    Activity:  SBA / walker   Skin:  No edema on bilateral lower extremities  LEFT EYE REDNESS   Pain:  0/10    CMS: Aox3-4  Brought his eyeglasses  With [place]   Dressing:  none   Diet:  Moderate consistent carb 60g CHO per meal   LDA: L PIV SL / infusing LR at 100 mL/hr.    Equipment: IV pole, personal belongings, monitor   Plan: droplet precautions maintained / Continue with plan of care. Call light within reach  Plan: antibiotic   Additional Info:  Will maintain at droplet precautions until with final results of the respiratory panel PCR   OBS status     0011 informed Dr ANDRADE that patient has mild fever - requested for acetaminophen. Suggested PRN O2 and asked if he has parameters for 02 sats    4626 RN checked the  initial BG in the unit; administered the insulin at bedtime. Flushed the PIV and started the ceftriaxone and later started the azithromycin. Gave the citracal. Obtained sample for the COVID 19 and respiratory panel.     0100 COVID-19 presumed negative in the results      0619 negative on respiratory panel PCR; presumed negative on COVID19

## 2024-03-27 ENCOUNTER — APPOINTMENT (OUTPATIENT)
Dept: OCCUPATIONAL THERAPY | Facility: CLINIC | Age: 79
DRG: 193 | End: 2024-03-27
Payer: COMMERCIAL

## 2024-03-27 ENCOUNTER — APPOINTMENT (OUTPATIENT)
Dept: PHYSICAL THERAPY | Facility: CLINIC | Age: 79
DRG: 193 | End: 2024-03-27
Payer: COMMERCIAL

## 2024-03-27 LAB
ACANTHOCYTES BLD QL SMEAR: SLIGHT
ANION GAP SERPL CALCULATED.3IONS-SCNC: 11 MMOL/L (ref 7–15)
AUER BODIES BLD QL SMEAR: ABNORMAL
BASO STIPL BLD QL SMEAR: ABNORMAL
BASOPHILS # BLD AUTO: 0.1 10E3/UL (ref 0–0.2)
BASOPHILS NFR BLD AUTO: 1 %
BITE CELLS BLD QL SMEAR: ABNORMAL
BLISTER CELLS BLD QL SMEAR: ABNORMAL
BUN SERPL-MCNC: 21.3 MG/DL (ref 8–23)
BURR CELLS BLD QL SMEAR: ABNORMAL
CALCIUM SERPL-MCNC: 8.6 MG/DL (ref 8.8–10.2)
CHLORIDE SERPL-SCNC: 103 MMOL/L (ref 98–107)
CREAT SERPL-MCNC: 0.83 MG/DL (ref 0.67–1.17)
DACRYOCYTES BLD QL SMEAR: ABNORMAL
DEPRECATED HCO3 PLAS-SCNC: 22 MMOL/L (ref 22–29)
EGFRCR SERPLBLD CKD-EPI 2021: 88 ML/MIN/1.73M2
ELLIPTOCYTES BLD QL SMEAR: ABNORMAL
EOSINOPHIL # BLD AUTO: 0 10E3/UL (ref 0–0.7)
EOSINOPHIL NFR BLD AUTO: 0 %
ERYTHROCYTE [DISTWIDTH] IN BLOOD BY AUTOMATED COUNT: 12.7 % (ref 10–15)
FRAGMENTS BLD QL SMEAR: ABNORMAL
GLUCOSE BLDC GLUCOMTR-MCNC: 186 MG/DL (ref 70–99)
GLUCOSE BLDC GLUCOMTR-MCNC: 210 MG/DL (ref 70–99)
GLUCOSE BLDC GLUCOMTR-MCNC: 221 MG/DL (ref 70–99)
GLUCOSE BLDC GLUCOMTR-MCNC: 313 MG/DL (ref 70–99)
GLUCOSE SERPL-MCNC: 220 MG/DL (ref 70–99)
HCT VFR BLD AUTO: 36.5 % (ref 40–53)
HGB BLD-MCNC: 12.5 G/DL (ref 13.3–17.7)
HGB C CRYSTALS: ABNORMAL
HIV 1+2 AB+HIV1 P24 AG SERPL QL IA: NONREACTIVE
HOWELL-JOLLY BOD BLD QL SMEAR: ABNORMAL
IMM GRANULOCYTES # BLD: 0.2 10E3/UL
IMM GRANULOCYTES NFR BLD: 1 %
L PNEUMO1 AG UR QL IA: NEGATIVE
LACTATE SERPL-SCNC: 1.4 MMOL/L (ref 0.7–2)
LYMPHOCYTES # BLD AUTO: 2.6 10E3/UL (ref 0.8–5.3)
LYMPHOCYTES NFR BLD AUTO: 14 %
MCH RBC QN AUTO: 30.3 PG (ref 26.5–33)
MCHC RBC AUTO-ENTMCNC: 34.2 G/DL (ref 31.5–36.5)
MCV RBC AUTO: 89 FL (ref 78–100)
MONOCYTES # BLD AUTO: 2.6 10E3/UL (ref 0–1.3)
MONOCYTES NFR BLD AUTO: 14 %
MRSA DNA SPEC QL NAA+PROBE: NEGATIVE
NEUTROPHILS # BLD AUTO: 13.8 10E3/UL (ref 1.6–8.3)
NEUTROPHILS NFR BLD AUTO: 70 %
NEUTS HYPERSEG BLD QL SMEAR: ABNORMAL
NRBC # BLD AUTO: 0 10E3/UL
NRBC BLD AUTO-RTO: 0 /100
PLAT MORPH BLD: ABNORMAL
PLATELET # BLD AUTO: 246 10E3/UL (ref 150–450)
POLYCHROMASIA BLD QL SMEAR: ABNORMAL
POTASSIUM SERPL-SCNC: 3.4 MMOL/L (ref 3.4–5.3)
PROCALCITONIN SERPL IA-MCNC: 0.44 NG/ML
RBC # BLD AUTO: 4.12 10E6/UL (ref 4.4–5.9)
RBC AGGLUT BLD QL: ABNORMAL
RBC MORPH BLD: ABNORMAL
ROULEAUX BLD QL SMEAR: ABNORMAL
S PNEUM AG SPEC QL: NEGATIVE
SA TARGET DNA: POSITIVE
SICKLE CELLS BLD QL SMEAR: ABNORMAL
SMUDGE CELLS BLD QL SMEAR: ABNORMAL
SODIUM SERPL-SCNC: 136 MMOL/L (ref 135–145)
SPHEROCYTES BLD QL SMEAR: ABNORMAL
STOMATOCYTES BLD QL SMEAR: ABNORMAL
T PALLIDUM AB SER QL: NONREACTIVE
TARGETS BLD QL SMEAR: ABNORMAL
TOXIC GRANULES BLD QL SMEAR: ABNORMAL
VARIANT LYMPHS BLD QL SMEAR: ABNORMAL
WBC # BLD AUTO: 19.4 10E3/UL (ref 4–11)

## 2024-03-27 PROCEDURE — 250N000013 HC RX MED GY IP 250 OP 250 PS 637: Performed by: INTERNAL MEDICINE

## 2024-03-27 PROCEDURE — 97530 THERAPEUTIC ACTIVITIES: CPT | Mod: GP

## 2024-03-27 PROCEDURE — 83605 ASSAY OF LACTIC ACID: CPT | Performed by: INTERNAL MEDICINE

## 2024-03-27 PROCEDURE — 85025 COMPLETE CBC W/AUTO DIFF WBC: CPT | Performed by: INTERNAL MEDICINE

## 2024-03-27 PROCEDURE — 87899 AGENT NOS ASSAY W/OPTIC: CPT | Performed by: INTERNAL MEDICINE

## 2024-03-27 PROCEDURE — 99233 SBSQ HOSP IP/OBS HIGH 50: CPT | Performed by: INTERNAL MEDICINE

## 2024-03-27 PROCEDURE — 99223 1ST HOSP IP/OBS HIGH 75: CPT | Mod: FS | Performed by: PHYSICIAN ASSISTANT

## 2024-03-27 PROCEDURE — 97535 SELF CARE MNGMENT TRAINING: CPT | Mod: GO | Performed by: OCCUPATIONAL THERAPIST

## 2024-03-27 PROCEDURE — 87449 NOS EACH ORGANISM AG IA: CPT | Performed by: INTERNAL MEDICINE

## 2024-03-27 PROCEDURE — 120N000002 HC R&B MED SURG/OB UMMC

## 2024-03-27 PROCEDURE — 87641 MR-STAPH DNA AMP PROBE: CPT | Performed by: INTERNAL MEDICINE

## 2024-03-27 PROCEDURE — 258N000003 HC RX IP 258 OP 636

## 2024-03-27 PROCEDURE — 250N000011 HC RX IP 250 OP 636: Performed by: INTERNAL MEDICINE

## 2024-03-27 PROCEDURE — 87385 HISTOPLASMA CAPSUL AG IA: CPT | Performed by: INTERNAL MEDICINE

## 2024-03-27 PROCEDURE — 84145 PROCALCITONIN (PCT): CPT | Performed by: INTERNAL MEDICINE

## 2024-03-27 PROCEDURE — 250N000013 HC RX MED GY IP 250 OP 250 PS 637: Performed by: STUDENT IN AN ORGANIZED HEALTH CARE EDUCATION/TRAINING PROGRAM

## 2024-03-27 PROCEDURE — 36415 COLL VENOUS BLD VENIPUNCTURE: CPT | Performed by: INTERNAL MEDICINE

## 2024-03-27 PROCEDURE — 258N000003 HC RX IP 258 OP 636: Performed by: INTERNAL MEDICINE

## 2024-03-27 PROCEDURE — 97530 THERAPEUTIC ACTIVITIES: CPT | Mod: GO | Performed by: OCCUPATIONAL THERAPIST

## 2024-03-27 PROCEDURE — 250N000012 HC RX MED GY IP 250 OP 636 PS 637: Performed by: INTERNAL MEDICINE

## 2024-03-27 PROCEDURE — 87389 HIV-1 AG W/HIV-1&-2 AB AG IA: CPT | Performed by: INTERNAL MEDICINE

## 2024-03-27 PROCEDURE — 250N000011 HC RX IP 250 OP 636

## 2024-03-27 PROCEDURE — 87040 BLOOD CULTURE FOR BACTERIA: CPT | Performed by: INTERNAL MEDICINE

## 2024-03-27 PROCEDURE — 80048 BASIC METABOLIC PNL TOTAL CA: CPT | Performed by: INTERNAL MEDICINE

## 2024-03-27 PROCEDURE — 86780 TREPONEMA PALLIDUM: CPT | Performed by: INTERNAL MEDICINE

## 2024-03-27 RX ORDER — CEFTRIAXONE 2 G/1
2 INJECTION, POWDER, FOR SOLUTION INTRAMUSCULAR; INTRAVENOUS EVERY 24 HOURS
Status: DISCONTINUED | OUTPATIENT
Start: 2024-03-27 | End: 2024-04-01

## 2024-03-27 RX ORDER — GLIPIZIDE 2.5 MG/1
5 TABLET, EXTENDED RELEASE ORAL
Status: DISCONTINUED | OUTPATIENT
Start: 2024-03-28 | End: 2024-03-31

## 2024-03-27 RX ADMIN — GLIPIZIDE 2.5 MG: 2.5 TABLET, FILM COATED, EXTENDED RELEASE ORAL at 08:08

## 2024-03-27 RX ADMIN — ATORVASTATIN CALCIUM 40 MG: 40 TABLET, FILM COATED ORAL at 08:08

## 2024-03-27 RX ADMIN — AZITHROMYCIN MONOHYDRATE 500 MG: 500 INJECTION, POWDER, LYOPHILIZED, FOR SOLUTION INTRAVENOUS at 22:33

## 2024-03-27 RX ADMIN — INSULIN HUMAN 5 UNITS: 100 INJECTION, SUSPENSION SUBCUTANEOUS at 17:39

## 2024-03-27 RX ADMIN — ACETAMINOPHEN 650 MG: 325 TABLET, FILM COATED ORAL at 07:24

## 2024-03-27 RX ADMIN — METFORMIN HYDROCHLORIDE 1000 MG: 500 TABLET, EXTENDED RELEASE ORAL at 17:38

## 2024-03-27 RX ADMIN — ASPIRIN 81 MG: 81 TABLET, COATED ORAL at 08:08

## 2024-03-27 RX ADMIN — CEFTRIAXONE SODIUM 2 G: 2 INJECTION, POWDER, FOR SOLUTION INTRAMUSCULAR; INTRAVENOUS at 21:30

## 2024-03-27 RX ADMIN — POLYMYXIN B SULFATE AND TRIMETHOPRIM 2 DROP: 10000; 1 SOLUTION OPHTHALMIC at 08:09

## 2024-03-27 RX ADMIN — TRIAMCINOLONE ACETONIDE: 5 OINTMENT TOPICAL at 20:10

## 2024-03-27 RX ADMIN — LOSARTAN POTASSIUM 25 MG: 25 TABLET, FILM COATED ORAL at 08:08

## 2024-03-27 RX ADMIN — POLYMYXIN B SULFATE AND TRIMETHOPRIM 2 DROP: 10000; 1 SOLUTION OPHTHALMIC at 20:10

## 2024-03-27 RX ADMIN — ENOXAPARIN SODIUM 40 MG: 40 INJECTION SUBCUTANEOUS at 08:09

## 2024-03-27 RX ADMIN — AZITHROMYCIN MONOHYDRATE 500 MG: 500 INJECTION, POWDER, LYOPHILIZED, FOR SOLUTION INTRAVENOUS at 00:02

## 2024-03-27 RX ADMIN — VANCOMYCIN HYDROCHLORIDE 1500 MG: 10 INJECTION, POWDER, LYOPHILIZED, FOR SOLUTION INTRAVENOUS at 09:51

## 2024-03-27 RX ADMIN — POLYMYXIN B SULFATE AND TRIMETHOPRIM 2 DROP: 10000; 1 SOLUTION OPHTHALMIC at 12:35

## 2024-03-27 RX ADMIN — Medication 1 TABLET: at 08:08

## 2024-03-27 RX ADMIN — METFORMIN HYDROCHLORIDE 500 MG: 500 TABLET, EXTENDED RELEASE ORAL at 08:08

## 2024-03-27 RX ADMIN — POLYMYXIN B SULFATE AND TRIMETHOPRIM 2 DROP: 10000; 1 SOLUTION OPHTHALMIC at 17:39

## 2024-03-27 RX ADMIN — TRIAMCINOLONE ACETONIDE: 5 OINTMENT TOPICAL at 08:09

## 2024-03-27 RX ADMIN — SERTRALINE HYDROCHLORIDE 100 MG: 100 TABLET ORAL at 08:08

## 2024-03-27 RX ADMIN — Medication 1 TABLET: at 20:10

## 2024-03-27 ASSESSMENT — ACTIVITIES OF DAILY LIVING (ADL)
ADLS_ACUITY_SCORE: 45
DEPENDENT_IADLS:: INDEPENDENT
ADLS_ACUITY_SCORE: 45
ADLS_ACUITY_SCORE: 45
ADLS_ACUITY_SCORE: 46
ADLS_ACUITY_SCORE: 45
ADLS_ACUITY_SCORE: 46
ADLS_ACUITY_SCORE: 45
ADLS_ACUITY_SCORE: 46
ADLS_ACUITY_SCORE: 45
ADLS_ACUITY_SCORE: 46
ADLS_ACUITY_SCORE: 46
ADLS_ACUITY_SCORE: 45
ADLS_ACUITY_SCORE: 45
ADLS_ACUITY_SCORE: 46
ADLS_ACUITY_SCORE: 45

## 2024-03-27 NOTE — PLAN OF CARE
Goal Outcome Evaluation:      Plan of Care Reviewed With: patient, spouse    Overall Patient Progress: no changeOverall Patient Progress: no change     Discharge Plan: Home when medically stable      VS: /57 (BP Location: Left arm, Patient Position: Sitting, Cuff Size: Adult Regular)   Pulse 65   Temp 98.9  F (37.2  C) (Oral)   Resp 16   Wt 75.6 kg (166 lb 10.7 oz)   SpO2 96%   BMI 22.29 kg/m       O2: Sating >95% on 1L NC(starting @ 3L NC, now on 1L, weaning off oxygen), denies SOB/Chest pain. Had temp of 101.2 this morning, prn tylenol given, temp down 98.9. Denies N/V. LS clear.    Output: Voids spontaneously in bathroom   Last BM: 3/27, bowel sounds normoactive x4   Activity: Up with SBA, W/GB   Up for meals? Yes   Skin: All visible skin intact. Redness to right AC    Pain: Denied pain   CMS: AO x3, disoriented to situation.  denies N/T   Dressing: None   Diet: Moderate carb diet with SSI   LDA: L.PIV/SL   Equipment:  IV pole, personal belongings   Plan: Call light within reach, bed in a low position. Able to make needs known. Continue to monitor with POC.   Additional Info:

## 2024-03-27 NOTE — CONSULTS
"  [facility] INFECTIOUS DISEASES CONSULTATION     Patient:  Gerber Levine   Date of birth 1945, Medical record number [id number]  Date of Visit:  03/27/2024  Date of Admission: 3/25/2024  Consult Requester:Reshma Vera MD          Assessment and Recommendations:   ID Problem List   1. Fever, leukocytosis   2. R lung consolidation c/f pneumonia   3. Bilateral conjunctivitis- on drops  4. Asplenia   5. Diabetes mellitus type II     RECOMMENDATION:  Increase dose of Ceftriaxone to 2g q24hrs for ongoing treatment of pneumonia.   Continue Azithromycin for atypical coverage.   Added IV Vancomycin (pharmacy to dose) today while awaiting further workup and pt having ongoing fevers.   Please send additional work up to include legionella urine Ag, histoplasma capsulatum urine Ag, blastomyces urine Ag.   Send HIV screen and treponema Ab with reflex- low concern for these but do not see these have been screened for in patient's adult life. Reasonable to rule out given progressive mental status changes.     ASSESSMENT:  Gerber Levine is a 79 year old male who was admitted for confusion and gait instability at home over the past few days.     He was afebrile on admission but then spiked fever to 102.4. Otherwise normal hemodynamics. Has an elevated WBC count to 19K. Lactate wnl, PCT <0.5. UA was unremarkable for infection. BCx 3/25 and  3/27 NGTD. RVP negative. COVID PCR negative. CXR with patchy opacities throughout RLL c/f pneumonia. CT chest with \"new ggo and consolidative opacities with interlobular septal thickening in R upper and middle lobes\"- c/f infection.    Chest imaging seems consistent with pneumonia and fever curve appears to be moving in the right direction. No e/o productive cough, so we are covering empirically for bacterial pneumonia. Ruling out common fungal pneumonia causes in this area given recent COVID infection 3 months ago. We have a low suspicion for Syphilis or HIV, but given progressive " "neurologic changes, reasonable to rule out/ screen for once in adult lifetime.     Thank you for this consult. ID will continue to follow.     Patient was discussed with Dr. Serrano.     Citlali Noriega PA-C  Infectious Diseases  Contact via [employer] or [facility] Paging/Directory         History of Present Illness:     Gerber Levine is a 79 year old male with PMHx significant for diabetes mellitus type II, hypertension, hereditary spherocytosis s/p splenectomy (1976), s/p R chest wall histiocytoma resection (2016) and depression who presented to the ED 3/25 with confusion and instability.     On chart review, the patient was noted to be more confused over the past 4-5 days PTA. Additionally, noted to be more unstable on his feet. Usually active in walking 3 miles daily which he did 2 days PTA. Patient endorsed chills. Denied fevers, urinary symptoms, diarrhea, SOB, headache, neck stiffness or other symptom complaints.     Wife provides most of history. Patient defers history to her for reason why he was admitted. He states he feels \"pretty good\" and offered no complaints. It was a bit unclear on acute change in mental status from patient's last 6 months for which patient's wife said he has had some decreased cognition. She notes he never really returned to his baseline after COVID infection 3 months ago. Wife recently finished course of antibiotics for sinus infection. She notes the patient has been coughing at home over the past week. He denies sputum production.     They live in [place], [place] in a home. No pets. No recent travel. Last left the state about 1 year ago and traveled to [place]. Denies travel to [place] or internationally. Patient does not walk by bodies of water locally. He does do his own yard work/leaf clearing but denied clearing any dead/moldy leaves/wood from his yard.          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    EYE SURGERY  2014    cataracts    HERNIA REPAIR  1976    REPAIR CHEST WALL N/A 9/21/2016    Procedure: REPAIR CHEST WALL;  Surgeon: Leah Nixon MD;  Location: [facility] OR    SURGICAL HISTORY OF -   1976    SPLENECTOMY    SURGICAL HISTORY OF -       APPENDECTOMY    SURGICAL HISTORY OF -       CHOLECYSTECTOMY    SURGICAL HISTORY OF -       HERNIAORRHAPHY    SURGICAL HISTORY OF -   1981    cholesteotoma            Family History:     Family History   Problem Relation Age of Onset    Dementia Mother     Other Cancer Mother         Skin cancer    Diabetes Father     Emphysema Father     Intellectual Disability (Mental Retardation) Sister     Diabetes Sister     Dementia Brother     Prostate Cancer Brother     Diabetes Maternal Grandmother     Genitourinary Problems Daughter         spherocytosis, had spleen removed    C.A.D. No family hx of     Cancer - colorectal No family hx of     Hypertension No family hx of     Cerebrovascular Disease No family hx of     Breast Cancer No family hx of             Social History:     Social History     Tobacco Use     Smoking status: Never    Smokeless tobacco: Never   Substance Use Topics    Alcohol use: Yes     Comment: Occasionaly     History   Sexual Activity    Sexual activity: Not Currently    Partners: Female    Birth control/ protection: None            Current Medications:      aspirin  81 mg Oral Daily    atorvastatin  40 mg Oral Daily    azithromycin  500 mg Intravenous Q24H    calcium citrate-vitamin D  1 tablet Oral BID    cefTRIAXone  1 g Intravenous Q24H    enoxaparin ANTICOAGULANT  40 mg Subcutaneous Q24H    glipiZIDE  2.5 mg Oral Daily with breakfast    insulin aspart  1-7 Units Subcutaneous TID AC    insulin aspart  1-5 Units Subcutaneous At Bedtime    losartan  25 mg Oral Daily    metFORMIN  1,000 mg Oral Daily with supper    metFORMIN  500 mg Oral QAM    polymixin b-trimethoprim  2 drop Both Eyes 4x Daily    sertraline  100 mg Oral Daily    sodium chloride (PF)  3 mL Intracatheter Q8H    triamcinolone   Topical BID    vancomycin  1,500 mg Intravenous Q24H            Allergies:     Allergies   Allergen Reactions    Oxycodone Itching    Shrimp Swelling            Physical Exam:   Vitals were reviewed  Patient Vitals for the past 24 hrs:   BP Temp Temp src Pulse Resp SpO2   03/27/24 0819 -- 98.3  F (36.8  C) Oral -- -- --   03/27/24 0711 116/53 (!) 101.2  F (38.4  C) Axillary 76 16 91 %   03/26/24 2100 112/57 98.8  F (37.1  C) Oral 80 16 96 %   03/26/24 1554 109/64 98.9  F (37.2  C) Oral 79 18 90 %   03/26/24 1017 111/54 -- -- 70 -- 92 %   03/26/24 0945 105/54 97.9  F (36.6  C) Oral 66 18 90 %       Physical Examination:  Constitutional: Pleasant male seen sitting up in a recliner, in NAD. Alert and interactive.   HEENT: NCAT, anicteric sclerae, conjunctiva clear. Moist mucous membranes.   Respiratory: Non-labored breathing on 3L NC. Lungs CTAB.   Cardiovascular: Regular rate and rhythm.  GI: Normoactive BS. Abdomen is soft, non-distended.  Skin: Warm and dry. No rashes or lesions on exposed  surfaces.  Musculoskeletal: Extremities grossly normal. No tenderness or edema present.   Neurologic: A &O x3, speech normal, answering questions appropriately. Moves all extremities spontaneously. Grossly non-focal.  Neuropsychiatric: Mentation and affect normal/appropriate.  VAD: PIV         Laboratory Data:     Inflammatory Markers  No lab results found.    Hematology Studies    Recent Labs   Lab Test 03/27/24 0627 03/26/24  0753 03/25/24 1739 12/25/23 2035 12/25/23 2029 01/18/23  0744 01/18/23  0744 09/24/16  0708   WBC 19.4* 19.9* 16.7*  --  10.1  --  9.0 13.5*   HGB 12.5* 12.3* 13.1* 12.6* 13.4  --  14.3 12.1*   MCV 89 88 89  --  92  --  92 94    248 283  --  281   < > 264 214  198    < > = values in this interval not displayed.       Metabolic Studies     Recent Labs   Lab Test 03/27/24 0627 03/25/24 1739 02/02/24  0934 02/02/24 0919 12/27/23  0802 12/26/23  0745 12/25/23 2035 12/25/23 2033 12/25/23 2029 05/05/23  1000 01/18/23  0744    137 136  --   --   --  140  --  137  --  140   POTASSIUM 3.4 3.9 4.3  --  3.8 4.5 4.1  --  4.1  --  4.5   CHLORIDE 103 98 101  --   --   --   --   --  101  --  104   CO2 22 29 27  --   --   --   --   --  29  --  25   BUN 21.3 17.5 21.1  --   --   --   --   --  30.7*  --  27.4*   CR 0.83 0.80 0.78 0.8  --   --   --  1.0 1.00   < > 0.82   GFRESTIMATED 88 89 90 >60  --   --   --  >60 76   < > 89    < > = values in this interval not displayed.       Hepatic Studies    Recent Labs   Lab Test 03/25/24 1739 12/25/23 2029 01/18/23  0744   BILITOTAL 1.1 0.5 0.9   ALKPHOS 104 123 80   ALBUMIN 3.9 4.2 4.3   AST 21 27 29   ALT 21 23 19       Microbiology:  Culture   Date Value Ref Range Status   03/25/2024 No growth after 1 day  Preliminary   03/25/2024 No growth after 1 day  Preliminary       Urine Studies    Recent Labs   Lab Test 03/25/24  1740 12/25/23  2127   LEUKEST Negative Negative   WBCU 1 1       Vancomycin Levels  No lab results found.    Invalid  "input(s): \"VANCO\"    Hepatitis B Testing No lab results found.  Hepatitis C Testing     Hepatitis C Antibody   Date Value Ref Range Status   10/28/2020 Nonreactive NR^Nonreactive Final     Comment:     Assay performance characteristics have not been established for newborns,   infants, and children       Respiratory Virus Testing    No results found for: \"RS\", \"FLUAG\"    IMAGING    CT chest w/o contrast 3/26/24  IMPRESSION:   1. New groundglass and consolidative opacities with interlobular  septal thickening in the right upper and middle lobes. Findings are  favored to represent infectious/inflammatory acute interstitial  pneumonia. However given the postsurgical changes of the adjacent  right chest wall for resection of a soft tissue mass, pulmonary  lymphatic metastases is a possibility. Recommend close attention on  short-term follow-up and consider tissue biopsy if findings persist.  2. Stable scattered solid pulmonary nodules. Attention on follow-up.  3. Trace pleural effusions.    CXR 3/25/24   IMPRESSION: Cardiac silhouette is within normal limits. Increased patchy opacities throughout the right lower lung concerning for pneumonia. No pleural effusion or pneumothorax. Remote postsurgical changes of the right lung and ribs.     CT head w/o contrast 3/25/24   IMPRESSION:  1.  Mild age-related changes without acute intracranial abnormality.   "

## 2024-03-27 NOTE — PLAN OF CARE
Problem: Adult Inpatient Plan of Care  Goal: Plan of Care Review  Description: The Plan of Care Review/Shift note should be completed every shift.  The Outcome Evaluation is a brief statement about your assessment that the patient is improving, declining, or no change.  This information will be displayed automatically on your shift  note.  Flowsheets (Taken 3/27/2024 8004)  Outcome Evaluation: Discharge home with assist of spouse  Plan of Care Reviewed With:   patient   spouse

## 2024-03-27 NOTE — PROGRESS NOTES
Chart reviewed, immunization record updated.  No new results noted on [facility] or [facility] web site.  Care Everywhere updated.   Patient care coordination note  Upcoming PCP visit updated.  Next PCP visit 9/20/2023 and lab visit on 9/12/2023.  Urine micro albumin order placed and linked to upcoming lab visit.  Patient states she will get eye exam.     [facility]    Medicine Progress Note - Hospitalist Service, GOLD TEAM 17    Date of Admission:  3/25/2024    Assessment & Plan   Gerber Levine is a 79 year old male admitted on 3/25/2024. He has a h/o T2DM, HTN, hearing loss wears hearing aid, hereditary spherocytosis, status post splenectomy, STM loss and depression.  He lives with his wife.  He was hospitalized at  12/26 - 12/27/23 for acute metabolic encephalopathy due to COVID-19 infection +/- side effect of sertraline.  Stroke workup was negative at that time.  He was brought to [facility] ED today by his wife and daughter for evaluation of confusion and generalized weakness.  Pt usually walks 3 miles but has been wobbly today.  He walked 3 miles 2 days ago and was able to shovel snow a bit yesterday.  Wife thinks he is confused and has declining short-term memory problem.  Daughter thinks he is depressed, not interested in things he normally enjoys.  He has chills but no fever.  No urinary or bowel symptoms.  Denies cough, SOB, HA, neck stiffness or focal weakness.  His initial vital signs were temp 98.6  F, /74, HR 80, RR 18 and 96% sat on RA.  WBC 16.7 with left shift, creatinine 0.8, glucose 277, LFTs wnl, and UA grossly negative for infection.  CT head without contrast negative for acute intracranial pathology.  Admitted for further management of pneumonia, increased confusion and weakness.     RML, RUL CAP, Asplenia:  CXR showed right lower lobe pneumonia, started on empiric IV ceftriaxone and azithromycin, to include coverage for encapsulated bacteria given asplenia.  Admission procalcitonin normal, blood cultures NGTD..  Respiratory viral panel negative.   Given recently identified nonspecific pulmonary nodules and the concerning appearance of the infiltrates on CXR, obtained HRCT 3/26, shows right upper and right middle lobe pneumonia, but given prior history of right chest wall  histiocytoma resection recommend follow-up.  Already has a repeat CT planned with oncology who monitors his history of histiocytoma and hereditary spherocytosis, planned for 10/2024.  Appetite, nutritional intake improved.  Recurrent fever, increased hypoxemia, placed on oxygen in a.m. 3/27.  Cough actually improved, no significant phlegm production and denies chills or sweats.  WBC remains elevated at 19.4.  Repeat blood cultures sent, repeat procalcitonin again normal.  Strep pneumonia urinary antigen negative.  Eating and drinking adequately.  Patient and his wife deny any difficulty swallowing or choking with meals.  Seen by ST, bedside swallow normal, but recommended video swallow to rule out silent aspiration.  -Discussed with Dr. Serrano of ID.  Start IV vancomycin for now and monitor over the next 24 hours.  Formal infectious disease consultation pending.  -Continue IV ceftriaxone, azithromycin  -Follow-up blood culture results  -PT, OT  -CBC, BMP 3/28  -planned repeat chest CT 10/2024 with oncology  -Check video swallow, rule out silent aspiration    Hereditary spherocytosis, s/p Splenectomy ~ 1976:  Followed by heme-onc.  High risk for encapsulated bacterial infection.  -Antibiotic as above  -Follow blood culture results    Cognitive impairment with progressive STM loss, acute metabolic encephalopathy due to pneumonia:  Patient lives with his wife.  Previously underwent neuropsychology evaluation several years ago.  Wife has noted progressive short-term memory loss and increased depression and poor sleep over the past 6-7 months.  Particularly accelerated when he was hospitalized with COVID with associated metabolic encephalopathy 3 months ago.  Brain MRI 12/25/2023 showed no acute findings, with mild to moderate generalized cerebral atrophy but no hydrocephalus.  Head CT 3/26/2024 again shows mild generalized volume loss, no hydrocephalus but no acute findings.  Wife also has noted shuffling gait, but no  other parkinsonian symptoms or signs on exam, and no recent falls.  Acute encephalopathy clearing, patient fully oriented again during today's visit.  No behavioral concerns.  Seen by psychiatry, recommended no changes in antidepressant regimen, and placed referral for outpatient neurology consultation regarding shuffling gait.  -PT, OT  -Has planned neuropsychology assessment 4/24/2024  -Continue to treat underlying depression, insomnia    Depression, insomnia:  As above, wife and daughter have noticed progressive depression, difficulty sleeping for the past 6-7 months.  Has been on Zoloft.  Also with progressive STM loss as discussed above, particularly accelerated after hospitalized with COVID and encephalopathy 3 months ago.  Patient very pleasant on exam.  Acute encephalopathy resolving.  Denies suicidal ideation or intent.  Seen by psychiatry 3/26, no changes in Zoloft.   -Continue PTA Zoloft    Weakness, Gait Instability:  Very active, walks 3 miles at baseline.  Acute weakness, gait instability likely due to acute pneumonia.  Wife has noted shuffling gait, but has no other Parkinson features on exam.  Progressing with therapy.  -PT, OT    DM Type 2, uncontrolled:  A1c 9.4 on 3/25/2024, previously 8.7 on 2/2/2024.  Prior to that had been under reasonable control.  Had been hospitalized with COVID 3 months ago, now with community-acquired pneumonia.  Eating well.  Blood sugars a bit labile, but generally high into the 300s at times.  -Increased PTA metformin to XR, 500 mg every morning, 1000 mg at bedtime on 3/26  -Increase Glucotrol XL to 5 mg daily  -Start NPH 5 units twice daily  -Continue moderate SSI    HTN, HLD:  BP stable, euvolemic on exam.  Eating well.  -Continue PTA Cozaar with hold parameters  -Continue PTA Lipitor, ASA    Bacterial conjunctivitis:  Started on topical antibiotic PTA.  Initially involved left eye, now also spreading to the right eye.  No purulent drainage.  Injection improved,  continues to have significant itching.  -Changed PTA polymyxin b-trimethopri to both eyes 3/26    Hearing Impaired:  Wears hearing aids.    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:  -As above, has planned repeat chest CT 10/2024 with oncology        Diet: Moderate Consistent Carb (60 g CHO per Meal) Diet    DVT Prophylaxis: Enoxaparin (Lovenox) SQ  Parks Catheter: Not present  Lines: None     Cardiac Monitoring: None  Code Status: Full Code      Clinically Significant Risk Factors Present on Admission               # Coagulation Defect: INR = 1.19 (Ref range: 0.85 - 1.15) and/or PTT = N/A, will monitor for bleeding  # Drug Induced Platelet Defect: home medication list includes an antiplatelet medication   # Hypertension: Noted on problem list     # DMII: A1C = 9.4 % (Ref range: <5.7 %) within past 6 months               Disposition Plan      Expected Discharge Date: 03/28/2024                    Ryne Lim MD  Hospitalist Service, GOLD TEAM 17  [facility]  Securely message with [employer] (more info)  Text page via [facility] Paging/Directory   See signed in provider for up to date coverage information  ______________________________________________________________________    Interval History   I first assessed patient early this morning due to fever, new onset hypoxemia with O2 sat 70s on room air.  O2 sats improved to 94% on 3 L.  Repeat blood culture sent.  Procalcitonin came back normal, strep pneumonia urinary antigen negative.  Patient actually was feeling better at the time of my visit, states his cough is improving and no significant phlegm production.  Denied any dyspnea on oxygen, no chest pain or pleurisy.  No chills or night sweats.  Denies any nausea, vomiting or constipation.  Had a loose bowel movement yesterday, but none since.  Denies any abdominal pain.  No dysuria or urgency.   Eating and drinking well.  Later in the day I returned to update his wife on today's plan.    Physical Exam   Vital Signs: Temp: 98.3  F (36.8  C) Temp src: Oral BP: 116/53 Pulse: 76   Resp: 16 SpO2: 91 % O2 Device: Nasal cannula Oxygen Delivery: 3 LPM  Weight: 166 lbs 10.68 oz  General: Alert and oriented x 4.  Pleasant.  Speech, affect normal.  HEENT: Bilateral conjunctival injection improved, no purulent drainage.  Oropharynx clear.  No cervical or supraclavicular lymphadenopathy appreciated.  Chest: Positive rales in the right lower and mid lung and anteriorly, no wheezes.  Trace rales left base on the left lung otherwise CTA.  No wheezes or rhonchi.  CV: RRR.  No murmurs.  Abdomen: NABS.  Soft, nontender, nondistended.  Extremities: No edema  Neuro: CN II through XII grossly intact.  Strength 5/5 symmetrically.  No significant tremors.  No cogwheeling rigidity.  No bradykinesia.    55 MINUTES SPENT BY ME on the date of service doing chart review, history, exam, documentation & further activities per the note.      Data      CMP  Recent Labs   Lab 03/27/24  0710 03/27/24  0627 03/26/24  2242 03/26/24  1726 03/25/24  2346 03/25/24  1739   NA  --  136  --   --   --  137   POTASSIUM  --  3.4  --   --   --  3.9   CHLORIDE  --  103  --   --   --  98   CO2  --  22  --   --   --  29   ANIONGAP  --  11  --   --   --  10   * 220* 161* 287*   < > 277*   BUN  --  21.3  --   --   --  17.5   CR  --  0.83  --   --   --  0.80   GFRESTIMATED  --  88  --   --   --  89   YANI  --  8.6*  --   --   --  9.4   PROTTOTAL  --   --   --   --   --  7.5   ALBUMIN  --   --   --   --   --  3.9   BILITOTAL  --   --   --   --   --  1.1   ALKPHOS  --   --   --   --   --  104   AST  --   --   --   --   --  21   ALT  --   --   --   --   --  21    < > = values in this interval not displayed.     CBC  Recent Labs   Lab 03/27/24  0627 03/26/24  0753 03/25/24  1739   WBC 19.4* 19.9* 16.7*   RBC 4.12* 4.02* 4.33*   HGB 12.5* 12.3* 13.1*   HCT  36.5* 35.3* 38.5*   MCV 89 88 89   MCH 30.3 30.6 30.3   MCHC 34.2 34.8 34.0   RDW 12.7 12.9 12.8    248 283     INR  Recent Labs   Lab 03/25/24  1739   INR 1.19*     Arterial Blood GasNo lab results found in last 7 days.Venous Blood Gas  No lab results found in last 7 days.  Hemoglobin A1C   Date Value Ref Range Status   03/25/2024 9.4 (H) <5.7 % Final     Comment:     Normal <5.7%   Prediabetes 5.7-6.4%    Diabetes 6.5% or higher     Note: Adopted from ADA consensus guidelines.   02/02/2024 8.7 (H) <5.7 % Final     Comment:     Normal <5.7%   Prediabetes 5.7-6.4%    Diabetes 6.5% or higher     Note: Adopted from ADA consensus guidelines.   10/03/2023 7.1 (H) 0.0 - 5.6 % Final     Comment:     Normal <5.7%   Prediabetes 5.7-6.4%    Diabetes 6.5% or higher     Note: Adopted from ADA consensus guidelines.   06/01/2021 7.1 (H) 0 - 5.6 % Final     Comment:     Normal <5.7% Prediabetes 5.7-6.4%  Diabetes 6.5% or higher - adopted from ADA   consensus guidelines.     02/26/2021 7.8 (H) 0 - 5.6 % Final     Comment:     Normal <5.7% Prediabetes 5.7-6.4%  Diabetes 6.5% or higher - adopted from ADA   consensus guidelines.     10/28/2020 6.8 (H) 0 - 5.6 % Final     Comment:     Normal <5.7% Prediabetes 5.7-6.4%  Diabetes 6.5% or higher - adopted from ADA   consensus guidelines.       Results for orders placed or performed during the hospital encounter of 03/25/24   Head CT w/o contrast    Narrative    EXAM: CT HEAD W/O CONTRAST  LOCATION: [facility]  DATE: 3/25/2024    INDICATION: Altered mental status  COMPARISON: MRI brain 12/25/2023  TECHNIQUE: Routine CT Head without IV contrast. Multiplanar reformats. Dose reduction techniques were used.    FINDINGS:  INTRACRANIAL CONTENTS: No intracranial hemorrhage, extraaxial collection, or mass effect.  No CT evidence of acute infarct. Normal parenchymal attenuation for age. Mild generalized volume loss. No hydrocephalus.      VISUALIZED ORBITS/SINUSES/MASTOIDS: Prior bilateral cataract surgery. Visualized portions of the orbits are otherwise unremarkable. Mild to moderate mucosal thickening scattered about the paranasal sinuses. No middle ear or mastoid effusion.    BONES/SOFT TISSUES: No acute abnormality.      Impression    IMPRESSION:  1.  Mild age-related changes without acute intracranial abnormality.    XR Chest Port 1 View    Narrative    EXAM: XR CHEST PORT 1 VIEW  LOCATION: [facility]  DATE: 3/25/2024    INDICATION: Altered MS w  leukocytosis.  Eval for pneumonia  COMPARISON: 12/25/2023.      Impression    IMPRESSION: Cardiac silhouette is within normal limits. Increased patchy opacities throughout the right lower lung concerning for pneumonia. No pleural effusion or pneumothorax. Remote postsurgical changes of the right lung and ribs.   CT Chest w/o Contrast    Narrative    EXAMINATION: Chest CT  3/26/2024 1:10 PM    CLINICAL HISTORY: right lower lung pneumonia, h/o pulmonary nodules.  Assess for worsening nodules, lung abscess.    COMPARISON: CT to 2/2/2024.    TECHNIQUE: CT imaging obtained through the chest without contrast.  Axial, coronal, and sagittal reconstructions and axial MIP reformatted  images are reviewed.     FINDINGS:  Mediastinum: The visualized thyroid is unremarkable. The heart is  normal in size. No significant  pericardial effusion. Moderate  coronary artery calcifications. The ascending aorta and main pulmonary  artery are normal in caliber. Several prominent mediastinal lymph  nodes are increased from prior. No lymphadenopathy by size criteria.  Small hiatal hernia.    Lungs: Trace pleural effusions. No pneumothorax. Layering secretions  in the mainstem bronchi. Mild lower lobe predominant bronchial wall  thickening with scattered mucous plugging at the lung bases. Mild  bronchiectasis of the right middle and upper lobes, similar to prior.  New crazy  paving pattern in the residual right upper and right middle  lobe consisting of groundglass opacities with intralobular septal  thickening as well as several patchy airspace opacities. Peripheral  consolidative opacities in the right lower lobe, suboptimally  evaluated due to respiratory motion artifact.  Multiple pulmonary nodules are not significantly changed, for example:  - Stable solid nodule in the right lower lobe measuring 15 x 9 mm  (series 4 image 172) with satellite nodularity, new since 2021.   - Stable 8 mm solid nodule at the right lower lobe lung base (series 4  image 228), but slowly increasing from 2019.    Bones and soft tissues: Stable postsurgical changes of partial right  chest wall resection with mesh reconstruction and rib osteotomies.    Upper Abdomen: Limited evaluation of the upper abdomen. Partially  visualized large right exophytic renal cyst and small left renal sinus  cysts.      Impression    IMPRESSION:   1. New groundglass and consolidative opacities with interlobular  septal thickening in the right upper and middle lobes. Findings are  favored to represent infectious/inflammatory acute interstitial  pneumonia. However given the postsurgical changes of the adjacent  right chest wall for resection of a soft tissue mass, pulmonary  lymphatic metastases is a possibility. Recommend close attention on  short-term follow-up and consider tissue biopsy if findings persist.  2. Stable scattered solid pulmonary nodules. Attention on follow-up.  3. Trace pleural effusions.    I have personally reviewed the examination and initial interpretation  and I agree with the findings.    TIM BALTAZAR MD         SYSTEM ID:  [id number]

## 2024-03-27 NOTE — PHARMACY-VANCOMYCIN DOSING SERVICE
Pharmacy Vancomycin Initial Note  Date of Service 2024  Patient's  1945  79 year old, male    Indication: Community Acquired Pneumonia    Current estimated CrCl = Estimated Creatinine Clearance: 74.6 mL/min (based on SCr of 0.83 mg/dL).    Creatinine for last 3 days  3/25/2024:  5:39 PM Creatinine 0.80 mg/dL  3/27/2024:  6:27 AM Creatinine 0.83 mg/dL    Recent Vancomycin Level(s) for last 3 days  No results found for requested labs within last 3 days.      Vancomycin IV Administrations (past 72 hours)        No vancomycin orders with administrations in past 72 hours.                    Nephrotoxins and other renal medications (From now, onward)      Start     Dose/Rate Route Frequency Ordered Stop    24 0900  vancomycin (VANCOCIN) 1,500 mg in 0.9% NaCl 250 mL intermittent infusion         1,500 mg  over 90 Minutes Intravenous EVERY 24 HOURS 24 0855              Contrast Orders - past 72 hours (72h ago, onward)      None            [employer] Prediction of Planned Initial Vancomycin Regimen  Loading dose: N/A  Regimen: 1500 mg IV every 24 hours.  Start time: 08:56 on 2024  Exposure target: AUC24 (range)400-600 mg/L.hr   AUC24,ss: 458 mg/L.hr  Probability of AUC24 > 400: 66 %  Ctrough,ss: 12.5 mg/L  Probability of Ctrough,ss > 20: 12 %  Probability of nephrotoxicity (Lodise MACIEJ ): 8 %          Plan:  Start vancomycin  1500 mg (19.8 mg/kg) IV q24h.   Vancomycin monitoring method: AUC  Vancomycin therapeutic monitoring goal: 400-600 mg*h/L  Pharmacy will check vancomycin levels as appropriate in 1-3 Days.    Serum creatinine levels will be ordered daily for the first week of therapy and at least twice weekly for subsequent weeks.      Joslyn Carolina, [facility]

## 2024-03-27 NOTE — PLAN OF CARE
Goal Outcome Evaluation:  Alert and oriented X 4, afebrile, vital signs stable, continent of bowel and incontinent bladder. Denies pain, SOB, and discomfort. Assist X1 with gait belt  About 0040 left antecubital, IV site extravasated IV stopped immediately and removed, site marked, cold compression applied. See flow sheet. New Iv inserted to complete infusion. MD updated Site monitoring initiated. Continue POC.

## 2024-03-27 NOTE — CONSULTS
Care Management Initial Consult    General Information  Assessment completed with: Patient, Spouse or significant otherKathleen  Type of CM/SW Visit: Initial Assessment    Primary Care Provider verified and updated as needed: Yes   Readmission within the last 30 days: no previous admission in last 30 days      Reason for Consult: discharge planning  Advance Care Planning: Advance Care Planning Reviewed: present on chart, no concerns identified, other (see comments) (printed out copy we have on file to review to see if they want to make changes. Spouse or patient will let us know.)          Communication Assessment  Patient's communication style: spoken language (English or Bilingual)             Cognitive  Cognitive/Neuro/Behavioral: .WDL except, orientation  Level of Consciousness: intermittent confusion  Arousal Level: opens eyes spontaneously  Orientation: disoriented to, situation  Mood/Behavior: flat affect, hypoactive (quiet, withdrawn)     Speech: clear    Living Environment:   People in home: spouse     Current living Arrangements: house      Able to return to prior arrangements: yes       Family/Social Support:  Care provided by: self, spouse/significant other  Provides care for: no one  Marital Status:   Wife, Children          Description of Support System: Involved, Supportive    Support Assessment: Adequate social supports, Adequate family and caregiver support    Current Resources:   Patient receiving home care services: No     Community Resources: None  Equipment currently used at home: none  Supplies currently used at home:      Employment/Financial:  Employment Status: retired        Financial Concerns: none   Referral to Financial Worker: No       Does the patient's insurance plan have a 3 day qualifying hospital stay waiver?  No    Lifestyle & Psychosocial Needs:  Social Determinants of Health     Food Insecurity: Low Risk  (2/8/2024)    Food Insecurity     Within the past 12 months, did you  worry that your food would run out before you got money to buy more?: No     Within the past 12 months, did the food you bought just not last and you didn t have money to get more?: No   Depression: Not at risk (2/8/2024)    PHQ-2     PHQ-2 Score: 1   Recent Concern: Depression - At risk (1/4/2024)    PHQ-2     PHQ-2 Score: 4   Housing Stability: Low Risk  (2/8/2024)    Housing Stability     Do you have housing? : Yes     Are you worried about losing your housing?: No   Tobacco Use: Low Risk  (2/8/2024)    Patient History     Smoking Tobacco Use: Never     Smokeless Tobacco Use: Never     Passive Exposure: Not on file   Financial Resource Strain: Low Risk  (2/8/2024)    Financial Resource Strain     Within the past 12 months, have you or your family members you live with been unable to get utilities (heat, electricity) when it was really needed?: No   Alcohol Use: Not on file   Transportation Needs: Low Risk  (2/8/2024)    Transportation Needs     Within the past 12 months, has lack of transportation kept you from medical appointments, getting your medicines, non-medical meetings or appointments, work, or from getting things that you need?: No   Physical Activity: Unknown (2/8/2024)    Exercise Vital Sign     Days of Exercise per Week: 7 days     Minutes of Exercise per Session: Not on file   Interpersonal Safety: Low Risk  (2/8/2024)    Interpersonal Safety     Do you feel physically and emotionally safe where you currently live?: Yes     Within the past 12 months, have you been hit, slapped, kicked or otherwise physically hurt by someone?: No     Within the past 12 months, have you been humiliated or emotionally abused in other ways by your partner or ex-partner?: No   Stress: No Stress Concern Present (2/8/2024)    [employer] [place] [employer] - Occupational Stress Questionnaire     Feeling of Stress : Only a little   Social Connections: Unknown (2/8/2024)    Social Connection and Isolation Panel  [NHANES]     Frequency of Communication with Friends and Family: Not on file     Frequency of Social Gatherings with Friends and Family: Once a week     Attends Faith Services: Not on file     Active Member of Clubs or Organizations: Not on file     Attends Club or Organization Meetings: Not on file     Marital Status: Not on file       Functional Status:  Prior to admission patient needed assistance:   Dependent ADLs:: Independent  Dependent IADLs:: Independent       Mental Health Status:  Mental Health Status: No Current Concerns       Chemical Dependency Status:  Chemical Dependency Status: No Current Concerns             Values/Beliefs:  Spiritual, Cultural Beliefs, Faith Practices, Values that affect care: no               Additional Information:    Met with Art and his wife at his bedside.  He lives at home with his wife.  She is available for assist at home until he is fully returned to baseline.      He has an Advanced Directive on file from 2013.  Printed copy for spouse at her request as she would like to review it.    Care Management available should discharge needs arise.     Taylor Yadav RN  Inpatient Care Coordinator  6 Med Surg  [phone], pager [phone]      For weekend social work needs, contact information below and available on [employer]:     SW Weekend [place] Pager ED, 5 MS, 5 ortho : [phone]  SW Weekend 6MS, 8A, 10ICU- Pager: [phone]     For weekend RN care coordinator needs (home discharge with needs including home care, assisted living facility returns, durable medical equip, IV antibiotics)   5 med/surg, 5 ortho, ED pager: [phone]  6 med/surg, 8 med/surg, 10 ICU pager: [phone]

## 2024-03-28 ENCOUNTER — APPOINTMENT (OUTPATIENT)
Dept: PHYSICAL THERAPY | Facility: CLINIC | Age: 79
DRG: 193 | End: 2024-03-28
Payer: COMMERCIAL

## 2024-03-28 ENCOUNTER — APPOINTMENT (OUTPATIENT)
Dept: SPEECH THERAPY | Facility: CLINIC | Age: 79
DRG: 193 | End: 2024-03-28
Payer: COMMERCIAL

## 2024-03-28 ENCOUNTER — APPOINTMENT (OUTPATIENT)
Dept: GENERAL RADIOLOGY | Facility: CLINIC | Age: 79
DRG: 193 | End: 2024-03-28
Attending: INTERNAL MEDICINE
Payer: COMMERCIAL

## 2024-03-28 ENCOUNTER — APPOINTMENT (OUTPATIENT)
Dept: OCCUPATIONAL THERAPY | Facility: CLINIC | Age: 79
DRG: 193 | End: 2024-03-28
Payer: COMMERCIAL

## 2024-03-28 LAB
ANION GAP SERPL CALCULATED.3IONS-SCNC: 9 MMOL/L (ref 7–15)
BUN SERPL-MCNC: 17.8 MG/DL (ref 8–23)
CALCIUM SERPL-MCNC: 8.3 MG/DL (ref 8.8–10.2)
CHLORIDE SERPL-SCNC: 104 MMOL/L (ref 98–107)
CREAT SERPL-MCNC: 0.77 MG/DL (ref 0.67–1.17)
DEPRECATED HCO3 PLAS-SCNC: 25 MMOL/L (ref 22–29)
EGFRCR SERPLBLD CKD-EPI 2021: 90 ML/MIN/1.73M2
ERYTHROCYTE [DISTWIDTH] IN BLOOD BY AUTOMATED COUNT: 13.2 % (ref 10–15)
GLUCOSE BLDC GLUCOMTR-MCNC: 153 MG/DL (ref 70–99)
GLUCOSE BLDC GLUCOMTR-MCNC: 163 MG/DL (ref 70–99)
GLUCOSE BLDC GLUCOMTR-MCNC: 234 MG/DL (ref 70–99)
GLUCOSE BLDC GLUCOMTR-MCNC: 239 MG/DL (ref 70–99)
GLUCOSE SERPL-MCNC: 155 MG/DL (ref 70–99)
HCT VFR BLD AUTO: 34.1 % (ref 40–53)
HGB BLD-MCNC: 11.7 G/DL (ref 13.3–17.7)
LACTATE SERPL-SCNC: 0.9 MMOL/L (ref 0.7–2)
MCH RBC QN AUTO: 29.9 PG (ref 26.5–33)
MCHC RBC AUTO-ENTMCNC: 34.3 G/DL (ref 31.5–36.5)
MCV RBC AUTO: 87 FL (ref 78–100)
PLATELET # BLD AUTO: 280 10E3/UL (ref 150–450)
POTASSIUM SERPL-SCNC: 3.3 MMOL/L (ref 3.4–5.3)
RBC # BLD AUTO: 3.91 10E6/UL (ref 4.4–5.9)
SODIUM SERPL-SCNC: 138 MMOL/L (ref 135–145)
WBC # BLD AUTO: 14.9 10E3/UL (ref 4–11)

## 2024-03-28 PROCEDURE — 83605 ASSAY OF LACTIC ACID: CPT | Performed by: INTERNAL MEDICINE

## 2024-03-28 PROCEDURE — 74230 X-RAY XM SWLNG FUNCJ C+: CPT | Mod: 26 | Performed by: STUDENT IN AN ORGANIZED HEALTH CARE EDUCATION/TRAINING PROGRAM

## 2024-03-28 PROCEDURE — 250N000011 HC RX IP 250 OP 636: Performed by: INTERNAL MEDICINE

## 2024-03-28 PROCEDURE — 250N000013 HC RX MED GY IP 250 OP 250 PS 637: Performed by: STUDENT IN AN ORGANIZED HEALTH CARE EDUCATION/TRAINING PROGRAM

## 2024-03-28 PROCEDURE — 74230 X-RAY XM SWLNG FUNCJ C+: CPT

## 2024-03-28 PROCEDURE — 258N000003 HC RX IP 258 OP 636: Performed by: INTERNAL MEDICINE

## 2024-03-28 PROCEDURE — 92526 ORAL FUNCTION THERAPY: CPT | Mod: GN

## 2024-03-28 PROCEDURE — 36415 COLL VENOUS BLD VENIPUNCTURE: CPT | Performed by: INTERNAL MEDICINE

## 2024-03-28 PROCEDURE — 120N000002 HC R&B MED SURG/OB UMMC

## 2024-03-28 PROCEDURE — 92611 MOTION FLUOROSCOPY/SWALLOW: CPT | Mod: GN

## 2024-03-28 PROCEDURE — 97530 THERAPEUTIC ACTIVITIES: CPT | Mod: GO

## 2024-03-28 PROCEDURE — 99233 SBSQ HOSP IP/OBS HIGH 50: CPT | Performed by: INTERNAL MEDICINE

## 2024-03-28 PROCEDURE — 250N000013 HC RX MED GY IP 250 OP 250 PS 637: Performed by: INTERNAL MEDICINE

## 2024-03-28 PROCEDURE — 97535 SELF CARE MNGMENT TRAINING: CPT | Mod: GO

## 2024-03-28 PROCEDURE — 97116 GAIT TRAINING THERAPY: CPT | Mod: GP

## 2024-03-28 PROCEDURE — 99233 SBSQ HOSP IP/OBS HIGH 50: CPT | Performed by: STUDENT IN AN ORGANIZED HEALTH CARE EDUCATION/TRAINING PROGRAM

## 2024-03-28 PROCEDURE — 97530 THERAPEUTIC ACTIVITIES: CPT | Mod: GP

## 2024-03-28 PROCEDURE — 250N000011 HC RX IP 250 OP 636

## 2024-03-28 PROCEDURE — 80048 BASIC METABOLIC PNL TOTAL CA: CPT | Performed by: INTERNAL MEDICINE

## 2024-03-28 PROCEDURE — 85027 COMPLETE CBC AUTOMATED: CPT | Performed by: INTERNAL MEDICINE

## 2024-03-28 PROCEDURE — 258N000003 HC RX IP 258 OP 636

## 2024-03-28 RX ORDER — POTASSIUM CHLORIDE 1500 MG/1
40 TABLET, EXTENDED RELEASE ORAL ONCE
Status: COMPLETED | OUTPATIENT
Start: 2024-03-28 | End: 2024-03-28

## 2024-03-28 RX ORDER — BARIUM SULFATE 400 MG/ML
SUSPENSION ORAL ONCE
Status: COMPLETED | OUTPATIENT
Start: 2024-03-28 | End: 2024-03-28

## 2024-03-28 RX ADMIN — VANCOMYCIN HYDROCHLORIDE 1500 MG: 10 INJECTION, POWDER, LYOPHILIZED, FOR SOLUTION INTRAVENOUS at 09:37

## 2024-03-28 RX ADMIN — Medication 1 TABLET: at 19:39

## 2024-03-28 RX ADMIN — POLYMYXIN B SULFATE AND TRIMETHOPRIM 2 DROP: 10000; 1 SOLUTION OPHTHALMIC at 17:20

## 2024-03-28 RX ADMIN — POLYMYXIN B SULFATE AND TRIMETHOPRIM 2 DROP: 10000; 1 SOLUTION OPHTHALMIC at 09:23

## 2024-03-28 RX ADMIN — TRIAMCINOLONE ACETONIDE: 5 OINTMENT TOPICAL at 19:40

## 2024-03-28 RX ADMIN — TRIAMCINOLONE ACETONIDE: 5 OINTMENT TOPICAL at 09:47

## 2024-03-28 RX ADMIN — POLYMYXIN B SULFATE AND TRIMETHOPRIM 2 DROP: 10000; 1 SOLUTION OPHTHALMIC at 13:19

## 2024-03-28 RX ADMIN — LOSARTAN POTASSIUM 25 MG: 25 TABLET, FILM COATED ORAL at 09:20

## 2024-03-28 RX ADMIN — INSULIN HUMAN 5 UNITS: 100 INJECTION, SUSPENSION SUBCUTANEOUS at 17:20

## 2024-03-28 RX ADMIN — Medication 1 TABLET: at 09:24

## 2024-03-28 RX ADMIN — AZITHROMYCIN MONOHYDRATE 500 MG: 500 INJECTION, POWDER, LYOPHILIZED, FOR SOLUTION INTRAVENOUS at 22:15

## 2024-03-28 RX ADMIN — ACETAMINOPHEN 650 MG: 325 TABLET, FILM COATED ORAL at 22:15

## 2024-03-28 RX ADMIN — POTASSIUM CHLORIDE 40 MEQ: 1500 TABLET, EXTENDED RELEASE ORAL at 11:33

## 2024-03-28 RX ADMIN — SERTRALINE HYDROCHLORIDE 100 MG: 100 TABLET ORAL at 09:20

## 2024-03-28 RX ADMIN — INSULIN HUMAN 5 UNITS: 100 INJECTION, SUSPENSION SUBCUTANEOUS at 09:15

## 2024-03-28 RX ADMIN — CEFTRIAXONE SODIUM 2 G: 2 INJECTION, POWDER, FOR SOLUTION INTRAMUSCULAR; INTRAVENOUS at 21:30

## 2024-03-28 RX ADMIN — POLYMYXIN B SULFATE AND TRIMETHOPRIM 2 DROP: 10000; 1 SOLUTION OPHTHALMIC at 21:17

## 2024-03-28 RX ADMIN — ATORVASTATIN CALCIUM 40 MG: 40 TABLET, FILM COATED ORAL at 09:20

## 2024-03-28 RX ADMIN — METFORMIN HYDROCHLORIDE 500 MG: 500 TABLET, EXTENDED RELEASE ORAL at 09:20

## 2024-03-28 RX ADMIN — BARIUM SULFATE 5 ML: 400 SUSPENSION ORAL at 15:34

## 2024-03-28 RX ADMIN — ASPIRIN 81 MG: 81 TABLET, COATED ORAL at 09:20

## 2024-03-28 RX ADMIN — ACETAMINOPHEN 650 MG: 325 TABLET, FILM COATED ORAL at 02:13

## 2024-03-28 RX ADMIN — GLIPIZIDE 5 MG: 2.5 TABLET, FILM COATED, EXTENDED RELEASE ORAL at 09:19

## 2024-03-28 RX ADMIN — ENOXAPARIN SODIUM 40 MG: 40 INJECTION SUBCUTANEOUS at 09:24

## 2024-03-28 RX ADMIN — METFORMIN HYDROCHLORIDE 1000 MG: 500 TABLET, EXTENDED RELEASE ORAL at 17:15

## 2024-03-28 ASSESSMENT — ACTIVITIES OF DAILY LIVING (ADL)
ADLS_ACUITY_SCORE: 46

## 2024-03-28 NOTE — PROGRESS NOTES
"Speech-Language Pathology: Video Swallow Study     03/28/24 1500   Appointment Info   Signing Clinician's Name / Credentials (SLP) Rosa Dent MS, CCC-SLP   General Information   Onset of Illness/Injury or Date of Surgery 03/25/24   Referring Physician Ryne Lim MD   Pertinent History of Current Problem Per chart review, \"Gerber Levine is a 79 year old male admitted on 3/25/2024. He has a h/o T2DM, HTN, hearing loss wears hearing aid, hereditary spherocytosis and depression.  He lives with his wife.  He was hospitalized at  12/26 - 12/27/23 for acute metabolic encephalopathy due to COVID-19 infection +/- side effect of sertraline.  Stroke workup was negative at that time.  He was brought to [facility] ED today by his wife and daughter for evaluation of confusion and generalized weakness.\" Clinical swallow evaluation completed per MD order.   General Observations Alert and cooperative. Pt in swallow chair, agreeable to evaluation.   Type of Evaluation   Type of Evaluation Swallow Evaluation   General Swallowing Observations   Past History of Dysphagia None per patient report. None per cursory review of EMR. CSE completed 3/26/24 with no overt s/s of aspiration.   Comment, General Swallowing Observations RN endorses good tolerance of diet tolerance.   Respiratory Support room air   Current Diet/Method of Nutritional Intake (General Swallowing Observations, NIS) regular diet;thin liquids (level 0)   Swallowing Evaluation Videofluoroscopic swallow study (VFSS)   VFSS Evaluation   Views Taken left lateral   Physical Location of Procedure [facility]   VFSS Textures Trialed thin liquids;mildly thick liquids;pureed;solid foods   VFSS Eval: Thin Liquid Texture Trial   Mode of Presentation, Thin Liquid cup;straw;self-fed   Order of Presentation 2-5, 8, 10-11   Preparatory Phase prolonged bolus preparation;poor bolus control   Oral Phase, Thin Liquid residue in oral " cavity;premature pharyngeal entry   Bolus Location When Swallow Triggered pyriforms   Pharyngeal Phase, Thin Liquid impaired tongue base retraction;residue in vallecula;residue in pyriform sinus  (mild residue)   Rosenbek's Penetration Aspiration Scale: Thin Liquid Trial Results 8 - contrast passes glottis, visible subglottic residue remains, absent patient response (aspiration)   Response to Aspiration absent response  (unproductive cued cough)   Diagnostic Statement x1 silent aspiration via straw with thin entering laryngeal vestibule prior to swallow initiation on first trial of liquid wash for regular solid trial (did not occur on second trial when used as liquid wash). Intermittent transient shallow penetration across all other trials. Mild vallecular & pyriform stasis.   VFSS Eval: Mildly Thick Liquids   Mode of Presentation spoon;fed by clinician   Order of Presentation 1   Preparatory Phase prolonged bolus preparation;poor bolus control   Oral Phase residue in oral cavity;premature pharyngeal entry   Bolus Location When Swallow Triggered valleculae   Pharyngeal Phase impaired tongue base retraction;residue in vallecula;residue in pyriform sinus  (mild residue)   Rosenbek's Penetration Aspiration Scale 1 - no aspiration, contrast does not enter airway   Diagnostic Statement No aspiration or penetration. Mild vallecular & pyriform stasis.   VFSS Evaluation: Puree Solid Texture Trial   Mode of Presentation, Puree spoon;self-fed   Order of Presentation 6   Preparatory Phase prolonged bolus preparation   Oral Phase, Puree residue in oral cavity;premature pharyngeal entry   Bolus Location When Swallow Triggered valleculae   Pharyngeal Phase, Puree impaired tongue base retraction;residue in vallecula   Rosenbek's Penetration Aspiration Scale: Puree Food Trial Results 1 - no aspiration, contrast does not enter airway   Diagnostic Statement No aspiration or penetration. Mild vallecular residue.   VFSS Evaluation:  Solid Food Texture Trial   Mode of Presentation, Solid fed by clinician   Order of Presentation 7, 9   Preparatory Phase prolonged bolus preparation;WFL   Oral Phase, Solid residue in oral cavity;premature pharyngeal entry   Bolus Location When Swallow Triggered valleculae   Pharyngeal Phase, Solid impaired tongue base retraction;residue in vallecula;residue in pyriform sinus   Rosenbek's Penetration Aspiration Scale: Solid Food Trial Results 1 - no aspiration, contrast does not enter airway   Strategies and Compensations liquid wash   Diagnostic Statement No aspiration or penetration. Mild vallecular & pyriform residue.   Esophageal Phase of Swallow   Patient reports or presents with symptoms of esophageal dysphagia No   Swallowing Recommendations   Diet Consistency Recommendations regular diet;thin liquids (level 0)   Supervision Level for Intake distant supervision needed   Mode of Delivery Recommendations bolus size, small;slow rate of intake   Postural Recommendations none   Monitoring/Assistance Required (Eating/Swallowing) stop eating activities when fatigue is present   Recommended Feeding/Eating Techniques (Swallow Eval) maintain upright sitting position for eating;minimize distractions during oral intake   Medication Administration Recommendations, Swallowing (SLP) as tolerated with single sip of thin or in puree   General Therapy Interventions   Planned Therapy Interventions Dysphagia Treatment   Dysphagia treatment Instruction of safe swallow strategies;Compensatory strategies for swallowing   Clinical Impression   Criteria for Skilled Therapeutic Interventions Met (SLP Eval) Yes, treatment indicated   SLP Diagnosis Oropharyngeal dysphagia   Risks & Benefits of therapy have been explained evaluation/treatment results reviewed;care plan/treatment goals reviewed;risks/benefits reviewed;current/potential barriers reviewed;participants voiced agreement with care plan;participants included;patient   Clinical  Impression Comments Videofluoroscopic Swallow Study completed per MD orders. Patient had silent aspiration with thin liquid via straw with bolus entering laryngeal vestibule prior to swallow initiation on first trial of liquid wash for regular solid trial. No other aspiration observed during evaluation across all other trials. Shallow transient penetration with thin liquids. Oral phase is c/b prolonged bolus preparation and premature pharyngeal entry. Tongue base retraction was minimally reduced, but functional. Swallow response was delayed to the pyriforms for thin liquid and to the vallecula for mildly thickened liquids & solid textures. Epiglottic inversion was delayed, but complete.  Mild stasis occurred with all textures trialed. Hyolaryngeal elevation and hyolaryngeal excursion were WNL.  Mastication prolonged, but functional, and complete. Cricopharyngeus was notable for possible CP bar, but allowed all textures to easily pass. Recommend pt continues regular diet & thin liquids with close check in supervision. Patient should be sitting fully upright and alert, take small single bites/sips, and eat at a slow rate. SLP will continue to follow for dysphagia management.   SLP Total Evaluation Time   Evaluation, videofluoroscopic eval of swallow function Minutes (49107) 10   SLP Discharge Planning   SLP Plan 5x/wk; review VFSS results, f/u with use of single small sips when using straw (if pt unable to follow through, suggest NO STRAWS), train swallowing strats   SLP Discharge Recommendation home with assist   SLP Rationale for DC Rec Pt warrents further training for swallow strategies d/t oropharyngeal dysphagia. Suspect pt will meet swallowing goals prior to d/c.   SLP Brief overview of current status  Recommend pt continues regular diet & thin liquids with close check in supervision. Patient should be sitting fully upright and alert, take small single bites/sips, eat at a slow rate, and chew foods thoroughly.  SLP will continue to follow for dysphagia management.

## 2024-03-28 NOTE — PROGRESS NOTES
"  [facility] GENERAL INFECTIOUS DISEASE PROGRESS NOTE     Patient:  Gerber Levine   Date of birth 1945, Medical record number [id number]  Date of Visit:  03/28/2024  Date of Admission: 3/25/2024  Consult Requester:Reshma Vera MD          Assessment and Plan:   ID Problem List  Community acquired pneumonia  Fever, leukocytosis   Bilateral conjunctivitis- on drops  Asplenia   Diabetes mellitus type II     Recommendations:  Continue current antibiotics:  Azithromycin for atypical coverage x3 days (3/26 - 3/28). Today is last day, then discontinue.  IV Ceftriaxone to 2g q24hrs for ongoing treatment of pneumonia.   IV Vancomycin (pharmacy to dose) for now  Follow up pending workup:  Urine histo and blasto antigens  Blood cultures  Continue eyedrops bilaterally for conjunctivitis    Assessment:  Gerber Levine is a 79 year old male who was admitted for confusion and gait instability at home over the past few days.      Afebrile on admission but then spiked fever to 102.4F. Otherwise normal hemodynamics. Leukocytosis to 19K, lactate wnl, PCT <0.5. UA was unremarkable for infection. BCx 3/25 and  3/27 NGTD. RVP and COVID negative. CXR with patchy opacities throughout RLL c/f pneumonia. However, no significant productive cough. CT chest with \"new ggo and consolidative opacities with interlobular septal thickening in R upper and middle lobes\"- c/f infection. He has been on 1-3L NC. No peripheral neuropathy, previous B12 wnl.     Clinical presentation consistent with community acquired pneumonia, being empirically treated with azithromycin and ceftriaxone. Given ongoing fever and recent COVID infection, added IV vancomycin as well. He is at risk for post-COVID bacterial vs fungal infection. Negative infectious workup includes: negative urine strep and legionella Ag, MRSA nares, HIV, and Treponema Ab. Urine Histo and Blasto antigens are pending. Blood cultures NGTD.    Fever curve and leukocytosis downtrending, " "now with mildly productive cough but no sputum culture obtained. Mentals status and weakness much improved. Undergoing speech/aspiration evaluation. He also was found to have initially left bacterial conjunctivitis, now bilateral likely from self-spread. No orbital concerns. Continue topical polymixin drops. Please continue azithromycin x3 days for CAP. Continue ceftriaxone and vancomycin for now.     ID will continue to follow with you. Recommendations discussed with primary team.    Lurdes Coleman PA-C  Infectious Diseases  Pager #[phone] or Vocera    50 MINUTES SPENT BY ME on the date of service doing chart review, history, exam, documentation & further activities per the note.           Interim History and Events:     Tmax 100.3F. Leukocytosis improving. Remains hemodynamically stable, on 3 --> 1L NC. Eyes are less red, though still crusty bilaterally and itchy. No vision changes, headache, or eye pain. He reports feeling better today - weakness and confusion improving per wife to near baseline. He did have episode of blood tinged sputum today, limited sputum. Dyspnea improving.    Urine strep and legionella Ag negative  MRSA nares negative  HIV negative  Treponema Ab negative    Urine Histo and Blasto Ag - pending  Blood cultures NGTD.         HPI: per ID consult dated 3/27/24 by Citlali Noriega PA-C     \"Gerber Levine is a 79 year old male with PMHx significant for diabetes mellitus type II, hypertension, hereditary spherocytosis s/p splenectomy (1976), s/p R chest wall histiocytoma resection (2016) and depression who presented to the ED 3/25 with confusion and instability.      On chart review, the patient was noted to be more confused over the past 4-5 days PTA. Additionally, noted to be more unstable on his feet. Usually active in walking 3 miles daily which he did 2 days PTA. Patient endorsed chills. Denied fevers, urinary symptoms, diarrhea, SOB, headache, neck stiffness or other symptom complaints. " "     Wife provides most of history. Patient defers history to her for reason why he was admitted. He states he feels \"pretty good\" and offered no complaints. It was a bit unclear on acute change in mental status from patient's last 6 months for which patient's wife said he has had some decreased cognition. She notes he never really returned to his baseline after COVID infection 3 months ago. Wife recently finished course of antibiotics for sinus infection. She notes the patient has been coughing at home over the past week. He denies sputum production.      They live in [place], [place] in a home. No pets. No recent travel. Last left the state about 1 year ago and traveled to [place]. Denies travel to [place] or internationally. Patient does not walk by bodies of water locally. He does do his own yard work/leaf clearing but denied clearing any dead/moldy leaves/wood from his yard. \"           ROS:   -Focused 5 point ROS completed, pertinent positives and negatives listed above.      Physical Examination:  Temp: 99.6  F (37.6  C) Temp src: Oral BP: 125/66 Pulse: 72   Resp: 18 SpO2: 92 % O2 Device: Nasal cannula Oxygen Delivery: 1 LPM    Vitals:    03/26/24 0005   Weight: 75.6 kg (166 lb 10.7 oz)       Constitutional: Pleasant adult male seen sitting in chair, in NAD. Alert and interactive.   HEENT: NCAT, anicteric sclerae, conjunctival erythema improving, yellow crusting L > R. Moist mucous membranes without lesions or thrush. Dentition intact/well cared for.  Respiratory: Non-labored breathing, good air exchange on 1L NC. Lungs with rales in RLL, without wheezing. Left clear. No cough noted.   Cardiovascular: Regular rate and rhythm with no murmur, rub or gallop.  GI: Abdomen is soft, non-distended, and non-tender to palpation. No rigidity or guarding.  Skin: Warm and dry. No rashes or lesions on exposed surfaces.  Musculoskeletal: Extremities grossly normal. No tenderness or edema present. No peripheral " "neuropathy.  Neurologic: A &O x3, speech normal, answering questions appropriately. Moves all extremities spontaneously. Grossly non-focal.  Neuropsychiatric: Mentation and affect normal/appropriate.  VAD: PIV is c/d/i with no erythema, drainage, or tenderness.      Medications:   aspirin  81 mg Oral Daily    atorvastatin  40 mg Oral Daily    azithromycin  500 mg Intravenous Q24H    calcium citrate-vitamin D  1 tablet Oral BID    cefTRIAXone  2 g Intravenous Q24H    enoxaparin ANTICOAGULANT  40 mg Subcutaneous Q24H    glipiZIDE  5 mg Oral Daily with breakfast    insulin aspart  1-7 Units Subcutaneous TID AC    insulin aspart  1-5 Units Subcutaneous At Bedtime    insulin NPH  5 Units Subcutaneous BID AC    losartan  25 mg Oral Daily    metFORMIN  1,000 mg Oral Daily with supper    metFORMIN  500 mg Oral QAM    polymixin b-trimethoprim  2 drop Both Eyes 4x Daily    sertraline  100 mg Oral Daily    sodium chloride (PF)  3 mL Intracatheter Q8H    triamcinolone   Topical BID    vancomycin  1,500 mg Intravenous Q24H       Infusions/Drips:      Laboratory Data:   No results found for: \"ACD4\"    Inflammatory Markers    No lab results found.    Metabolic Studies       Recent Labs   Lab Test 03/28/24  0751 03/28/24  0740 03/28/24  0223 03/27/24  2213 03/27/24  1705 03/27/24  1146 03/27/24  0952 03/27/24  0710 03/27/24  0627 03/25/24  2346 03/25/24  1739 02/02/24  0934 02/02/24  0934 02/02/24  0919 12/27/23  0813 12/27/23  0802 12/26/23  0838 12/26/23  0745 12/26/23  0246 12/25/23  2035 12/25/23  2033 12/25/23  2029 05/05/23  1000 01/18/23  0744   NA  --  138  --   --   --   --   --   --  136  --  137  --  136  --   --   --   --   --   --  140  --  137  --  140   POTASSIUM  --  3.3*  --   --   --   --   --   --  3.4  --  3.9  --  4.3  --   --  3.8  --  4.5  --  4.1  --  4.1  --  4.5   CHLORIDE  --  104  --   --   --   --   --   --  103  --  98  --  101  --   --   --   --   --   --   --   --  101  --  104   CO2  --  25  --   -- "   --   --   --   --  22  --  29  --  27  --   --   --   --   --   --   --   --  29  --  25   ANIONGAP  --  9  --   --   --   --   --   --  11  --  10  --  8  --   --   --   --   --   --   --   --  7  --  11   BUN  --  17.8  --   --   --   --   --   --  21.3  --  17.5  --  21.1  --   --   --   --   --   --   --   --  30.7*  --  27.4*   CR  --  0.77  --   --   --   --   --   --  0.83  --  0.80  --  0.78 0.8  --   --   --   --   --   --  1.0 1.00   < > 0.82   GFRESTIMATED  --  90  --   --   --   --   --   --  88  --  89  --  90 >60  --   --   --   --   --   --  >60 76   < > 89   * 155*  --  186* 221* 313*  --  210* 220*   < > 277*  --  180*  --    < >  --    < >  --    < > 280*  --  280*   < > 151*   A1C  --   --   --   --   --   --   --   --   --   --  9.4*  --  8.7*  --   --   --   --   --   --   --   --   --    < > 7.8*   YAIN  --  8.3*  --   --   --   --   --   --  8.6*  --  9.4  --  9.2  --   --   --   --   --   --   --   --  9.4  --  9.1   MAG  --   --   --   --   --   --   --   --   --   --   --   --   --   --   --  2.4*  --  2.1  --   --   --  2.1   < >  --    LACT  --   --  0.9  --   --   --  1.4  --   --   --  1.2   < >  --   --   --   --   --   --   --   --   --   --   --   --     < > = values in this interval not displayed.       Hepatic Studies    Recent Labs   Lab Test 03/25/24  1739 12/25/23 2029 01/18/23  0744   BILITOTAL 1.1 0.5 0.9   ALKPHOS 104 123 80   ALBUMIN 3.9 4.2 4.3   AST 21 27 29   ALT 21 23 19       Pancreatitis testing    Recent Labs   Lab Test 01/18/23  0744 10/19/21  1155 10/28/20  0809 06/12/19  0822 06/06/18  0821 06/13/17  0849   TRIG 110 73 70 78 71 81       Hematology Studies      Recent Labs   Lab Test 03/28/24  0740 03/27/24  0627 03/26/24  0753 03/25/24  1739 12/25/23 2035 12/25/23 2029 01/18/23  0744   WBC 14.9* 19.4* 19.9* 16.7*  --  10.1 9.0   HGB 11.7* 12.5* 12.3* 13.1* 12.6* 13.4 14.3   HCT 34.1* 36.5* 35.3* 38.5* 37* 38.7* 42.4    246 248 283  --  281 264  "      Arterial Blood Gas Testing  No lab results found.     Urine Studies     Recent Labs   Lab Test 03/25/24  1740 12/25/23  2127   URINEPH 5.5 5.0   NITRITE Negative Negative   LEUKEST Negative Negative   WBCU 1 1       Vancomycin Levels     No lab results found.    Invalid input(s): \"VANCO\"    Tobramycin levels     No lab results found.    Gentamicin levels    No lab results found.    CSF testing   No lab results found.      Microbiology:  Culture   Date Value Ref Range Status   03/27/2024 No growth after 12 hours  Preliminary   03/27/2024 No growth after 12 hours  Preliminary   03/25/2024 No growth after 2 days  Preliminary   03/25/2024 No growth after 2 days  Preliminary       Last check of C difficile  No results found for: \"CDBPCT\"    Imaging:  CT Chest w/o Contrast  Result Date: 3/26/2024  IMPRESSION: 1. New groundglass and consolidative opacities with interlobular septal thickening in the right upper and middle lobes. Findings are favored to represent infectious/inflammatory acute interstitial pneumonia. However given the postsurgical changes of the adjacent right chest wall for resection of a soft tissue mass, pulmonary lymphatic metastases is a possibility. Recommend close attention on short-term follow-up and consider tissue biopsy if findings persist. 2. Stable scattered solid pulmonary nodules. Attention on follow-up. 3. Trace pleural effusions.    XR Chest Port 1 View  Result Date: 3/25/2024  IMPRESSION: Cardiac silhouette is within normal limits. Increased patchy opacities throughout the right lower lung concerning for pneumonia. No pleural effusion or pneumothorax. Remote postsurgical changes of the right lung and ribs.    Head CT w/o contrast  Result Date: 3/25/2024  IMPRESSION: 1.  Mild age-related changes without acute intracranial abnormality.   "

## 2024-03-28 NOTE — PROGRESS NOTES
[facility]    Medicine Progress Note - Hospitalist Service, GOLD TEAM 17    Date of Admission:  3/25/2024    Assessment & Plan   Gerber Levine is a 79 year old male admitted on 3/25/2024. He has a h/o T2DM, HTN, hearing loss wears hearing aid, hereditary spherocytosis, status post splenectomy, STM loss and depression.  He lives with his wife.  He was hospitalized at  12/26 - 12/27/23 for acute metabolic encephalopathy due to COVID-19 infection +/- side effect of sertraline.  Stroke workup was negative at that time.  He was brought to [facility] ED today by his wife and daughter for evaluation of confusion and generalized weakness.  Pt usually walks 3 miles but has been wobbly today.  He walked 3 miles 2 days ago and was able to shovel snow a bit yesterday.  Wife thinks he is confused and has declining short-term memory problem.  Daughter thinks he is depressed, not interested in things he normally enjoys.  He has chills but no fever.  No urinary or bowel symptoms.  Denies cough, SOB, HA, neck stiffness or focal weakness.  His initial vital signs were temp 98.6  F, /74, HR 80, RR 18 and 96% sat on RA.  WBC 16.7 with left shift, creatinine 0.8, glucose 277, LFTs wnl, and UA grossly negative for infection.  CT head without contrast negative for acute intracranial pathology.  Admitted for further management of pneumonia, increased confusion and weakness.     RML, RUL CAP, Asplenia:  CXR showed right lower lobe pneumonia, started on empiric IV ceftriaxone and azithromycin, to include coverage for encapsulated bacteria given asplenia.  Admission procalcitonin normal, blood cultures NGTD..  Respiratory viral panel negative.   Given recently identified nonspecific pulmonary nodules and the concerning appearance of the infiltrates on CXR, obtained HRCT 3/26, shows right upper and right middle lobe pneumonia, but given prior history of right chest wall  histiocytoma resection recommend follow-up.  Already has a repeat CT planned with oncology who monitors his history of histiocytoma and hereditary spherocytosis, planned for 10/2024.  Appetite, nutritional intake improved.  Recurrent fever, increased hypoxemia, placed on oxygen in a.m. 3/27.  WBC remained elevated at 19.4.  Repeat procalcitonin again normal.  IV vancomycin added following repeat blood cultures.    Strep pneumonia and Legionella urinary antigen negative.  HIV negative.  Treponema antibody negative.  MRSA negative.  Blood cultures no growth to date.  Patient and his wife deny any difficulty swallowing or choking with meals.  Seen by ST, bedside swallow normal, but recommended video swallow to rule out silent aspiration.  WBC trending down, fevers resolving.  Strength improving with therapy.  Oxygen weaned to 1 L.  Eating well.  -Continue IV ceftriaxone, vancomycin  -Per infectious disease, discontinue azithromycin after 3/28  -Follow-up blood culture results  -PT, OT  -Video swallow planned 3/28, rule out silent aspiration  -planned repeat chest CT 10/2024 with oncology  -Daily BMP, CBC    Hereditary spherocytosis, s/p Splenectomy ~ 1976:  Followed by heme-onc.  High risk for encapsulated bacterial infection.  -Antibiotic as above  -Follow blood culture results    Cognitive impairment with progressive STM loss, acute metabolic encephalopathy due to pneumonia:  Patient lives with his wife.  Previously underwent neuropsychology evaluation several years ago.  Wife has noted progressive short-term memory loss and increased depression and poor sleep over the past 6-7 months.  Particularly accelerated when he was hospitalized with COVID with associated metabolic encephalopathy 3 months ago.  Brain MRI 12/25/2023 showed no acute findings, with mild to moderate generalized cerebral atrophy but no hydrocephalus.  Head CT 3/26/2024 again shows mild generalized volume loss, no hydrocephalus but no acute  findings.  Wife also has noted shuffling gait, but no other parkinsonian symptoms or signs on exam, and no recent falls.  Acute encephalopathy clearing, patient has remained fully oriented and appropriate.  No behavioral concerns.  Seen by psychiatry, recommended no changes in antidepressant regimen, and placed referral for outpatient neurology consultation regarding shuffling gait.  Strength, gait improving with therapy.  -PT, OT  -Has planned neuropsychology assessment 4/24/2024  -Continue to treat underlying depression, insomnia    Depression, insomnia:  As above, wife and daughter have noticed progressive depression, difficulty sleeping for the past 6-7 months.  Has been on Zoloft.  Also with progressive STM loss as discussed above, particularly accelerated after hospitalized with COVID and encephalopathy 3 months ago.  Patient very pleasant on exam.  Acute encephalopathy resolving.  Denies suicidal ideation or intent.  Seen by psychiatry 3/26, no changes in Zoloft.   -Continue PTA Zoloft    Weakness, Gait Instability:  Very active, walks 3 miles at baseline.  Acute weakness, gait instability likely due to acute pneumonia.  Wife has noted shuffling gait, but has no other Parkinson features on exam.  Progressing with therapy.  -PT, OT    DM Type 2, uncontrolled:  A1c 9.4 on 3/25/2024, previously 8.7 on 2/2/2024.  Prior to that had been under reasonable control.  Had been hospitalized with COVID 3 months ago, now with community-acquired pneumonia likely further driving hyperglycemia.  Eating well.  Blood sugars much improved with addition of Glucotrol, NPH.  -Increased PTA metformin to XR, 500 mg every morning, 1000 mg at bedtime on 3/26  -Increased Glucotrol XL to 5 mg daily 3/28  -Started NPH 5 units twice daily 3/28, if blood sugars remain stable with treatment of pneumonia could likely discontinue in the coming days  -Continue moderate SSI    HTN, HLD:  BP stable, euvolemic on exam.  Eating well.  -Continue  PTA Cozaar with hold parameters  -Continue PTA Lipitor, ASA    Bacterial conjunctivitis:  Started on topical antibiotic PTA.  Initially involved left eye, now also spreading to the right eye.  No purulent drainage.  Injection and itching improved.  -Changed PTA polymyxin b-trimethopri to both eyes 3/26    Hearing Impaired:  Wears hearing aids.    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:  -As above, has planned repeat chest CT 10/2024 with oncology        Diet: Moderate Consistent Carb (60 g CHO per Meal) Diet    DVT Prophylaxis: Enoxaparin (Lovenox) SQ  Parks Catheter: Not present  Lines: None     Cardiac Monitoring: None  Code Status: Full Code      Clinically Significant Risk Factors        # Hypokalemia: Lowest K = 3.3 mmol/L in last 2 days, will replace as needed           # Hypertension: Noted on problem list       # DMII: A1C = 9.4 % (Ref range: <5.7 %) within past 6 months, PRESENT ON ADMISSION      # Financial/Environmental Concerns: none         Disposition Plan     Expected Discharge Date: 03/28/2024                    Ryne Lim MD  Hospitalist Service, GOLD TEAM 17  [facility]  Securely message with [employer] (more info)  Text page via [facility] Paging/Directory   See signed in provider for up to date coverage information  ______________________________________________________________________    Interval History   Companied by his wife.  Doing well, ambulating well with a rolling walker and working on stairs and therapy.  Oxygen weaned to 1 L.  Mobilizing some yellow to dark phlegm.  Denies any dyspnea with therapy on oxygen.  No chills or night sweats.  Fever curve trending down.  Eating well.  Had a large slightly loose stool this morning, none since.  No abdominal pain.  No dysuria or urgency.    Physical Exam   Vital Signs: Temp: 99.6  F (37.6  C) Temp src: Oral BP: 125/66  Pulse: 72   Resp: 18 SpO2: 94 % O2 Device: None (Room air) Oxygen Delivery: 1 LPM  Weight: 166 lbs 10.68 oz  General: Alert and oriented x 4.  Pleasant.  Speech, affect normal.  HEENT: Bilateral conjunctival injection improved, no purulent drainage.  Oropharynx clear.    Chest: Mild rales at the right greater than left bases and in the right anteriorly.  No wheezes or rhonchi.  CV: RRR.  No murmurs.  Abdomen: NABS.  Soft, nontender, nondistended.  Extremities: No edema  Neuro: CN II through XII grossly intact.  Strength 5/5 symmetrically.  No significant tremors.  No cogwheeling rigidity.  No bradykinesia.    50 MINUTES SPENT BY ME on the date of service doing chart review, history, exam, documentation & further activities per the note.      Data      CMP  Recent Labs   Lab 03/28/24  1314 03/28/24  0751 03/28/24  0740 03/27/24  2213 03/27/24  0710 03/27/24  0627 03/25/24  2346 03/25/24  1739   NA  --   --  138  --   --  136  --  137   POTASSIUM  --   --  3.3*  --   --  3.4  --  3.9   CHLORIDE  --   --  104  --   --  103  --  98   CO2  --   --  25  --   --  22  --  29   ANIONGAP  --   --  9  --   --  11  --  10   * 153* 155* 186*   < > 220*   < > 277*   BUN  --   --  17.8  --   --  21.3  --  17.5   CR  --   --  0.77  --   --  0.83  --  0.80   GFRESTIMATED  --   --  90  --   --  88  --  89   YANI  --   --  8.3*  --   --  8.6*  --  9.4   PROTTOTAL  --   --   --   --   --   --   --  7.5   ALBUMIN  --   --   --   --   --   --   --  3.9   BILITOTAL  --   --   --   --   --   --   --  1.1   ALKPHOS  --   --   --   --   --   --   --  104   AST  --   --   --   --   --   --   --  21   ALT  --   --   --   --   --   --   --  21    < > = values in this interval not displayed.     CBC  Recent Labs   Lab 03/28/24  0740 03/27/24  0627 03/26/24  0753 03/25/24  1739   WBC 14.9* 19.4* 19.9* 16.7*   RBC 3.91* 4.12* 4.02* 4.33*   HGB 11.7* 12.5* 12.3* 13.1*   HCT 34.1* 36.5* 35.3* 38.5*   MCV 87 89 88 89   MCH 29.9 30.3 30.6 30.3    MCHC 34.3 34.2 34.8 34.0   RDW 13.2 12.7 12.9 12.8    246 248 283     INR  Recent Labs   Lab 03/25/24  1739   INR 1.19*     Arterial Blood GasNo lab results found in last 7 days.Venous Blood Gas  No lab results found in last 7 days.  Hemoglobin A1C   Date Value Ref Range Status   03/25/2024 9.4 (H) <5.7 % Final     Comment:     Normal <5.7%   Prediabetes 5.7-6.4%    Diabetes 6.5% or higher     Note: Adopted from ADA consensus guidelines.   02/02/2024 8.7 (H) <5.7 % Final     Comment:     Normal <5.7%   Prediabetes 5.7-6.4%    Diabetes 6.5% or higher     Note: Adopted from ADA consensus guidelines.   10/03/2023 7.1 (H) 0.0 - 5.6 % Final     Comment:     Normal <5.7%   Prediabetes 5.7-6.4%    Diabetes 6.5% or higher     Note: Adopted from ADA consensus guidelines.   06/01/2021 7.1 (H) 0 - 5.6 % Final     Comment:     Normal <5.7% Prediabetes 5.7-6.4%  Diabetes 6.5% or higher - adopted from ADA   consensus guidelines.     02/26/2021 7.8 (H) 0 - 5.6 % Final     Comment:     Normal <5.7% Prediabetes 5.7-6.4%  Diabetes 6.5% or higher - adopted from ADA   consensus guidelines.     10/28/2020 6.8 (H) 0 - 5.6 % Final     Comment:     Normal <5.7% Prediabetes 5.7-6.4%  Diabetes 6.5% or higher - adopted from ADA   consensus guidelines.       Results for orders placed or performed during the hospital encounter of 03/25/24   Head CT w/o contrast    Narrative    EXAM: CT HEAD W/O CONTRAST  LOCATION: [facility]  DATE: 3/25/2024    INDICATION: Altered mental status  COMPARISON: MRI brain 12/25/2023  TECHNIQUE: Routine CT Head without IV contrast. Multiplanar reformats. Dose reduction techniques were used.    FINDINGS:  INTRACRANIAL CONTENTS: No intracranial hemorrhage, extraaxial collection, or mass effect.  No CT evidence of acute infarct. Normal parenchymal attenuation for age. Mild generalized volume loss. No hydrocephalus.     VISUALIZED ORBITS/SINUSES/MASTOIDS: Prior  bilateral cataract surgery. Visualized portions of the orbits are otherwise unremarkable. Mild to moderate mucosal thickening scattered about the paranasal sinuses. No middle ear or mastoid effusion.    BONES/SOFT TISSUES: No acute abnormality.      Impression    IMPRESSION:  1.  Mild age-related changes without acute intracranial abnormality.    XR Chest Port 1 View    Narrative    EXAM: XR CHEST PORT 1 VIEW  LOCATION: [facility]  DATE: 3/25/2024    INDICATION: Altered MS w  leukocytosis.  Eval for pneumonia  COMPARISON: 12/25/2023.      Impression    IMPRESSION: Cardiac silhouette is within normal limits. Increased patchy opacities throughout the right lower lung concerning for pneumonia. No pleural effusion or pneumothorax. Remote postsurgical changes of the right lung and ribs.   CT Chest w/o Contrast    Narrative    EXAMINATION: Chest CT  3/26/2024 1:10 PM    CLINICAL HISTORY: right lower lung pneumonia, h/o pulmonary nodules.  Assess for worsening nodules, lung abscess.    COMPARISON: CT to 2/2/2024.    TECHNIQUE: CT imaging obtained through the chest without contrast.  Axial, coronal, and sagittal reconstructions and axial MIP reformatted  images are reviewed.     FINDINGS:  Mediastinum: The visualized thyroid is unremarkable. The heart is  normal in size. No significant  pericardial effusion. Moderate  coronary artery calcifications. The ascending aorta and main pulmonary  artery are normal in caliber. Several prominent mediastinal lymph  nodes are increased from prior. No lymphadenopathy by size criteria.  Small hiatal hernia.    Lungs: Trace pleural effusions. No pneumothorax. Layering secretions  in the mainstem bronchi. Mild lower lobe predominant bronchial wall  thickening with scattered mucous plugging at the lung bases. Mild  bronchiectasis of the right middle and upper lobes, similar to prior.  New crazy paving pattern in the residual right upper and  right middle  lobe consisting of groundglass opacities with intralobular septal  thickening as well as several patchy airspace opacities. Peripheral  consolidative opacities in the right lower lobe, suboptimally  evaluated due to respiratory motion artifact.  Multiple pulmonary nodules are not significantly changed, for example:  - Stable solid nodule in the right lower lobe measuring 15 x 9 mm  (series 4 image 172) with satellite nodularity, new since 2021.   - Stable 8 mm solid nodule at the right lower lobe lung base (series 4  image 228), but slowly increasing from 2019.    Bones and soft tissues: Stable postsurgical changes of partial right  chest wall resection with mesh reconstruction and rib osteotomies.    Upper Abdomen: Limited evaluation of the upper abdomen. Partially  visualized large right exophytic renal cyst and small left renal sinus  cysts.      Impression    IMPRESSION:   1. New groundglass and consolidative opacities with interlobular  septal thickening in the right upper and middle lobes. Findings are  favored to represent infectious/inflammatory acute interstitial  pneumonia. However given the postsurgical changes of the adjacent  right chest wall for resection of a soft tissue mass, pulmonary  lymphatic metastases is a possibility. Recommend close attention on  short-term follow-up and consider tissue biopsy if findings persist.  2. Stable scattered solid pulmonary nodules. Attention on follow-up.  3. Trace pleural effusions.    I have personally reviewed the examination and initial interpretation  and I agree with the findings.    TIM BALTAZAR MD         SYSTEM ID:  [id number]

## 2024-03-28 NOTE — PROGRESS NOTES
1900 - 0700      VS: Temp: 98.7  F (37.1  C) Temp src: Oral BP: 133/68 Pulse: 84   Resp: 18 SpO2: 91 % O2 Device: Nasal cannula Oxygen Delivery: 3 LPM   O2: Denies chest pain and SOB.    Output: Voids spontaneously without difficulty to bathroom    Last BM: Incontinent of bladder   Activity: Up with SBA with GB and walker   Skin: Visible skin intact   R AC - Redness   Pain: denies   CMS: AO x 3, disoriented to situation  able to make needs known.   Dressing: N/a   Diet: Moderate Consistent Carb (60 g CHO per Meal)   BG check at bedtime 186,    LDA: L PIV SL    Equipment: IV pole, personal belongings,    Plan: Continue with plan of care.   Call light within reach  Bed alarm on   Additional Info: > Triggered sepsis protocol, provider notified. No intervention.   > No other acute event during shift

## 2024-03-28 NOTE — PROGRESS NOTES
oal Outcome Evaluation:       Plan of Care Reviewed With: patient, spouse     Overall Patient Progress: no changeOverall Patient Progress: no change      Discharge Plan: Home when medically stable      S:     /73 (BP Location: Right arm)   Pulse 74   Temp 98.6  F (37  C) (Oral)   Resp 18   Wt 75.6 kg (166 lb 10.7 oz)   SpO2 92%   BMI 22.29 kg/m       O2: Sating >93% on RA ( desats to 88% on RA while in bed, 92-93% RA  while sitting and with activity), denies SOB/Chest pain. Afebrile this shift.  Denies N/V. LS clear.    Output: Voids spontaneously in bathroom   Last BM: 3/27, bowel sounds normoactive x4   Activity: Up with SBA, W/GB   Up for meals? Yes   Skin: All visible skin intact.    Pain: Denied pain   CMS: AO x3, disoriented to situation.  denies N/T   Dressing: None   Diet: Moderate carb diet with SSI   LDA: L.PIV/SL   Equipment:  IV pole, personal belongings   Plan: Call light within reach, bed in a low position. Able to make needs known. Continue to monitor with POC.   Additional Info:

## 2024-03-29 ENCOUNTER — APPOINTMENT (OUTPATIENT)
Dept: PHYSICAL THERAPY | Facility: CLINIC | Age: 79
DRG: 193 | End: 2024-03-29
Payer: COMMERCIAL

## 2024-03-29 ENCOUNTER — APPOINTMENT (OUTPATIENT)
Dept: SPEECH THERAPY | Facility: CLINIC | Age: 79
DRG: 193 | End: 2024-03-29
Payer: COMMERCIAL

## 2024-03-29 ENCOUNTER — APPOINTMENT (OUTPATIENT)
Dept: GENERAL RADIOLOGY | Facility: CLINIC | Age: 79
DRG: 193 | End: 2024-03-29
Attending: INTERNAL MEDICINE
Payer: COMMERCIAL

## 2024-03-29 LAB
ANION GAP SERPL CALCULATED.3IONS-SCNC: 10 MMOL/L (ref 7–15)
BUN SERPL-MCNC: 15.1 MG/DL (ref 8–23)
CALCIUM SERPL-MCNC: 8.7 MG/DL (ref 8.8–10.2)
CHLORIDE SERPL-SCNC: 101 MMOL/L (ref 98–107)
CREAT SERPL-MCNC: 0.7 MG/DL (ref 0.67–1.17)
DEPRECATED HCO3 PLAS-SCNC: 25 MMOL/L (ref 22–29)
EGFRCR SERPLBLD CKD-EPI 2021: >90 ML/MIN/1.73M2
ERYTHROCYTE [DISTWIDTH] IN BLOOD BY AUTOMATED COUNT: 13.2 % (ref 10–15)
GLUCOSE BLDC GLUCOMTR-MCNC: 128 MG/DL (ref 70–99)
GLUCOSE BLDC GLUCOMTR-MCNC: 176 MG/DL (ref 70–99)
GLUCOSE BLDC GLUCOMTR-MCNC: 182 MG/DL (ref 70–99)
GLUCOSE BLDC GLUCOMTR-MCNC: 205 MG/DL (ref 70–99)
GLUCOSE SERPL-MCNC: 122 MG/DL (ref 70–99)
HCT VFR BLD AUTO: 35.8 % (ref 40–53)
HGB BLD-MCNC: 12.4 G/DL (ref 13.3–17.7)
MCH RBC QN AUTO: 30.2 PG (ref 26.5–33)
MCHC RBC AUTO-ENTMCNC: 34.6 G/DL (ref 31.5–36.5)
MCV RBC AUTO: 87 FL (ref 78–100)
NT-PROBNP SERPL-MCNC: 2124 PG/ML (ref 0–1800)
PLATELET # BLD AUTO: 315 10E3/UL (ref 150–450)
POTASSIUM SERPL-SCNC: 3.5 MMOL/L (ref 3.4–5.3)
RBC # BLD AUTO: 4.1 10E6/UL (ref 4.4–5.9)
SODIUM SERPL-SCNC: 136 MMOL/L (ref 135–145)
VANCOMYCIN SERPL-MCNC: 5.6 UG/ML
WBC # BLD AUTO: 14.2 10E3/UL (ref 4–11)

## 2024-03-29 PROCEDURE — 99233 SBSQ HOSP IP/OBS HIGH 50: CPT | Performed by: STUDENT IN AN ORGANIZED HEALTH CARE EDUCATION/TRAINING PROGRAM

## 2024-03-29 PROCEDURE — 97530 THERAPEUTIC ACTIVITIES: CPT | Mod: GP

## 2024-03-29 PROCEDURE — 97116 GAIT TRAINING THERAPY: CPT | Mod: GP

## 2024-03-29 PROCEDURE — 80048 BASIC METABOLIC PNL TOTAL CA: CPT | Performed by: INTERNAL MEDICINE

## 2024-03-29 PROCEDURE — 250N000011 HC RX IP 250 OP 636: Performed by: INTERNAL MEDICINE

## 2024-03-29 PROCEDURE — 71046 X-RAY EXAM CHEST 2 VIEWS: CPT | Mod: 26 | Performed by: RADIOLOGY

## 2024-03-29 PROCEDURE — 99233 SBSQ HOSP IP/OBS HIGH 50: CPT | Performed by: INTERNAL MEDICINE

## 2024-03-29 PROCEDURE — 120N000002 HC R&B MED SURG/OB UMMC

## 2024-03-29 PROCEDURE — 250N000013 HC RX MED GY IP 250 OP 250 PS 637: Performed by: INTERNAL MEDICINE

## 2024-03-29 PROCEDURE — 80202 ASSAY OF VANCOMYCIN: CPT | Performed by: INTERNAL MEDICINE

## 2024-03-29 PROCEDURE — 258N000003 HC RX IP 258 OP 636: Performed by: INTERNAL MEDICINE

## 2024-03-29 PROCEDURE — 92526 ORAL FUNCTION THERAPY: CPT | Mod: GN | Performed by: SPEECH-LANGUAGE PATHOLOGIST

## 2024-03-29 PROCEDURE — 85014 HEMATOCRIT: CPT | Performed by: INTERNAL MEDICINE

## 2024-03-29 PROCEDURE — 83880 ASSAY OF NATRIURETIC PEPTIDE: CPT | Performed by: INTERNAL MEDICINE

## 2024-03-29 PROCEDURE — 36415 COLL VENOUS BLD VENIPUNCTURE: CPT | Performed by: INTERNAL MEDICINE

## 2024-03-29 PROCEDURE — 71046 X-RAY EXAM CHEST 2 VIEWS: CPT

## 2024-03-29 RX ORDER — FUROSEMIDE 10 MG/ML
20 INJECTION INTRAMUSCULAR; INTRAVENOUS ONCE
Status: COMPLETED | OUTPATIENT
Start: 2024-03-29 | End: 2024-03-29

## 2024-03-29 RX ORDER — POTASSIUM CHLORIDE 1500 MG/1
40 TABLET, EXTENDED RELEASE ORAL ONCE
Status: COMPLETED | OUTPATIENT
Start: 2024-03-29 | End: 2024-03-29

## 2024-03-29 RX ORDER — FUROSEMIDE 20 MG
20 TABLET ORAL DAILY
Status: DISCONTINUED | OUTPATIENT
Start: 2024-03-30 | End: 2024-03-30

## 2024-03-29 RX ORDER — POTASSIUM CHLORIDE 1500 MG/1
20 TABLET, EXTENDED RELEASE ORAL DAILY
Status: DISCONTINUED | OUTPATIENT
Start: 2024-03-30 | End: 2024-04-01

## 2024-03-29 RX ADMIN — GLIPIZIDE 5 MG: 2.5 TABLET, FILM COATED, EXTENDED RELEASE ORAL at 08:08

## 2024-03-29 RX ADMIN — POLYMYXIN B SULFATE AND TRIMETHOPRIM 2 DROP: 10000; 1 SOLUTION OPHTHALMIC at 13:04

## 2024-03-29 RX ADMIN — POLYMYXIN B SULFATE AND TRIMETHOPRIM 2 DROP: 10000; 1 SOLUTION OPHTHALMIC at 08:09

## 2024-03-29 RX ADMIN — Medication 1 TABLET: at 20:21

## 2024-03-29 RX ADMIN — POTASSIUM CHLORIDE 40 MEQ: 1500 TABLET, EXTENDED RELEASE ORAL at 16:00

## 2024-03-29 RX ADMIN — TRIAMCINOLONE ACETONIDE: 5 OINTMENT TOPICAL at 20:21

## 2024-03-29 RX ADMIN — POLYMYXIN B SULFATE AND TRIMETHOPRIM 2 DROP: 10000; 1 SOLUTION OPHTHALMIC at 16:01

## 2024-03-29 RX ADMIN — CEFTRIAXONE SODIUM 2 G: 2 INJECTION, POWDER, FOR SOLUTION INTRAMUSCULAR; INTRAVENOUS at 22:24

## 2024-03-29 RX ADMIN — FUROSEMIDE 20 MG: 10 INJECTION, SOLUTION INTRAMUSCULAR; INTRAVENOUS at 16:00

## 2024-03-29 RX ADMIN — METFORMIN HYDROCHLORIDE 500 MG: 500 TABLET, EXTENDED RELEASE ORAL at 08:09

## 2024-03-29 RX ADMIN — POLYMYXIN B SULFATE AND TRIMETHOPRIM 2 DROP: 10000; 1 SOLUTION OPHTHALMIC at 21:16

## 2024-03-29 RX ADMIN — Medication 1 TABLET: at 08:08

## 2024-03-29 RX ADMIN — SERTRALINE HYDROCHLORIDE 100 MG: 100 TABLET ORAL at 08:08

## 2024-03-29 RX ADMIN — METFORMIN HYDROCHLORIDE 1000 MG: 500 TABLET, EXTENDED RELEASE ORAL at 17:42

## 2024-03-29 RX ADMIN — TRIAMCINOLONE ACETONIDE: 5 OINTMENT TOPICAL at 08:09

## 2024-03-29 RX ADMIN — ASPIRIN 81 MG: 81 TABLET, COATED ORAL at 08:08

## 2024-03-29 RX ADMIN — LOSARTAN POTASSIUM 25 MG: 25 TABLET, FILM COATED ORAL at 08:08

## 2024-03-29 RX ADMIN — INSULIN HUMAN 5 UNITS: 100 INJECTION, SUSPENSION SUBCUTANEOUS at 08:12

## 2024-03-29 RX ADMIN — VANCOMYCIN HYDROCHLORIDE 1250 MG: 10 INJECTION, POWDER, LYOPHILIZED, FOR SOLUTION INTRAVENOUS at 20:21

## 2024-03-29 RX ADMIN — VANCOMYCIN HYDROCHLORIDE 1250 MG: 10 INJECTION, POWDER, LYOPHILIZED, FOR SOLUTION INTRAVENOUS at 08:43

## 2024-03-29 RX ADMIN — ATORVASTATIN CALCIUM 40 MG: 40 TABLET, FILM COATED ORAL at 08:09

## 2024-03-29 RX ADMIN — ENOXAPARIN SODIUM 40 MG: 40 INJECTION SUBCUTANEOUS at 08:09

## 2024-03-29 ASSESSMENT — ACTIVITIES OF DAILY LIVING (ADL)
ADLS_ACUITY_SCORE: 46
ADLS_ACUITY_SCORE: 45
ADLS_ACUITY_SCORE: 46

## 2024-03-29 NOTE — PROGRESS NOTES
[facility]    Medicine Progress Note - Hospitalist Service, GOLD TEAM 17    Date of Admission:  3/25/2024    Assessment & Plan   Gerber Levine is a 79 year old male admitted on 3/25/2024. He has a h/o T2DM, HTN, hearing loss wears hearing aid, hereditary spherocytosis, status post splenectomy, STM loss and depression.  He lives with his wife.  He was hospitalized at  12/26 - 12/27/23 for acute metabolic encephalopathy due to COVID-19 infection +/- side effect of sertraline.  Stroke workup was negative at that time.  He was brought to [facility] ED today by his wife and daughter for evaluation of confusion and generalized weakness.  Pt usually walks 3 miles but has been wobbly today.  He walked 3 miles 2 days ago and was able to shovel snow a bit yesterday.  Wife thinks he is confused and has declining short-term memory problem.  Daughter thinks he is depressed, not interested in things he normally enjoys.  He has chills but no fever.  No urinary or bowel symptoms.  Denies cough, SOB, HA, neck stiffness or focal weakness.  His initial vital signs were temp 98.6  F, /74, HR 80, RR 18 and 96% sat on RA.  WBC 16.7 with left shift, creatinine 0.8, glucose 277, LFTs wnl, and UA grossly negative for infection.  CT head without contrast negative for acute intracranial pathology.  Admitted for further management of pneumonia, increased confusion and weakness.     RML, RUL CAP, Asplenia:  CXR showed right lower lobe pneumonia, started on empiric IV ceftriaxone and azithromycin, to include coverage for encapsulated bacteria given asplenia.  Admission procalcitonin normal, blood cultures NGTD..  Respiratory viral panel negative.   Given recently identified nonspecific pulmonary nodules and the concerning appearance of the infiltrates on CXR, obtained HRCT 3/26, shows right upper and right middle lobe pneumonia, but given prior history of right chest wall  histiocytoma resection recommend follow-up.  Already has a repeat CT planned with oncology who monitors his history of histiocytoma and hereditary spherocytosis, planned for 10/2024.  Appetite, nutritional intake improved.  Recurrent fever, increased hypoxemia, placed on oxygen in a.m. 3/27.  WBC remained elevated at 19.4.  Repeat procalcitonin again normal.  IV vancomycin added following repeat blood cultures.    Strep pneumonia and Legionella urinary antigen negative.  HIV negative.  Treponema antibody negative.  MRSA negative.  Blood cultures remain no growth to date.  Patient and his wife deny any difficulty swallowing or choking with meals.  Video swallow 3/28 showed mild silent aspiration with thin liquids using a straw, no other aspiration.  WBC trending down, fevers resolving.  Strength improving with therapy.  Continues to require oxygen, now at 3 L nasal cannula despite clinical improvement and pneumonia.  CXR 3/29 shows new vascular congestion, proBNP elevated.  BP running higher.  -Continue IV ceftriaxone, vancomycin  -Per infectious disease, discontinued azithromycin after 3/28  -Follow-up blood culture results  -PT, OT  -Aspiration precautions per speech therapy.  Regular diet with thin liquids, sit upright for all meals, small bites and sips, chew food thoroughly.  -planned repeat chest CT 10/2024 with oncology  -Daily BMP, CBC  -Initiate diuresis as below    Hereditary spherocytosis, s/p Splenectomy ~ 1976:  Followed by heme-onc.  High risk for encapsulated bacterial infection.  -Antibiotic as above  -Follow blood culture results    New-onset CHF, hypertension, HLD:  CXR 3/29 shows new vascular congestion, proBNP elevated, previously normal 12/25/2023.  BP running higher 3/29.  No echo on file, no prior history of CHF.  Does have chronic hypertension, diabetes.  -Continue PTA losartan, statin, ASA  -Give Lasix 20 mg IV x 1, KCl 40 mEq x 1 on 3/29  -Start oral Lasix 20 mg q morning, KCl 20 mEq q  morning 3/30  -Daily BMP    Cognitive impairment with progressive STM loss, acute metabolic encephalopathy due to pneumonia:  Patient lives with his wife.  Previously underwent neuropsychology evaluation several years ago.  Wife has noted progressive short-term memory loss and increased depression and poor sleep over the past 6-7 months.  Particularly accelerated when he was hospitalized with COVID with associated metabolic encephalopathy 3 months ago.  Brain MRI 12/25/2023 showed no acute findings, with mild to moderate generalized cerebral atrophy but no hydrocephalus.  Head CT 3/26/2024 again shows mild generalized volume loss, no hydrocephalus but no acute findings.  Wife also has noted shuffling gait, but no other parkinsonian symptoms or signs on exam, and no recent falls.  Acute encephalopathy clearing, patient has remained fully oriented and appropriate.  No behavioral concerns.  Seen by psychiatry, recommended no changes in antidepressant regimen, and placed referral for outpatient neurology consultation regarding shuffling gait.  Strength, gait improving with therapy.  -PT, OT  -Has planned neuropsychology assessment 4/24/2024  -Continue to treat underlying depression, insomnia    Depression, insomnia:  As above, wife and daughter have noticed progressive depression, difficulty sleeping for the past 6-7 months.  Has been on Zoloft.  Also with progressive STM loss as discussed above, particularly accelerated after hospitalized with COVID and encephalopathy 3 months ago.  Patient very pleasant on exam.  Acute encephalopathy resolving.  Denies suicidal ideation or intent.  Seen by psychiatry 3/26, no changes in Zoloft.   -Continue PTA Zoloft    Weakness, Gait Instability:  Very active, walks 3 miles at baseline.  Acute weakness, gait instability likely due to acute pneumonia.  Wife has noted shuffling gait, but has no other Parkinson features on exam.  Progressing with therapy.  -PT, OT    DM Type 2,  uncontrolled:  A1c 9.4 on 3/25/2024, previously 8.7 on 2/2/2024.  Prior to that had been under reasonable control.  Had been hospitalized with COVID 3 months ago, now with community-acquired pneumonia likely further driving hyperglycemia.  Eating well.  Blood sugars much improved with addition of Glucotrol, NPH.  -Increased PTA metformin to XR, 500 mg every morning, 1000 mg at bedtime on 3/26  -Increased Glucotrol XL to 5 mg daily 3/28  -Discontinue NPH and monitor  -Continue moderate SSI    Bacterial conjunctivitis:  Started on topical antibiotic PTA.  Initially involved left eye, now also spreading to the right eye.  No purulent drainage.  Injection and itching continue to resolve.  -Changed PTA polymyxin b-trimethopri to both eyes 3/26    Hearing Impaired:  Wears hearing aids.    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:  -As above, has planned repeat chest CT 10/2024 with oncology        Diet: Moderate Consistent Carb (60 g CHO per Meal) Diet    DVT Prophylaxis: Enoxaparin (Lovenox) SQ  Parks Catheter: Not present  Lines: None     Cardiac Monitoring: None  Code Status: Full Code      Clinically Significant Risk Factors        # Hypokalemia: Lowest K = 3.3 mmol/L in last 2 days, will replace as needed           # Hypertension: Noted on problem list       # DMII: A1C = 9.4 % (Ref range: <5.7 %) within past 6 months, PRESENT ON ADMISSION      # Financial/Environmental Concerns: none         Disposition Plan      Expected Discharge Date: 03/30/2024                    Ryne Lim MD  Hospitalist Service, GOLD TEAM [address] [place] [address]  Securely message with [email or web address] (more info)  Text page via [facility] Paging/Directory   See signed in provider for up to date coverage information  ______________________________________________________________________    Interval History   Wife present.  Had soft brown  "stool this morning per RN.  \"I am feeling much better every day\".  Cough improving, no further phlegm production.  Using incentive spirometry.  No fevers or chills.  Continues to require oxygen at 3 L nasal cannula but does not feel dyspneic with therapy.  Eating well.  BMs now formed.  Denies any choking or significant coughing at meals.  Ambulating well with a rolling walker, working on stairs.  No lightheadedness.    Physical Exam   Vital Signs: Temp: 98.7  F (37.1  C) Temp src: Oral BP: (!) 152/83 Pulse: 75   Resp: 20 SpO2: 92 % O2 Device: Nasal cannula Oxygen Delivery: 2 LPM  Weight: 166 lbs 10.68 oz  General: Alert and oriented x 4.  Pleasant.  Speech, affect normal.  HEENT: Bilateral conjunctival injection improved, no purulent drainage.  Oropharynx clear.    Chest: Mild rales at the right greater than left bases and in the right anteriorly, unchanged.  No wheezes or rhonchi.  CV: RRR.  No murmurs.  Abdomen: NABS.  Soft, nontender, nondistended.  Extremities: No edema  Neuro: CN II through XII grossly intact.  Strength 5/5 symmetrically.  No significant tremors.  No cogwheeling rigidity.  No bradykinesia.    60 MINUTES SPENT BY ME on the date of service doing chart review, history, exam, documentation & further activities per the note.      Data      CMP  Recent Labs   Lab 03/29/24  1303 03/29/24  0810 03/29/24  0521 03/28/24  2220 03/28/24  0751 03/28/24  0740 03/27/24  0710 03/27/24  0627 03/25/24  2346 03/25/24  1739   NA  --   --  136  --   --  138  --  136  --  137   POTASSIUM  --   --  3.5  --   --  3.3*  --  3.4  --  3.9   CHLORIDE  --   --  101  --   --  104  --  103  --  98   CO2  --   --  25  --   --  25  --  22  --  29   ANIONGAP  --   --  10  --   --  9  --  11  --  10   * 128* 122* 163*   < > 155*   < > 220*   < > 277*   BUN  --   --  15.1  --   --  17.8  --  21.3  --  17.5   CR  --   --  0.70  --   --  0.77  --  0.83  --  0.80   GFRESTIMATED  --   --  >90  --   --  90  --  88  --  89 "   YANI  --   --  8.7*  --   --  8.3*  --  8.6*  --  9.4   PROTTOTAL  --   --   --   --   --   --   --   --   --  7.5   ALBUMIN  --   --   --   --   --   --   --   --   --  3.9   BILITOTAL  --   --   --   --   --   --   --   --   --  1.1   ALKPHOS  --   --   --   --   --   --   --   --   --  104   AST  --   --   --   --   --   --   --   --   --  21   ALT  --   --   --   --   --   --   --   --   --  21    < > = values in this interval not displayed.     CBC  Recent Labs   Lab 03/29/24  0521 03/28/24  0740 03/27/24  0627 03/26/24  0753   WBC 14.2* 14.9* 19.4* 19.9*   RBC 4.10* 3.91* 4.12* 4.02*   HGB 12.4* 11.7* 12.5* 12.3*   HCT 35.8* 34.1* 36.5* 35.3*   MCV 87 87 89 88   MCH 30.2 29.9 30.3 30.6   MCHC 34.6 34.3 34.2 34.8   RDW 13.2 13.2 12.7 12.9    280 246 248     INR  Recent Labs   Lab 03/25/24  1739   INR 1.19*     Arterial Blood GasNo lab results found in last 7 days.Venous Blood Gas  No lab results found in last 7 days.  Hemoglobin A1C   Date Value Ref Range Status   03/25/2024 9.4 (H) <5.7 % Final     Comment:     Normal <5.7%   Prediabetes 5.7-6.4%    Diabetes 6.5% or higher     Note: Adopted from ADA consensus guidelines.   02/02/2024 8.7 (H) <5.7 % Final     Comment:     Normal <5.7%   Prediabetes 5.7-6.4%    Diabetes 6.5% or higher     Note: Adopted from ADA consensus guidelines.   10/03/2023 7.1 (H) 0.0 - 5.6 % Final     Comment:     Normal <5.7%   Prediabetes 5.7-6.4%    Diabetes 6.5% or higher     Note: Adopted from ADA consensus guidelines.   06/01/2021 7.1 (H) 0 - 5.6 % Final     Comment:     Normal <5.7% Prediabetes 5.7-6.4%  Diabetes 6.5% or higher - adopted from ADA   consensus guidelines.     02/26/2021 7.8 (H) 0 - 5.6 % Final     Comment:     Normal <5.7% Prediabetes 5.7-6.4%  Diabetes 6.5% or higher - adopted from ADA   consensus guidelines.     10/28/2020 6.8 (H) 0 - 5.6 % Final     Comment:     Normal <5.7% Prediabetes 5.7-6.4%  Diabetes 6.5% or higher - adopted from ADA   consensus  guidelines.       Results for orders placed or performed during the hospital encounter of 03/25/24   Head CT w/o contrast    Narrative    EXAM: CT HEAD W/O CONTRAST  LOCATION: [facility]  DATE: 3/25/2024    INDICATION: Altered mental status  COMPARISON: MRI brain 12/25/2023  TECHNIQUE: Routine CT Head without IV contrast. Multiplanar reformats. Dose reduction techniques were used.    FINDINGS:  INTRACRANIAL CONTENTS: No intracranial hemorrhage, extraaxial collection, or mass effect.  No CT evidence of acute infarct. Normal parenchymal attenuation for age. Mild generalized volume loss. No hydrocephalus.     VISUALIZED ORBITS/SINUSES/MASTOIDS: Prior bilateral cataract surgery. Visualized portions of the orbits are otherwise unremarkable. Mild to moderate mucosal thickening scattered about the paranasal sinuses. No middle ear or mastoid effusion.    BONES/SOFT TISSUES: No acute abnormality.      Impression    IMPRESSION:  1.  Mild age-related changes without acute intracranial abnormality.    XR Chest Port 1 View    Narrative    EXAM: XR CHEST PORT 1 VIEW  LOCATION: [facility]  DATE: 3/25/2024    INDICATION: Altered MS w  leukocytosis.  Eval for pneumonia  COMPARISON: 12/25/2023.      Impression    IMPRESSION: Cardiac silhouette is within normal limits. Increased patchy opacities throughout the right lower lung concerning for pneumonia. No pleural effusion or pneumothorax. Remote postsurgical changes of the right lung and ribs.   CT Chest w/o Contrast    Narrative    EXAMINATION: Chest CT  3/26/2024 1:10 PM    CLINICAL HISTORY: right lower lung pneumonia, h/o pulmonary nodules.  Assess for worsening nodules, lung abscess.    COMPARISON: CT to 2/2/2024.    TECHNIQUE: CT imaging obtained through the chest without contrast.  Axial, coronal, and sagittal reconstructions and axial MIP reformatted  images are reviewed.      FINDINGS:  Mediastinum: The visualized thyroid is unremarkable. The heart is  normal in size. No significant  pericardial effusion. Moderate  coronary artery calcifications. The ascending aorta and main pulmonary  artery are normal in caliber. Several prominent mediastinal lymph  nodes are increased from prior. No lymphadenopathy by size criteria.  Small hiatal hernia.    Lungs: Trace pleural effusions. No pneumothorax. Layering secretions  in the mainstem bronchi. Mild lower lobe predominant bronchial wall  thickening with scattered mucous plugging at the lung bases. Mild  bronchiectasis of the right middle and upper lobes, similar to prior.  New crazy paving pattern in the residual right upper and right middle  lobe consisting of groundglass opacities with intralobular septal  thickening as well as several patchy airspace opacities. Peripheral  consolidative opacities in the right lower lobe, suboptimally  evaluated due to respiratory motion artifact.  Multiple pulmonary nodules are not significantly changed, for example:  - Stable solid nodule in the right lower lobe measuring 15 x 9 mm  (series 4 image 172) with satellite nodularity, new since 2021.   - Stable 8 mm solid nodule at the right lower lobe lung base (series 4  image 228), but slowly increasing from 2019.    Bones and soft tissues: Stable postsurgical changes of partial right  chest wall resection with mesh reconstruction and rib osteotomies.    Upper Abdomen: Limited evaluation of the upper abdomen. Partially  visualized large right exophytic renal cyst and small left renal sinus  cysts.      Impression    IMPRESSION:   1. New groundglass and consolidative opacities with interlobular  septal thickening in the right upper and middle lobes. Findings are  favored to represent infectious/inflammatory acute interstitial  pneumonia. However given the postsurgical changes of the adjacent  right chest wall for resection of a soft tissue mass,  pulmonary  lymphatic metastases is a possibility. Recommend close attention on  short-term follow-up and consider tissue biopsy if findings persist.  2. Stable scattered solid pulmonary nodules. Attention on follow-up.  3. Trace pleural effusions.    I have personally reviewed the examination and initial interpretation  and I agree with the findings.    TIM BALTAZAR MD         SYSTEM ID:  [id number]   XR Video Swallow with SLP or OT    Narrative    EXAM:  Modified Barium Swallow Study with Speech Pathology, 3/28/2024    COMPARISON: None    HISTORY: Assess for silent aspiration.    FLUOROSCOPY TIME: 2.1 minutes.    FINDINGS: Under fluoroscopic guidance, the patient was given orally  administered barium of varying consistencies in the presence of the  speech pathology service.     Poor bolus control. Delayed initiation of swallowing. Mild to moderate  vallecular residue. Flash penetration was observed with thin barium.  Single episode of silent aspiration occurred with thin barium. No  evidence of tracheal aspiration with any other consistency of barium.  Degenerative changes of the cervical spine.      Impression    IMPRESSION:   Single episode of silent aspiration with thin barium. No evidence of  tracheal aspiration with any other consistency of barium.     Please see the speech pathologist report for further details.    DWAINE MISTRY MD         SYSTEM ID:  [id number]   XR Chest 2 Views    Narrative    Exam:  Chest X-ray 3/29/2024 11:29 AM    History: right lung pneumonia, reassess infiltrates.    Comparison: 3/25/2024    Findings:   Single portable AP view of the chest. Increased diffuse mixed  interstitial and airspace opacities in the right upper and middle  lobes. Cardiac silhouette is normal. Small right pleural effusion. No  pneumothorax.      Impression    Impression:   1.  Increased mixed interstitial and airspace opacities in the right  upper and middle lobes suggestive of pulmonary edema and/or  pneumonia.    JOHN WYATT MD         SYSTEM ID:  [id number]

## 2024-03-29 NOTE — PROGRESS NOTES
1900 - 0700    VS: Temp: 99.2  F (37.3  C) Temp src: Oral BP: 133/68 Pulse: 74   Resp: 18 SpO2: 92 % O2 Device: Nasal cannula Oxygen Delivery: 1 LPM   O2: stable on RA when fully awake, with 1 LPM/NC oxygen on bed sleeping. Denies chest pain and SOB.    Output: Incontinent Bladder   Last BM: denies abdominal discomfort.    Activity: Up with Assist, GB with Walker   Skin: Visible skin intact   Pain: denies   CMS: Intact, AOx3.    able to make needs known.    Diet: Moderate Consistent Carb (60 g CHO per Meal) Diet . Denies nausea/vomiting. BG check at bedtime 163   LDA: R PIV SL    Equipment: IV pole, personal belongings,    Plan: Continue with plan of care.   Call light within reach

## 2024-03-29 NOTE — PHARMACY-ADMISSION MEDICATION HISTORY
Pharmacy Vancomycin Note  Date of Service 2024  Patient's  1945   79 year old, male    Indication: Community Acquired Pneumonia  Day of Therapy: started 3/27/24  Current vancomycin regimen:  1500 mg IV q24h  Current vancomycin monitoring method: AUC  Current vancomycin therapeutic monitoring goal: 400-600 mg*h/L    [employer] Prediction of Current Vancomycin Regimen  Regimen: 1500 mg IV every 24 hours.  Exposure target: AUC24 (range)400-600 mg/L.hr   AUC24,ss: 284 mg/L.hr  Probability of AUC24 > 400: 1 %  Ctrough,ss: 5 mg/L  Probability of Ctrough,ss > 20: 0 %  Probability of nephrotoxicity (Lodise MACIEJ ): 3 %    Current estimated CrCl = Estimated Creatinine Clearance: 88.5 mL/min (based on SCr of 0.7 mg/dL).    Creatinine for last 3 days  3/27/2024:  6:27 AM Creatinine 0.83 mg/dL  3/28/2024:  7:40 AM Creatinine 0.77 mg/dL  3/29/2024:  5:21 AM Creatinine 0.70 mg/dL    Recent Vancomycin Levels (past 3 days)  3/29/2024:  5:21 AM Vancomycin 5.6 ug/mL    Vancomycin IV Administrations (past 72 hours)                     vancomycin (VANCOCIN) 1,500 mg in 0.9% NaCl 250 mL intermittent infusion (mg) 1,500 mg New Bag 24 0937     1,500 mg New Bag 24 0951                    Nephrotoxins and other renal medications (From now, onward)      Start     Dose/Rate Route Frequency Ordered Stop    24 0900  vancomycin (VANCOCIN) 1,500 mg in 0.9% NaCl 250 mL intermittent infusion         1,500 mg  over 90 Minutes Intravenous EVERY 24 HOURS 24 0855                 Contrast Orders - past 72 hours (72h ago, onward)      Start     Dose/Rate Route Frequency Stop    24 1600  barium sulfate (VARIBAR THIN Liquid) 40 % oral suspension 18 g         45 mL Oral ONCE 24 15324 1600  barium sulfate (VARIBAR) 40 % pudding/paste 15 mL         15 mL Oral ONCE 24 15324 1600  barium sulfate 40% (VARIBAR NECTAR) oral suspension          Oral ONCE 24 153             Interpretation of levels and current regimen:  Vancomycin level is reflective of AUC less than 400    Has serum creatinine changed greater than 50% in last 72 hours: No    Urine output:  unable to determine    Renal Function: Improving    [employer]ight[employer] Prediction of Planned New Vancomycin Regimen  Regimen: 1250 mg IV every 12 hours.  Exposure target: AUC24 (range)400-600 mg/L.hr   AUC24,ss: 471 mg/L.hr  Probability of AUC24 > 400: 86 %  Ctrough,ss: 12 mg/L  Probability of Ctrough,ss > 20: 1 %  Probability of nephrotoxicity (Lodise MACIEJ 2009): 7 %    Plan:  Increase Dose to 1250 mg IV every 12 hours.   Vancomycin monitoring method: AUC  Vancomycin therapeutic monitoring goal: 400-600 mg*h/L  Pharmacy will check vancomycin levels as appropriate in 1-3 Days.  Serum creatinine levels will be ordered daily for the first week of therapy and at least twice weekly for subsequent weeks.    RADHA TORIBIO, [facility]

## 2024-03-29 NOTE — PHARMACY-VANCOMYCIN DOSING SERVICE
Pharmacy Vancomycin Note  Date of Service 2024  Patient's  1945   79 year old, male    Indication: Community Acquired Pneumonia  Day of Therapy: started 3/27/24  Current vancomycin regimen:  1500 mg IV q24h  Current vancomycin monitoring method: AUC  Current vancomycin therapeutic monitoring goal: 400-600 mg*h/L    [employer] Prediction of Current Vancomycin Regimen  Regimen: 1500 mg IV every 24 hours.  Exposure target: AUC24 (range)400-600 mg/L.hr   AUC24,ss: 284 mg/L.hr  Probability of AUC24 > 400: 1 %  Ctrough,ss: 5 mg/L  Probability of Ctrough,ss > 20: 0 %  Probability of nephrotoxicity (Lodise MACIEJ ): 3 %    Current estimated CrCl = Estimated Creatinine Clearance: 88.5 mL/min (based on SCr of 0.7 mg/dL).    Creatinine for last 3 days  3/27/2024:  6:27 AM Creatinine 0.83 mg/dL  3/28/2024:  7:40 AM Creatinine 0.77 mg/dL  3/29/2024:  5:21 AM Creatinine 0.70 mg/dL    Recent Vancomycin Levels (past 3 days)  3/29/2024:  5:21 AM Vancomycin 5.6 ug/mL    Vancomycin IV Administrations (past 72 hours)                     vancomycin (VANCOCIN) 1,500 mg in 0.9% NaCl 250 mL intermittent infusion (mg) 1,500 mg New Bag 24 0937     1,500 mg New Bag 24 0951                    Nephrotoxins and other renal medications (From now, onward)      Start     Dose/Rate Route Frequency Ordered Stop    24 0900  vancomycin (VANCOCIN) 1,500 mg in 0.9% NaCl 250 mL intermittent infusion         1,500 mg  over 90 Minutes Intravenous EVERY 24 HOURS 24 0855                 Contrast Orders - past 72 hours (72h ago, onward)      Start     Dose/Rate Route Frequency Stop    24 1600  barium sulfate (VARIBAR THIN Liquid) 40 % oral suspension 18 g         45 mL Oral ONCE 24 15324 1600  barium sulfate (VARIBAR) 40 % pudding/paste 15 mL         15 mL Oral ONCE 24 15324 1600  barium sulfate 40% (VARIBAR NECTAR) oral suspension          Oral ONCE 24 153             Interpretation of levels and current regimen:  Vancomycin level is reflective of AUC less than 400    Has serum creatinine changed greater than 50% in last 72 hours: No    Urine output:  unable to determine    Renal Function: Improving    InsightRX Prediction of Planned New Vancomycin Regimen  Regimen: 1250 mg IV every 12 hours.  Exposure target: AUC24 (range)400-600 mg/L.hr   AUC24,ss: 471 mg/L.hr  Probability of AUC24 > 400: 86 %  Ctrough,ss: 12 mg/L  Probability of Ctrough,ss > 20: 1 %  Probability of nephrotoxicity (Lodise MACIEJ 2009): 7 %    Plan:  Increase Dose to 1250 mg IV every 12 hours.   Vancomycin monitoring method: AUC  Vancomycin therapeutic monitoring goal: 400-600 mg*h/L  Pharmacy will check vancomycin levels as appropriate in 1-3 Days.  Serum creatinine levels will be ordered daily for the first week of therapy and at least twice weekly for subsequent weeks.    RADHA TORIBIO, [facility]

## 2024-03-29 NOTE — PROGRESS NOTES
"  [facility] GENERAL INFECTIOUS DISEASE PROGRESS NOTE     Patient:  Gerber Levine   Date of birth 1945, Medical record number [id number]  Date of Visit:  03/29/2024  Date of Admission: 3/25/2024  Consult Requester:Reshma Vera MD          Assessment and Plan:   ID Problem List  Community acquired pneumonia  Fever, leukocytosis   Bilateral conjunctivitis  Asplenia   Diabetes mellitus type II   Oropharyngeal dysphagia    Recommendations:  Continue IV Ceftriaxone to 2g q24hrs AND IV Vancomycin (pharmacy to dose) for now  Follow up pending workup:  Urine histo and blasto antigens  Blood cultures  Continue eyedrops bilaterally for conjunctivitis  Continue aspiration precautions    Assessment:  Gerber Levine is a 79 year old male who was admitted for confusion and gait instability at home over the past few days.      Afebrile on admission but then spiked fever to 102.4F. Otherwise normal hemodynamics. Leukocytosis to 19K, lactate wnl, PCT <0.5. UA was unremarkable for infection. BCx 3/25 and  3/27 NGTD. RVP and COVID negative. CXR with patchy opacities throughout RLL c/f pneumonia. However, no significant productive cough. CT chest with \"new ggo and consolidative opacities with interlobular septal thickening in R upper and middle lobes\"- c/f infection. He has been on 1-3L NC. No peripheral neuropathy, previous B12 wnl.     Clinical presentation consistent with community acquired pneumonia, being empirically treated with azithromycin and ceftriaxone. Given ongoing fever and recent COVID infection, added IV vancomycin as well. He is at risk for post-COVID bacterial vs fungal infection. Negative infectious workup includes: negative urine strep and legionella Ag, MRSA nares, HIV, and Treponema Ab. Urine Histo and Blasto antigens remain pending. Blood cultures NGTD.    Fever curve and leukocytosis downtrending. Had mildly productive cough with eating/incentive spirometer use. No sputum culture obtained this " "admission, nonproductive cough now. Mental status and weakness much improved per wife. Speech/aspiration evaluation with oropharyngeal dysphagia and 1 episode of silent aspiration. He also was found to have initially left bacterial conjunctivitis, now bilateral likely from self-spread. No orbital concerns. Continue topical polymixin drops, itching and injection are improving. S/p azithromycin x3 days for CAP. Today is day 5 of CAP treatment, however would continue ceftriaxone and vancomycin for now given recent fever and ongoing oxygen needs. CXR pending.     Infectious diseases will follow peripherally over the weekend. Dr. Villatoro will be covering the [facility] ID services over the weekend. Dr. Serrano will take over the [facility] ID service on Monday.     Recommendations discussed with primary team.    Lurdes Coleman PA-C  Infectious Diseases  Pager #[phone] or Vocera    50 MINUTES SPENT BY ME on the date of service doing chart review, history, exam, documentation & further activities per the note.           Interim History and Events:     Afebrile and leukocytosis improving. Remains hemodynamically stable, on 2-3L NC. Eyes are less red, less crusting. Still itchy. No vision changes, headache, or eye pain. He reports feeling better today. Weakness and confusion improving per wife to near baseline, though he seems worse today. Went for walk with PT. Plan for CXR today.    Urine Histo and Blasto Ag - pending  Blood cultures NGTD.         HPI: per ID consult dated 3/27/24 by Citlali Noriega PA-C     \"Gerber Levine is a 79 year old male with PMHx significant for diabetes mellitus type II, hypertension, hereditary spherocytosis s/p splenectomy (1976), s/p R chest wall histiocytoma resection (2016) and depression who presented to the ED 3/25 with confusion and instability.      On chart review, the patient was noted to be more confused over the past 4-5 days PTA. Additionally, noted to be more unstable on his " "feet. Usually active in walking 3 miles daily which he did 2 days PTA. Patient endorsed chills. Denied fevers, urinary symptoms, diarrhea, SOB, headache, neck stiffness or other symptom complaints.      Wife provides most of history. Patient defers history to her for reason why he was admitted. He states he feels \"pretty good\" and offered no complaints. It was a bit unclear on acute change in mental status from patient's last 6 months for which patient's wife said he has had some decreased cognition. She notes he never really returned to his baseline after COVID infection 3 months ago. Wife recently finished course of antibiotics for sinus infection. She notes the patient has been coughing at home over the past week. He denies sputum production.      They live in [place], [place] in a home. No pets. No recent travel. Last left the state about 1 year ago and traveled to [place]. Denies travel to [place] or internationally. Patient does not walk by bodies of water locally. He does do his own yard work/leaf clearing but denied clearing any dead/moldy leaves/wood from his yard. \"           ROS:   -Focused 5 point ROS completed, pertinent positives and negatives listed above.      Physical Examination:  Temp: 98.7  F (37.1  C) Temp src: Oral BP: (!) 152/83 Pulse: 75   Resp: 20 SpO2: 92 % O2 Device: Nasal cannula Oxygen Delivery: 2 LPM    Vitals:    03/26/24 0005   Weight: 75.6 kg (166 lb 10.7 oz)       Constitutional: Pleasant adult male seen sitting in chair, in NAD. Alert and interactive.   HEENT: NCAT, anicteric sclerae, conjunctival erythema improving, dried crusting L > R improved from prior. Moist mucous membranes without lesions or thrush. Dentition intact/well cared for.  Respiratory: Non-labored breathing, good air exchange on 3L NC. Lungs with rales in RLL and RML, without wheezing. Left clear. No cough noted.   Cardiovascular: Regular rate and rhythm with no murmur, rub or gallop.  GI: Abdomen is soft, " "non-distended, and non-tender to palpation. No rigidity or guarding.  Skin: Warm and dry. No rashes or lesions on exposed surfaces.  Musculoskeletal: Extremities grossly normal. No tenderness or edema present. No peripheral neuropathy.  Neurologic: A &O x3, speech normal, answering questions appropriately. Moves all extremities spontaneously. Grossly non-focal.  Neuropsychiatric: Mentation and affect normal/appropriate.  VAD: PIV is c/d/i with no erythema, drainage, or tenderness.      Medications:   aspirin  81 mg Oral Daily    atorvastatin  40 mg Oral Daily    calcium citrate-vitamin D  1 tablet Oral BID    cefTRIAXone  2 g Intravenous Q24H    enoxaparin ANTICOAGULANT  40 mg Subcutaneous Q24H    glipiZIDE  5 mg Oral Daily with breakfast    insulin aspart  1-7 Units Subcutaneous TID AC    insulin aspart  1-5 Units Subcutaneous At Bedtime    losartan  25 mg Oral Daily    metFORMIN  1,000 mg Oral Daily with supper    metFORMIN  500 mg Oral QAM    polymixin b-trimethoprim  2 drop Both Eyes 4x Daily    sertraline  100 mg Oral Daily    sodium chloride (PF)  3 mL Intracatheter Q8H    triamcinolone   Topical BID    vancomycin  1,250 mg Intravenous Q12H       Infusions/Drips:      Laboratory Data:   No results found for: \"ACD4\"    Inflammatory Markers    No lab results found.    Metabolic Studies       Recent Labs   Lab Test 03/29/24  0810 03/29/24  0521 03/28/24  2220 03/28/24  1714 03/28/24  1314 03/28/24  0751 03/28/24  0740 03/28/24  0223 03/27/24  1146 03/27/24  0952 03/27/24  0710 03/27/24  0627 03/25/24  2346 03/25/24  1739 02/02/24  0934 02/02/24  0934 02/02/24  0919 12/27/23  0813 12/27/23  0802 12/26/23  0838 12/26/23  0745 12/26/23  0246 12/25/23  2035 12/25/23  2033 12/25/23  2029   NA  --  136  --   --   --   --  138  --   --   --   --  136  --  137  --  136  --   --   --   --   --   --  140  --  137   POTASSIUM  --  3.5  --   --   --   --  3.3*  --   --   --   --  3.4  --  3.9  --  4.3  --   --  3.8  --  4.5  " --  4.1  --  4.1   CHLORIDE  --  101  --   --   --   --  104  --   --   --   --  103  --  98  --  101  --   --   --   --   --   --   --   --  101   CO2  --  25  --   --   --   --  25  --   --   --   --  22  --  29  --  27  --   --   --   --   --   --   --   --  29   ANIONGAP  --  10  --   --   --   --  9  --   --   --   --  11  --  10  --  8  --   --   --   --   --   --   --   --  7   BUN  --  15.1  --   --   --   --  17.8  --   --   --   --  21.3  --  17.5  --  21.1  --   --   --   --   --   --   --   --  30.7*   CR  --  0.70  --   --   --   --  0.77  --   --   --   --  0.83  --  0.80  --  0.78 0.8  --   --   --   --   --   --    < > 1.00   GFRESTIMATED  --  >90  --   --   --   --  90  --   --   --   --  88  --  89  --  90 >60  --   --   --   --   --   --    < > 76   * 122* 163* 239* 234* 153* 155*  --    < >  --    < > 220*   < > 277*  --  180*  --    < >  --    < >  --    < > 280*  --  280*   A1C  --   --   --   --   --   --   --   --   --   --   --   --   --  9.4*  --  8.7*  --   --   --   --   --   --   --   --   --    YANI  --  8.7*  --   --   --   --  8.3*  --   --   --   --  8.6*  --  9.4  --  9.2  --   --   --   --   --   --   --   --  9.4   MAG  --   --   --   --   --   --   --   --   --   --   --   --   --   --   --   --   --   --  2.4*  --  2.1  --   --   --  2.1   LACT  --   --   --   --   --   --   --  0.9  --  1.4  --   --   --  1.2   < >  --   --   --   --   --   --   --   --   --   --     < > = values in this interval not displayed.       Hepatic Studies    Recent Labs   Lab Test 03/25/24  1739 12/25/23 2029 01/18/23  0744   BILITOTAL 1.1 0.5 0.9   ALKPHOS 104 123 80   ALBUMIN 3.9 4.2 4.3   AST 21 27 29   ALT 21 23 19       Pancreatitis testing    Recent Labs   Lab Test 01/18/23  0744 10/19/21  1155 10/28/20  0809 06/12/19  0822 06/06/18  0821 06/13/17  0849   TRIG 110 73 70 78 71 81       Hematology Studies      Recent Labs   Lab Test 03/29/24  0521 03/28/24  0740 03/27/24  0627  "03/26/24  0753 03/25/24  1739 12/25/23 2035 12/25/23 2029   WBC 14.2* 14.9* 19.4* 19.9* 16.7*  --  10.1   HGB 12.4* 11.7* 12.5* 12.3* 13.1* 12.6* 13.4   HCT 35.8* 34.1* 36.5* 35.3* 38.5* 37* 38.7*    280 246 248 283  --  281       Arterial Blood Gas Testing  No lab results found.     Urine Studies     Recent Labs   Lab Test 03/25/24  1740 12/25/23  2127   URINEPH 5.5 5.0   NITRITE Negative Negative   LEUKEST Negative Negative   WBCU 1 1       Vancomycin Levels     Recent Labs   Lab Test 03/29/24  0521   VANCOMYCIN 5.6       Tobramycin levels     No lab results found.    Gentamicin levels    No lab results found.    CSF testing   No lab results found.      Microbiology:  Culture   Date Value Ref Range Status   03/27/2024 No growth after 2 days  Preliminary   03/27/2024 No growth after 2 days  Preliminary   03/25/2024 No growth after 3 days  Preliminary   03/25/2024 No growth after 3 days  Preliminary       Last check of C difficile  No results found for: \"CDBPCT\"    Imaging:  CT Chest w/o Contrast  Result Date: 3/26/2024  IMPRESSION: 1. New groundglass and consolidative opacities with interlobular septal thickening in the right upper and middle lobes. Findings are favored to represent infectious/inflammatory acute interstitial pneumonia. However given the postsurgical changes of the adjacent right chest wall for resection of a soft tissue mass, pulmonary lymphatic metastases is a possibility. Recommend close attention on short-term follow-up and consider tissue biopsy if findings persist. 2. Stable scattered solid pulmonary nodules. Attention on follow-up. 3. Trace pleural effusions.    XR Chest Port 1 View  Result Date: 3/25/2024  IMPRESSION: Cardiac silhouette is within normal limits. Increased patchy opacities throughout the right lower lung concerning for pneumonia. No pleural effusion or pneumothorax. Remote postsurgical changes of the right lung and ribs.    Head CT w/o contrast  Result Date: " 3/25/2024  IMPRESSION: 1.  Mild age-related changes without acute intracranial abnormality.

## 2024-03-29 NOTE — PLAN OF CARE
Goal Outcome Evaluation:      Plan of Care Reviewed With: patient, spouse    Overall Patient Progress: no change    Outcome Evaluation: A&O x3. Disoriented to situation. Assist of 1 with walker and gait belt. Mixed continence. Chest XR completed this shift. Denies pain. No acute events this shift. No changes. Continue with plan of care.

## 2024-03-29 NOTE — CARE PLAN
Speech Language Therapy Discharge Summary    Reason for therapy discharge:    All goals and outcomes met, no further needs identified.    Progress towards therapy goal(s). See goals on Care Plan in [facility] electronic health record for goal details.  Goals met    Therapy recommendation(s):    No further therapy is recommended. Recommend pt continues regular diet & thin liquids with check in supervision. Patient should be sitting fully upright and alert, take small single bites/sips, eat at a slow rate, and chew foods thoroughly. 1 single sip of liquids at a time.

## 2024-03-30 ENCOUNTER — APPOINTMENT (OUTPATIENT)
Dept: CARDIOLOGY | Facility: CLINIC | Age: 79
DRG: 193 | End: 2024-03-30
Attending: INTERNAL MEDICINE
Payer: COMMERCIAL

## 2024-03-30 LAB
ANION GAP SERPL CALCULATED.3IONS-SCNC: 13 MMOL/L (ref 7–15)
BUN SERPL-MCNC: 12.6 MG/DL (ref 8–23)
CALCIUM SERPL-MCNC: 8.5 MG/DL (ref 8.8–10.2)
CHLORIDE SERPL-SCNC: 101 MMOL/L (ref 98–107)
CREAT SERPL-MCNC: 0.66 MG/DL (ref 0.67–1.17)
DEPRECATED HCO3 PLAS-SCNC: 23 MMOL/L (ref 22–29)
EGFRCR SERPLBLD CKD-EPI 2021: >90 ML/MIN/1.73M2
ERYTHROCYTE [DISTWIDTH] IN BLOOD BY AUTOMATED COUNT: 13.1 % (ref 10–15)
GLUCOSE BLDC GLUCOMTR-MCNC: 153 MG/DL (ref 70–99)
GLUCOSE BLDC GLUCOMTR-MCNC: 153 MG/DL (ref 70–99)
GLUCOSE BLDC GLUCOMTR-MCNC: 167 MG/DL (ref 70–99)
GLUCOSE BLDC GLUCOMTR-MCNC: 197 MG/DL (ref 70–99)
GLUCOSE BLDC GLUCOMTR-MCNC: 295 MG/DL (ref 70–99)
GLUCOSE SERPL-MCNC: 165 MG/DL (ref 70–99)
HCT VFR BLD AUTO: 35 % (ref 40–53)
HGB BLD-MCNC: 11.9 G/DL (ref 13.3–17.7)
LVEF ECHO: NORMAL
MCH RBC QN AUTO: 29.5 PG (ref 26.5–33)
MCHC RBC AUTO-ENTMCNC: 34 G/DL (ref 31.5–36.5)
MCV RBC AUTO: 87 FL (ref 78–100)
PLATELET # BLD AUTO: 342 10E3/UL (ref 150–450)
POTASSIUM SERPL-SCNC: 3.6 MMOL/L (ref 3.4–5.3)
POTASSIUM SERPL-SCNC: 3.9 MMOL/L (ref 3.4–5.3)
RBC # BLD AUTO: 4.03 10E6/UL (ref 4.4–5.9)
SODIUM SERPL-SCNC: 137 MMOL/L (ref 135–145)
WBC # BLD AUTO: 13.4 10E3/UL (ref 4–11)

## 2024-03-30 PROCEDURE — 85027 COMPLETE CBC AUTOMATED: CPT | Performed by: INTERNAL MEDICINE

## 2024-03-30 PROCEDURE — 36415 COLL VENOUS BLD VENIPUNCTURE: CPT | Performed by: INTERNAL MEDICINE

## 2024-03-30 PROCEDURE — 99233 SBSQ HOSP IP/OBS HIGH 50: CPT | Performed by: INTERNAL MEDICINE

## 2024-03-30 PROCEDURE — 250N000011 HC RX IP 250 OP 636: Performed by: INTERNAL MEDICINE

## 2024-03-30 PROCEDURE — 120N000002 HC R&B MED SURG/OB UMMC

## 2024-03-30 PROCEDURE — 258N000003 HC RX IP 258 OP 636: Performed by: INTERNAL MEDICINE

## 2024-03-30 PROCEDURE — 250N000013 HC RX MED GY IP 250 OP 250 PS 637: Performed by: INTERNAL MEDICINE

## 2024-03-30 PROCEDURE — 80048 BASIC METABOLIC PNL TOTAL CA: CPT | Performed by: INTERNAL MEDICINE

## 2024-03-30 PROCEDURE — 84132 ASSAY OF SERUM POTASSIUM: CPT | Performed by: INTERNAL MEDICINE

## 2024-03-30 PROCEDURE — 93306 TTE W/DOPPLER COMPLETE: CPT | Mod: 26 | Performed by: INTERNAL MEDICINE

## 2024-03-30 PROCEDURE — 93306 TTE W/DOPPLER COMPLETE: CPT

## 2024-03-30 RX ORDER — FUROSEMIDE 20 MG
20 TABLET ORAL
Status: DISCONTINUED | OUTPATIENT
Start: 2024-03-30 | End: 2024-03-30

## 2024-03-30 RX ORDER — FUROSEMIDE 20 MG
20 TABLET ORAL DAILY
Status: DISCONTINUED | OUTPATIENT
Start: 2024-03-31 | End: 2024-03-31

## 2024-03-30 RX ORDER — LOSARTAN POTASSIUM 25 MG/1
50 TABLET ORAL DAILY
Status: DISCONTINUED | OUTPATIENT
Start: 2024-03-31 | End: 2024-04-01 | Stop reason: HOSPADM

## 2024-03-30 RX ADMIN — SERTRALINE HYDROCHLORIDE 100 MG: 100 TABLET ORAL at 08:41

## 2024-03-30 RX ADMIN — VANCOMYCIN HYDROCHLORIDE 1250 MG: 10 INJECTION, POWDER, LYOPHILIZED, FOR SOLUTION INTRAVENOUS at 20:24

## 2024-03-30 RX ADMIN — POLYMYXIN B SULFATE AND TRIMETHOPRIM 2 DROP: 10000; 1 SOLUTION OPHTHALMIC at 08:45

## 2024-03-30 RX ADMIN — TRIAMCINOLONE ACETONIDE: 5 OINTMENT TOPICAL at 20:24

## 2024-03-30 RX ADMIN — METFORMIN HYDROCHLORIDE 1000 MG: 500 TABLET, EXTENDED RELEASE ORAL at 17:43

## 2024-03-30 RX ADMIN — ENOXAPARIN SODIUM 40 MG: 40 INJECTION SUBCUTANEOUS at 08:41

## 2024-03-30 RX ADMIN — POLYMYXIN B SULFATE AND TRIMETHOPRIM 2 DROP: 10000; 1 SOLUTION OPHTHALMIC at 20:24

## 2024-03-30 RX ADMIN — CEFTRIAXONE SODIUM 2 G: 2 INJECTION, POWDER, FOR SOLUTION INTRAMUSCULAR; INTRAVENOUS at 22:43

## 2024-03-30 RX ADMIN — GLIPIZIDE 5 MG: 2.5 TABLET, FILM COATED, EXTENDED RELEASE ORAL at 09:56

## 2024-03-30 RX ADMIN — ATORVASTATIN CALCIUM 40 MG: 40 TABLET, FILM COATED ORAL at 08:40

## 2024-03-30 RX ADMIN — Medication 1 TABLET: at 20:24

## 2024-03-30 RX ADMIN — ASPIRIN 81 MG: 81 TABLET, COATED ORAL at 08:41

## 2024-03-30 RX ADMIN — LOSARTAN POTASSIUM 25 MG: 25 TABLET, FILM COATED ORAL at 08:40

## 2024-03-30 RX ADMIN — Medication 1 TABLET: at 08:40

## 2024-03-30 RX ADMIN — FUROSEMIDE 20 MG: 20 TABLET ORAL at 08:40

## 2024-03-30 RX ADMIN — TRIAMCINOLONE ACETONIDE: 5 OINTMENT TOPICAL at 08:47

## 2024-03-30 RX ADMIN — POLYMYXIN B SULFATE AND TRIMETHOPRIM 2 DROP: 10000; 1 SOLUTION OPHTHALMIC at 13:20

## 2024-03-30 RX ADMIN — METFORMIN HYDROCHLORIDE 500 MG: 500 TABLET, EXTENDED RELEASE ORAL at 08:40

## 2024-03-30 RX ADMIN — VANCOMYCIN HYDROCHLORIDE 1250 MG: 10 INJECTION, POWDER, LYOPHILIZED, FOR SOLUTION INTRAVENOUS at 08:30

## 2024-03-30 RX ADMIN — POLYMYXIN B SULFATE AND TRIMETHOPRIM 2 DROP: 10000; 1 SOLUTION OPHTHALMIC at 16:24

## 2024-03-30 RX ADMIN — POTASSIUM CHLORIDE 20 MEQ: 1500 TABLET, EXTENDED RELEASE ORAL at 08:40

## 2024-03-30 ASSESSMENT — ACTIVITIES OF DAILY LIVING (ADL)
ADLS_ACUITY_SCORE: 51
ADLS_ACUITY_SCORE: 51
ADLS_ACUITY_SCORE: 45
ADLS_ACUITY_SCORE: 51
ADLS_ACUITY_SCORE: 45
ADLS_ACUITY_SCORE: 51
ADLS_ACUITY_SCORE: 45
ADLS_ACUITY_SCORE: 51
ADLS_ACUITY_SCORE: 45
ADLS_ACUITY_SCORE: 45
ADLS_ACUITY_SCORE: 51
ADLS_ACUITY_SCORE: 45
ADLS_ACUITY_SCORE: 51
ADLS_ACUITY_SCORE: 51
ADLS_ACUITY_SCORE: 45

## 2024-03-30 NOTE — PLAN OF CARE
[id number]      Plan of Care Reviewed With: patient    Overall Patient Progress: no changeOverall Patient Progress: no change    Outcome Evaluation: IV Abx. Orientation.    Pt alert and oriented X 3 with intermittent confusion/forgetfulness. VSS, on 2 L O2 via NC; unable to wean this shift. Able to make needs known appropriately. PIV patent; had scheduled Abx. Up with assist of 1 with walker and gait belt. Call light within reach and bed alarms on for safety. Incontinent of urine this shift, no BM. Slept most of the night. Continues to follow POC.

## 2024-03-30 NOTE — PROGRESS NOTES
[facility]    Medicine Progress Note - Hospitalist Service, GOLD TEAM 17    Date of Admission:  3/25/2024    Assessment & Plan   Gerber Levine is a 79 year old male admitted on 3/25/2024. He has a h/o T2DM, HTN, hearing loss wears hearing aid, hereditary spherocytosis, status post splenectomy, STM loss and depression.  He lives with his wife.  He was hospitalized at  12/26 - 12/27/23 for acute metabolic encephalopathy due to COVID-19 infection +/- side effect of sertraline.  Stroke workup was negative at that time.  He was brought to [facility] ED today by his wife and daughter for evaluation of confusion and generalized weakness.  Pt usually walks 3 miles but has been wobbly today.  He walked 3 miles 2 days ago and was able to shovel snow a bit yesterday.  Wife thinks he is confused and has declining short-term memory problem.  Daughter thinks he is depressed, not interested in things he normally enjoys.  He has chills but no fever.  No urinary or bowel symptoms.  Denies cough, SOB, HA, neck stiffness or focal weakness.  His initial vital signs were temp 98.6  F, /74, HR 80, RR 18 and 96% sat on RA.  WBC 16.7 with left shift, creatinine 0.8, glucose 277, LFTs wnl, and UA grossly negative for infection.  CT head without contrast negative for acute intracranial pathology.  Admitted for further management of pneumonia, increased confusion and weakness.     RML, RUL CAP, Asplenia:  CXR showed right lower lobe pneumonia, started on empiric IV ceftriaxone and azithromycin, to include coverage for encapsulated bacteria given asplenia.  Admission procalcitonin normal, blood cultures NGTD..  Respiratory viral panel negative.   Given recently identified nonspecific pulmonary nodules and the concerning appearance of the infiltrates on CXR, obtained HRCT 3/26, shows right upper and right middle lobe pneumonia, but given prior history of right chest wall  histiocytoma resection recommend follow-up.  Already has a repeat CT planned with oncology who monitors his history of histiocytoma and hereditary spherocytosis, planned for 10/2024.  Appetite, nutritional intake improved.  Recurrent fever, increased hypoxemia, placed on oxygen in a.m. 3/27.  WBC remained elevated at 19.4.  Repeat procalcitonin again normal.  IV vancomycin added following repeat blood cultures.    Strep pneumonia and Legionella urinary antigen negative.  HIV negative.  Treponema antibody negative.  MRSA negative.  Blood cultures remain no growth to date.  Patient and his wife deny any difficulty swallowing or choking with meals.  Video swallow 3/28 showed mild silent aspiration with thin liquids using a straw, no other aspiration.  WBC trending down, fevers resolved.  Strength improving with therapy but has continued to require supplemental oxygen.  CXR 3/29 showed new vascular congestion, proBNP elevated (was previously normal 12/2023) and BP running higher.  Gave 20 mg IV Lasix 3/29 with good urine output response, started Lasix 20 mg by mouth daily 3/30.  Feeling better, oxygenating 97% on 2 L.  Cough resolving.  Marck afebrile, WBC trending down.  -Continue IV ceftriaxone, vancomycin  -Per infectious disease, discontinued azithromycin after 3/28  -Follow-up final blood culture results  -PT, OT  -Aspiration precautions per speech therapy.  Regular diet with thin liquids, sit upright for all meals, small bites and sips, chew food thoroughly.  -planned repeat chest CT 10/2024 with oncology  -Daily BMP, CBC  -Cautious diuresis as below  -Repeat CXR 3/31    Hereditary spherocytosis, s/p Splenectomy ~ 1976:  Followed by heme-onc.  High risk for encapsulated bacterial infection.  -Antibiotic as above  -Follow blood culture results    New-onset CHF, HTN, HLD:  CXR 3/29 showed new vascular congestion, proBNP elevated, previously normal 12/25/2023.  BP running higher 3/29.  No echo on file, no prior  history of CHF.  Does have chronic hypertension, diabetes.  Received 20 mg IV Lasix 3/29 followed by 20 mg by mouth daily starting 3/30.  Excellent response with UOP.  Echo 3/30/2024 normal, EF 55-60%, normal RV size and function, no significant valvular disease, normal RAP.  Oxygen saturation improving, continues to require O2 2-3 L nasal cannula.  BMP, potassium normal 3/30.  -Continue PTA losartan, statin, ASA  -Gave Lasix 20 mg IV x 1, KCl 40 mEq x 1 on 3/29  -Start oral Lasix 20 mg q morning, KCl 20 mEq q morning 3/30  -Daily BMP    Cognitive impairment with progressive STM loss, acute metabolic encephalopathy due to pneumonia:  Patient lives with his wife.  Previously underwent neuropsychology evaluation several years ago.  Wife has noted progressive short-term memory loss and increased depression and poor sleep over the past 6-7 months.  Particularly accelerated when he was hospitalized with COVID with associated metabolic encephalopathy 3 months ago.  Brain MRI 12/25/2023 showed no acute findings, with mild to moderate generalized cerebral atrophy but no hydrocephalus.  Head CT 3/26/2024 again shows mild generalized volume loss, no hydrocephalus but no acute findings.  Wife also has noted shuffling gait, but no other parkinsonian symptoms or signs on exam, and no recent falls.  Acute encephalopathy clearing, patient has remained fully oriented and appropriate.  No behavioral concerns.  Seen by psychiatry, recommended no changes in antidepressant regimen, and placed referral for outpatient neurology consultation regarding shuffling gait.  Strength, gait improving with therapy.  -PT, OT  -Has planned neuropsychology assessment 4/24/2024  -Continue to treat underlying depression, insomnia    Depression, insomnia:  As above, wife and daughter have noticed progressive depression, difficulty sleeping for the past 6-7 months.  Has been on Zoloft.  Also with progressive STM loss as discussed above, particularly  accelerated after hospitalized with COVID and encephalopathy 3 months ago.  Patient very pleasant on exam.  Acute encephalopathy resolving.  Denies suicidal ideation or intent.  Seen by psychiatry 3/26, no changes in Zoloft.   -Continue PTA Zoloft    Weakness, Gait Instability:  Very active, walks 3 miles at baseline.  Acute weakness, gait instability likely due to acute pneumonia.  Wife has noted shuffling gait, but has no other Parkinson features on exam.  Progressing with therapy.  -PT, OT    DM Type 2, uncontrolled:  A1c 9.4 on 3/25/2024, previously 8.7 on 2/2/2024.  Prior to that had been under reasonable control.  Had been hospitalized with COVID 3 months ago, now with community-acquired pneumonia likely further driving hyperglycemia.  Eating well.  Blood sugars much improved with addition of Glucotrol, NPH.  NPH discontinued 3/29.  Blood sugar control remains adequate.  -Increased PTA metformin to XR, 500 mg every morning, 1000 mg at bedtime on 3/26  -Increased Glucotrol XL to 5 mg daily 3/28  -Continue moderate SSI    Bacterial conjunctivitis:  Started on topical antibiotic PTA.  Initially involved left eye, now also spreading to the right eye.  No purulent drainage.  Injection and itching continue to resolve.  -Changed PTA polymyxin b-trimethopri to both eyes 3/26    Hearing Impaired:  Wears hearing aids.    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:  -As above, has planned repeat chest CT 10/2024 with oncology        Diet: Moderate Consistent Carb (60 g CHO per Meal) Diet    DVT Prophylaxis: Enoxaparin (Lovenox) SQ  Parks Catheter: Not present  Lines: None     Cardiac Monitoring: None  Code Status: Full Code      Clinically Significant Risk Factors                  # Hypertension: Noted on problem list       # DMII: A1C = 9.4 % (Ref range: <5.7 %) within past 6 months, PRESENT ON ADMISSION      # Financial/Environmental Concerns: none          Disposition Plan     Expected Discharge Date: 03/30/2024                    Ryne Lim MD  Hospitalist Service, GOLD TEAM 17  [facility]  Securely message with [employer] (more info)  Text page via [facility] Paging/Directory   See signed in provider for up to date coverage information  ______________________________________________________________________    Interval History   Accompanied by his wife.  Seems a little more confused today but remains oriented.  Overall feeling better.  Eating well.  Wife watching him to make sure he cuts small bites, eat slowly and sits in a chair.  Noted significant increase in urine output with new incontinence yesterday after IV Lasix.  Denies any chest pain or dyspnea.  Cough improving.  Denies any pain.  Eating well.  No nausea, vomiting, diarrhea or constipation.    Physical Exam   Vital Signs: Temp: 98.6  F (37  C) Temp src: Oral BP: (!) 156/76 Pulse: 72   Resp: 18 SpO2: 93 % O2 Device: Nasal cannula Oxygen Delivery: 2 LPM  Weight: 166 lbs 10.68 oz  General: Alert and oriented x 4.  Pleasant.  Speech, affect normal.  HEENT: Bilateral conjunctival injection resolving, no purulent drainage.  Oropharynx clear.    Chest: Mild rales at the right base, otherwise CTA.  No wheezes or rhonchi.  CV: RRR.  No murmurs.  Abdomen: NABS.  Soft, nontender, nondistended.  Extremities: No edema  Neuro: CN II through XII grossly intact.  Strength 5/5 symmetrically.  No significant tremors.  No cogwheeling rigidity.  No bradykinesia.    55 MINUTES SPENT BY ME on the date of service doing chart review, history, exam, documentation & further activities per the note.      Data      CMP  Recent Labs   Lab 03/30/24  1245 03/30/24  0858 03/30/24  0709 03/30/24  0248 03/29/24  0810 03/29/24  0521 03/28/24  0751 03/28/24  0740 03/27/24  0710 03/27/24  0627 03/25/24  2346 03/25/24  1739   NA  --   --  137  --   --  136  --  138  --  136  --  137    POTASSIUM  --   --  3.6  --   --  3.5  --  3.3*  --  3.4  --  3.9   CHLORIDE  --   --  101  --   --  101  --  104  --  103  --  98   CO2  --   --  23  --   --  25  --  25  --  22  --  29   ANIONGAP  --   --  13  --   --  10  --  9  --  11  --  10   * 167* 165* 153*   < > 122*   < > 155*   < > 220*   < > 277*   BUN  --   --  12.6  --   --  15.1  --  17.8  --  21.3  --  17.5   CR  --   --  0.66*  --   --  0.70  --  0.77  --  0.83  --  0.80   GFRESTIMATED  --   --  >90  --   --  >90  --  90  --  88  --  89   YANI  --   --  8.5*  --   --  8.7*  --  8.3*  --  8.6*  --  9.4   PROTTOTAL  --   --   --   --   --   --   --   --   --   --   --  7.5   ALBUMIN  --   --   --   --   --   --   --   --   --   --   --  3.9   BILITOTAL  --   --   --   --   --   --   --   --   --   --   --  1.1   ALKPHOS  --   --   --   --   --   --   --   --   --   --   --  104   AST  --   --   --   --   --   --   --   --   --   --   --  21   ALT  --   --   --   --   --   --   --   --   --   --   --  21    < > = values in this interval not displayed.     CBC  Recent Labs   Lab 03/30/24  0709 03/29/24  0521 03/28/24  0740 03/27/24  0627   WBC 13.4* 14.2* 14.9* 19.4*   RBC 4.03* 4.10* 3.91* 4.12*   HGB 11.9* 12.4* 11.7* 12.5*   HCT 35.0* 35.8* 34.1* 36.5*   MCV 87 87 87 89   MCH 29.5 30.2 29.9 30.3   MCHC 34.0 34.6 34.3 34.2   RDW 13.1 13.2 13.2 12.7    315 280 246     INR  Recent Labs   Lab 03/25/24  1739   INR 1.19*     Arterial Blood GasNo lab results found in last 7 days.Venous Blood Gas  No lab results found in last 7 days.  Hemoglobin A1C   Date Value Ref Range Status   03/25/2024 9.4 (H) <5.7 % Final     Comment:     Normal <5.7%   Prediabetes 5.7-6.4%    Diabetes 6.5% or higher     Note: Adopted from ADA consensus guidelines.   02/02/2024 8.7 (H) <5.7 % Final     Comment:     Normal <5.7%   Prediabetes 5.7-6.4%    Diabetes 6.5% or higher     Note: Adopted from ADA consensus guidelines.   10/03/2023 7.1 (H) 0.0 - 5.6 % Final      Comment:     Normal <5.7%   Prediabetes 5.7-6.4%    Diabetes 6.5% or higher     Note: Adopted from ADA consensus guidelines.   06/01/2021 7.1 (H) 0 - 5.6 % Final     Comment:     Normal <5.7% Prediabetes 5.7-6.4%  Diabetes 6.5% or higher - adopted from ADA   consensus guidelines.     02/26/2021 7.8 (H) 0 - 5.6 % Final     Comment:     Normal <5.7% Prediabetes 5.7-6.4%  Diabetes 6.5% or higher - adopted from ADA   consensus guidelines.     10/28/2020 6.8 (H) 0 - 5.6 % Final     Comment:     Normal <5.7% Prediabetes 5.7-6.4%  Diabetes 6.5% or higher - adopted from ADA   consensus guidelines.       Results for orders placed or performed during the hospital encounter of 03/25/24   Head CT w/o contrast    Narrative    EXAM: CT HEAD W/O CONTRAST  LOCATION: [facility]  DATE: 3/25/2024    INDICATION: Altered mental status  COMPARISON: MRI brain 12/25/2023  TECHNIQUE: Routine CT Head without IV contrast. Multiplanar reformats. Dose reduction techniques were used.    FINDINGS:  INTRACRANIAL CONTENTS: No intracranial hemorrhage, extraaxial collection, or mass effect.  No CT evidence of acute infarct. Normal parenchymal attenuation for age. Mild generalized volume loss. No hydrocephalus.     VISUALIZED ORBITS/SINUSES/MASTOIDS: Prior bilateral cataract surgery. Visualized portions of the orbits are otherwise unremarkable. Mild to moderate mucosal thickening scattered about the paranasal sinuses. No middle ear or mastoid effusion.    BONES/SOFT TISSUES: No acute abnormality.      Impression    IMPRESSION:  1.  Mild age-related changes without acute intracranial abnormality.    XR Chest Port 1 View    Narrative    EXAM: XR CHEST PORT 1 VIEW  LOCATION: [facility]  DATE: 3/25/2024    INDICATION: Altered MS w  leukocytosis.  Eval for pneumonia  COMPARISON: 12/25/2023.      Impression    IMPRESSION: Cardiac silhouette is within normal limits.  Increased patchy opacities throughout the right lower lung concerning for pneumonia. No pleural effusion or pneumothorax. Remote postsurgical changes of the right lung and ribs.   CT Chest w/o Contrast    Narrative    EXAMINATION: Chest CT  3/26/2024 1:10 PM    CLINICAL HISTORY: right lower lung pneumonia, h/o pulmonary nodules.  Assess for worsening nodules, lung abscess.    COMPARISON: CT to 2/2/2024.    TECHNIQUE: CT imaging obtained through the chest without contrast.  Axial, coronal, and sagittal reconstructions and axial MIP reformatted  images are reviewed.     FINDINGS:  Mediastinum: The visualized thyroid is unremarkable. The heart is  normal in size. No significant  pericardial effusion. Moderate  coronary artery calcifications. The ascending aorta and main pulmonary  artery are normal in caliber. Several prominent mediastinal lymph  nodes are increased from prior. No lymphadenopathy by size criteria.  Small hiatal hernia.    Lungs: Trace pleural effusions. No pneumothorax. Layering secretions  in the mainstem bronchi. Mild lower lobe predominant bronchial wall  thickening with scattered mucous plugging at the lung bases. Mild  bronchiectasis of the right middle and upper lobes, similar to prior.  New crazy paving pattern in the residual right upper and right middle  lobe consisting of groundglass opacities with intralobular septal  thickening as well as several patchy airspace opacities. Peripheral  consolidative opacities in the right lower lobe, suboptimally  evaluated due to respiratory motion artifact.  Multiple pulmonary nodules are not significantly changed, for example:  - Stable solid nodule in the right lower lobe measuring 15 x 9 mm  (series 4 image 172) with satellite nodularity, new since 2021.   - Stable 8 mm solid nodule at the right lower lobe lung base (series 4  image 228), but slowly increasing from 2019.    Bones and soft tissues: Stable postsurgical changes of partial right  chest wall  resection with mesh reconstruction and rib osteotomies.    Upper Abdomen: Limited evaluation of the upper abdomen. Partially  visualized large right exophytic renal cyst and small left renal sinus  cysts.      Impression    IMPRESSION:   1. New groundglass and consolidative opacities with interlobular  septal thickening in the right upper and middle lobes. Findings are  favored to represent infectious/inflammatory acute interstitial  pneumonia. However given the postsurgical changes of the adjacent  right chest wall for resection of a soft tissue mass, pulmonary  lymphatic metastases is a possibility. Recommend close attention on  short-term follow-up and consider tissue biopsy if findings persist.  2. Stable scattered solid pulmonary nodules. Attention on follow-up.  3. Trace pleural effusions.    I have personally reviewed the examination and initial interpretation  and I agree with the findings.    TIM BALTAZAR MD         SYSTEM ID:  [id number]   XR Video Swallow with SLP or OT    Narrative    EXAM:  Modified Barium Swallow Study with Speech Pathology, 3/28/2024    COMPARISON: None    HISTORY: Assess for silent aspiration.    FLUOROSCOPY TIME: 2.1 minutes.    FINDINGS: Under fluoroscopic guidance, the patient was given orally  administered barium of varying consistencies in the presence of the  speech pathology service.     Poor bolus control. Delayed initiation of swallowing. Mild to moderate  vallecular residue. Flash penetration was observed with thin barium.  Single episode of silent aspiration occurred with thin barium. No  evidence of tracheal aspiration with any other consistency of barium.  Degenerative changes of the cervical spine.      Impression    IMPRESSION:   Single episode of silent aspiration with thin barium. No evidence of  tracheal aspiration with any other consistency of barium.     Please see the speech pathologist report for further details.    DWAINE MISTRY MD         SYSTEM ID:   [id number]   XR Chest 2 Views    Narrative    Exam:  Chest X-ray 3/29/2024 11:29 AM    History: right lung pneumonia, reassess infiltrates.    Comparison: 3/25/2024    Findings:   Single portable AP view of the chest. Increased diffuse mixed  interstitial and airspace opacities in the right upper and middle  lobes. Cardiac silhouette is normal. Small right pleural effusion. No  pneumothorax.      Impression    Impression:   1.  Increased mixed interstitial and airspace opacities in the right  upper and middle lobes suggestive of pulmonary edema and/or pneumonia.    JOHN WYATT MD         SYSTEM ID:  [id number]   Echo Complete     Value    LVEF  55-60%    Narrative    [id number]  [id number]  [id number]  [id number]^MARLYS^SUNDAY^SIVA     [facility]  Echocardiography Laboratory  [address] [place] [id number]     Name: FATOUMATA LEMUS  MRN: [id number]  : 1945  Study Date: 2024 11:43 AM  Age: 79 yrs  Gender: Male  Patient Location: [facility]  Reason For Study: CHF  Ordering Physician: SUNDAY FRANKEL  Performed By: Taiwo Decker     BSA: 2.0 m2  Height: 72 in  Weight: 166 lb  HR: 67  BP: 130/69 mmHg  ______________________________________________________________________________  Procedure  Complete Portable Echo Adult. Echocardiogram with two-dimensional, color and  spectral Doppler performed.  ______________________________________________________________________________  Interpretation Summary  Global and regional left ventricular function is normal with an EF of 55-60%.  Global right ventricular function is normal.  No significant valvular abnormalities present.  Estimated mean right atrial pressure is normal.  Sinuses of Valsalva is dilated at 4.2 cm.  No pericardial effusion is present.     ______________________________________________________________________________  Left Ventricle  Left ventricular size is normal. Left ventricular wall thickness  is normal.  Global and regional left ventricular function is normal with an EF of 55-60%.  No regional wall motion abnormalities are seen.     Right Ventricle  The right ventricle is normal size. Global right ventricular function is  normal.     Atria  The right atria appears normal. Mild left atrial enlargement is present.     Mitral Valve  The mitral valve is normal.     Aortic Valve  Trileaflet aortic sclerosis without stenosis.     Tricuspid Valve  The tricuspid valve is normal. Trace to mild tricuspid insufficiency is  present. The peak velocity of the tricuspid regurgitant jet is not obtainable.  Pulmonary artery systolic pressure cannot be assessed.     Pulmonic Valve  The pulmonic valve is normal.     Vessels  Sinuses of Valsalva 4.2 cm. Ascending aorta 3.6 cm. Estimated mean right  atrial pressure is normal.     Pericardium  No pericardial effusion is present.     Miscellaneous  No significant valvular abnormalities present.     Compared to Previous Study  Previous study not available for comparison.  ______________________________________________________________________________  MMode/2D Measurements & Calculations  IVSd: 1.0 cm  LVIDd: 4.4 cm  LVIDs: 3.2 cm  LVPWd: 0.90 cm  FS: 27.3 %  LV mass(C)d: 137.8 grams  LV mass(C)dI: 70.0 grams/m2  Ao root diam: 4.2 cm  asc Aorta Diam: 3.6 cm  LVOT diam: 2.1 cm  LVOT area: 3.5 cm2  Ao root diam index Ht(cm/m): 2.3  Ao root diam index BSA (cm/m2): 2.1  Asc Ao diam index BSA (cm/m2): 1.8  Asc Ao diam index Ht(cm/m): 2.0  EF Biplane: 55.5 %  LA Volume (BP): 82.7 ml     LA Volume Index (BP): 42.0 ml/m2  RWT: 0.41  TAPSE: 2.3 cm     Doppler Measurements & Calculations  MV E max aristeo: 92.8 cm/sec  MV A max aristeo: 105.0 cm/sec  MV E/A: 0.88  MV dec slope: 333.0 cm/sec2  MV dec time: 0.28 sec  E/E' av.9  Lateral E/e': 10.9  Medial E/e': 9.0     RV S Aristeo: 18.5 cm/sec     ______________________________________________________________________________  Report approved by:  Theresa Kruse 03/30/2024 12:23 PM

## 2024-03-30 NOTE — PROGRESS NOTES
BP (!) 156/76 (BP Location: Left arm)   Pulse 72   Temp 98.6  F (37  C) (Oral)   Resp 18   Wt 75.6 kg (166 lb 10.7 oz)   SpO2 93%   BMI 22.29 kg/m      Pt AO x3 (disoriented to time and situation), on 2 LPM via NC. Pt denies pain, denies chest pain or SOB, incontinent of both bowel and bladder. 2 PIVs SL, DM type 2 and on Insulin per sliding scale.   Pt had a shower today; had a good time with family visiting. No acute or new events this shift. Continue to monitor patient and follow the POC.

## 2024-03-31 ENCOUNTER — TRANSFERRED RECORDS (OUTPATIENT)
Dept: MULTI SPECIALTY CLINIC | Facility: CLINIC | Age: 79
End: 2024-03-31

## 2024-03-31 ENCOUNTER — APPOINTMENT (OUTPATIENT)
Dept: GENERAL RADIOLOGY | Facility: CLINIC | Age: 79
DRG: 193 | End: 2024-03-31
Attending: INTERNAL MEDICINE
Payer: COMMERCIAL

## 2024-03-31 LAB
ANION GAP SERPL CALCULATED.3IONS-SCNC: 10 MMOL/L (ref 7–15)
B BURGDOR IGG+IGM SER QL: 0.13
BACTERIA BLD CULT: NO GROWTH
BACTERIA BLD CULT: NO GROWTH
BUN SERPL-MCNC: 12.6 MG/DL (ref 8–23)
CALCIUM SERPL-MCNC: 8.7 MG/DL (ref 8.8–10.2)
CHLORIDE SERPL-SCNC: 99 MMOL/L (ref 98–107)
CREAT SERPL-MCNC: 0.67 MG/DL (ref 0.67–1.17)
CRP SERPL-MCNC: 87.53 MG/L
DEPRECATED HCO3 PLAS-SCNC: 28 MMOL/L (ref 22–29)
EGFRCR SERPLBLD CKD-EPI 2021: >90 ML/MIN/1.73M2
ERYTHROCYTE [DISTWIDTH] IN BLOOD BY AUTOMATED COUNT: 13 % (ref 10–15)
GLUCOSE BLDC GLUCOMTR-MCNC: 155 MG/DL (ref 70–99)
GLUCOSE BLDC GLUCOMTR-MCNC: 160 MG/DL (ref 70–99)
GLUCOSE BLDC GLUCOMTR-MCNC: 177 MG/DL (ref 70–99)
GLUCOSE BLDC GLUCOMTR-MCNC: 180 MG/DL (ref 70–99)
GLUCOSE BLDC GLUCOMTR-MCNC: 221 MG/DL (ref 70–99)
GLUCOSE SERPL-MCNC: 163 MG/DL (ref 70–99)
HCT VFR BLD AUTO: 36 % (ref 40–53)
HGB BLD-MCNC: 12.4 G/DL (ref 13.3–17.7)
MCH RBC QN AUTO: 30 PG (ref 26.5–33)
MCHC RBC AUTO-ENTMCNC: 34.4 G/DL (ref 31.5–36.5)
MCV RBC AUTO: 87 FL (ref 78–100)
PLATELET # BLD AUTO: 402 10E3/UL (ref 150–450)
POTASSIUM SERPL-SCNC: 3.9 MMOL/L (ref 3.4–5.3)
POTASSIUM SERPL-SCNC: 4 MMOL/L (ref 3.4–5.3)
RBC # BLD AUTO: 4.14 10E6/UL (ref 4.4–5.9)
RETINOPATHY: NORMAL
SODIUM SERPL-SCNC: 137 MMOL/L (ref 135–145)
VANCOMYCIN SERPL-MCNC: 11.4 UG/ML
WBC # BLD AUTO: 14.4 10E3/UL (ref 4–11)

## 2024-03-31 PROCEDURE — 250N000013 HC RX MED GY IP 250 OP 250 PS 637: Performed by: INTERNAL MEDICINE

## 2024-03-31 PROCEDURE — 71046 X-RAY EXAM CHEST 2 VIEWS: CPT | Mod: 26 | Performed by: RADIOLOGY

## 2024-03-31 PROCEDURE — 258N000003 HC RX IP 258 OP 636: Performed by: INTERNAL MEDICINE

## 2024-03-31 PROCEDURE — 85027 COMPLETE CBC AUTOMATED: CPT | Performed by: INTERNAL MEDICINE

## 2024-03-31 PROCEDURE — 120N000002 HC R&B MED SURG/OB UMMC

## 2024-03-31 PROCEDURE — 36415 COLL VENOUS BLD VENIPUNCTURE: CPT | Performed by: INTERNAL MEDICINE

## 2024-03-31 PROCEDURE — 86140 C-REACTIVE PROTEIN: CPT | Performed by: INTERNAL MEDICINE

## 2024-03-31 PROCEDURE — 99233 SBSQ HOSP IP/OBS HIGH 50: CPT | Performed by: INTERNAL MEDICINE

## 2024-03-31 PROCEDURE — 84132 ASSAY OF SERUM POTASSIUM: CPT | Performed by: INTERNAL MEDICINE

## 2024-03-31 PROCEDURE — 250N000011 HC RX IP 250 OP 636: Performed by: INTERNAL MEDICINE

## 2024-03-31 PROCEDURE — 86618 LYME DISEASE ANTIBODY: CPT | Performed by: INTERNAL MEDICINE

## 2024-03-31 PROCEDURE — 71046 X-RAY EXAM CHEST 2 VIEWS: CPT

## 2024-03-31 PROCEDURE — 80202 ASSAY OF VANCOMYCIN: CPT | Performed by: INTERNAL MEDICINE

## 2024-03-31 PROCEDURE — 80048 BASIC METABOLIC PNL TOTAL CA: CPT | Performed by: INTERNAL MEDICINE

## 2024-03-31 RX ORDER — FUROSEMIDE 10 MG/ML
20 INJECTION INTRAMUSCULAR; INTRAVENOUS ONCE
Status: COMPLETED | OUTPATIENT
Start: 2024-03-31 | End: 2024-03-31

## 2024-03-31 RX ORDER — GLIPIZIDE 2.5 MG/1
10 TABLET, EXTENDED RELEASE ORAL
Status: DISCONTINUED | OUTPATIENT
Start: 2024-04-01 | End: 2024-04-01 | Stop reason: HOSPADM

## 2024-03-31 RX ORDER — POTASSIUM CHLORIDE 1500 MG/1
20 TABLET, EXTENDED RELEASE ORAL ONCE
Status: COMPLETED | OUTPATIENT
Start: 2024-03-31 | End: 2024-03-31

## 2024-03-31 RX ORDER — AZITHROMYCIN 500 MG/5ML
500 INJECTION, POWDER, LYOPHILIZED, FOR SOLUTION INTRAVENOUS EVERY 24 HOURS
Status: DISCONTINUED | OUTPATIENT
Start: 2024-03-31 | End: 2024-04-01

## 2024-03-31 RX ORDER — FUROSEMIDE 40 MG
40 TABLET ORAL DAILY
Status: DISCONTINUED | OUTPATIENT
Start: 2024-04-01 | End: 2024-04-01

## 2024-03-31 RX ADMIN — POLYMYXIN B SULFATE AND TRIMETHOPRIM 2 DROP: 10000; 1 SOLUTION OPHTHALMIC at 08:28

## 2024-03-31 RX ADMIN — AZITHROMYCIN MONOHYDRATE 500 MG: 500 INJECTION, POWDER, LYOPHILIZED, FOR SOLUTION INTRAVENOUS at 11:17

## 2024-03-31 RX ADMIN — GLIPIZIDE 5 MG: 2.5 TABLET, FILM COATED, EXTENDED RELEASE ORAL at 08:24

## 2024-03-31 RX ADMIN — FUROSEMIDE 20 MG: 10 INJECTION, SOLUTION INTRAMUSCULAR; INTRAVENOUS at 11:03

## 2024-03-31 RX ADMIN — METFORMIN HYDROCHLORIDE 1000 MG: 500 TABLET, EXTENDED RELEASE ORAL at 17:24

## 2024-03-31 RX ADMIN — Medication 1 TABLET: at 21:35

## 2024-03-31 RX ADMIN — ASPIRIN 81 MG: 81 TABLET, COATED ORAL at 08:24

## 2024-03-31 RX ADMIN — SERTRALINE HYDROCHLORIDE 100 MG: 100 TABLET ORAL at 08:25

## 2024-03-31 RX ADMIN — LOSARTAN POTASSIUM 50 MG: 25 TABLET, FILM COATED ORAL at 08:25

## 2024-03-31 RX ADMIN — POTASSIUM CHLORIDE 20 MEQ: 1500 TABLET, EXTENDED RELEASE ORAL at 11:03

## 2024-03-31 RX ADMIN — POLYMYXIN B SULFATE AND TRIMETHOPRIM 2 DROP: 10000; 1 SOLUTION OPHTHALMIC at 17:25

## 2024-03-31 RX ADMIN — METFORMIN HYDROCHLORIDE 500 MG: 500 TABLET, EXTENDED RELEASE ORAL at 08:24

## 2024-03-31 RX ADMIN — VANCOMYCIN HYDROCHLORIDE 1250 MG: 10 INJECTION, POWDER, LYOPHILIZED, FOR SOLUTION INTRAVENOUS at 08:16

## 2024-03-31 RX ADMIN — CEFTRIAXONE SODIUM 2 G: 2 INJECTION, POWDER, FOR SOLUTION INTRAMUSCULAR; INTRAVENOUS at 21:36

## 2024-03-31 RX ADMIN — Medication 1 TABLET: at 08:24

## 2024-03-31 RX ADMIN — POLYMYXIN B SULFATE AND TRIMETHOPRIM 2 DROP: 10000; 1 SOLUTION OPHTHALMIC at 12:56

## 2024-03-31 RX ADMIN — POLYMYXIN B SULFATE AND TRIMETHOPRIM 2 DROP: 10000; 1 SOLUTION OPHTHALMIC at 21:36

## 2024-03-31 RX ADMIN — ATORVASTATIN CALCIUM 40 MG: 40 TABLET, FILM COATED ORAL at 08:24

## 2024-03-31 RX ADMIN — INSULIN ASPART 1 UNITS: 100 INJECTION, SOLUTION INTRAVENOUS; SUBCUTANEOUS at 22:16

## 2024-03-31 RX ADMIN — FUROSEMIDE 20 MG: 20 TABLET ORAL at 08:24

## 2024-03-31 RX ADMIN — TRIAMCINOLONE ACETONIDE: 5 OINTMENT TOPICAL at 08:30

## 2024-03-31 RX ADMIN — POTASSIUM CHLORIDE 20 MEQ: 1500 TABLET, EXTENDED RELEASE ORAL at 08:24

## 2024-03-31 RX ADMIN — ENOXAPARIN SODIUM 40 MG: 40 INJECTION SUBCUTANEOUS at 08:24

## 2024-03-31 ASSESSMENT — ACTIVITIES OF DAILY LIVING (ADL)
ADLS_ACUITY_SCORE: 51

## 2024-03-31 NOTE — PROGRESS NOTES
"  [facility] INFECTIOUS DISEASES Follow Up     Patient:  Gerber Levine   Date of birth 1945, Medical record number [id number]  Date of Visit:  03/31/2024  Date of Admission: 3/25/2024  Attending Physician:Kurt Lim          Assessment and Recommendations:   ID Problem List   1. Fever, leukocytosis  - afebrile, WBC improved (20-> 14) but still high.  2. R lung consolidation consistent with community acquired pneumonia -- consistent CXR and Chest CT  ---- Pending Histoplasma and Blastomyces urine Antigens  3. Bilateral conjunctivitis  4. Asplenia --- This predisposes for encapsulated organisms, and also makes response slower.   -- at risk for severe Babesiosis  5. Diabetes mellitus type II   6. Staph aureus colonization in nares, but no MRSA.  7. Recent COVID-19 in Dec 2023, but that is fairly remote and likely unrelated.  8. Mental decline since Dec, unexplained.  Syphilis, HIV neg    RECOMMENDATION:  Continue Ceftriaxone to 2g q24hrs for ongoing treatment of pneumonia.   Continue Azithromycin for atypical coverage.  (Would give another 2 days worth)  Stop Vancomycin (as no MRSA identified)  Trend CRP -- a 10% decrease per day is a good trend.  If CRP is not declining, then I would expand the current diagnostic work up.  (Ordered)  5. Consider Lyme disease screen (ordered) as reversible cause of dementia.      ASSESSMENT:  Gerber Levine is a 79 year old male who was admitted for confusion and gait instability at home over the past few days.     He was afebrile on admission but then spiked fever to 102.4. Otherwise normal hemodynamics. Has an elevated WBC count to 19K now down to 14k. Lactate wnl, PCT <0.5. UA was unremarkable for infection. BCx 3/25 and  3/27 NGTD. RVP negative. COVID PCR negative. CXR with patchy opacities throughout RLL c/f pneumonia. CT chest with \"new ggo and consolidative opacities with interlobular septal thickening in R upper and middle lobes\"- c/f infection.    Chest imaging " "seems consistent with pneumonia and fever curve appears to be moving in the right direction. No e/o productive cough, so we are covering empirically for bacterial pneumonia. Ruling out common fungal pneumonia causes in this area given recent COVID infection 3 months ago. We have a low suspicion for Syphilis or HIV, but given progressive neurologic changes, reasonable to rule out/ screen for once in adult lifetime.              History of Present Illness:     Gerber Levine is a 79 year old male with PMHx significant for diabetes mellitus type II, hypertension, hereditary spherocytosis s/p splenectomy (1976), s/p R chest wall histiocytoma resection (2016) and depression who presented to the ED 3/25 with confusion and instability.     On chart review, the patient was noted to be more confused over the past 4-5 days PTA. Additionally, noted to be more unstable on his feet. Usually active in walking 3 miles daily which he did 2 days PTA. Patient endorsed chills. Denied fevers, urinary symptoms, diarrhea, SOB, headache, neck stiffness or other symptom complaints.     Wife provides most of history. Patient defers history to her for reason why he was admitted. He states he feels \"pretty good\" and offered no complaints. It was a bit unclear on acute change in mental status from patient's last 6 months for which patient's wife said he has had some decreased cognition. She notes he never really returned to his baseline after COVID infection 3 months ago. Wife recently finished course of antibiotics for sinus infection. She notes the patient has been coughing at home over the past week. He denies sputum production.     They live in [place], [place] in a home. No pets. No recent travel. Last left the state about 1 year ago and traveled to [place]. Denies travel to [place] or internationally. Patient does not walk by bodies of water locally. He does do his own yard work/leaf clearing but denied clearing any dead/moldy " leaves/wood from his yard.          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    EYE SURGERY  2014    cataracts    HERNIA REPAIR  1976    REPAIR CHEST WALL N/A 9/21/2016    Procedure: REPAIR CHEST WALL;  Surgeon: Leah Nixon MD;  Location: [facility] OR    SURGICAL HISTORY OF -   1976    SPLENECTOMY    SURGICAL HISTORY OF -       APPENDECTOMY    SURGICAL HISTORY OF -       CHOLECYSTECTOMY    SURGICAL HISTORY OF -       HERNIAORRHAPHY    SURGICAL HISTORY OF -   1981    cholesteotoma            Family History:     Family History   Problem Relation Age of Onset    Dementia Mother     Other Cancer Mother         Skin cancer    Diabetes Father     Emphysema Father     Intellectual Disability (Mental Retardation) Sister     Diabetes Sister     Dementia Brother     Prostate Cancer Brother     Diabetes Maternal Grandmother     Genitourinary Problems Daughter         spherocytosis, had spleen removed    C.A.D. No family hx of     Cancer - colorectal No family hx of     Hypertension No  family hx of     Cerebrovascular Disease No family hx of     Breast Cancer No family hx of             Social History:     Social History     Tobacco Use    Smoking status: Never    Smokeless tobacco: Never   Substance Use Topics    Alcohol use: Yes     Comment: Occasionaly     History   Sexual Activity    Sexual activity: Not Currently    Partners: Female    Birth control/ protection: None            Current Medications:      aspirin  81 mg Oral Daily    atorvastatin  40 mg Oral Daily    calcium citrate-vitamin D  1 tablet Oral BID    cefTRIAXone  2 g Intravenous Q24H    enoxaparin ANTICOAGULANT  40 mg Subcutaneous Q24H    furosemide  20 mg Oral Daily    [START ON 4/1/2024] glipiZIDE  10 mg Oral Daily with breakfast    insulin aspart  1-7 Units Subcutaneous TID AC    insulin aspart  1-5 Units Subcutaneous At Bedtime    losartan  50 mg Oral Daily    metFORMIN  1,000 mg Oral Daily with supper    metFORMIN  500 mg Oral QAM    polymixin b-trimethoprim  2 drop Both Eyes 4x Daily    potassium chloride  20 mEq Oral Daily    sertraline  100 mg Oral Daily    sodium chloride (PF)  3 mL Intracatheter Q8H    triamcinolone   Topical BID    vancomycin  1,250 mg Intravenous Q12H            Allergies:     Allergies   Allergen Reactions    Oxycodone Itching    Shrimp Swelling            Physical Exam:   Vitals were reviewed  Patient Vitals for the past 24 hrs:   BP Temp Temp src Pulse Resp SpO2   03/31/24 0808 138/79 98.9  F (37.2  C) Oral 70 16 93 %   03/31/24 0419 -- -- -- -- -- 90 %   03/30/24 2342 (!) 140/71 99.5  F (37.5  C) Oral 72 16 92 %   03/30/24 2303 133/74 98.3  F (36.8  C) Oral 70 16 95 %   03/30/24 1706 137/83 97.6  F (36.4  C) Axillary 71 16 92 %       Physical Examination:  Constitutional: Pleasant male seen sitting up in a recliner, in NAD. Alert and interactive.   HEENT: NCAT, anicteric sclerae, conjunctiva clear. Moist mucous membranes.   Respiratory: Non-labored breathing on 3L NC. Lungs CTAB.   Cardiovascular:  Regular rate and rhythm.  GI: Normoactive BS. Abdomen is soft, non-distended.  Skin: Warm and dry. No rashes or lesions on exposed surfaces.  Musculoskeletal: Extremities grossly normal. No tenderness or edema present.   Neurologic: A &O x3, speech normal, answering questions appropriately. Moves all extremities spontaneously. Grossly non-focal.  Neuropsychiatric: Mentation and affect normal/appropriate.  VAD: PIV         Laboratory Data:     Inflammatory Markers  No lab results found.    Hematology Studies    Recent Labs   Lab Test 03/31/24  0713 03/30/24  0709 03/29/24  0521 03/28/24  0740 03/27/24  0627 03/26/24  0753   WBC 14.4* 13.4* 14.2* 14.9* 19.4* 19.9*   HGB 12.4* 11.9* 12.4* 11.7* 12.5* 12.3*   MCV 87 87 87 87 89 88    342 315 280 246 248       Metabolic Studies     Recent Labs   Lab Test 03/31/24  0713 03/30/24  1617 03/30/24  0709 03/29/24  0521 03/28/24  0740 03/27/24  0627     --  137 136 138 136   POTASSIUM 3.9 3.9 3.6 3.5 3.3* 3.4   CHLORIDE 99  --  101 101 104 103   CO2 28  --  23 25 25 22   BUN 12.6  --  12.6 15.1 17.8 21.3   CR 0.67  --  0.66* 0.70 0.77 0.83   GFRESTIMATED >90  --  >90 >90 90 88       Hepatic Studies    Recent Labs   Lab Test 03/25/24  1739 12/25/23 2029 01/18/23  0744   BILITOTAL 1.1 0.5 0.9   ALKPHOS 104 123 80   ALBUMIN 3.9 4.2 4.3   AST 21 27 29   ALT 21 23 19       Microbiology:  Culture   Date Value Ref Range Status   03/27/2024 No growth after 3 days  Preliminary   03/27/2024 No growth after 3 days  Preliminary   03/25/2024 No Growth  Final   03/25/2024 No Growth  Final       Urine Studies    Recent Labs   Lab Test 03/25/24  1740 12/25/23  2127   LEUKEST Negative Negative   WBCU 1 1       Vancomycin Levels    Recent Labs   Lab Test 03/31/24  0713 03/29/24  0521   VANCOMYCIN 11.4 5.6       Hepatitis B Testing No lab results found.  Hepatitis C Testing     Hepatitis C Antibody   Date Value Ref Range Status   10/28/2020 Nonreactive NR^Nonreactive Final      "Comment:     Assay performance characteristics have not been established for newborns,   infants, and children       Respiratory Virus Testing    No results found for: \"RS\", \"FLUAG\"    IMAGING    CT chest w/o contrast 3/26/24  IMPRESSION:   1. New groundglass and consolidative opacities with interlobular  septal thickening in the right upper and middle lobes. Findings are  favored to represent infectious/inflammatory acute interstitial  pneumonia. However given the postsurgical changes of the adjacent  right chest wall for resection of a soft tissue mass, pulmonary  lymphatic metastases is a possibility. Recommend close attention on  short-term follow-up and consider tissue biopsy if findings persist.  2. Stable scattered solid pulmonary nodules. Attention on follow-up.  3. Trace pleural effusions.    CXR 3/25/24   IMPRESSION: Cardiac silhouette is within normal limits. Increased patchy opacities throughout the right lower lung concerning for pneumonia. No pleural effusion or pneumothorax. Remote postsurgical changes of the right lung and ribs.     CT head w/o contrast 3/25/24   IMPRESSION:  1.  Mild age-related changes without acute intracranial abnormality.   "

## 2024-03-31 NOTE — PHARMACY-VANCOMYCIN DOSING SERVICE
Pharmacy Vancomycin Note  Date of Service 2024  Patient's  1945   79 year old, male    Indication: Community Acquired Pneumonia  Day of Therapy: started 3/27/24  Current vancomycin regimen:  1250 mg IV q12h  Current vancomycin monitoring method: AUC  Current vancomycin therapeutic monitoring goal: 400-600 mg*h/L    [employer]ight[employer] Prediction of Current Vancomycin Regimen  Regimen: 1250 mg IV every 12 hours.  Exposure target: AUC24 (range)400-600 mg/L.hr   AUC24,ss: 447 mg/L.hr  Probability of AUC24 > 400: 80 %  Ctrough,ss: 10.9 mg/L  Probability of Ctrough,ss > 20: 0 %  Probability of nephrotoxicity (Lodise MACIEJ ): 6 %    Current estimated CrCl = Estimated Creatinine Clearance: 92.5 mL/min (based on SCr of 0.67 mg/dL).    Creatinine for last 3 days  3/29/2024:  5:21 AM Creatinine 0.70 mg/dL  3/30/2024:  7:09 AM Creatinine 0.66 mg/dL  3/31/2024:  7:13 AM Creatinine 0.67 mg/dL    Recent Vancomycin Levels (past 3 days)  3/29/2024:  5:21 AM Vancomycin 5.6 ug/mL  3/31/2024:  7:13 AM Vancomycin 11.4 ug/mL    Vancomycin IV Administrations (past 72 hours)                     vancomycin (VANCOCIN) 1,250 mg in 0.9% NaCl 250 mL intermittent infusion (mg) 1,250 mg New Bag 24 0816     1,250 mg New Bag 24     1,250 mg New Bag  0830     1,250 mg New Bag 24     1,250 mg New Bag  0843    vancomycin (VANCOCIN) 1,500 mg in 0.9% NaCl 250 mL intermittent infusion (mg) 1,500 mg New Bag 24 0937                    Nephrotoxins and other renal medications (From now, onward)      Start     Dose/Rate Route Frequency Ordered Stop    24 0800  furosemide (LASIX) tablet 20 mg         20 mg Oral DAILY 24 1309      24 0800  vancomycin (VANCOCIN) 1,250 mg in 0.9% NaCl 250 mL intermittent infusion         1,250 mg  over 90 Minutes Intravenous EVERY 12 HOURS 24 0753                 Contrast Orders - past 72 hours (72h ago, onward)      Start     Dose/Rate Route Frequency Stop     03/28/24 1600  barium sulfate (VARIBAR THIN Liquid) 40 % oral suspension 18 g         45 mL Oral ONCE 03/28/24 1535    03/28/24 1600  barium sulfate (VARIBAR) 40 % pudding/paste 15 mL         15 mL Oral ONCE 03/28/24 1534    03/28/24 1600  barium sulfate 40% (VARIBAR NECTAR) oral suspension          Oral ONCE 03/28/24 1534            Interpretation of levels and current regimen:  Vancomycin level is reflective of -600    Has serum creatinine changed greater than 50% in last 72 hours: No    Urine output:  unable to determine    Renal Function: Improving    Plan:  Continue Current Dose  Vancomycin monitoring method: AUC  Vancomycin therapeutic monitoring goal: 400-600 mg*h/L  Pharmacy will check vancomycin levels as appropriate in 1-3 Days.  Serum creatinine levels will be ordered daily for the first week of therapy and at least twice weekly for subsequent weeks.    RADHA TORIBIO, [facility]

## 2024-03-31 NOTE — PROGRESS NOTES
/70 (BP Location: Left arm)   Pulse 79   Temp 99  F (37.2  C) (Oral)   Resp 18   Wt 75.6 kg (166 lb 10.7 oz)   SpO2 94%   BMI 22.29 kg/m      Pt AO x3, on RA and sating adequately. Pt exhibits intermitted forgetfulness. Assist x1 with gait belt and walker, DM type 2 with Insulin per sliding scale. Denies pain, denies SOB. Patient continent of bowel, incontinent of bladder. Pt on Lasix. Pt successfully weaned off of Oxygen.    Pt had a good day today with family. Patient's spouse took patient on walks in the hallways. Patient progressing and may discharge tomorrow. POC is ongoing.

## 2024-03-31 NOTE — PLAN OF CARE
Goal Outcome Evaluation:      Plan of Care Reviewed With: patient    Overall Patient Progress: no changeOverall Patient Progress: no change    Outcome Evaluation: Pt alert and oriented times 3 and disoriented to situation. Assist of 1 with walker and gait belt. Pt use ear aid and it is on  at this time. No acute event at this time and continue with POC.      Problem: Adult Inpatient Plan of Care  Goal: Plan of Care Review  Description: The Plan of Care Review/Shift note should be completed every shift.  The Outcome Evaluation is a brief statement about your assessment that the patient is improving, declining, or no change.  This information will be displayed automatically on your shift  note.  Flowsheets (Taken 3/30/2024 2217)  Outcome Evaluation: Pt alert and oriented times 3 and disoriented to situation. Assist of 1 with walker and gait belt. Pt use ear aid and it is on  at this time. No acute event at this time and continue with POC.  Plan of Care Reviewed With: patient  Overall Patient Progress: no change  Goal: Absence of Hospital-Acquired Illness or Injury  Intervention: Identify and Manage Fall Risk  Recent Flowsheet Documentation  Taken 3/30/2024 2139 by Sangeeta Benites, RN  Safety Promotion/Fall Prevention:   assistive device/personal items within reach   clutter free environment maintained   increased rounding and observation   nonskid shoes/slippers when out of bed   room near nurse's station  Intervention: Prevent Skin Injury  Recent Flowsheet Documentation  Taken 3/30/2024 2139 by Sangeeta Benites, RN  Body Position: position changed independently     Problem: Fall Injury Risk  Goal: Absence of Fall and Fall-Related Injury  Intervention: Identify and Manage Contributors  Recent Flowsheet Documentation  Taken 3/30/2024 2139 by Sangeeta Benites, RN  Medication Review/Management: medications reviewed  Intervention: Promote Injury-Free Environment  Recent Flowsheet Documentation  Taken  3/30/2024 2139 by Sangeeta Benites, RN  Safety Promotion/Fall Prevention:   assistive device/personal items within reach   clutter free environment maintained   increased rounding and observation   nonskid shoes/slippers when out of bed   room near nurse's station     Problem: Comorbidity Management  Goal: Blood Glucose Levels Within Targeted Range  Intervention: Monitor and Manage Glycemia  Recent Flowsheet Documentation  Taken 3/30/2024 2139 by Sangeeta Benites, RN  Medication Review/Management: medications reviewed  Goal: Blood Pressure in Desired Range  Intervention: Maintain Blood Pressure Management  Recent Flowsheet Documentation  Taken 3/30/2024 2139 by Sangeeta Benites, RN  Medication Review/Management: medications reviewed     Problem: Pneumonia  Goal: Effective Oxygenation and Ventilation  Intervention: Optimize Oxygenation and Ventilation  Recent Flowsheet Documentation  Taken 3/30/2024 2139 by Sangeeta Benites, RN  Head of Bed (HOB) Positioning: HOB at 30 degrees

## 2024-03-31 NOTE — PROGRESS NOTES
Shift [id number]    VS: BP (!) 140/71 (BP Location: Right arm, Patient Position: Semi-Gonzales's, Cuff Size: Adult Regular)   Pulse 72   Temp 99.5  F (37.5  C) (Oral)   Resp 16   Wt 75.6 kg (166 lb 10.7 oz)   SpO2 92%   BMI 22.29 kg/m     O2: SpO2 91-93% on 3L. Denies chest pain and SOB.    Output: Incontinent of bowel and bladder.    Last BM: 3/30.    Activity: SBA in room.    Skin: WDL except right ear scab.    Pain: Denies any pain.    CMS: Intermittent confusion. Disoriented to time and situation @ times. Denies numbness and tingling.   Dressing: N/A   Diet: Regular diet. Denies nausea/vomiting.   BG 0200 check done.    LDA: R PIV SL and patent.    Equipment: IV pole, personal belongings, and call light in reach.    Plan: Continue with plan of care. Call light within reach, pt able to make needs known.    Additional Info: Pt confused not using call light to alert writer when they had episode of urine incontinence. Pt educated.

## 2024-03-31 NOTE — PROGRESS NOTES
[facility]    Medicine Progress Note - Hospitalist Service, GOLD TEAM 17    Date of Admission:  3/25/2024    Assessment & Plan   Gerber Levine is a 79 year old male admitted on 3/25/2024. He has a h/o T2DM, HTN, hearing loss wears hearing aid, hereditary spherocytosis, status post splenectomy, STM loss and depression.  He lives with his wife.  He was hospitalized at  12/26 - 12/27/23 for acute metabolic encephalopathy due to COVID-19 infection +/- side effect of sertraline.  Stroke workup was negative at that time.  He was brought to [facility] ED today by his wife and daughter for evaluation of confusion and generalized weakness.  Pt usually walks 3 miles but has been wobbly today.  He walked 3 miles 2 days ago and was able to shovel snow a bit yesterday.  Wife thinks he is confused and has declining short-term memory problem.  Daughter thinks he is depressed, not interested in things he normally enjoys.  He has chills but no fever.  No urinary or bowel symptoms.  Denies cough, SOB, HA, neck stiffness or focal weakness.  His initial vital signs were temp 98.6  F, /74, HR 80, RR 18 and 96% sat on RA.  WBC 16.7 with left shift, creatinine 0.8, glucose 277, LFTs wnl, and UA grossly negative for infection.  CT head without contrast negative for acute intracranial pathology.  Admitted for further management of pneumonia, increased confusion and weakness.     RML, RUL CAP, Asplenia:  CXR showed right lower lobe pneumonia, started on empiric IV ceftriaxone and azithromycin, to include coverage for encapsulated bacteria given asplenia.  Admission procalcitonin normal, blood cultures NGTD..  Respiratory viral panel negative.   Given recently identified nonspecific pulmonary nodules and the concerning appearance of the infiltrates on CXR, obtained HRCT 3/26, shows right upper and right middle lobe pneumonia, but given prior history of right chest wall  histiocytoma resection recommend follow-up.  Already has a repeat CT planned with oncology who monitors his history of histiocytoma and hereditary spherocytosis, planned for 10/2024.  Appetite, nutritional intake improved.  Recurrent fever, increased hypoxemia, placed on oxygen in a.m. 3/27.  WBC remained elevated at 19.4.  Repeat procalcitonin again normal.  IV vancomycin added following repeat blood cultures.    Strep pneumonia and Legionella urinary antigen negative.  HIV negative.  Treponema antibody negative.  MRSA negative.  Blood cultures remain no growth to date.  Patient and his wife deny any difficulty swallowing or choking with meals.  Video swallow 3/28 showed mild silent aspiration with thin liquids using a straw, no other aspiration.  WBC trending down, fevers resolved.  Strength improving with therapy but has continued to require supplemental oxygen.  CXR 3/29 showed new vascular congestion, proBNP elevated (was previously normal 12/2023) and BP running higher.  Gave 20 mg IV Lasix 3/29 with good urine output response, started Lasix 20 mg by mouth daily 3/30.  Cough resolving, no fevers or chills and eating well now.  Continues to require oxygen, sats 87-88% on room air.  Repeat CXR 3/31 continues to show increased right-sided infiltrates, possible vascular congestion with small right effusion, stable.  Persistent leukocytosis but stable, CRP elevated.  Discussed with ID 3/31.  Not very unusual for asplenic patients to have slow recovery from pulmonary infections.  Per ID recommendations 3/31:   -Continue IV ceftriaxone  -Discontinue IV vancomycin  -Resume azithromycin 3/31 for 2 more doses to complete 5-day course  -Follow-up final blood culture results  -PT, OT  -Aspiration precautions per speech therapy.  Regular diet with thin liquids, sit upright for all meals, small bites and sips, chew food thoroughly.  -planned repeat chest CT 10/2024 with oncology  -Daily BMP, CBC, CRP  -Continue cautious  diuresis as below  -Repeat CXR 4/1 to assess response to diuretics    Hereditary spherocytosis, s/p Splenectomy ~ 1976:  Followed by heme-onc.  High risk for encapsulated bacterial infection.  -Antibiotic as above  -Follow blood culture results    New-onset CHF, HTN, HLD:  CXR 3/29 showed new vascular congestion, proBNP mildly elevated, previously normal 12/25/2023.  BP running higher 3/29.  No echo on file, no prior history of CHF.  Does have chronic hypertension, diabetes.  Received 20 mg IV Lasix 3/29 followed by 20 mg by mouth daily starting 3/30.  Excellent response with UOP to the IV dose.  Echo 3/30/2024 normal, EF 55-60%, normal RV size and function, no significant valvular disease, normal RAP.    Continues to require O2 2-3 L nasal cannula, room air sats 87-88%.  BMP, potassium normal again 4/1.  Repeat CXR 3/31 continues to show possible vascular congestion, but euvolemic on exam.  -Continue PTA losartan, statin, ASA  -Repeat Lasix 20 mg IV x 1, KCl 20 mEq x 1 on 3/31  -Increase oral Lasix to 40 mg daily starting 4/1, continue KCl 20 mEq daily  -Daily BMP  -Repeat CXR 4/1 to assess for response to diuretics    Cognitive impairment with progressive STM loss, acute metabolic encephalopathy due to pneumonia:  Patient lives with his wife.  Previously underwent neuropsychology evaluation several years ago.  Wife has noted progressive short-term memory loss and increased depression and poor sleep over the past 6-7 months.  Particularly accelerated when he was hospitalized with COVID with associated metabolic encephalopathy 3 months ago.  Brain MRI 12/25/2023 showed no acute findings, with mild to moderate generalized cerebral atrophy but no hydrocephalus.  Head CT 3/26/2024 again shows mild generalized volume loss, no hydrocephalus but no acute findings.  Wife also has noted shuffling gait, but no other parkinsonian symptoms or signs on exam, and no recent falls.  Acute encephalopathy clearing, patient has  remained fully oriented and appropriate.  No behavioral concerns.  Seen by psychiatry, recommended no changes in antidepressant regimen, and placed referral for outpatient neurology consultation regarding shuffling gait.  Strength, gait improving with therapy.  Ambulating well with a rolling walker.  -PT, OT  -Has planned neuropsychology assessment 4/24/2024  -Continue to treat underlying depression, insomnia    Depression, insomnia:  As above, wife and daughter have noticed progressive depression, difficulty sleeping for the past 6-7 months.  Has been on Zoloft.  Also with progressive STM loss as discussed above, particularly accelerated after hospitalized with COVID and encephalopathy 3 months ago.  Patient very pleasant on exam.  Acute encephalopathy resolving.  Denies suicidal ideation or intent.  Seen by psychiatry 3/26, no changes in Zoloft.   -Continue PTA Zoloft    Weakness, Gait Instability:  Very active, walks 3 miles at baseline.  Acute weakness, gait instability likely due to acute pneumonia.  Wife has noted shuffling gait, but has no other Parkinson features on exam.  Ambulating with rolling walker.  -PT, OT    DM Type 2, uncontrolled:  A1c 9.4 on 3/25/2024, previously 8.7 on 2/2/2024.  Prior to that had been under reasonable control.  Had been hospitalized with COVID 3 months ago, now with community-acquired pneumonia likely further driving hyperglycemia.  Blood sugars much improved with addition of Glucotrol, NPH.  NPH discontinued 3/29.  Blood sugar control generally better.  Eating and drinking well.  -Increased PTA metformin to XR, 500 mg every morning, 1000 mg at bedtime on 3/26  -Increased Glucotrol XL to 10 mg daily 3/31  -Continue moderate SSI    Bacterial conjunctivitis:  Started on topical antibiotic PTA.  Initially involved left eye, now also spreading to the right eye.  No purulent drainage.  Injection and itching continue to resolve.  -Continue PTA polymyxin b-trimethoprim, changed to both  eyes 3/26    Hearing Impaired:  Wears hearing aids.    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:  -As above, has planned repeat chest CT 10/2024 with oncology        Diet: Moderate Consistent Carb (60 g CHO per Meal) Diet    DVT Prophylaxis: Enoxaparin (Lovenox) SQ  Parks Catheter: Not present  Lines: None     Cardiac Monitoring: None  Code Status: Full Code      Clinically Significant Risk Factors                  # Hypertension: Noted on problem list       # DMII: A1C = 9.4 % (Ref range: <5.7 %) within past 6 months       # Financial/Environmental Concerns: none         Disposition Plan      Expected Discharge Date: 04/01/2024                    Ryne Lim MD  Hospitalist Service, GOLD TEAM [address] [place] [address]  Securely message with [employer] (more info)  Text page via [facility] Paging/Directory   See signed in provider for up to date coverage information  ______________________________________________________________________    Interval History   Accompanied by his wife.  Feeling better today.  Cough resolving, no phlegm production.  Has been ambulating with a walker with therapy, would like to start doing this with his wife.  Denies any chest pain or dyspnea but continues to require oxygen.  Room air sats today 87-88% on room air when sitting upright at rest.  Asymptomatic.  Denies any fevers, chills or night sweats.  Eating well.  No constipation or diarrhea.    Physical Exam   Vital Signs: Temp: 98.9  F (37.2  C) Temp src: Oral BP: 138/79 Pulse: 70   Resp: 16 SpO2: 93 % O2 Device: Nasal cannula Oxygen Delivery: 3 LPM  Weight: 166 lbs 10.68 oz  General: Alert and oriented x 4.  Pleasant.  Speech, affect normal.  HEENT: Bilateral conjunctival injection resolving, no purulent drainage.  Oropharynx clear.    Chest: Mild rales at the right base, otherwise CTA.  No wheezes or rhonchi.  CV: RRR.  No  murmurs.  Abdomen: NABS.  Soft, nontender, nondistended.  Extremities: No edema  Neuro: CN II through XII grossly intact.  Strength 5/5 symmetrically.  No significant tremors.  No cogwheeling rigidity.  No bradykinesia.    50 MINUTES SPENT BY ME on the date of service doing chart review, history, exam, documentation & further activities per the note.      Data      CMP  Recent Labs   Lab 03/31/24  1203 03/31/24  0821 03/31/24  0713 03/31/24  0255 03/30/24  1724 03/30/24  1617 03/30/24  0858 03/30/24  0709 03/29/24  0810 03/29/24  0521 03/28/24  0751 03/28/24  0740 03/25/24  2346 03/25/24  1739   NA  --   --  137  --   --   --   --  137  --  136  --  138   < > 137   POTASSIUM  --   --  3.9  --   --  3.9  --  3.6  --  3.5  --  3.3*   < > 3.9   CHLORIDE  --   --  99  --   --   --   --  101  --  101  --  104   < > 98   CO2  --   --  28  --   --   --   --  23  --  25  --  25   < > 29   ANIONGAP  --   --  10  --   --   --   --  13  --  10  --  9   < > 10   * 177* 163* 160*   < >  --    < > 165*   < > 122*   < > 155*   < > 277*   BUN  --   --  12.6  --   --   --   --  12.6  --  15.1  --  17.8   < > 17.5   CR  --   --  0.67  --   --   --   --  0.66*  --  0.70  --  0.77   < > 0.80   GFRESTIMATED  --   --  >90  --   --   --   --  >90  --  >90  --  90   < > 89   YANI  --   --  8.7*  --   --   --   --  8.5*  --  8.7*  --  8.3*   < > 9.4   PROTTOTAL  --   --   --   --   --   --   --   --   --   --   --   --   --  7.5   ALBUMIN  --   --   --   --   --   --   --   --   --   --   --   --   --  3.9   BILITOTAL  --   --   --   --   --   --   --   --   --   --   --   --   --  1.1   ALKPHOS  --   --   --   --   --   --   --   --   --   --   --   --   --  104   AST  --   --   --   --   --   --   --   --   --   --   --   --   --  21   ALT  --   --   --   --   --   --   --   --   --   --   --   --   --  21    < > = values in this interval not displayed.     CBC  Recent Labs   Lab 03/31/24  0713 03/30/24  0709 03/29/24  0521  03/28/24  0740   WBC 14.4* 13.4* 14.2* 14.9*   RBC 4.14* 4.03* 4.10* 3.91*   HGB 12.4* 11.9* 12.4* 11.7*   HCT 36.0* 35.0* 35.8* 34.1*   MCV 87 87 87 87   MCH 30.0 29.5 30.2 29.9   MCHC 34.4 34.0 34.6 34.3   RDW 13.0 13.1 13.2 13.2    342 315 280     INR  Recent Labs   Lab 03/25/24  1739   INR 1.19*     Arterial Blood GasNo lab results found in last 7 days.Venous Blood Gas  No lab results found in last 7 days.  Hemoglobin A1C   Date Value Ref Range Status   03/25/2024 9.4 (H) <5.7 % Final     Comment:     Normal <5.7%   Prediabetes 5.7-6.4%    Diabetes 6.5% or higher     Note: Adopted from ADA consensus guidelines.   02/02/2024 8.7 (H) <5.7 % Final     Comment:     Normal <5.7%   Prediabetes 5.7-6.4%    Diabetes 6.5% or higher     Note: Adopted from ADA consensus guidelines.   10/03/2023 7.1 (H) 0.0 - 5.6 % Final     Comment:     Normal <5.7%   Prediabetes 5.7-6.4%    Diabetes 6.5% or higher     Note: Adopted from ADA consensus guidelines.   06/01/2021 7.1 (H) 0 - 5.6 % Final     Comment:     Normal <5.7% Prediabetes 5.7-6.4%  Diabetes 6.5% or higher - adopted from ADA   consensus guidelines.     02/26/2021 7.8 (H) 0 - 5.6 % Final     Comment:     Normal <5.7% Prediabetes 5.7-6.4%  Diabetes 6.5% or higher - adopted from ADA   consensus guidelines.     10/28/2020 6.8 (H) 0 - 5.6 % Final     Comment:     Normal <5.7% Prediabetes 5.7-6.4%  Diabetes 6.5% or higher - adopted from ADA   consensus guidelines.       Results for orders placed or performed during the hospital encounter of 03/25/24   Head CT w/o contrast    Narrative    EXAM: CT HEAD W/O CONTRAST  LOCATION: [facility]  DATE: 3/25/2024    INDICATION: Altered mental status  COMPARISON: MRI brain 12/25/2023  TECHNIQUE: Routine CT Head without IV contrast. Multiplanar reformats. Dose reduction techniques were used.    FINDINGS:  INTRACRANIAL CONTENTS: No intracranial hemorrhage, extraaxial collection, or mass  effect.  No CT evidence of acute infarct. Normal parenchymal attenuation for age. Mild generalized volume loss. No hydrocephalus.     VISUALIZED ORBITS/SINUSES/MASTOIDS: Prior bilateral cataract surgery. Visualized portions of the orbits are otherwise unremarkable. Mild to moderate mucosal thickening scattered about the paranasal sinuses. No middle ear or mastoid effusion.    BONES/SOFT TISSUES: No acute abnormality.      Impression    IMPRESSION:  1.  Mild age-related changes without acute intracranial abnormality.    XR Chest Port 1 View    Narrative    EXAM: XR CHEST PORT 1 VIEW  LOCATION: [facility]  DATE: 3/25/2024    INDICATION: Altered MS w  leukocytosis.  Eval for pneumonia  COMPARISON: 12/25/2023.      Impression    IMPRESSION: Cardiac silhouette is within normal limits. Increased patchy opacities throughout the right lower lung concerning for pneumonia. No pleural effusion or pneumothorax. Remote postsurgical changes of the right lung and ribs.   CT Chest w/o Contrast    Narrative    EXAMINATION: Chest CT  3/26/2024 1:10 PM    CLINICAL HISTORY: right lower lung pneumonia, h/o pulmonary nodules.  Assess for worsening nodules, lung abscess.    COMPARISON: CT to 2/2/2024.    TECHNIQUE: CT imaging obtained through the chest without contrast.  Axial, coronal, and sagittal reconstructions and axial MIP reformatted  images are reviewed.     FINDINGS:  Mediastinum: The visualized thyroid is unremarkable. The heart is  normal in size. No significant  pericardial effusion. Moderate  coronary artery calcifications. The ascending aorta and main pulmonary  artery are normal in caliber. Several prominent mediastinal lymph  nodes are increased from prior. No lymphadenopathy by size criteria.  Small hiatal hernia.    Lungs: Trace pleural effusions. No pneumothorax. Layering secretions  in the mainstem bronchi. Mild lower lobe predominant bronchial wall  thickening with  scattered mucous plugging at the lung bases. Mild  bronchiectasis of the right middle and upper lobes, similar to prior.  New crazy paving pattern in the residual right upper and right middle  lobe consisting of groundglass opacities with intralobular septal  thickening as well as several patchy airspace opacities. Peripheral  consolidative opacities in the right lower lobe, suboptimally  evaluated due to respiratory motion artifact.  Multiple pulmonary nodules are not significantly changed, for example:  - Stable solid nodule in the right lower lobe measuring 15 x 9 mm  (series 4 image 172) with satellite nodularity, new since 2021.   - Stable 8 mm solid nodule at the right lower lobe lung base (series 4  image 228), but slowly increasing from 2019.    Bones and soft tissues: Stable postsurgical changes of partial right  chest wall resection with mesh reconstruction and rib osteotomies.    Upper Abdomen: Limited evaluation of the upper abdomen. Partially  visualized large right exophytic renal cyst and small left renal sinus  cysts.      Impression    IMPRESSION:   1. New groundglass and consolidative opacities with interlobular  septal thickening in the right upper and middle lobes. Findings are  favored to represent infectious/inflammatory acute interstitial  pneumonia. However given the postsurgical changes of the adjacent  right chest wall for resection of a soft tissue mass, pulmonary  lymphatic metastases is a possibility. Recommend close attention on  short-term follow-up and consider tissue biopsy if findings persist.  2. Stable scattered solid pulmonary nodules. Attention on follow-up.  3. Trace pleural effusions.    I have personally reviewed the examination and initial interpretation  and I agree with the findings.    TIM BALTAZAR MD         SYSTEM ID:  [id number]   XR Video Swallow with SLP or OT    Narrative    EXAM:  Modified Barium Swallow Study with Speech Pathology, 3/28/2024    COMPARISON:  None    HISTORY: Assess for silent aspiration.    FLUOROSCOPY TIME: 2.1 minutes.    FINDINGS: Under fluoroscopic guidance, the patient was given orally  administered barium of varying consistencies in the presence of the  speech pathology service.     Poor bolus control. Delayed initiation of swallowing. Mild to moderate  vallecular residue. Flash penetration was observed with thin barium.  Single episode of silent aspiration occurred with thin barium. No  evidence of tracheal aspiration with any other consistency of barium.  Degenerative changes of the cervical spine.      Impression    IMPRESSION:   Single episode of silent aspiration with thin barium. No evidence of  tracheal aspiration with any other consistency of barium.     Please see the speech pathologist report for further details.    DWAINE MISTRY MD         SYSTEM ID:  [id number]   XR Chest 2 Views    Narrative    Exam:  Chest X-ray 3/29/2024 11:29 AM    History: right lung pneumonia, reassess infiltrates.    Comparison: 3/25/2024    Findings:   Single portable AP view of the chest. Increased diffuse mixed  interstitial and airspace opacities in the right upper and middle  lobes. Cardiac silhouette is normal. Small right pleural effusion. No  pneumothorax.      Impression    Impression:   1.  Increased mixed interstitial and airspace opacities in the right  upper and middle lobes suggestive of pulmonary edema and/or pneumonia.    JOHN WYATT MD         SYSTEM ID:  [id number]   XR Chest 2 Views    Narrative    XR CHEST 2 VIEWS  3/31/2024 10:02 AM     HISTORY:  reassess right sided infiltrates, CHF       COMPARISON:  3/29/2024 CXR    TECHNIQUE: Portable, semiupright, frontal projection radiograph of the  chest.    FINDINGS:   Cardiomediastinal silhouette is within normal limits. Trachea is  midline. Mild blunting of the posterior costophrenic angles. No  appreciable pneumothorax. Slightly increased streaky and patchy  opacification of the right  hemithorax.     Upper abdomen appears normal. No acute osseous abnormalities.      Impression    IMPRESSION:     1. Increased mixed opacities of the right hemithorax concerning for  infectious process. Although edema is possible it usually is not this  asymmetric.   2. Unchanged small bilateral pleural effusions.    I have personally reviewed the examination and initial interpretation  and I agree with the findings.    DANO ELDER MD         SYSTEM ID:  [id number]   Echo Complete     Value    LVEF  55-60%    Narrative    [id number]  [id number]  [id number]  [id number]^MARLYS^SUNDAY^SIVA     [facility],[place]  Echocardiography Laboratory  [address] [place] [id number]     Name: FATOUMATA LEMUS  MRN: [id number]  : 1945  Study Date: 2024 11:43 AM  Age: 79 yrs  Gender: Male  Patient Location: [facility]  Reason For Study: CHF  Ordering Physician: SUNDAY FRANKEL  Performed By: Taiwo Decker     BSA: 2.0 m2  Height: 72 in  Weight: 166 lb  HR: 67  BP: 130/69 mmHg  ______________________________________________________________________________  Procedure  Complete Portable Echo Adult. Echocardiogram with two-dimensional, color and  spectral Doppler performed.  ______________________________________________________________________________  Interpretation Summary  Global and regional left ventricular function is normal with an EF of 55-60%.  Global right ventricular function is normal.  No significant valvular abnormalities present.  Estimated mean right atrial pressure is normal.  Sinuses of Valsalva is dilated at 4.2 cm.  No pericardial effusion is present.     ______________________________________________________________________________  Left Ventricle  Left ventricular size is normal. Left ventricular wall thickness is normal.  Global and regional left ventricular function is normal with an EF of 55-60%.  No regional wall motion abnormalities are seen.      Right Ventricle  The right ventricle is normal size. Global right ventricular function is  normal.     Atria  The right atria appears normal. Mild left atrial enlargement is present.     Mitral Valve  The mitral valve is normal.     Aortic Valve  Trileaflet aortic sclerosis without stenosis.     Tricuspid Valve  The tricuspid valve is normal. Trace to mild tricuspid insufficiency is  present. The peak velocity of the tricuspid regurgitant jet is not obtainable.  Pulmonary artery systolic pressure cannot be assessed.     Pulmonic Valve  The pulmonic valve is normal.     Vessels  Sinuses of Valsalva 4.2 cm. Ascending aorta 3.6 cm. Estimated mean right  atrial pressure is normal.     Pericardium  No pericardial effusion is present.     Miscellaneous  No significant valvular abnormalities present.     Compared to Previous Study  Previous study not available for comparison.  ______________________________________________________________________________  MMode/2D Measurements & Calculations  IVSd: 1.0 cm  LVIDd: 4.4 cm  LVIDs: 3.2 cm  LVPWd: 0.90 cm  FS: 27.3 %  LV mass(C)d: 137.8 grams  LV mass(C)dI: 70.0 grams/m2  Ao root diam: 4.2 cm  asc Aorta Diam: 3.6 cm  LVOT diam: 2.1 cm  LVOT area: 3.5 cm2  Ao root diam index Ht(cm/m): 2.3  Ao root diam index BSA (cm/m2): 2.1  Asc Ao diam index BSA (cm/m2): 1.8  Asc Ao diam index Ht(cm/m): 2.0  EF Biplane: 55.5 %  LA Volume (BP): 82.7 ml     LA Volume Index (BP): 42.0 ml/m2  RWT: 0.41  TAPSE: 2.3 cm     Doppler Measurements & Calculations  MV E max aristeo: 92.8 cm/sec  MV A max aristeo: 105.0 cm/sec  MV E/A: 0.88  MV dec slope: 333.0 cm/sec2  MV dec time: 0.28 sec  E/E' av.9  Lateral E/e': 10.9  Medial E/e': 9.0     RV S Aristeo: 18.5 cm/sec     ______________________________________________________________________________  Report approved by: Theresa Kruse 2024 12:23 PM

## 2024-04-01 ENCOUNTER — APPOINTMENT (OUTPATIENT)
Dept: OCCUPATIONAL THERAPY | Facility: CLINIC | Age: 79
DRG: 193 | End: 2024-04-01
Payer: COMMERCIAL

## 2024-04-01 ENCOUNTER — APPOINTMENT (OUTPATIENT)
Dept: PHYSICAL THERAPY | Facility: CLINIC | Age: 79
DRG: 193 | End: 2024-04-01
Payer: COMMERCIAL

## 2024-04-01 ENCOUNTER — TELEPHONE (OUTPATIENT)
Dept: FAMILY MEDICINE | Facility: CLINIC | Age: 79
End: 2024-04-01

## 2024-04-01 ENCOUNTER — APPOINTMENT (OUTPATIENT)
Dept: GENERAL RADIOLOGY | Facility: CLINIC | Age: 79
DRG: 193 | End: 2024-04-01
Attending: INTERNAL MEDICINE
Payer: COMMERCIAL

## 2024-04-01 VITALS
BODY MASS INDEX: 22.29 KG/M2 | TEMPERATURE: 97.8 F | HEART RATE: 85 BPM | WEIGHT: 166.67 LBS | DIASTOLIC BLOOD PRESSURE: 74 MMHG | SYSTOLIC BLOOD PRESSURE: 117 MMHG | RESPIRATION RATE: 16 BRPM | OXYGEN SATURATION: 92 %

## 2024-04-01 LAB
ANION GAP SERPL CALCULATED.3IONS-SCNC: 12 MMOL/L (ref 7–15)
BACTERIA BLD CULT: NO GROWTH
BACTERIA BLD CULT: NO GROWTH
BUN SERPL-MCNC: 18.3 MG/DL (ref 8–23)
CALCIUM SERPL-MCNC: 9 MG/DL (ref 8.8–10.2)
CHLORIDE SERPL-SCNC: 99 MMOL/L (ref 98–107)
CREAT SERPL-MCNC: 0.74 MG/DL (ref 0.67–1.17)
CRP SERPL-MCNC: 63.38 MG/L
DEPRECATED HCO3 PLAS-SCNC: 23 MMOL/L (ref 22–29)
EGFRCR SERPLBLD CKD-EPI 2021: >90 ML/MIN/1.73M2
ERYTHROCYTE [DISTWIDTH] IN BLOOD BY AUTOMATED COUNT: 13.2 % (ref 10–15)
GLUCOSE BLDC GLUCOMTR-MCNC: 159 MG/DL (ref 70–99)
GLUCOSE BLDC GLUCOMTR-MCNC: 249 MG/DL (ref 70–99)
GLUCOSE SERPL-MCNC: 163 MG/DL (ref 70–99)
HCT VFR BLD AUTO: 36.6 % (ref 40–53)
HGB BLD-MCNC: 12.4 G/DL (ref 13.3–17.7)
MCH RBC QN AUTO: 29.7 PG (ref 26.5–33)
MCHC RBC AUTO-ENTMCNC: 33.9 G/DL (ref 31.5–36.5)
MCV RBC AUTO: 88 FL (ref 78–100)
NT-PROBNP SERPL-MCNC: 640 PG/ML (ref 0–1800)
PLATELET # BLD AUTO: 429 10E3/UL (ref 150–450)
POTASSIUM SERPL-SCNC: 3.9 MMOL/L (ref 3.4–5.3)
RBC # BLD AUTO: 4.17 10E6/UL (ref 4.4–5.9)
SCANNED LAB RESULT: NORMAL
SCANNED LAB RESULT: NORMAL
SODIUM SERPL-SCNC: 134 MMOL/L (ref 135–145)
WBC # BLD AUTO: 11.6 10E3/UL (ref 4–11)

## 2024-04-01 PROCEDURE — 80048 BASIC METABOLIC PNL TOTAL CA: CPT | Performed by: INTERNAL MEDICINE

## 2024-04-01 PROCEDURE — 90734 MENACWYD/MENACWYCRM VACC IM: CPT | Performed by: INTERNAL MEDICINE

## 2024-04-01 PROCEDURE — 97530 THERAPEUTIC ACTIVITIES: CPT | Mod: GP

## 2024-04-01 PROCEDURE — 36415 COLL VENOUS BLD VENIPUNCTURE: CPT | Performed by: INTERNAL MEDICINE

## 2024-04-01 PROCEDURE — 250N000021 HC RX MED A9270 GY (STAT IND- M) 250: Performed by: INTERNAL MEDICINE

## 2024-04-01 PROCEDURE — 250N000013 HC RX MED GY IP 250 OP 250 PS 637: Performed by: INTERNAL MEDICINE

## 2024-04-01 PROCEDURE — 86140 C-REACTIVE PROTEIN: CPT | Performed by: INTERNAL MEDICINE

## 2024-04-01 PROCEDURE — 71046 X-RAY EXAM CHEST 2 VIEWS: CPT | Mod: 26 | Performed by: RADIOLOGY

## 2024-04-01 PROCEDURE — 97535 SELF CARE MNGMENT TRAINING: CPT | Mod: GO

## 2024-04-01 PROCEDURE — 250N000011 HC RX IP 250 OP 636: Performed by: INTERNAL MEDICINE

## 2024-04-01 PROCEDURE — 99239 HOSP IP/OBS DSCHRG MGMT >30: CPT | Performed by: INTERNAL MEDICINE

## 2024-04-01 PROCEDURE — 85027 COMPLETE CBC AUTOMATED: CPT | Performed by: INTERNAL MEDICINE

## 2024-04-01 PROCEDURE — 71046 X-RAY EXAM CHEST 2 VIEWS: CPT

## 2024-04-01 PROCEDURE — 83880 ASSAY OF NATRIURETIC PEPTIDE: CPT | Performed by: INTERNAL MEDICINE

## 2024-04-01 PROCEDURE — 258N000003 HC RX IP 258 OP 636: Performed by: INTERNAL MEDICINE

## 2024-04-01 PROCEDURE — 90471 IMMUNIZATION ADMIN: CPT | Performed by: INTERNAL MEDICINE

## 2024-04-01 PROCEDURE — 99233 SBSQ HOSP IP/OBS HIGH 50: CPT | Performed by: PHYSICIAN ASSISTANT

## 2024-04-01 RX ORDER — CEFDINIR 300 MG/1
300 CAPSULE ORAL 2 TIMES DAILY
Qty: 8 CAPSULE | Refills: 0 | Status: SHIPPED | OUTPATIENT
Start: 2024-04-01 | End: 2024-04-05

## 2024-04-01 RX ORDER — METFORMIN HYDROCHLORIDE EXTENDED-RELEASE TABLETS 1000 MG/1
1000 TABLET, FILM COATED, EXTENDED RELEASE ORAL 2 TIMES DAILY WITH MEALS
Qty: 60 TABLET | Refills: 0 | Status: SHIPPED | OUTPATIENT
Start: 2024-04-01 | End: 2024-07-31

## 2024-04-01 RX ORDER — POLYMYXIN B SULFATE AND TRIMETHOPRIM 1; 10000 MG/ML; [USP'U]/ML
2 SOLUTION OPHTHALMIC 4 TIMES DAILY
Qty: 10 ML | Refills: 0 | Status: SHIPPED | OUTPATIENT
Start: 2024-04-01 | End: 2024-04-04

## 2024-04-01 RX ORDER — FUROSEMIDE 20 MG
20 TABLET ORAL EVERY MORNING
Qty: 7 TABLET | Refills: 0 | Status: SHIPPED | OUTPATIENT
Start: 2024-04-01 | End: 2024-05-08

## 2024-04-01 RX ORDER — GLIPIZIDE 10 MG/1
10 TABLET, FILM COATED, EXTENDED RELEASE ORAL
Qty: 30 TABLET | Refills: 0 | Status: SHIPPED | OUTPATIENT
Start: 2024-04-02 | End: 2024-04-05

## 2024-04-01 RX ORDER — AZITHROMYCIN 500 MG/1
500 TABLET, FILM COATED ORAL ONCE
Qty: 1 TABLET | Refills: 0 | Status: SHIPPED | OUTPATIENT
Start: 2024-04-01 | End: 2024-04-01

## 2024-04-01 RX ORDER — LOSARTAN POTASSIUM 50 MG/1
50 TABLET ORAL DAILY
Qty: 30 TABLET | Refills: 0 | Status: SHIPPED | OUTPATIENT
Start: 2024-04-02 | End: 2024-05-08

## 2024-04-01 RX ORDER — FUROSEMIDE 20 MG
20 TABLET ORAL DAILY
Status: DISCONTINUED | OUTPATIENT
Start: 2024-04-02 | End: 2024-04-01 | Stop reason: HOSPADM

## 2024-04-01 RX ORDER — POTASSIUM CHLORIDE 750 MG/1
10 TABLET, EXTENDED RELEASE ORAL 2 TIMES DAILY
Qty: 14 TABLET | Refills: 0 | Status: SHIPPED | OUTPATIENT
Start: 2024-04-02 | End: 2024-04-09

## 2024-04-01 RX ORDER — POTASSIUM CHLORIDE 750 MG/1
10 TABLET, EXTENDED RELEASE ORAL 2 TIMES DAILY
Status: DISCONTINUED | OUTPATIENT
Start: 2024-04-02 | End: 2024-04-01 | Stop reason: HOSPADM

## 2024-04-01 RX ADMIN — POTASSIUM CHLORIDE 20 MEQ: 1500 TABLET, EXTENDED RELEASE ORAL at 09:14

## 2024-04-01 RX ADMIN — ENOXAPARIN SODIUM 40 MG: 40 INJECTION SUBCUTANEOUS at 09:15

## 2024-04-01 RX ADMIN — POLYMYXIN B SULFATE AND TRIMETHOPRIM 2 DROP: 10000; 1 SOLUTION OPHTHALMIC at 13:08

## 2024-04-01 RX ADMIN — ASPIRIN 81 MG: 81 TABLET, COATED ORAL at 09:14

## 2024-04-01 RX ADMIN — Medication 1 TABLET: at 09:12

## 2024-04-01 RX ADMIN — LOSARTAN POTASSIUM 50 MG: 25 TABLET, FILM COATED ORAL at 09:14

## 2024-04-01 RX ADMIN — POLYMYXIN B SULFATE AND TRIMETHOPRIM 2 DROP: 10000; 1 SOLUTION OPHTHALMIC at 09:17

## 2024-04-01 RX ADMIN — MENINGOCOCCAL (GROUPS A, C, Y AND W-135) OLIGOSACCHARIDE DIPHTHERIA CRM197 CONJUGATE VACCINE 0.5 ML: KIT at 15:48

## 2024-04-01 RX ADMIN — FUROSEMIDE 40 MG: 40 TABLET ORAL at 09:15

## 2024-04-01 RX ADMIN — GLIPIZIDE 10 MG: 2.5 TABLET, FILM COATED, EXTENDED RELEASE ORAL at 09:11

## 2024-04-01 RX ADMIN — METFORMIN HYDROCHLORIDE 500 MG: 500 TABLET, EXTENDED RELEASE ORAL at 09:13

## 2024-04-01 RX ADMIN — SERTRALINE HYDROCHLORIDE 100 MG: 100 TABLET ORAL at 09:13

## 2024-04-01 RX ADMIN — TRIAMCINOLONE ACETONIDE: 5 OINTMENT TOPICAL at 09:18

## 2024-04-01 RX ADMIN — AZITHROMYCIN MONOHYDRATE 500 MG: 500 INJECTION, POWDER, LYOPHILIZED, FOR SOLUTION INTRAVENOUS at 11:27

## 2024-04-01 RX ADMIN — ATORVASTATIN CALCIUM 40 MG: 40 TABLET, FILM COATED ORAL at 09:12

## 2024-04-01 ASSESSMENT — ACTIVITIES OF DAILY LIVING (ADL)
ADLS_ACUITY_SCORE: 51

## 2024-04-01 NOTE — PROGRESS NOTES
[facility] INFECTIOUS DISEASES Follow Up     Patient:  Gerber Levine   Date of birth 1945, Medical record number [id number]  Date of Visit:  04/01/2024  Date of Admission: 3/25/2024  Attending Physician:Kurt Lim          Assessment and Recommendations:   ID Problem List   1. Fever (resolved), leukocytosis (improving)  2. R lung consolidation consistent with community acquired pneumonia -- consistent CXR and Chest CT  3. Bilateral conjunctivitis  4. Asplenia --- This predisposes for encapsulated organisms, makes response to treatment slower.   5. Diabetes mellitus type II   6. 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  7. Recent COVID-19 in Dec 2023  8. Cognitive decline since Dec, unexplained.  Syphilis, HIV neg, Lyme neg    RECOMMENDATION:  Continue Ceftriaxone to 2g q24hrs for CAP.   For discharge- can transition to oral 3rd generation cephalosporin with either cefdinir 300mg PO q12hr or cefpodoxime 200mg PO q12hr    - complete 10 days of antibiotic therapy from the increased ceftriaxone dosing (3/27-4/5)  Has completed course of Azithromycin (6 doses over 7 days), OK to stop  If patient remains hospitalized overnight, please check CRP with AM labs  No ID follow up needed, recommend post-hospital follow up with PCP  Encouraged continued use of incentive spirometer over next several days for pulmonary hygiene while recovering  Recomend MenACWY vaccine series (Menveo or MenQuadfi) due to asplenia, CDC advirsoy regarding increased Kana cases this year  1st dose here if possible, 2nd dose 8 weeks later- though PCP clinic  ID will sign off at this time, please don't hesitate to contact the [facility] ID team should further questions arise.        ASSESSMENT:  Gerber Levine is a 79 year old male who was admitted for confusion and gait instability at home over the past few days.     Initially afebrile, spiked fever to 102.4 shortly after arrival, otherwise hemodynamically stable. WBC peaked at 19.9K  "(3/26), now down-trending to 11.6 (4/1). BCx 3/25 and 3/27 neg. RVP and COVID PCR negative. CXR with patchy opacities throughout RLL c/f pneumonia. CT chest with \"new ggo and consolidative opacities with interlobular septal thickening in R upper and middle lobes\"- c/f infection.    Clinical picture and imaging consistent with pneumonia, cough has become productive during hospital stay. Now weaned to room air. Recommend 10 days for treatment of CAP, course on the longer end of range due to slow response and hx asplenia.    Had unilateral (left) conjunctivitis initially, then spread to both eyes. Now resolved on abx eye gtts.     Evaluated for common fungal pneumonia causes in this area given recent COVID infection 3 months ago; Histoplasma and Blastomyces antigens have returned negative. HIV, syphilis and lyme screenings sent due to report of cognitive decline- these have all returned negative.       Tiffanie Ware PA-C  Pronouns: she/her/hers  Infectious Diseases  Contact via [employer] or [employer] Paging/Directory      50 MINUTES SPENT BY ME on the date of service doing chart review, history, exam, documentation & further activities per the note.             Interval History:     Afebrile, VSS. Weaned to room air. Wife (Kathleen) visiting this morning. No new concerns and hoping to discharge today or tomorrow. Cough has been more productive through the weekend, feels like it's loosening up. Breathing feels easier. No rashes, nausea, vomiting, diarrhea.            Antimicrobials:   Current:  - Azithromycin (3/26-3/28, 3/31-4/1)- dosing is just before midnight, essentially missed 3/30 only  - ceftriaxone 2g daily (3/27-present)    Prior:  - ceftriaxone 1g daily (3/25-3/26)  - Vancomycin (3/27-3/31)         History of Present Illness:   Per initial ID consult note  Gerber Levine is a 79 year old male with PMHx significant for diabetes mellitus type II, hypertension, hereditary spherocytosis s/p splenectomy (1976), s/p R " "chest wall histiocytoma resection (2016) and depression who presented to the ED 3/25 with confusion and instability.     On chart review, the patient was noted to be more confused over the past 4-5 days PTA. Additionally, noted to be more unstable on his feet. Usually active in walking 3 miles daily which he did 2 days PTA. Patient endorsed chills. Denied fevers, urinary symptoms, diarrhea, SOB, headache, neck stiffness or other symptom complaints.     Wife provides most of history. Patient defers history to her for reason why he was admitted. He states he feels \"pretty good\" and offered no complaints. It was a bit unclear on acute change in mental status from patient's last 6 months for which patient's wife said he has had some decreased cognition. She notes he never really returned to his baseline after COVID infection 3 months ago. Wife recently finished course of antibiotics for sinus infection. She notes the patient has been coughing at home over the past week. He denies sputum production.     They live in [place], [place] in a home. No pets. No recent travel. Last left the state about 1 year ago and traveled to [place]. Denies travel to [place] or internationally. Patient does not walk by bodies of water locally. He does do his own yard work/leaf clearing but denied clearing any dead/moldy leaves/wood from his yard.            Physical Exam:   Vitals were reviewed  Patient Vitals for the past 24 hrs:   BP Temp Temp src Pulse Resp SpO2 Weight   04/01/24 0739 130/70 97.8  F (36.6  C) Oral 85 16 92 % --   04/01/24 0609 -- -- -- -- -- -- 75.6 kg (166 lb 10.7 oz)   04/01/24 0012 127/65 98.3  F (36.8  C) Oral 73 18 91 % --   03/31/24 1640 125/70 99  F (37.2  C) Oral 79 18 94 % --       Physical Examination:  Constitutional: Awake, alert, and sitting up in recliner. Wife is visiting  HEENT: NCAT, anicteric sclerae, conjunctiva clear without injection, crusting, or discharge. Moist mucous membranes.   Respiratory: Faint " right mid-field rhonchi. No wheez, crackles. Normal respiratory effort on RA.  Cardiovascular: Regular rate and rhythm, +S1/S2, no murmur appreciated. +DP pulses bilaterally  GI: soft, nondistended,  +bowel sounds  Skin: Warm and dry. No rashes or lesions on exposed surfaces.  Musculoskeletal: Extremities grossly normal. No tenderness or edema present.            Laboratory Data:     Inflammatory Markers  No lab results found.    Hematology Studies    Recent Labs   Lab Test 04/01/24  0524 03/31/24  0713 03/30/24  0709 03/29/24  0521 03/28/24  0740 03/27/24  0627   WBC 11.6* 14.4* 13.4* 14.2* 14.9* 19.4*   HGB 12.4* 12.4* 11.9* 12.4* 11.7* 12.5*   MCV 88 87 87 87 87 89    402 342 315 280 246       Metabolic Studies     Recent Labs   Lab Test 04/01/24  0524 03/31/24  1625 03/31/24  0713 03/30/24  1617 03/30/24  0709 03/29/24  0521 03/28/24  0740   *  --  137  --  137 136 138   POTASSIUM 3.9 4.0 3.9 3.9 3.6 3.5 3.3*   CHLORIDE 99  --  99  --  101 101 104   CO2 23  --  28  --  23 25 25   BUN 18.3  --  12.6  --  12.6 15.1 17.8   CR 0.74  --  0.67  --  0.66* 0.70 0.77   GFRESTIMATED >90  --  >90  --  >90 >90 90       Hepatic Studies    Recent Labs   Lab Test 03/25/24 1739 12/25/23 2029 01/18/23  0744   BILITOTAL 1.1 0.5 0.9   ALKPHOS 104 123 80   ALBUMIN 3.9 4.2 4.3   AST 21 27 29   ALT 21 23 19       Microbiology:  Culture   Date Value Ref Range Status   03/27/2024 No Growth  Final   03/27/2024 No Growth  Final   03/25/2024 No Growth  Final   03/25/2024 No Growth  Final       Urine Studies    Recent Labs   Lab Test 03/25/24 1740 12/25/23 2127   LEUKEST Negative Negative   WBCU 1 1       Vancomycin Levels    Recent Labs   Lab Test 03/31/24  0713 03/29/24  0521   VANCOMYCIN 11.4 5.6       Hepatitis B Testing No lab results found.  Hepatitis C Testing     Hepatitis C Antibody   Date Value Ref Range Status   10/28/2020 Nonreactive NR^Nonreactive Final     Comment:     Assay performance characteristics have  "not been established for newborns,   infants, and children       Respiratory Virus Testing    No results found for: \"RS\", \"FLUAG\"    IMAGING    CXR (4/1/2024)  Impression:   1. Decrease of persistent patchy airspace opacities throughout the mid  to upper right lung.  2. Stable trace right pleural effusion.    CT chest w/o contrast 3/26/24  IMPRESSION:   1. New groundglass and consolidative opacities with interlobular  septal thickening in the right upper and middle lobes. Findings are  favored to represent infectious/inflammatory acute interstitial  pneumonia. However given the postsurgical changes of the adjacent  right chest wall for resection of a soft tissue mass, pulmonary  lymphatic metastases is a possibility. Recommend close attention on  short-term follow-up and consider tissue biopsy if findings persist.  2. Stable scattered solid pulmonary nodules. Attention on follow-up.  3. Trace pleural effusions.    CXR 3/25/24   IMPRESSION: Cardiac silhouette is within normal limits. Increased patchy opacities throughout the right lower lung concerning for pneumonia. No pleural effusion or pneumothorax. Remote postsurgical changes of the right lung and ribs.     CT head w/o contrast 3/25/24   IMPRESSION:  1.  Mild age-related changes without acute intracranial abnormality.   "

## 2024-04-01 NOTE — CARE PLAN
Shift: 2300 - 0730    /65 (BP Location: Left arm)   Pulse 73   Temp 98.3  F (36.8  C) (Oral)   Resp 18   Wt 75.6 kg (166 lb 10.7 oz)   SpO2 91%   BMI 22.29 kg/m      Pt was de- sating at the start of shift on RA, placed pt on 2 L O2 and sating at 90 - 92%  through  the entire shift. Denies SOB, chest pain. No other change overnight. Call light is in reach, Continue with POC.

## 2024-04-01 NOTE — PROGRESS NOTES
[id number]  Pt alert and oriented times 3 and disoriented to situation. Assist of 1 with walker and gait belt. Pt use ear aid. No acute event at this time and continue with POC.

## 2024-04-01 NOTE — PROGRESS NOTES
VS:  /70 (BP Location: Left arm)   Pulse 85   Temp 97.8  F (36.6  C) (Oral)   Resp 16   Wt 75.6 kg (166 lb 10.7 oz)   SpO2 92%   BMI 22.29 kg/m      O2:  Room Air 95% / Ambulating 90%   Output:  Continent of Bowel and Incontinent of Bladder    Last BM:  01/01/2024   Activity:  Assist 1/ SBA / jGait Belt    Up for meals?  Yes / Chair    Skin:  No issues noted by staff    Pain:  Denies    CMS:  A&OX 2-3   Dressing:  None    Diet:  Consistent Carb    LDA:  Right PIV   Equipment:  Personal Belongings / IV Pole   Plan:  POC   Additional Info:  Generalized Weakness / Wife in Room       Patient is Discharging give Meningococcal 0.5  injection when it comes up from Pharmacy     Hearing Impaired / Hearing Aids     Denies any chest pain or Dyspnea       DM Type 2

## 2024-04-01 NOTE — DISCHARGE SUMMARY
[facility]  Hospitalist Discharge Summary      Date of Admission:  3/25/2024  Date of Discharge:  4/1/2024  Discharging Provider: Ryne Lim MD  Discharge Service: Hospitalist Service, GOLD TEAM 17    Discharge Diagnoses   RML, RUL Community Acquired Pneumonia  Acute Hypoxemic Respiratory Failure due to CAP, Diastolic CHF  Hereditary spherocytosis, s/p Splenectomy ~ 1976  Acute Diastolic CHF  HTN  Hyperlipidemia  Cognitive impairment with progressive STM loss  Acute metabolic encephalopathy due to pneumonia, resolved  Depression, insomnia  Weakness, Gait Instability  DM Type 2, uncontrolled (A1c 9.4 on 3/25/2024)  Bacterial conjunctivitis  Hearing Impaired-Wears hearing aids.  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  The following labs/tests are recommended: CBC, CRP, BMP.    Recommend repeat chest xray in 3-4 weeks to assess for resolution of right   sided infiltrates.     Appointments on [place] and/or [facility] (with [facility] or [facility]   provider or service). Call [phone] if you haven't heard regarding   these appointments within 7 days of discharge.            Discharge Disposition   Discharged to home  Condition at discharge: Stable    Hospital Course   Gerbre Levine is a 79 year old male admitted on 3/25/2024. He has a h/o T2DM, HTN, hearing loss wears hearing aid, hereditary spherocytosis, status post splenectomy, STM loss and depression.  He lives with his wife.  He was  hospitalized at  12/26 - 12/27/23 for acute metabolic encephalopathy due to COVID-19 infection +/- side effect of sertraline.  Stroke workup was negative at that time.  He was brought to [facility] ED today by his wife and daughter for evaluation of confusion and generalized weakness.  Pt usually walks 3 miles but has been wobbly today.  He walked 3 miles 2 days ago and was able to shovel snow a bit yesterday.  Wife thinks he is confused and has declining short-term memory problem.  Daughter thinks he is depressed, not interested in things he normally enjoys.  He has chills but no fever.  No urinary or bowel symptoms.  Denies cough, SOB, HA, neck stiffness or focal weakness.  His initial vital signs were temp 98.6  F, /74, HR 80, RR 18 and 96% sat on RA.  WBC 16.7 with left shift, creatinine 0.8, glucose 277, LFTs wnl, and UA grossly negative for infection.  CT head without contrast negative for acute intracranial pathology.  Admitted for further management of pneumonia, increased confusion and weakness.      Acute Hypoxemic Respiratory Failure due to  RML, RUL CAP, Acute Diastolic CHF  Asplenia  CXR showed right lower lobe pneumonia, started on empiric IV ceftriaxone and azithromycin, to include coverage for encapsulated bacteria given asplenia.  Admission procalcitonin normal, blood cultures NGTD..  Respiratory viral panel negative.   Given recently identified nonspecific pulmonary nodules and the concerning appearance of the infiltrates on CXR, obtained HRCT 3/26, shows right upper and right middle lobe pneumonia, but given prior history of right chest wall histiocytoma resection recommend follow-up.  Already has a repeat CT planned with oncology who monitors his history of histiocytoma and hereditary spherocytosis, planned for 10/2024.  Appetite, nutritional intake improved.  Recurrent fever, increased hypoxemia, placed on oxygen in a.m. 3/27.  WBC remained elevated at 19.4.  Repeat  procalcitonin again normal.  IV vancomycin added following repeat blood cultures.    Strep pneumonia and Legionella urinary antigen negative.  HIV negative.  Treponema antibody negative.  MRSA negative.  Blood cultures remain no growth to date.  Patient and his wife deny any difficulty swallowing or choking with meals.  Video swallow 3/28 showed mild silent aspiration with thin liquids using a straw, no other aspiration.  Aspiration precautions initiated by speech, including to have patient upright for all meals, small bites and sips and to chew food thoroughly.  Patient's wife has been monitoring this closely.  Given persistent leukocytosis, hypoxemia infectious disease consulted.  Felt not unusual for asplenic patients to have delayed recovery from pulmonary infections.  CXR 3/29 showed new vascular congestion, proBNP elevated (was previously normal 12/2023) and BP running higher.  Gave 20 mg IV Lasix 3/29 and 3/31 with good urine output response, followed by Lasix 40 mg by mouth daily 4/1.  Repeat CXR 4/1 showed marked improvement in vascular congestion, stable right pulmonary infiltrates.  Was weaned from oxygen 4/1, oxygenating 95% on room air at rest, 90% on room air following ambulation in the freeman.  CRP much improved, leukocytosis finally normalizing.  Cough has largely resolved and denied dyspnea.  No further fevers.  Received 7-day course of ceftriaxone, course of IV vancomycin and will complete fifth dose of azithromycin by mouth on the day of discharge.  Discussed with ID/1/24:  -Transition ceftriaxone to cefdinir for 4 more days at hospital discharge.  -Give fifth dose of azithromycin by mouth 4/1  -Continue aspiration precautions per speech therapy.  Regular diet with thin liquids, sit upright for all meals, small bites and sips, chew food thoroughly.  -planned repeat chest CT 10/2024 with oncology  -Repeat CBC, CRP, BMP with PCP within 3-7 days  -recommend repeat CXR in 3-4 weeks to reassess right  lung infiltrates    Hereditary spherocytosis, s/p Splenectomy ~ 1976:  Followed by heme-onc.  High risk for encapsulated bacterial infection.  -Antibiotic as above  -Follow up final blood culture results  -per ID, needs meningitis vaccine (MENVEO), gave 1st dose 4/1/24 prior to discharge. Will need the second dose in 8 weeks, can be done in PCP clinic.     New-onset Acute Diastolic CHF, HTN, HLD:  CXR 3/29 showed new vascular congestion, proBNP mildly elevated, previously normal 12/25/2023.  BP running higher 3/29.  No prior echo on file, no prior history of CHF.  Does have chronic hypertension, diabetes.   Echo 3/30/2024: EF 55-60%, normal RV size and function, no significant valvular disease, normal RAP.   Received 20 mg IV Lasix 3/29 and 3/31 followed by 40 mg by mouth daily starting 4/1.  PTA losartan increased to 50 mg daily.  Tolerated diuresis well, BMP, potassium normal on day of discharge.  Repeat CXR 4/1 showed marked improvement in vascular congestion, stable right pulmonary infiltrates and small right pleural effusion.  Repeat BNP normal.  Blood pressure now well-controlled, euvolemic on exam.  -Continue losartan to 50 mg daily  -Continue PTA statin, ASA  -continue lasix at 20 mg daily, KCL 10 mEq bid for 5-7 days pending follow up with PCP with repeat BMP, blood pressure check    Cognitive impairment with progressive STM loss  Acute metabolic encephalopathy due to pneumonia, resolved  Patient lives with his wife.  Previously underwent neuropsychology evaluation several years ago.  Wife has noted progressive short-term memory loss and increased depression and poor sleep over the past 6-7 months.  Particularly accelerated when he was hospitalized with COVID with associated metabolic encephalopathy 3 months ago.  Brain MRI 12/25/2023 showed no acute findings, with mild to moderate generalized cerebral atrophy but no hydrocephalus.  Head CT 3/26/2024 again shows mild generalized volume loss, no  hydrocephalus but no acute findings.  Wife also has noted shuffling gait, but no other parkinsonian symptoms or signs on exam, and no recent falls.  Acute encephalopathy clearing, patient has remained fully oriented and appropriate.  No behavioral concerns.  Seen by psychiatry, recommended no changes in antidepressant regimen, and placed referral for outpatient neurology consultation regarding shuffling gait.  Strength, gait improving with therapy.  Ambulating well with a rolling walker.  Offered patient home care PT, OT, but wife declined feeling she could offer this on her own at home.  Day of discharge, patient remained fully oriented and appropriate.  The acute metabolic encephalopathy component had resolved.  -Has planned neuropsychology assessment 4/24/2024  -neurology  referral placed   -Continue to treat underlying depression, insomnia    Depression, insomnia:  As above, wife and daughter have noticed progressive depression, difficulty sleeping for the past 6-7 months.  Has been on Zoloft.  Also with progressive STM loss as discussed above, particularly accelerated after hospitalized with COVID and encephalopathy 3 months ago.  Patient very pleasant on exam.  Acute encephalopathy resolving.  Denies suicidal ideation or intent.  Seen by psychiatry 3/26, no changes in Zoloft.   -Continue PTA Zoloft    Weakness, Gait Instability:  Very active, walks 3 miles at baseline.  Acute weakness, gait instability likely due to acute pneumonia.  Wife has noted shuffling gait, but has no other Parkinson features on exam.  Ambulating with rolling walker, does not have at home but has access to one at home.  Wife declined home health care or TCU referrals feeling that he could manage with her at home.    DM Type 2, uncontrolled:  A1c 9.4 on 3/25/2024, previously 8.7 on 2/2/2024.  Prior to that had been under reasonable control.  Had been hospitalized with COVID 3 months ago, now with community-acquired pneumonia  likely further driving hyperglycemia.  Blood sugars much improved with addition of Glucotrol, NPH.  NPH discontinued 3/29.  Blood sugar control generally better.  Eating and drinking well.  -Increased PTA metformin to XR to 1,000 mg BID  -started Glucotrol XL, increased to 10 mg daily 3/31  -Follow-up with PCP as above for ongoing management.    Bacterial conjunctivitis:  Started on topical antibiotic PTA.  Initially involved left eye, now also spreading to the right eye.  No purulent drainage.  Injection and itching continue to resolve.  -Continue PTA polymyxin b-trimethoprim, changed to both eyes 3/26, for 3-4 more days    Hearing Impaired:  Wears hearing aids.    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       Ryne Lim MD  [facility] MED SURG  [facility] [address]  Phone: [phone]  Fax: [phone]  ______________________________________________________________________    Physical Exam   Vital Signs: Temp: 97.8  F (36.6  C) Temp src: Oral BP: 130/70 Pulse: 85   Resp: 16 SpO2: 92 % O2 Device: Nasal cannula Oxygen Delivery: 2 LPM  Weight: 166 lbs 10.68 oz  General: Alert and oriented x 4, very pleasant, appropriate.  Speech, affect normal.  Chest: CTA bilaterally, previously heard right lung and right anterior lung rales no longer present.  CV: RRR.  No  murmurs.  Abdomen: NABS.  Soft, nontender, nondistended.  Extremities: No edema  Neuro: Nonfocal, strength 5/5 symmetrically.       Primary Care Physician   Caty Yusuf    Discharge Orders      Adult Neurology [facility] Referral      Reason for your hospital stay    Right upper and middle lobe community acquired pneumonia. Asplenia. Diastolic CHF/Volume overload. Uncontrolled Type 2 Diabetes.     Activity    Your activity upon discharge: activity as tolerated     Adult [facility] Follow-up and recommended labs and tests    Follow up with primary care provider, Caty Yusuf, within 7 days to evaluate medication change, for hospital follow- up, and regarding new diagnosis.  The following labs/tests are recommended: CBC, CRP, BMP.    Recommend repeat chest xray in 3-4 weeks to assess for resolution of right sided infiltrates.     Appointments on [place] and/or [facility] (with [facility] or [facility] provider or service). Call [phone] if you haven't heard regarding these appointments within 7 days of discharge.     Full Code     Diet    Follow this diet upon discharge: Moderate Consistent Carb (60 g CHO per Meal) Diet, 2 gram sodium.       Significant Results and Procedures   Most Recent 3 CBC's:  Recent Labs   Lab Test 04/01/24  0524 03/31/24  0713 03/30/24  0709   WBC 11.6* 14.4* 13.4*   HGB 12.4* 12.4* 11.9*   MCV 88 87 87    402 342     Most Recent 3 BMP's:  Recent Labs   Lab Test 04/01/24  1117 04/01/24  0742 04/01/24  0524 03/31/24  1708 03/31/24  1625 03/31/24  0821 03/31/24  0713 03/30/24  0858 03/30/24  0709   NA  --   --  134*  --   --   --  137  --  137   POTASSIUM  --   --  3.9  --  4.0  --  3.9   < > 3.6   CHLORIDE  --   --  99  --   --   --  99  --  101   CO2  --   --  23  --   --   --  28  --  23   BUN  --   --  18.3  --   --   --  12.6  --  12.6   CR  --   --  0.74  --   --   --  0.67  --  0.66*   ANIONGAP  --   --  12  --   --   --  10  --  13   YANI  --   --  9.0  --   --   --  8.7*   --  8.5*   * 159* 163*   < >  --    < > 163*   < > 165*    < > = values in this interval not displayed.     Most Recent 2 LFT's:  Recent Labs   Lab Test 03/25/24 1739 12/25/23 2029   AST 21 27   ALT 21 23   ALKPHOS 104 123   BILITOTAL 1.1 0.5     Most Recent 3 BNP's:  Recent Labs   Lab Test 04/01/24  0524 03/29/24  1349 12/25/23 2029   NTBNPI 640 2,124* 670     Most Recent TSH and T4:  Recent Labs   Lab Test 12/25/23 2029   TSH 2.05     Most Recent Hemoglobin A1c:  Recent Labs   Lab Test 03/25/24 1739   A1C 9.4*     Most Recent ESR & CRP:  Recent Labs   Lab Test 04/01/24 0524   CRPI 63.38*   ,   Results for orders placed or performed during the hospital encounter of 03/25/24   Head CT w/o contrast    Narrative    EXAM: CT HEAD W/O CONTRAST  LOCATION: [facility]  DATE: 3/25/2024    INDICATION: Altered mental status  COMPARISON: MRI brain 12/25/2023  TECHNIQUE: Routine CT Head without IV contrast. Multiplanar reformats. Dose reduction techniques were used.    FINDINGS:  INTRACRANIAL CONTENTS: No intracranial hemorrhage, extraaxial collection, or mass effect.  No CT evidence of acute infarct. Normal parenchymal attenuation for age. Mild generalized volume loss. No hydrocephalus.     VISUALIZED ORBITS/SINUSES/MASTOIDS: Prior bilateral cataract surgery. Visualized portions of the orbits are otherwise unremarkable. Mild to moderate mucosal thickening scattered about the paranasal sinuses. No middle ear or mastoid effusion.    BONES/SOFT TISSUES: No acute abnormality.      Impression    IMPRESSION:  1.  Mild age-related changes without acute intracranial abnormality.    XR Chest Port 1 View    Narrative    EXAM: XR CHEST PORT 1 VIEW  LOCATION: [facility]  DATE: 3/25/2024    INDICATION: Altered MS w  leukocytosis.  Eval for pneumonia  COMPARISON: 12/25/2023.      Impression    IMPRESSION: Cardiac silhouette is within  normal limits. Increased patchy opacities throughout the right lower lung concerning for pneumonia. No pleural effusion or pneumothorax. Remote postsurgical changes of the right lung and ribs.   CT Chest w/o Contrast    Narrative    EXAMINATION: Chest CT  3/26/2024 1:10 PM    CLINICAL HISTORY: right lower lung pneumonia, h/o pulmonary nodules.  Assess for worsening nodules, lung abscess.    COMPARISON: CT to 2/2/2024.    TECHNIQUE: CT imaging obtained through the chest without contrast.  Axial, coronal, and sagittal reconstructions and axial MIP reformatted  images are reviewed.     FINDINGS:  Mediastinum: The visualized thyroid is unremarkable. The heart is  normal in size. No significant  pericardial effusion. Moderate  coronary artery calcifications. The ascending aorta and main pulmonary  artery are normal in caliber. Several prominent mediastinal lymph  nodes are increased from prior. No lymphadenopathy by size criteria.  Small hiatal hernia.    Lungs: Trace pleural effusions. No pneumothorax. Layering secretions  in the mainstem bronchi. Mild lower lobe predominant bronchial wall  thickening with scattered mucous plugging at the lung bases. Mild  bronchiectasis of the right middle and upper lobes, similar to prior.  New crazy paving pattern in the residual right upper and right middle  lobe consisting of groundglass opacities with intralobular septal  thickening as well as several patchy airspace opacities. Peripheral  consolidative opacities in the right lower lobe, suboptimally  evaluated due to respiratory motion artifact.  Multiple pulmonary nodules are not significantly changed, for example:  - Stable solid nodule in the right lower lobe measuring 15 x 9 mm  (series 4 image 172) with satellite nodularity, new since 2021.   - Stable 8 mm solid nodule at the right lower lobe lung base (series 4  image 228), but slowly increasing from 2019.    Bones and soft tissues: Stable postsurgical changes of partial  right  chest wall resection with mesh reconstruction and rib osteotomies.    Upper Abdomen: Limited evaluation of the upper abdomen. Partially  visualized large right exophytic renal cyst and small left renal sinus  cysts.      Impression    IMPRESSION:   1. New groundglass and consolidative opacities with interlobular  septal thickening in the right upper and middle lobes. Findings are  favored to represent infectious/inflammatory acute interstitial  pneumonia. However given the postsurgical changes of the adjacent  right chest wall for resection of a soft tissue mass, pulmonary  lymphatic metastases is a possibility. Recommend close attention on  short-term follow-up and consider tissue biopsy if findings persist.  2. Stable scattered solid pulmonary nodules. Attention on follow-up.  3. Trace pleural effusions.    I have personally reviewed the examination and initial interpretation  and I agree with the findings.    TIM BALTAZAR MD         SYSTEM ID:  [id number]   XR Video Swallow with SLP or OT    Narrative    EXAM:  Modified Barium Swallow Study with Speech Pathology, 3/28/2024    COMPARISON: None    HISTORY: Assess for silent aspiration.    FLUOROSCOPY TIME: 2.1 minutes.    FINDINGS: Under fluoroscopic guidance, the patient was given orally  administered barium of varying consistencies in the presence of the  speech pathology service.     Poor bolus control. Delayed initiation of swallowing. Mild to moderate  vallecular residue. Flash penetration was observed with thin barium.  Single episode of silent aspiration occurred with thin barium. No  evidence of tracheal aspiration with any other consistency of barium.  Degenerative changes of the cervical spine.      Impression    IMPRESSION:   Single episode of silent aspiration with thin barium. No evidence of  tracheal aspiration with any other consistency of barium.     Please see the speech pathologist report for further details.    DWAINE MISTRY MD          SYSTEM ID:  [id number]   XR Chest 2 Views    Narrative    Exam:  Chest X-ray 3/29/2024 11:29 AM    History: right lung pneumonia, reassess infiltrates.    Comparison: 3/25/2024    Findings:   Single portable AP view of the chest. Increased diffuse mixed  interstitial and airspace opacities in the right upper and middle  lobes. Cardiac silhouette is normal. Small right pleural effusion. No  pneumothorax.      Impression    Impression:   1.  Increased mixed interstitial and airspace opacities in the right  upper and middle lobes suggestive of pulmonary edema and/or pneumonia.    JOHN WYATT MD         SYSTEM ID:  [id number]   XR Chest 2 Views    Narrative    XR CHEST 2 VIEWS  3/31/2024 10:02 AM     HISTORY:  reassess right sided infiltrates, CHF       COMPARISON:  3/29/2024 CXR    TECHNIQUE: Portable, semiupright, frontal projection radiograph of the  chest.    FINDINGS:   Cardiomediastinal silhouette is within normal limits. Trachea is  midline. Mild blunting of the posterior costophrenic angles. No  appreciable pneumothorax. Slightly increased streaky and patchy  opacification of the right hemithorax.     Upper abdomen appears normal. No acute osseous abnormalities.      Impression    IMPRESSION:     1. Increased mixed opacities of the right hemithorax concerning for  infectious process. Although edema is possible it usually is not this  asymmetric.   2. Unchanged small bilateral pleural effusions.    I have personally reviewed the examination and initial interpretation  and I agree with the findings.    DANO ELDER MD         SYSTEM ID:  [id number]   XR Chest 2 Views    Narrative    Exam: XR CHEST 2 VIEWS, 4/1/2024 10:54 AM    Indication: reassess right lung infiltrates.    Comparison: 3/31/2024 chest radiographs, 3/26/2024 chest CT    Findings:   The cardiomediastinal silhouette and pulmonary vasculature are within  normal limits. Stable trace right pleural effusion. No appreciable  pneumothorax. Right upper  lobe wedge resection changes. Decreased but  persistent patchy airspace opacities throughout the mid to upper right  lung. Right apical pleural scarring/calcification. The left lung is  clear. Chronic right thoracotomy changes.      Impression    Impression:   1. Decrease of persistent patchy airspace opacities throughout the mid  to upper right lung.  2. Stable trace right pleural effusion.    DARIO ADLER DO         SYSTEM ID:  [id number]   Echo Complete     Value    LVEF  55-60%    Narrative    [id number]  [id number]  [id number]  [id number]^MARLYS^SUNDAY^SIVA     [facility],[place]  Echocardiography Laboratory  [address] [place] [id number]     Name: FATOUMATA LEMUS  MRN: [id number]  : 1945  Study Date: 2024 11:43 AM  Age: 79 yrs  Gender: Male  Patient Location: [facility]  Reason For Study: CHF  Ordering Physician: SUNDAY FRANKEL  Performed By: Taiwo Decker     BSA: 2.0 m2  Height: 72 in  Weight: 166 lb  HR: 67  BP: 130/69 mmHg  ______________________________________________________________________________  Procedure  Complete Portable Echo Adult. Echocardiogram with two-dimensional, color and  spectral Doppler performed.  ______________________________________________________________________________  Interpretation Summary  Global and regional left ventricular function is normal with an EF of 55-60%.  Global right ventricular function is normal.  No significant valvular abnormalities present.  Estimated mean right atrial pressure is normal.  Sinuses of Valsalva is dilated at 4.2 cm.  No pericardial effusion is present.     ______________________________________________________________________________  Left Ventricle  Left ventricular size is normal. Left ventricular wall thickness is normal.  Global and regional left ventricular function is normal with an EF of 55-60%.  No regional wall motion abnormalities are seen.     Right Ventricle  The right  ventricle is normal size. Global right ventricular function is  normal.     Atria  The right atria appears normal. Mild left atrial enlargement is present.     Mitral Valve  The mitral valve is normal.     Aortic Valve  Trileaflet aortic sclerosis without stenosis.     Tricuspid Valve  The tricuspid valve is normal. Trace to mild tricuspid insufficiency is  present. The peak velocity of the tricuspid regurgitant jet is not obtainable.  Pulmonary artery systolic pressure cannot be assessed.     Pulmonic Valve  The pulmonic valve is normal.     Vessels  Sinuses of Valsalva 4.2 cm. Ascending aorta 3.6 cm. Estimated mean right  atrial pressure is normal.     Pericardium  No pericardial effusion is present.     Miscellaneous  No significant valvular abnormalities present.     Compared to Previous Study  Previous study not available for comparison.  ______________________________________________________________________________  MMode/2D Measurements & Calculations  IVSd: 1.0 cm  LVIDd: 4.4 cm  LVIDs: 3.2 cm  LVPWd: 0.90 cm  FS: 27.3 %  LV mass(C)d: 137.8 grams  LV mass(C)dI: 70.0 grams/m2  Ao root diam: 4.2 cm  asc Aorta Diam: 3.6 cm  LVOT diam: 2.1 cm  LVOT area: 3.5 cm2  Ao root diam index Ht(cm/m): 2.3  Ao root diam index BSA (cm/m2): 2.1  Asc Ao diam index BSA (cm/m2): 1.8  Asc Ao diam index Ht(cm/m): 2.0  EF Biplane: 55.5 %  LA Volume (BP): 82.7 ml     LA Volume Index (BP): 42.0 ml/m2  RWT: 0.41  TAPSE: 2.3 cm     Doppler Measurements & Calculations  MV E max aristeo: 92.8 cm/sec  MV A max aristeo: 105.0 cm/sec  MV E/A: 0.88  MV dec slope: 333.0 cm/sec2  MV dec time: 0.28 sec  E/E' av.9  Lateral E/e': 10.9  Medial E/e': 9.0     RV S Aristeo: 18.5 cm/sec     ______________________________________________________________________________  Report approved by: Theresa Kruse 2024 12:23 PM             Discharge Medications   Current Discharge Medication List        START taking these medications    Details   azithromycin  (ZITHROMAX) 500 MG tablet Take 1 tablet (500 mg) by mouth once for 1 dose  Qty: 1 tablet, Refills: 0    Associated Diagnoses: Community acquired pneumonia of right middle lobe of lung      cefdinir (OMNICEF) 300 MG capsule Take 1 capsule (300 mg) by mouth 2 times daily for 4 days  Qty: 8 capsule, Refills: 0    Associated Diagnoses: Community acquired pneumonia of right middle lobe of lung      furosemide (LASIX) 20 MG tablet Take 1 tablet (20 mg) by mouth every morning  Qty: 7 tablet, Refills: 0    Associated Diagnoses: Acute diastolic heart failure (H)      glipiZIDE (GLUCOTROL XL) 10 MG 24 hr tablet Take 1 tablet (10 mg) by mouth daily (with breakfast)  Qty: 30 tablet, Refills: 0    Associated Diagnoses: Type 2 diabetes mellitus with hyperglycemia, without long-term current use of insulin (H)      metFORMIN (FORTAMET) 1000 MG 24 hr tablet Take 1 tablet (1,000 mg) by mouth 2 times daily (with meals) for 30 days  Qty: 60 tablet, Refills: 0    Associated Diagnoses: Type 2 diabetes mellitus with hyperglycemia, without long-term current use of insulin (H)      polymixin b-trimethoprim (POLYTRIM) 52713-1.1 UNIT/ML-% ophthalmic solution Place 2 drops into both eyes 4 times daily for 3 days  Qty: 10 mL, Refills: 0    Associated Diagnoses: Acute bacterial conjunctivitis of both eyes      potassium chloride mi ER (KLOR-CON M10) 10 MEQ CR tablet Take 1 tablet (10 mEq) by mouth 2 times daily for 7 days  Qty: 14 tablet, Refills: 0    Associated Diagnoses: Acute diastolic heart failure (H)           CONTINUE these medications which have CHANGED    Details   losartan (COZAAR) 50 MG tablet Take 1 tablet (50 mg) by mouth daily  Qty: 30 tablet, Refills: 0    Associated Diagnoses: Hypertension goal BP (blood pressure) < 140/90           CONTINUE these medications which have NOT CHANGED    Details   ASPIRIN 81 MG OR TABS 1 tab po QD (Once per day)      atorvastatin (LIPITOR) 40 MG tablet Take 1 tablet (40 mg) by mouth daily  Qty:  90 tablet, Refills: 3    Associated Diagnoses: Hyperlipidemia LDL goal <100      CALCITRATE/VITAMIN D 315-200 MG-UNIT OR TABS One tab daily in the morning      Cyanocobalamin (B-12 PO) Take by mouth every morning Take 1/2 tablet daily      sertraline (ZOLOFT) 100 MG tablet Take 1 tablet (100 mg) by mouth daily  Qty: 90 tablet, Refills: 3    Comments: Just needs Sertraline and Test Strips today 10/3/23  Associated Diagnoses: Mild major depression (H24)      triamcinolone (KENALOG) 0.5 % external ointment Use to lesion on left arm twice daily for 14 days  Qty: 15 g, Refills: 1    Associated Diagnoses: Skin lesion      !! blood glucose (ACCU-CHEK RODRI PLUS) test strip 1 strip by In Vitro route daily  Qty: 100 strip, Refills: 3    Associated Diagnoses: Type 2 diabetes mellitus with hyperglycemia, without long-term current use of insulin (H)      !! blood glucose (CONTOUR NEXT TEST) test strip USE TO TEST BLOOD SUGAR 1 TIME DAILY OR AS DIRECTED  Qty: 100 strip, Refills: 3    Associated Diagnoses: Type 2 diabetes mellitus with hyperglycemia, without long-term current use of insulin (H)      blood glucose (NO BRAND SPECIFIED) lancets standard Use to test blood sugar daily and as needed  Qty: 100 each, Refills: 11    Associated Diagnoses: Type 2 diabetes mellitus without complication, without long-term current use of insulin (H)      Microlet Lancets MISC USE TO TEST BLOOD SUGAR DAILY AND AS NEEDED  Qty: 100 each, Refills: 1       !! - Potential duplicate medications found. Please discuss with provider.        STOP taking these medications       metFORMIN (GLUCOPHAGE) 500 MG tablet Comments:   Reason for Stopping:             Allergies   Allergies   Allergen Reactions    Oxycodone Itching    Shrimp Swelling

## 2024-04-01 NOTE — PROGRESS NOTES
6MS DISCHARGE    D: Patient discharged to home at 1555. Patient accompanied by wife.    I: Discharge prescriptions given to patient. All discharge medications and instructions reviewed with patient. Patient instructed to seek care if experiencing worsening symptoms, such as Sob chest pain dizziness . Other phone numbers to call with questions or concerns after discharge reviewed. IV removed. Education completed.    A: Patient wife and patient  verbalized understanding of discharge medications and instructions. Prescribed home medications given to patient.  Belongings returned to patient.    P: Patient to follow-up within 7 days  with mirella Zimmer.    Menveo vaccine given by this writer.

## 2024-04-01 NOTE — PROGRESS NOTES
Care Management Discharge Note    Discharge Date: 04/01/2024       Discharge Disposition: Home    Discharge Services: None    Discharge DME: None    Discharge Transportation: family or friend will provide    Private pay costs discussed: Not applicable    Does the patient's insurance plan have a 3 day qualifying hospital stay waiver?  No    PAS Confirmation Code:    Patient/family educated on Medicare website which has current facility and service quality ratings: no    Education Provided on the Discharge Plan: Yes  Persons Notified of Discharge Plans: patient, wife, PCP,   Patient/Family in Agreement with the Plan: yes    Handoff Referral Completed: Yes    Additional Information:        Patient is discharging home today.  He passed the oxygen walk test so does not need home O2.  Spouse will transport him home and assist.     Taylor Yadav, RN  Inpatient Care Coordinator  6 Med Surg  [phone], pager [phone]      For weekend social work needs, contact information below and available on [place]:     SW Weekend [place] Pager ED, 5 MS, 5 ortho : [phone]  SW Weekend 6MS, 8A, 10ICU- Pager: [phone]     For weekend RN care coordinator needs (home discharge with needs including home care, assisted living facility returns, durable medical equip, IV antibiotics)   5 med/surg, 5 ortho, ED pager: [phone]  6 med/surg, 8 med/surg, 10 ICU pager: [phone]

## 2024-04-01 NOTE — TELEPHONE ENCOUNTER
Reason for Call:  Appointment Request    Patient requesting this type of appt:  Hospital/ED Follow-Up     Requested provider:  Pt. Was recommended to see Dr. Smith, however he's not accepting new pt. Wife was wondering if they would be able to see him     Reason patient unable to be scheduled:  Not accepting new pt. Wondering if he would be willing to see him    When does patient want to be seen/preferred time: 3-7 days    Comments: none    Could we send this information to you in D4P or would you prefer to receive a phone call?:   Patient would like to be contacted via D4P    Call taken on 4/1/2024 at 10:08 AM by Liz Ling

## 2024-04-02 ENCOUNTER — PATIENT OUTREACH (OUTPATIENT)
Dept: CARE COORDINATION | Facility: CLINIC | Age: 79
End: 2024-04-02
Payer: COMMERCIAL

## 2024-04-02 NOTE — LETTER
[facility] CARE COORDINATION  [address] [facility] [address]    April 2, 2024    Gerber Levine  [address]  [facility] [address]      Dear Gerber,    I am a clinic care coordinator who works with Caty Yusuf NP with the [facility]. I wanted to introduce myself and provide you with my contact information for you to be able to call me with any questions or concerns. Below is a description of clinic care coordination and how I can further assist you.       The clinic care coordination team is made up of a registered nurse, , financial resource worker and community health worker who understand the health care system. The goal of clinic care coordination is to help you manage your health and improve access to the health care system. Our team works alongside your provider to assist you in determining your health and social needs. We can help you obtain health care and community resources, providing you with necessary information and education. We can work with you through any barriers and develop a care plan that helps coordinate and strengthen the communication between you and your care team.  Our services are voluntary and are offered without charge to you personally.    Please feel free to contact me with any questions or concerns regarding care coordination and what we can offer.      We are focused on providing you with the highest-quality healthcare experience possible.    Sincerely,     Aj Rico MSN, RN, PHN, CCM   Primary Care Clinical RN Care Coordinator  [facility]  4/2/2024   1:34 PM  [email or web address]  Office: [phone]

## 2024-04-02 NOTE — PROGRESS NOTES
Clinic Care Coordination Contact  [facility]: Post-Discharge Note  SITUATION                                                      Admission:    Admission Date: 03/25/24   Reason for Admission: abnormal gait  Discharge:   Discharge Date: 04/01/24  Discharge Diagnosis: Discharge Diagnoses  RML, RUL Community Acquired Pneumonia  Acute Hypoxemic Respiratory Failure due to CAP, Diastolic CHF  Hereditary spherocytosis, s/p Splenectomy ~ 1976  Acute Diastolic CHF  HTN  Hyperlipidemia  Cognitive impairment with progressive STM loss  Acute metabolic encephalopathy due to pneumonia, resolved  Depression, insomnia  Weakness, Gait Instability  DM Type 2, uncontrolled (A1c 9.4 on 3/25/2024)  Bacterial conjunctivitis  Hearing Impaired-Wears hearing aids.  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    BACKGROUND                                                      Per hospital discharge summary and inpatient provider notes:  Discharge Diagnoses  RML, RUL Community Acquired Pneumonia  Acute Hypoxemic Respiratory Failure due to CAP, Diastolic CHF  Hereditary spherocytosis, s/p Splenectomy ~ 1976  Acute Diastolic CHF  HTN  Hyperlipidemia  Cognitive impairment with progressive STM loss  Acute metabolic encephalopathy due to pneumonia, resolved  Depression, insomnia  Weakness, Gait Instability  DM Type 2, uncontrolled (A1c 9.4 on 3/25/2024)  Bacterial conjunctivitis  Hearing Impaired-Wears hearing aids.  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  How are your symptoms? (Red Flag symptoms escalate to triage hotline per guidelines): Improved  Do you feel your condition is stable enough to be safe at home until your provider visit?: Yes  Does the  patient have their discharge instructions? : Yes  Does the patient have questions regarding their discharge instructions? : No  Were you started on any new medications or were there changes to any of your previous medications? : Yes  Does the patient have all of their medications?: Yes  Do you have questions regarding any of your medications? : No  Do you have all of your needed medical supplies or equipment (DME)?  (i.e. oxygen tank, CPAP, cane, etc.): Yes  Discharge follow-up appointment scheduled within 14 calendar days? : Yes  Discharge Follow Up Appointment Date: 04/05/24  Discharge Follow Up Appointment Scheduled with?: Primary Care Provider    Post-op ([facility] CTA Only)  If the patient had a surgery or procedure, do they have any questions for a nurse?: No    Post-op (Clinicians Only)  Did the patient have surgery or a procedure: No  Fever: No  Chills: No  Eating & Drinking: eating and drinking without complaints/concerns  PO Intake: other  Bowel Function: normal  Urinary Status: voiding without complaint/concerns        PLAN                                                      Outpatient Plan:  The spouse of the patient declined care coordination.    Future Appointments   Date Time Provider Department Center   4/3/2024  7:45 PM Chiquis Gong, PT [facility] [place]   4/5/2024  3:30 PM Caty Yusuf NP [facility]   4/24/2024  8:00 AM Alfonzo Lara, PhD [facility]   5/8/2024  7:00 AM Caty Yusuf NP [facility]   8/23/2024  8:30 AM Guru Vieira DO [facility]   10/24/2024 10:00 AM [id number]   10/29/2024  4:30 PM Augie Soto MD [facility]         For any urgent concerns, please contact our 24 hour nurse triage line: [phone] ([phone])         Aj Rico MSN, RN, PHN, [facility]   Primary Care Clinical RN Care Coordinator  [facility]  4/2/2024   1:36 PM  [email or web address]  Office: [phone]

## 2024-04-03 NOTE — PLAN OF CARE
Physical Therapy Discharge Summary    Reason for therapy discharge:    All goals and outcomes met, no further needs identified.    Progress towards therapy goal(s). See goals on Care Plan in [facility] electronic health record for goal details.  Goals met    Therapy recommendation(s):    Continued therapy is recommended.  Rationale/Recommendations:  home PT for safety evaluation and progression.

## 2024-04-04 ASSESSMENT — PATIENT HEALTH QUESTIONNAIRE - PHQ9
SUM OF ALL RESPONSES TO PHQ QUESTIONS 1-9: 0
SUM OF ALL RESPONSES TO PHQ QUESTIONS 1-9: 0

## 2024-04-05 ENCOUNTER — OFFICE VISIT (OUTPATIENT)
Dept: FAMILY MEDICINE | Facility: CLINIC | Age: 79
End: 2024-04-05
Payer: COMMERCIAL

## 2024-04-05 VITALS
OXYGEN SATURATION: 96 % | BODY MASS INDEX: 24.49 KG/M2 | RESPIRATION RATE: 16 BRPM | WEIGHT: 184.8 LBS | DIASTOLIC BLOOD PRESSURE: 68 MMHG | HEIGHT: 73 IN | TEMPERATURE: 97.6 F | HEART RATE: 72 BPM | SYSTOLIC BLOOD PRESSURE: 118 MMHG

## 2024-04-05 DIAGNOSIS — I50.31 ACUTE DIASTOLIC HEART FAILURE (H): ICD-10-CM

## 2024-04-05 DIAGNOSIS — J18.9 COMMUNITY ACQUIRED PNEUMONIA OF RIGHT LOWER LOBE OF LUNG: ICD-10-CM

## 2024-04-05 DIAGNOSIS — D58.0 HEREDITARY SPHEROCYTOSIS (H): ICD-10-CM

## 2024-04-05 DIAGNOSIS — M62.81 GENERALIZED MUSCLE WEAKNESS: ICD-10-CM

## 2024-04-05 DIAGNOSIS — E11.65 TYPE 2 DIABETES MELLITUS WITH HYPERGLYCEMIA, WITHOUT LONG-TERM CURRENT USE OF INSULIN (H): ICD-10-CM

## 2024-04-05 DIAGNOSIS — R26.81 GAIT INSTABILITY: ICD-10-CM

## 2024-04-05 DIAGNOSIS — E78.5 HYPERLIPIDEMIA LDL GOAL <100: ICD-10-CM

## 2024-04-05 DIAGNOSIS — R41.89 COGNITIVE IMPAIRMENT: ICD-10-CM

## 2024-04-05 DIAGNOSIS — Z09 HOSPITAL DISCHARGE FOLLOW-UP: Primary | ICD-10-CM

## 2024-04-05 PROBLEM — C44.612 BASAL CELL CARCINOMA (BCC) OF SKIN OF RIGHT UPPER EXTREMITY INCLUDING SHOULDER: Status: ACTIVE | Noted: 2024-03-07

## 2024-04-05 LAB
ERYTHROCYTE [DISTWIDTH] IN BLOOD BY AUTOMATED COUNT: 12.8 % (ref 10–15)
HCT VFR BLD AUTO: 40.8 % (ref 40–53)
HGB BLD-MCNC: 13.4 G/DL (ref 13.3–17.7)
MCH RBC QN AUTO: 29.6 PG (ref 26.5–33)
MCHC RBC AUTO-ENTMCNC: 32.8 G/DL (ref 31.5–36.5)
MCV RBC AUTO: 90 FL (ref 78–100)
PLATELET # BLD AUTO: 556 10E3/UL (ref 150–450)
RBC # BLD AUTO: 4.52 10E6/UL (ref 4.4–5.9)
WBC # BLD AUTO: 8.6 10E3/UL (ref 4–11)

## 2024-04-05 PROCEDURE — 80061 LIPID PANEL: CPT | Performed by: NURSE PRACTITIONER

## 2024-04-05 PROCEDURE — 86140 C-REACTIVE PROTEIN: CPT | Performed by: NURSE PRACTITIONER

## 2024-04-05 PROCEDURE — 85027 COMPLETE CBC AUTOMATED: CPT | Performed by: NURSE PRACTITIONER

## 2024-04-05 PROCEDURE — 99495 TRANSJ CARE MGMT MOD F2F 14D: CPT | Performed by: NURSE PRACTITIONER

## 2024-04-05 PROCEDURE — 36415 COLL VENOUS BLD VENIPUNCTURE: CPT | Performed by: NURSE PRACTITIONER

## 2024-04-05 PROCEDURE — 80048 BASIC METABOLIC PNL TOTAL CA: CPT | Performed by: NURSE PRACTITIONER

## 2024-04-05 RX ORDER — GLIPIZIDE 10 MG/1
10 TABLET, FILM COATED, EXTENDED RELEASE ORAL
Qty: 90 TABLET | Refills: 3 | Status: SHIPPED | OUTPATIENT
Start: 2024-04-05

## 2024-04-05 ASSESSMENT — PAIN SCALES - GENERAL: PAINLEVEL: NO PAIN (0)

## 2024-04-05 NOTE — PROGRESS NOTES
Assessment & Plan     Hospital discharge follow-up    - CBC with platelets; Future  - CRP, inflammation; Future  - Basic metabolic panel  (Ca, Cl, CO2, Creat, Gluc, K, Na, BUN); Future  - CBC with platelets  - CRP, inflammation  - Basic metabolic panel  (Ca, Cl, CO2, Creat, Gluc, K, Na, BUN)    Community acquired pneumonia of right lower lobe of lung  Will get repeat CHEST X-RAY in 3-4 weeks to reassess right lung infiltrates.  Complete antibiotics as prescribed.  Continue aspiration precautions.  - XR Chest 2 Views; Future  - Planned repeat chest CT 10/2024 with Oncology.    Acute diastolic heart failure (H)  BP well controlled, euvolemic on exam.  Repeat BMP today normal- will notify patient that he may discontinue Lasix and Potassium.    HYPERLIPIDEMIA LDL GOAL <100    - Lipid panel reflex to direct LDL Non-fasting    Hereditary spherocytosis (H24)  Will need the second dose of MENVEO in 8 weeks around 6/1/24    Cognitive impairment  Has planned neuropsychology assessment 4/24/24.    Generalized muscle weakness  Continue home exercise program.    Gait instability  Has follow up with Neurology 8/23/24.    Type 2 diabetes mellitus with hyperglycemia, without long-term current use of insulin (H)    - Continue Metformin XR 1,000 mg twice daily and glipiZIDE (GLUCOTROL XL) 10 MG 24 hr tablet; Take 1 tablet (10 mg) by mouth daily (with breakfast)    Follow-up in 1 month as planned for recheck.          MED REC REQUIRED  Post Medication Reconciliation Status: discharge medications reconciled and changed, per note/orders      Subjective   Art is a 79 year old, presenting for the following health issues:  Hospital F/U        4/5/2024     3:09 PM   Additional Questions   Roomed by Adela DELONG     Via the Health Maintenance questionnaire, the patient has reported the following services have been completed -Eye Exam, this information has been sent to the abstraction team.  HPI         4/2/2024     1:27 PM   Post Discharge  Outreach   Admission Date 3/25/2024   Reason for Admission abnormal gait   Discharge Date 4/1/2024   Discharge Diagnosis Discharge Diagnoses  RML, RUL Community Acquired Pneumonia  Acute Hypoxemic Respiratory Failure due to CAP, Diastolic CHF  Hereditary spherocytosis, s/p Splenectomy ~ 1976  Acute Diastolic CHF  HTN  Hyperlipidemia  Cognitive impairment with progressive STM loss  Acute metabolic encephalopathy due to pneumonia, resolved  Depression, insomnia  Weakness, Gait Instability  DM Type 2, uncontrolled (A1c 9.4 on 3/25/2024)  Bacterial conjunctivitis  Hearing Impaired-Wears hearing aids.  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 He is still very weak and blood sugars are still high.   How are your symptoms? (Red Flag symptoms escalate to triage hotline per guidelines) Improved   Do you feel your condition is stable enough to be safe at home until your provider visit? Yes   Does the patient have their discharge instructions?  Yes   Does the patient have questions regarding their discharge instructions?  No   Were you started on any new medications or were there changes to any of your previous medications?  Yes   Does the patient have all of their medications? Yes   Do you have questions regarding any of your medications?  No   Do you have all of your needed medical supplies or equipment (DME)?  (i.e. oxygen tank, CPAP, cane, etc.) Yes   Discharge follow-up appointment scheduled within 14 calendar days?  Yes   Discharge Follow Up Appointment Date 4/5/2024   Discharge Follow Up Appointment Scheduled with? Primary Care Provider     Hospital Follow-up Visit:    Hospital/Nursing Home/IP Rehab Facility: [facility]  Date of Admission: 3/25/2024  Date of Discharge: 4/01/2024  Reason(s) for Admission:    RML, RUL Community Acquired Pneumonia  Acute Hypoxemic  "Respiratory Failure due to CAP, Diastolic CHF  Hereditary spherocytosis, s/p Splenectomy ~ 1976  Acute Diastolic CHF  HTN  Hyperlipidemia  Cognitive impairment with progressive STM loss  Acute metabolic encephalopathy due to pneumonia, resolved  Depression, insomnia  Weakness, Gait Instability  DM Type 2, uncontrolled (A1c 9.4 on 3/25/2024)  Bacterial conjunctivitis  Hearing Impaired-Wears hearing aids.  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 No   Problems taking medications regularly:  None  Medication changes since discharge: yes   Problems adhering to non-medication therapy:  None    Summary of hospitalization:  [facility] discharge summary reviewed  Diagnostic Tests/Treatments reviewed.  Follow up needed: The following labs/tests are recommended: CBC, CRP, BMP.    Recommend repeat chest xray in 3-4 weeks to assess for resolution of right   sided infiltrates.  Will follow up with Neurology for abnormal gait.  Other Healthcare Providers Involved in Patient s Care:         None  Update since discharge: improved.         Plan of care communicated with patient                   Objective    /68 (BP Location: Right arm, Patient Position: Sitting, Cuff Size: Adult Regular)   Pulse 72   Temp 97.6  F (36.4  C) (Oral)   Resp 16   Ht 1.842 m (6' 0.5\")   Wt 83.8 kg (184 lb 12.8 oz)   SpO2 96%   BMI 24.72 kg/m    Body mass index is 24.72 kg/m .  Physical Exam   GENERAL: alert and no distress  RESP: lungs clear to auscultation - no rales, rhonchi or wheezes  CV: regular rates and rhythm, normal S1 S2, no S3 or S4, and no murmur, click or rub  PSYCH: mentation appears normal, affect normal/bright    Results for orders placed or performed in visit on 04/05/24   CBC with platelets     Status: Abnormal   Result Value Ref Range    WBC Count 8.6 4.0 - 11.0 10e3/uL    RBC Count " 4.52 4.40 - 5.90 10e6/uL    Hemoglobin 13.4 13.3 - 17.7 g/dL    Hematocrit 40.8 40.0 - 53.0 %    MCV 90 78 - 100 fL    MCH 29.6 26.5 - 33.0 pg    MCHC 32.8 31.5 - 36.5 g/dL    RDW 12.8 10.0 - 15.0 %    Platelet Count 556 (H) 150 - 450 10e3/uL   CRP, inflammation     Status: Abnormal   Result Value Ref Range    CRP Inflammation 7.82 (H) <5.00 mg/L   Basic metabolic panel  (Ca, Cl, CO2, Creat, Gluc, K, Na, BUN)     Status: Abnormal   Result Value Ref Range    Sodium 137 135 - 145 mmol/L    Potassium 4.9 3.4 - 5.3 mmol/L    Chloride 99 98 - 107 mmol/L    Carbon Dioxide (CO2) 26 22 - 29 mmol/L    Anion Gap 12 7 - 15 mmol/L    Urea Nitrogen 27.2 (H) 8.0 - 23.0 mg/dL    Creatinine 0.90 0.67 - 1.17 mg/dL    GFR Estimate 86 >60 mL/min/1.73m2    Calcium 9.6 8.8 - 10.2 mg/dL    Glucose 151 (H) 70 - 99 mg/dL           Signed Electronically by: Caty Yusuf NP    Answers submitted by the patient for this visit:  Patient Health Questionnaire (Submitted on 4/4/2024)  PHQ9 TOTAL SCORE: 0

## 2024-04-06 PROBLEM — R41.82 ALTERED MENTAL STATUS, UNSPECIFIED ALTERED MENTAL STATUS TYPE: Status: RESOLVED | Noted: 2023-12-26 | Resolved: 2024-04-06

## 2024-04-06 PROBLEM — R26.81 GAIT INSTABILITY: Status: ACTIVE | Noted: 2024-04-06

## 2024-04-06 LAB
ANION GAP SERPL CALCULATED.3IONS-SCNC: 12 MMOL/L (ref 7–15)
BUN SERPL-MCNC: 27.2 MG/DL (ref 8–23)
CALCIUM SERPL-MCNC: 9.6 MG/DL (ref 8.8–10.2)
CHLORIDE SERPL-SCNC: 99 MMOL/L (ref 98–107)
CHOLEST SERPL-MCNC: 136 MG/DL
CREAT SERPL-MCNC: 0.9 MG/DL (ref 0.67–1.17)
CRP SERPL-MCNC: 7.82 MG/L
DEPRECATED HCO3 PLAS-SCNC: 26 MMOL/L (ref 22–29)
EGFRCR SERPLBLD CKD-EPI 2021: 86 ML/MIN/1.73M2
GLUCOSE SERPL-MCNC: 151 MG/DL (ref 70–99)
HDLC SERPL-MCNC: 47 MG/DL
LDLC SERPL CALC-MCNC: 64 MG/DL
NONHDLC SERPL-MCNC: 89 MG/DL
POTASSIUM SERPL-SCNC: 4.9 MMOL/L (ref 3.4–5.3)
SODIUM SERPL-SCNC: 137 MMOL/L (ref 135–145)
TRIGL SERPL-MCNC: 124 MG/DL

## 2024-04-22 ENCOUNTER — TRANSFERRED RECORDS (OUTPATIENT)
Dept: HEALTH INFORMATION MANAGEMENT | Facility: CLINIC | Age: 79
End: 2024-04-22
Payer: COMMERCIAL

## 2024-04-22 LAB — RETINOPATHY: NEGATIVE

## 2024-04-24 ENCOUNTER — OFFICE VISIT (OUTPATIENT)
Dept: NEUROPSYCHOLOGY | Facility: CLINIC | Age: 79
End: 2024-04-24
Attending: NURSE PRACTITIONER
Payer: COMMERCIAL

## 2024-04-24 DIAGNOSIS — R41.3 MEMORY LOSS: ICD-10-CM

## 2024-04-24 DIAGNOSIS — R41.844 FRONTAL LOBE AND EXECUTIVE FUNCTION DEFICIT: Primary | ICD-10-CM

## 2024-04-24 DIAGNOSIS — F06.8 OTHER SPECIFIED MENTAL DISORDERS DUE TO KNOWN PHYSIOLOGICAL CONDITION: ICD-10-CM

## 2024-04-24 PROCEDURE — 90791 PSYCH DIAGNOSTIC EVALUATION: CPT | Performed by: CLINICAL NEUROPSYCHOLOGIST

## 2024-04-24 PROCEDURE — 96138 PSYCL/NRPSYC TECH 1ST: CPT | Performed by: CLINICAL NEUROPSYCHOLOGIST

## 2024-04-24 PROCEDURE — 96139 PSYCL/NRPSYC TST TECH EA: CPT | Performed by: CLINICAL NEUROPSYCHOLOGIST

## 2024-04-24 PROCEDURE — 96132 NRPSYC TST EVAL PHYS/QHP 1ST: CPT | Performed by: CLINICAL NEUROPSYCHOLOGIST

## 2024-04-26 NOTE — PROGRESS NOTES
"NAME  Gerber Levine    MRN  [id number]      [birth year]     AGE  79     SEX  Male     HANDEDNESS Right     EDUCATION 13     LOPEZ  24     PROVIDER  [facility]  SH     STATION  OP            ORIENTATION      Time  0     Personal Info.     Place      Presidents             WAIS-IV         WMI: 92      RDS: 6             Raw ScS    Similarities  24 11    Block Design 24 10    Digit Span  17 7    Arithmetic  12 10    Coding  39 10           IRAIS-O COMPLEX FIGURE       Raw T %ile   Copy  31  >16   Time to Copy 279\"  >16                                      COWAT FAS       Raw 41      ScS 10      T 52             ANIMAL FLUENCY      Raw 14      ScS 7      T 44              BOSTON NAMING TEST     Raw 56 /60     ScS 13      %ile 82-89             COMPLEX IDEATIONAL MATERIAL     Raw 10      ScS 7      T 35             TRAIL MAKING TEST       Time Errors ScS %ile   A 38 0 12 72-81   B 153 1 9 29-40          STROOP        Raw %ile     Word 66 11-18     Color 52 41-59     C/W 22 29-40             YUMIKO   H   Raw 20      %ile 41-59             GROOVED PEGBOARD       Raw Drops ScS T    0 5 51    1 2 38          BDI-II       Raw 12      Interp. MINIMAL             JIM       Raw 5      Interp. MINIMAL              WMS-IV LOGICAL MEMORY OA    Raw ScS/%ile     LM I 18 7     LM II 13 10     Recog. 18 51-75             HVLT   4     Raw T    Trial 1  5     Trial 2  6     Trial 3  6     Learning  1     Total Recall 17 39    Delayed Recall 7 46    Percent Retention 117% 65    True Positives 11     False Positives 0     Disc. Index  11 56            BVMT   3     Raw T/%ile    Trial 1  3     Trial 2  5     Trial 3  6     Learning  3     Total Recall 14 39    Delayed Recall 6 43    Percent Retention 100% >16    Recognition Hits 6     Recognition F.P 0     Disc. Index  6 >16           ACS WORD CHOICE      Raw 50        "

## 2024-04-26 NOTE — PROGRESS NOTES
Name: Gerber Levine  MR#: [id number]  YOB: 1945  Date of Exam: 4/24/2024  ??   NEUROPSYCHOLOGICAL EVALUATION?????     Gerber Levine 79-year-old, right-handed, retired , with 13 years of formal education. He was accompanied to the appointment by his wife Adilia.     The patient was in-clinic of the entirety of this evaluation. The interview and testing for this evaluation were completed face-to-face.    BACKGROUND INFORMATION/INTERVIEW FINDINGS  According to records, Mr. Levine was hospitalized for acute metabolic encephalopathy, likely caused by COVID-19 infection, on 12/25/2023. He presented with acute alerted mental status and right sided weakness. Mr. Levine was hospitalized again on 3/25/2024 due to pneumonia. He presented with confusion and generalized weakness. Following his most recent hospitalization, his wife reported cognitive changes which began in December 2023. She noted short-term memory decline, increased confusion, and irritability She also noted generalized weakness, shuffling gait, increased symptoms of depression, and poor sleep.    Of note, Mr. Levine underwent 2 prior neuropsychological evaluations under the direction of Dr. Alfonzo Lara in 2018 and 2019. In 2018, Mr. Levine's cognitive profile raised some concern about mild dysfunction of frontal brain regions. Speculated etiology included cerebrovascular disease, as well as the impact of depression. Alzheimer's disease was considered unlikely. In 2019, his cognitive profile was again concerning for mild relative compromise of the frontal lobes. Cognition was considered generally stable, suggesting that a progressive condition was unlikely. Depression, anxiety, and chronically poor sleep were considered the most likely etiology, while the impact of cerebrovascular disease could not entirely be ruled out. Please see those evaluation (dated 1/4/2018 and 1/14/2019) for further background  information not recapitulated here.     CT of the head taken on 3/25/2024 was notable for,  Mild age-related changes without acute intracranial abnormality.  MRI of the brain taken on 12/25/2023 was notable for  Age-related changes.  Specific note was made of T2 FLAIR abnormalities in the cerebral white matter and mild-moderate generalized cerebral atrophy.     Mr. Levine's psychiatric history includes major depressive disorder, generalized anxiety disorder, and dysthymic disorder. His other medical history includes gait instability, community aquired pneumonia, leukocytosis, basal cell carcinoma, right sided weakness, type-2 diabetes mellitus, soft tissue carcinoma, hypertension, hyperlipidemia, hearing loss, hereditary spherocytosis, and allergic rhinitis. His current medications include aspirin, atorvastatin, cyanocobalamin, furosemide, glipizide, losartan, metformin, sertraline, and triamcinolone. His family medical history includes dementia (mother, brother), skin cancer (mother), diabetes (father, sister, maternal grandmother), emphysema (father), Down syndrome (sister), and prostate cancer.     Mr. Levine was referred for neuropsychological evaluation at the request of, Caty Yusuf NP, in this context.    Today, Mr. Levine reported cognitive decline since our prior evaluation in 2019. He reported short- and long-term memory decline. He added that he often relies on guessing. His wife reported temporary cognitive change following recent hospitalizations, stating that he has returned to baseline. She denied concerns related to progressive cognitive decline. She speculated that hearing decline may contribute to inattention, disengagement, or perceived memory change.     Mr. Levine lives with his wife in their home. He reportedly manages personal cares independently and without difficulty. He manages his medications accurately. His wife has always managed meal preparation. They have always shared the  responsibility of housework. He reportedly continues to drive but noted recent near accidents due to inattentiveness. His wife temporarily took over management of their finances while he was recovering from COVID-19 and pneumonia. He has since resumed financial management.    Neurologic history includes migraines, which have resolved. Regarding sensorimotor changes, he reported balance changes, including slipping and falling on ice a few times. He noted a shoulder injury related to a fall.     Mr. Levine described fragmented sleep, waking up several times per night with difficulty returning to sleep. He takes melatonin to aid in sleep. He reported history of sleep apnea which resolved with weight loss. He denied REM sleep behaviors. He reported low daytime energy without daytime naps. He reported good appetite but noted a 15-pound weight loss while recovering from pneumonia. He reported chronic pain near his ribs following a surgery to address cancer.    Mr. Levine described symptoms of depression and anxiety which began approximately 30 years ago and have worsened in recent years. He has taken sertraline for approximately 25 years with unclear benefit. He had 2-3 visits with a psychotherapist approximately 22 years ago. He denied history of psychiatric hospitalization. He denied history of suicidal ideation, plans, or intentions. He denied history of hallucinations, delusion, or nadine. Regarding substance use, he drinks approximately 2 alcoholic beverages monthly. He denied use of tobacco, marijuana, and other illicit substances. He denied history of substance abuse, addiction, or dependence.    BEHAVIORAL OBSERVATIONS  Mr. Levine arrived on time. He was alert and oriented. He wore glasses and hearing aids. He mouthed the words which were said to him by others. Speech and gait were unremarkable. Affect was consistent with his reported depressed mood and anxiety. He occasionally expressed doubt in his  cognitive abilities. He presented as a good historian. His thought process was linear and goal directed. On a measure of fine motor speed and dexterity, he reported an injury resulting in left thumb numbness. Throughout testing, he provided good effort and passed measures of performance validity. Results from the present evaluation are likely to reflect his current cognitive functioning.??     RESULTS OF EXAM   His performances on standardized measures of neuropsychological functioning were as follows:     He was oriented to time, place, and various aspects of personal information. He named 4 of the 6 most recent [place] presidents. Visual acuity was 20/30 with glasses. Auditory attention for digits was low average. Mental calculations were average. Immediate story memory was low average. Story recall following a delay was average. Delayed recognition for story details was average. Learning of words in a list format was low average across trials (5, 6, 6). Recall of list words was average following a delay. Retention of list words was above average. Delayed recognition of list words was average. Learning of simple geometric figures and their spatial locations was low average across trials (3, 5, 6). Delayed recall for the figures and their locations was average. Retention of shapes was average. Delayed recognition on this task was high average, and without error. His copy of a complex geometric figure was average. Visuospatial judgments for variably oriented lines was average. Visual problem solving with blocks was average. Comprehension of spoken sentences and paragraphs was below average. Verbal abstract reasoning was average. Verbal associative fluency was average. Semantic fluency was average. Naming to confrontation was high average. Speeded visual sequencing under focused attention was average to high average. A similar measure with a divided attention component was average, including 1 error. Speeded word  reading was low average. Speeded color naming was average. Speeded inhibition of an over-learned response was average. Speeded psychomotor coding was average. Speeded fine motor dexterity was average for his dominant right hand. Speeded fine motor dexterity was low average, including 1 peg drop.    ?   He endorsed items consistent with minimal symptoms of depression and minimal symptoms of anxiety on self-report questionnaires.?     IMPRESSIONS?  Results from today's evaluation were generally consistent with those of his prior evaluations in 2018 and 2019. Within the context of some variability, there is suggestion of subtle inefficiency in frontal and subcortical circuitry. Similar patterns of weakness can be seen individuals with cerebrovascular disease. Importantly, these results are not indicative of mild cognitive impairment or dementia at this time. He continued to demonstrate subtle weaknesses in auditory attention (potentially related to hearing loss). Other areas of subtle weakness include initial learning of both visual and verbal information and variable processing speed. Psychiatrically, he endorsed minimal symptoms of anxiety and depression, which represents improvement compared to prior evaluations. Despite this, Mr. Levine reported that depression and anxiety have worsened in recent years. Other potential contributing factors could include poor sleep, chronic pain, and reported symptoms of depression and anxiety. Symptoms are unlikely related to a neurodegenerative condition.     RECOMMENDATIONS?   Results and recommendations were provided to the patient over the telephone on 4/26/2024 and all of his questions were answered.     Mr. Levine reported symptoms of depression and anxiety during our interview. He added that he was unsure of how effective his psychiatric medication has been for symptom management. He is encouraged to follow up with his prescribing physician about medication management.    Referral for psychotherapy services may also be considered. A structured and evidence-based approach such as cognitive behavioral therapy may provide most benefit. One possible referral option would be Located within [facility], with locations throughout the [facility] area. They can be reached by calling [phone].  Mr. Levine's speculated that hearing decline may contribute to inattention, disengagement, or perceived memory change. Hearing concerns were also raised during this evaluation. He may benefit from follow up with his audiologist about ways to optimize his hearing.   Mr. Levine reported a pattern of sleep disturbances. Sleep may be valuable to address in psychotherapy. He may also consider undergoing a sleep study, if medically indicated.   A neuropsychological update has been obtained. Given the relative stability of his cognition, a follow-up neuropsychological evaluation to be considered in the future, if clinically indicated.    All services provided by the Postdoctoral Fellow were supervised by this licensed psychologist and all billing noted here is for professional services provided by the psychologist and trained psychometrist.???????   ???????   Shital Herrera, Ph.D.???????   Postdoctoral Neuropsychology Fellow???????   ???????   Alfonzo Lara, Ph.D., L.P., ABPP???????   Board Certified in Clinical Neuropsychology???????    / Licensed Psychologist [id number]???????   ?????   Time spent: One unit psychiatric diagnostic interview including interview, clinical assessment of thinking, reasoning, and judgment by licensed and board-certified neuropsychologist (CPT 85254). One unit (80 minutes) neuropsychological testing evaluation by licensed and board-certified neuropsychologist, including integration of patient data, interpretation of standardized test results and clinical data, clinical decision-making, treatment planning, report, and interactive feedback to the patient, first hour  (CPT 07060. One unit (30 minutes) of psychological and neuropsychological test administration and scoring by technician, first 30 minutes (CPT 60782). Five units (151 minutes) psychological or neuropsychological test administration and scoring by technician, subsequent 30 minutes (CPT 67405). Diagnoses: R41.844, R41.3, F06.8.

## 2024-04-30 NOTE — TELEPHONE ENCOUNTER
I could change the diagnosis to type 2 diabetes. He has had 1 A1c at 6.5. Diagnosis is [id number] would meet criteria. Also he does not need to check every day with well controlled diabetes and on metformin. Low blood sugars are not a concern with metformin. He could even check 3 times per week if stable and increase if he notes changes.   Called and left message. I will call again later   Infusion Nursing Note:  Sharon Fong presents today for Cycle 1 Day 1 Taxol .    Patient seen by provider today: Yes: Dr. Bernal   present during visit today: Not Applicable.    Note: Oriented to infusion space and call light. Patient has been provided written information on new chemotherapy. Side effects reviewed and all questions answered. Patient is aware to call [facility] with questions or concerns and with a temperature of 100.5 or greater.     Intravenous Access:  Implanted Port.    Treatment Conditions:  Lab Results   Component Value Date    HGB 12.3 04/30/2024    WBC 8.2 04/30/2024    ANEUTAUTO 4.7 04/30/2024     04/30/2024        Lab Results   Component Value Date     04/30/2024    POTASSIUM 4.1 04/30/2024    MAG 2.1 04/17/2024    CR 0.58 04/30/2024    JJ 9.5 04/30/2024    BILITOTAL 0.9 04/30/2024    BILITOTAL 0.9 04/30/2024    ALBUMIN 4.3 04/30/2024    ALBUMIN 4.3 04/30/2024    ALT 7 04/30/2024    ALT 7 04/30/2024    AST 17 04/30/2024    AST 17 04/30/2024       Results reviewed, labs MET treatment parameters, ok to proceed with treatment.    About 10 minutes into taxol infusion, patient reported sharp pelvic pain at pubis bone site. Taxol stopped. No other s/s of reaction. VSS, no itching, swelling, flushing, or shortness of breath. Emergency meds given, patient became very sedated with benadryl IV administration. Dr. Bernal notified.      Post Infusion Assessment:  Patient tolerated infusion poorly due to : Hypersensitivity: Did patient have a hypersensitivity reaction? : Yes  Drug or Product name: Taxol  Were pre-meds administered?: Yes  What pre-meds were administered?: Diphenhydramine (Benadryl);Famotidine (Pepcid);Ondansetron (Zofran);Dexamethasone (decadron  First or Subsequent treatment: First time receiving  Rate of infusion when patient had hypersensitivity reaction: 299  Time the hypersensitivity reaction was first recognized: 1355  Symptoms observed or  reported (select all that apply): Other: (Comment) (pelvic pain)  Interventions/treatment following reaction: Infusion stopped;Hypersensitivity medications administered  What hypersensitivity medications were administered?: DiphendydrAMINE (benadryl);Methylprednisolone;NaCl 0.9% Bolus;Famotidine(Pepcid)  Name of provider notified: Stivenloida  Time provider notified: 7810  Type of notification (select all that apply): Other: (Comment) (secure chat)  Was the patient re-challenged today?: Yes - tolerated well  Blood return noted pre and post infusion.  No evidence of extravasations.  Access discontinued per protocol.       Discharge Plan:   Prescription refills given for zofran.  Discharge instructions reviewed with: Family.  AVS to patient via [employer].  Patient will return 5/7 for next appointment. IB sent to scheduled to schedule lab appts with infusions.  Patient discharged in stable condition accompanied by: .  Departure Mode: Wheelchair      Cherrie Bergeron RN

## 2024-05-06 ENCOUNTER — TRANSFERRED RECORDS (OUTPATIENT)
Dept: HEALTH INFORMATION MANAGEMENT | Facility: CLINIC | Age: 79
End: 2024-05-06
Payer: COMMERCIAL

## 2024-05-08 ENCOUNTER — OFFICE VISIT (OUTPATIENT)
Dept: FAMILY MEDICINE | Facility: CLINIC | Age: 79
End: 2024-05-08
Attending: NURSE PRACTITIONER
Payer: COMMERCIAL

## 2024-05-08 ENCOUNTER — ANCILLARY PROCEDURE (OUTPATIENT)
Dept: GENERAL RADIOLOGY | Facility: CLINIC | Age: 79
End: 2024-05-08
Attending: NURSE PRACTITIONER
Payer: COMMERCIAL

## 2024-05-08 VITALS
SYSTOLIC BLOOD PRESSURE: 126 MMHG | RESPIRATION RATE: 16 BRPM | BODY MASS INDEX: 24.84 KG/M2 | OXYGEN SATURATION: 95 % | HEIGHT: 73 IN | TEMPERATURE: 97.9 F | HEART RATE: 75 BPM | WEIGHT: 187.4 LBS | DIASTOLIC BLOOD PRESSURE: 80 MMHG

## 2024-05-08 DIAGNOSIS — J18.9 COMMUNITY ACQUIRED PNEUMONIA OF RIGHT LOWER LOBE OF LUNG: ICD-10-CM

## 2024-05-08 DIAGNOSIS — I10 HYPERTENSION GOAL BP (BLOOD PRESSURE) < 140/90: ICD-10-CM

## 2024-05-08 DIAGNOSIS — E11.65 TYPE 2 DIABETES MELLITUS WITH HYPERGLYCEMIA, WITHOUT LONG-TERM CURRENT USE OF INSULIN (H): Primary | ICD-10-CM

## 2024-05-08 LAB — HBA1C MFR BLD: 7.8 % (ref 0–5.6)

## 2024-05-08 PROCEDURE — G2211 COMPLEX E/M VISIT ADD ON: HCPCS | Performed by: NURSE PRACTITIONER

## 2024-05-08 PROCEDURE — 99214 OFFICE O/P EST MOD 30 MIN: CPT | Performed by: NURSE PRACTITIONER

## 2024-05-08 PROCEDURE — 71046 X-RAY EXAM CHEST 2 VIEWS: CPT | Mod: TC | Performed by: RADIOLOGY

## 2024-05-08 PROCEDURE — 83036 HEMOGLOBIN GLYCOSYLATED A1C: CPT | Performed by: NURSE PRACTITIONER

## 2024-05-08 PROCEDURE — 36415 COLL VENOUS BLD VENIPUNCTURE: CPT | Performed by: NURSE PRACTITIONER

## 2024-05-08 RX ORDER — RESPIRATORY SYNCYTIAL VIRUS VACCINE 120MCG/0.5
0.5 KIT INTRAMUSCULAR ONCE
Qty: 1 EACH | Refills: 0 | Status: CANCELLED | OUTPATIENT
Start: 2024-05-08 | End: 2024-05-08

## 2024-05-08 RX ORDER — LOSARTAN POTASSIUM 50 MG/1
50 TABLET ORAL DAILY
Qty: 90 TABLET | Refills: 3 | Status: SHIPPED | OUTPATIENT
Start: 2024-05-08

## 2024-05-08 ASSESSMENT — PAIN SCALES - GENERAL: PAINLEVEL: NO PAIN (0)

## 2024-05-08 NOTE — PROGRESS NOTES
Answers submitted by the patient for this visit:  Diabetes Visit (Submitted on 5/3/2024)  Chief Complaint: Chronic problems general questions HPI Form  Frequency of checking blood sugars:: one time daily  What time of day are you checking your blood sugars : before meals  Have you had any blood sugars above 200?: Yes  Have you had any blood sugars below 70?: No  Hypoglycemia symptoms:: none  Diabetic concerns:: none  Paraesthesia present:: none of these symptoms    Assessment & Plan     Type 2 diabetes mellitus with hyperglycemia, without long-term current use of insulin (H)  Chronic, stable, continue current treatment.   - Hemoglobin A1c  - Follow-up in 4-6 months for recheck.    Hypertension goal BP (blood pressure) < 140/90  Chronic, stable, continue current treatment.   - losartan (COZAAR) 50 MG tablet; Take 1 tablet (50 mg) by mouth daily    Community acquired pneumonia of right lower lobe of lung  Improved.  Recheck chest x-ray today now that he is 6 weeks after the pneumonia illness.  - XR Chest 2 Views; Future    Discussed possibility of discontinuing Sertraline due to patient unaware if this has been helping his mood or not; patient states he prefers to leave this as it is and continue which is reasonable.          A total of 25 minutes were spent face-to-face with the patient during this encounter and over half of that time was spend on counseling and coordination of care.  We discussed in depth the nature of problem(s) listed above (see diagnoses listed above), course, prior treatments, and therapeutic options.  I also educated the patient about lifestyle modifications which may improve the problem.            Austen Castrejon is a 79 year old, presenting for the following health issues:  Diabetes        5/8/2024     6:46 AM   Additional Questions   Roomed by Adela DELONG     History of Present Illness       Diabetes:   He presents for follow up of diabetes.  He is checking home blood glucose one time daily.   " He checks blood glucose before meals.  Blood glucose is sometimes over 200 and never under 70.  When his blood glucose is low, the patient is asymptomatic for confusion, blurred vision, lethargy and reports not feeling dizzy, shaky, or weak.   He has no concerns regarding his diabetes at this time.   He is not experiencing numbness or burning in feet, excessive thirst, blurry vision, weight changes or redness, sores or blisters on feet.             Says blood sugar has dropped 100-120 in the morning.            Objective    /80 (BP Location: Right arm, Patient Position: Sitting, Cuff Size: Adult Large)   Pulse 75   Temp 97.9  F (36.6  C) (Oral)   Resp 16   Ht 1.842 m (6' 0.5\")   Wt 85 kg (187 lb 6.4 oz)   SpO2 95%   BMI 25.07 kg/m    Body mass index is 25.07 kg/m .  Physical Exam   GENERAL: alert and no distress  RESP: lungs clear to auscultation - no rales, rhonchi or wheezes  CV: regular rates and rhythm, normal S1 S2, no S3 or S4, and no murmur, click or rub  PSYCH: mentation appears normal, affect normal/bright    Results for orders placed or performed in visit on 05/08/24   XR Chest 2 Views     Status: None    Narrative    CHEST TWO VIEWS   5/8/2024 7:44 AM     HISTORY: Community acquired pneumonia of right lower lobe of lung.    COMPARISON: 4/1/2024.      Impression    IMPRESSION: Interval mild improvement of patchy opacities at the right  midlung. No new airspace disease. Postoperative changes at the right  chest wall appear stable. Ill-defined opacity at the subpleural right  mid lung appears stable and could be airspace disease or stable  scarring. Normal cardiac silhouette.    SATHISH MCCLAIN MD         SYSTEM ID:  [id number]   Results for orders placed or performed in visit on 05/08/24   Hemoglobin A1c     Status: Abnormal   Result Value Ref Range    Hemoglobin A1C 7.8 (H) 0.0 - 5.6 %           Signed Electronically by: Caty Yusuf NP    "

## 2024-06-10 ENCOUNTER — DOCUMENTATION ONLY (OUTPATIENT)
Dept: OTHER | Facility: CLINIC | Age: 79
End: 2024-06-10
Payer: COMMERCIAL

## 2024-06-21 ENCOUNTER — DOCUMENTATION ONLY (OUTPATIENT)
Dept: OTHER | Facility: CLINIC | Age: 79
End: 2024-06-21
Payer: COMMERCIAL

## 2024-06-21 NOTE — TELEPHONE ENCOUNTER
RECORDS RECEIVED FROM: Internal    REASON FOR VISIT: Abnormal gait   PROVIDER: Guru Vieira DO   DATE OF APPT: 8/23/24 @ 8:30 am    NOTES (FOR ALL VISITS) STATUS DETAILS   OFFICE NOTE from referring provider Internal [facility]osp Referral    OFFICE NOTE from other specialist Internal 4/24/24 Alfonzo Lara, PhD LP @[facility]    4/5/24, 2/8/24, 1/4/24 Caty Yusuf NP @[facility]    2/6/24 Augie Soto MD @[facility]     DISCHARGE SUMMARY from hospital Internal 3/25/24-4/1/24 Reshma Vera MD @[facility]    12/25/23-12/27/23 Ramiro Simmons MD @[facility]     MEDICATION LIST Internal    IMAGING  (FOR ALL VISITS)     MRI (HEAD, NECK, SPINE) Internal [facility]  12/25/23 MR Brain     CT (HEAD, NECK, SPINE) Internal [facility]  3/25/24 CT Head  12/25/23 CTA Head Neck  12/25/23 CT Head

## 2024-07-31 ENCOUNTER — TELEPHONE (OUTPATIENT)
Dept: FAMILY MEDICINE | Facility: CLINIC | Age: 79
End: 2024-07-31
Payer: COMMERCIAL

## 2024-07-31 DIAGNOSIS — E11.65 TYPE 2 DIABETES MELLITUS WITH HYPERGLYCEMIA, WITHOUT LONG-TERM CURRENT USE OF INSULIN (H): Primary | ICD-10-CM

## 2024-07-31 RX ORDER — METFORMIN HCL 500 MG
1000 TABLET, EXTENDED RELEASE 24 HR ORAL 2 TIMES DAILY WITH MEALS
Qty: 360 TABLET | Refills: 3 | Status: SHIPPED | OUTPATIENT
Start: 2024-07-31

## 2024-08-23 ENCOUNTER — OFFICE VISIT (OUTPATIENT)
Dept: NEUROLOGY | Facility: CLINIC | Age: 79
End: 2024-08-23
Attending: STUDENT IN AN ORGANIZED HEALTH CARE EDUCATION/TRAINING PROGRAM
Payer: COMMERCIAL

## 2024-08-23 ENCOUNTER — PRE VISIT (OUTPATIENT)
Dept: NEUROLOGY | Facility: CLINIC | Age: 79
End: 2024-08-23

## 2024-08-23 VITALS
BODY MASS INDEX: 26.63 KG/M2 | WEIGHT: 199.1 LBS | HEART RATE: 72 BPM | DIASTOLIC BLOOD PRESSURE: 67 MMHG | SYSTOLIC BLOOD PRESSURE: 124 MMHG | OXYGEN SATURATION: 95 %

## 2024-08-23 DIAGNOSIS — R26.9 ABNORMAL GAIT: ICD-10-CM

## 2024-08-23 PROCEDURE — 99205 OFFICE O/P NEW HI 60 MIN: CPT | Performed by: PSYCHIATRY & NEUROLOGY

## 2024-08-23 ASSESSMENT — PAIN SCALES - GENERAL: PAINLEVEL: NO PAIN (0)
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
## 2024-08-23 NOTE — PATIENT INSTRUCTIONS
I think you have a fall risk given testing today, but that this fall risk is not due to one specific issue like parkinson disease or neuropathy or brain injury.  At this point, it seems isolated and can be treated by fall avoidance with a physical therapist.      If new issues develop (tremor, confusion, falls, passing out, drooling, slowed walking that is unexplained, weakness, sensory loss) please return to clinic for further evaluation.

## 2024-08-25 PROBLEM — R26.9 ABNORMAL GAIT: Status: ACTIVE | Noted: 2024-04-06

## 2024-09-12 ENCOUNTER — OFFICE VISIT (OUTPATIENT)
Dept: FAMILY MEDICINE | Facility: CLINIC | Age: 79
End: 2024-09-12
Attending: NURSE PRACTITIONER
Payer: COMMERCIAL

## 2024-09-12 VITALS
OXYGEN SATURATION: 92 % | HEIGHT: 73 IN | HEART RATE: 91 BPM | SYSTOLIC BLOOD PRESSURE: 122 MMHG | WEIGHT: 196 LBS | TEMPERATURE: 98 F | BODY MASS INDEX: 25.98 KG/M2 | RESPIRATION RATE: 22 BRPM | DIASTOLIC BLOOD PRESSURE: 76 MMHG

## 2024-09-12 DIAGNOSIS — Z23 NEED FOR INFLUENZA VACCINATION: ICD-10-CM

## 2024-09-12 DIAGNOSIS — I10 HYPERTENSION GOAL BP (BLOOD PRESSURE) < 140/90: ICD-10-CM

## 2024-09-12 DIAGNOSIS — E11.65 TYPE 2 DIABETES MELLITUS WITH HYPERGLYCEMIA, WITHOUT LONG-TERM CURRENT USE OF INSULIN (H): Primary | ICD-10-CM

## 2024-09-12 DIAGNOSIS — Z29.11 NEED FOR VACCINATION AGAINST RESPIRATORY SYNCYTIAL VIRUS: ICD-10-CM

## 2024-09-12 PROBLEM — J18.9 COMMUNITY ACQUIRED PNEUMONIA: Status: RESOLVED | Noted: 2024-03-26 | Resolved: 2024-09-12

## 2024-09-12 LAB
ANION GAP SERPL CALCULATED.3IONS-SCNC: 10 MMOL/L (ref 7–15)
BUN SERPL-MCNC: 27.8 MG/DL (ref 8–23)
CALCIUM SERPL-MCNC: 9.6 MG/DL (ref 8.8–10.4)
CHLORIDE SERPL-SCNC: 106 MMOL/L (ref 98–107)
CREAT SERPL-MCNC: 0.92 MG/DL (ref 0.67–1.17)
EGFRCR SERPLBLD CKD-EPI 2021: 84 ML/MIN/1.73M2
GLUCOSE SERPL-MCNC: 107 MG/DL (ref 70–99)
HBA1C MFR BLD: 6.7 % (ref 0–5.6)
HCO3 SERPL-SCNC: 26 MMOL/L (ref 22–29)
POTASSIUM SERPL-SCNC: 5.1 MMOL/L (ref 3.4–5.3)
SODIUM SERPL-SCNC: 142 MMOL/L (ref 135–145)

## 2024-09-12 PROCEDURE — 83036 HEMOGLOBIN GLYCOSYLATED A1C: CPT | Performed by: NURSE PRACTITIONER

## 2024-09-12 PROCEDURE — 99207 PR FOOT EXAM NO CHARGE: CPT | Performed by: NURSE PRACTITIONER

## 2024-09-12 PROCEDURE — G0008 ADMIN INFLUENZA VIRUS VAC: HCPCS | Mod: GZ | Performed by: NURSE PRACTITIONER

## 2024-09-12 PROCEDURE — 36415 COLL VENOUS BLD VENIPUNCTURE: CPT | Performed by: NURSE PRACTITIONER

## 2024-09-12 PROCEDURE — 80048 BASIC METABOLIC PNL TOTAL CA: CPT | Performed by: NURSE PRACTITIONER

## 2024-09-12 PROCEDURE — 90662 IIV NO PRSV INCREASED AG IM: CPT | Mod: GZ | Performed by: NURSE PRACTITIONER

## 2024-09-12 PROCEDURE — 99214 OFFICE O/P EST MOD 30 MIN: CPT | Mod: 25 | Performed by: NURSE PRACTITIONER

## 2024-09-12 ASSESSMENT — PAIN SCALES - GENERAL: PAINLEVEL: NO PAIN (0)

## 2024-09-12 NOTE — PATIENT INSTRUCTIONS
Recommend getting the RSV vaccine and the PCV20 (Pneumococcal vaccine) at the Pharmacy.  Also get the Covid booster at the Pharmacy.

## 2024-09-12 NOTE — PROGRESS NOTES
"  Assessment & Plan     Type 2 diabetes mellitus with hyperglycemia, without long-term current use of insulin (H)  Chronic, stable, continue current treatment.   - FOOT EXAM  - Hemoglobin A1c; Future  - Hemoglobin A1c  - Continue metformin  mg tablets-take 2 tablets twice daily by mouth.  Continue glipizide ER 10 mg tablet daily.  - Follow-up in 6 months for recheck.    Hypertension goal BP (blood pressure) < 140/90  Chronic, stable, continue current treatment.   - BASIC METABOLIC PANEL; Future  - BASIC METABOLIC PANEL    Need for influenza vaccination    - INFLUENZA HIGH DOSE, TRIVALENT, PF (FLUZONE)    Need for vaccination against respiratory syncytial virus  Encouraged to get the RSV vaccine at the pharmacy.    He is due to get the Menveo #2 vaccine but we do not have this stocked in the clinic.  I recommend patient to go to the pharmacy next-door to get this completed.      BMI  Estimated body mass index is 26.22 kg/m  as calculated from the following:    Height as of this encounter: 1.842 m (6' 0.5\").    Weight as of this encounter: 88.9 kg (196 lb).         Austen Castrejon is a 79 year old, presenting for the following health issues:  Diabetes and other (Discuss up coming appt. /)      9/12/2024     6:48 AM   Additional Questions   Roomed by Adela DELONG     History of Present Illness       Diabetes:   He presents for follow up of diabetes.  He is checking home blood glucose one time daily.   He checks blood glucose before meals.  Blood glucose is never over 200 and never under 70.  When his blood glucose is low, the patient is asymptomatic for confusion, blurred vision, lethargy and reports not feeling dizzy, shaky, or weak.   He has no concerns regarding his diabetes at this time.   He is not experiencing numbness or burning in feet, excessive thirst, blurry vision, weight changes or redness, sores or blisters on feet.          We clarified the Metformin  mg tablets - he is taking two tablets twice " "daily  Glipizide ER 10 mg tablet - taking       8/23/24 Consulted with Dr. Vieira, Neurology for abnormal gait who recommended Physical Therapy to help with gait and balance.  He has first Physical Therapy appointment next week for this.        Objective    /76 (BP Location: Right arm, Patient Position: Sitting, Cuff Size: Adult Regular)   Pulse 91   Temp 98  F (36.7  C) (Oral)   Resp 22   Ht 1.842 m (6' 0.5\")   Wt 88.9 kg (196 lb)   SpO2 92%   BMI 26.22 kg/m    Body mass index is 26.22 kg/m .  Physical Exam   GENERAL: alert and no distress  CV: regular rate and rhythm, normal S1 S2, no S3 or S4, no murmur, click or rub, no peripheral edema   PSYCH: mentation appears normal, affect normal/bright  Diabetic foot exam: normal DP and PT pulses, no trophic changes or ulcerative lesions, normal sensory exam, and normal monofilament exam    Results for orders placed or performed in visit on 09/12/24   Hemoglobin A1c     Status: Abnormal   Result Value Ref Range    Hemoglobin A1C 6.7 (H) 0.0 - 5.6 %           Signed Electronically by: Caty Yusuf NP    "

## 2024-09-12 NOTE — NURSING NOTE
Prior to immunization administration, verified patients identity using patient s name and date of birth. Please see Immunization Activity for additional information.     Screening Questionnaire for Adult Immunization    Are you sick today?   No   Do you have allergies to medications, food, a vaccine component or latex?   No   Have you ever had a serious reaction after receiving a vaccination?   No   Do you have a long-term health problem with heart, lung, kidney, or metabolic disease (e.g., diabetes), asthma, a blood disorder, no spleen, complement component deficiency, a cochlear implant, or a spinal fluid leak?  Are you on long-term aspirin therapy?   No   Do you have cancer, leukemia, HIV/AIDS, or any other immune system problem?   No   Do you have a parent, brother, or sister with an immune system problem?   No   In the past 3 months, have you taken medications that affect  your immune system, such as prednisone, other steroids, or anticancer drugs; drugs for the treatment of rheumatoid arthritis, Crohn s disease, or psoriasis; or have you had radiation treatments?   No   Have you had a seizure, or a brain or other nervous system problem?   No   During the past year, have you received a transfusion of blood or blood    products, or been given immune (gamma) globulin or antiviral drug?   No   For women: Are you pregnant or is there a chance you could become       pregnant during the next month?   No   Have you received any vaccinations in the past 4 weeks?   No     Immunization questionnaire answers were all negative.      Patient instructed to remain in clinic for 15 minutes afterwards, and to report any adverse reactions.     Screening performed by Lissa Winter MA on 9/12/2024 at 7:39 AM.

## 2024-09-17 ENCOUNTER — THERAPY VISIT (OUTPATIENT)
Dept: PHYSICAL THERAPY | Facility: CLINIC | Age: 79
End: 2024-09-17
Attending: PSYCHIATRY & NEUROLOGY
Payer: COMMERCIAL

## 2024-09-17 DIAGNOSIS — R26.9 ABNORMAL GAIT: Primary | ICD-10-CM

## 2024-09-17 PROCEDURE — 97110 THERAPEUTIC EXERCISES: CPT | Mod: GP

## 2024-09-17 PROCEDURE — 97161 PT EVAL LOW COMPLEX 20 MIN: CPT | Mod: GP

## 2024-09-17 NOTE — PROGRESS NOTES
"PHYSICAL THERAPY EVALUATION  Type of Visit: Evaluation        Fall Risk Screen:  Fall screen completed by: PT  Have you fallen 2 or more times in the past year?: No  Have you fallen and had an injury in the past year?: Yes  Is patient a fall risk?: Yes    Subjective   Patient is a 79 year old male with a referral diagnosis of abnormal gait. He endorses that he recently went to a neurology appointment which he was unsure why he was there but they told him his balance was off, prompting visit to physical therapy. He endorses a fall last winter when he slipped on ice and then came close yesterday where he \"mis-stepped\". He notes that his balance has declined over the last few years and he would like to improve this to reduce risk of falls.         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    Type 2 diabetes mellitus with hyperglycemia, without long-term current use of insulin (H)    Major depressive disorder, recurrent, mild (H24)    Memory loss    Right sided weakness    Basal cell carcinoma (BCC) of skin of right upper extremity including shoulder    Leukocytosis, unspecified type    Abnormal gait     Dates & types of surgery:      Prior diagnostic imaging/testing results:       Prior therapy history for the same diagnosis, illness or injury: No      Prior Level of Function  Transfers: Independent  Ambulation: Independent  ADL: Independent  IADL: Driving, Housekeeping, Laundry, Meal preparation, Medication management    Living Environment  Social support: With a significant other or spouse   Type of " home: House   Stairs to enter the home: No       Ramp: No   Stairs inside the home: Yes 13 Is there a railing: Yes     Help at home: None  Equipment owned:       Employment: No    Hobbies/Interests:      Patient goals for therapy: Just have better balance    Pain assessment: Pain denied     Objective      Cognitive Status Examination  Orientation: Oriented to person, place and time   Level of Consciousness: Alert  Follows Commands and Answers Questions: Follows multi step instructions  Personal Safety and Judgement: Intact  Memory: Impaired    OBSERVATION: Patient ambulating IND without device  INTEGUMENTARY: Intact  POSTURE: Rounded shoulders and forward head   PALPATION: NT will assess as indicated  RANGE OF MOTION: LE ROM WFL  STRENGTH:  Grossly reduced BLE as indicated by 5xSTS score     BED MOBILITY: Independent    TRANSFERS: Independent    WHEELCHAIR MOBILITY: NA     GAIT:   Level of Sampson: IND  Assistive Device(s): None  Gait Deviations: Stride length decreased  Rowena decreased  Gait Distance: within clinic  Stairs: Reciprocal, rails     BALANCE:  Patient demonstrates deficits in dynamic balance as indicated by FGA score. Exacerbated by conditions with reduced visual support. Static balance on uneven surfaces is WFL.     SPECIAL TESTS  Functional Gait Assessment (FGA) TOTAL SCORE: (MAXIMUM SCORE 30): 20    10 Meter Walk Test (Comfortable)  0.65 m/s   5 Times Sit-to-Stand (5TSTS)  25.65 m/s     Modified CTSIB Conditions (sec) Cond 1: 30  Cond 2: 30  Cond 4: 30  Cond 5 : 30       SENSATION: LE Sensation WNL  COORDINATION: WFL    Assessment & Plan   CLINICAL IMPRESSIONS  Medical Diagnosis: R26.9 (ICD-10-CM) - Abnormal gait    Treatment Diagnosis: Force production deficit, balance deficits   Impression/Assessment:  Patient is a 79 year old male who presents with a referral diagnosis of abnormal gait. He demonstrates reduced BLE strength, slightly impaired dynamic balance, and reduced gait speed on  evaluation. He would benefit from continued skilled PT intervention to address deficits and progress goals.      Clinical Decision Making (Complexity):  Clinical Presentation: Stable/Uncomplicated  Clinical Presentation Rationale: based on medical and personal factors listed in PT evaluation  Clinical Decision Making (Complexity): Low complexity    PLAN OF CARE  Treatment Interventions:  Interventions: Gait Training, Manual Therapy, Neuromuscular Re-education, Therapeutic Activity, Therapeutic Exercise, Self-Care/Home Management    Long Term Goals     PT Goal 1  Goal Identifier: HEP  Goal Description: Pt will be able to demonstrate HEP activities without need for cues from provider to demonstrate understanding and independence with HEP.  Rationale: to maximize safety and independence with performance of ADLs and functional tasks;to maximize safety and independence within the home;to maximize safety and independence within the community  Target Date: 12/15/24  PT Goal 2  Goal Identifier: FGA  Goal Description: Pt will demonstrate a 4 point improvement on the FGA to demonstrate minimum clinically significant difference in score to demonstrate improved safety with functional mobility and decrease risk of falls.  Rationale: to maximize safety and independence within the home;to maximize safety and independence with performance of ADLs and functional tasks;to maximize safety and independence within the community  Target Date: 12/15/24  PT Goal 3  Goal Identifier: Gait speed  Goal Description: Pt will demonstrate a 0.12 second improvement in gait speed to demonstrate minimum clinically significant difference in score to demonstrate improved gait velocity.  Rationale: to maximize safety and independence with performance of ADLs and functional tasks;to maximize safety and independence within the home;to maximize safety and independence within the community  Target Date: 12/15/24  PT Goal 4  Goal Identifier: 5xSTS  Goal  Description: Pt will demonstrate a 2.3 seconds improvement on 5xSTS to demonstrate minimum clinically significant difference in score to demonstrate improved LE strength.  Rationale: to maximize safety and independence with performance of ADLs and functional tasks;to maximize safety and independence within the home;to maximize safety and independence within the community  Target Date: 12/15/24      Frequency of Treatment: 1x/week  Duration of Treatment: 8 weeks    Recommended Referrals to Other Professionals:  None at this time .  Education Assessment:   Learner/Method: Patient;Demonstration;Listening  Education Comments: Pt verbalizes/demos understanding of education.    Risks and benefits of evaluation/treatment have been explained.   Patient/Family/caregiver agrees with Plan of Care.     Evaluation Time:          Signing Clinician: Jessica Lyon PT, DPT        [facility]                                                                                   OUTPATIENT PHYSICAL THERAPY      PLAN OF TREATMENT FOR OUTPATIENT REHABILITATION   Patient's Last Name, First Name, Gerber Valadez YOB: 1945   Provider's Name   [facility]   Medical Record No.  [id number]     Onset Date: 08/23/34 (referral)  Start of Care Date: 09/17/24     Medical Diagnosis:  R26.9 (ICD-10-CM) - Abnormal gait      PT Treatment Diagnosis:  Force production deficit, balance deficits Plan of Treatment  Frequency/Duration: 1x/week/ 8 weeks    Certification date from 09/17/24 to 12/15/24         See note for plan of treatment details and functional goals     Jessica Lyon PT, DPT                        I CERTIFY THE NEED FOR THESE SERVICES FURNISHED UNDER        THIS PLAN OF TREATMENT AND WHILE UNDER MY CARE     (Physician attestation of this document indicates review and certification of the therapy plan).              Referring Provider:  Guru Bashir  Dariel    Initial Assessment  See Epic Evaluation- Start of Care Date: 09/17/24

## 2024-09-18 RX ORDER — LANCETS
EACH MISCELLANEOUS
Qty: 100 EACH | Refills: 1 | OUTPATIENT
Start: 2024-09-18

## 2024-09-25 ENCOUNTER — MYC REFILL (OUTPATIENT)
Dept: FAMILY MEDICINE | Facility: CLINIC | Age: 79
End: 2024-09-25
Payer: COMMERCIAL

## 2024-09-25 DIAGNOSIS — E11.65 TYPE 2 DIABETES MELLITUS WITH HYPERGLYCEMIA, WITHOUT LONG-TERM CURRENT USE OF INSULIN (H): Primary | ICD-10-CM

## 2024-09-26 RX ORDER — LANCETS
EACH MISCELLANEOUS
Qty: 100 EACH | Refills: 5 | Status: SHIPPED | OUTPATIENT
Start: 2024-09-26

## 2024-09-26 NOTE — TELEPHONE ENCOUNTER
Routing to PCP    Patient came to clinic.    Patient has only one lancet left.    RN advised message had been sent to provider.    James Hood RN, BSN, PHN  [facility]  
Is This A New Presentation, Or A Follow-Up?: Skin Lesion
How Severe Is Your Skin Lesion?: moderate
Has Your Skin Lesion Been Treated?: not been treated

## 2024-10-15 ENCOUNTER — THERAPY VISIT (OUTPATIENT)
Dept: PHYSICAL THERAPY | Facility: CLINIC | Age: 79
End: 2024-10-15
Attending: PSYCHIATRY & NEUROLOGY
Payer: COMMERCIAL

## 2024-10-15 DIAGNOSIS — R26.9 ABNORMAL GAIT: Primary | ICD-10-CM

## 2024-10-15 PROCEDURE — 97112 NEUROMUSCULAR REEDUCATION: CPT | Mod: GP

## 2024-10-15 PROCEDURE — 97110 THERAPEUTIC EXERCISES: CPT | Mod: GP

## 2024-10-15 NOTE — PROGRESS NOTES
Video     Start about 414  Stop about 436  Pt home  Prov offsite  Plat amwel          USE DOXIMETRY NEXT TIME _ DANTE DID NOT WORK and WASTED 20+ min of roomer time            10-29-24    I saw Mr. Levine for f/u of a sarcoma of the chest wall.       Background  In brief, he had some URI-like symptoms in 06/2016 leading to a chest x-ray which revealed a mass leading to further evaluation.  PET/CT imaging revealed about a 2 x 1.3-cm mass extending from the right pleura into the chest wall.  He also had some small nonspecific lung nodules noted.  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  He himself noted no symptoms from this lesion.  The tumor was excised en bloc on 09/21/2016 with a 3-cm margin on each side anteriorly and posteriorly.    -  Specimen #: [id number]   Collected: 9/21/2016      FINAL DIAGNOSIS:   A. Soft tissue, right chest wall, resection:   - Cellular/deep fibrous histiocytoma; see comment   - Tumor size: 3.0 cm   - Tumor extent: Involves skeletal muscle   - Lymphovascular invasion: Not identified   - Margins of resection: Free of tumor     B. Soft tissue, additional right chest wall margin, resection:   - Benign skeletal muscle, bone and connective tissue     C. 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 Soft tissue, right muscle final superficial margin, resection:    - Benign skeletal muscle and connective tissue     E. Soft tissue, right chest wall skin margin, resection:   - Benign skin and subcutaneous tissue     These features favor a deep, cellular fibrous histiocytoma.     Deep fibrous histiocytoma (deep counterpart of cutaneous fibrous   histiocytoma / dermatofibroma) is a rare lesion that arises in the   skeletal muscle or visceral locations. Unlike cutaneous dermatofibroma,   it is usually a highly cellular lesion that lacks heterogeneous elements   such as giant  cells, histiocytic aggregates and hemosiderin, thus   mimicking DFSP. These lesions in general are clinically indolent but   have the potential to recur at an increased frequency (varies from 22%   to 57%) and undergo metastasis in rare instances.  -        Interval history     On 23 he was seen for a possible CVA that was felt to be an acute encephalopathy felt related to a covid infection.     He recovered from the COVID completely.    His wife accompanied him on the visit.    He was hospitalized on 3-25-24 for pneumonia and respiratory failure with diastolic HF.    He is generally doing pretty well now with no complaints but he does think he has gradually worsening dementia and memory issues; his mom  of dementia.  His wife thinks is not that different.    He gets out of the house every day and walks 2.5 miles typically.  He notes no limitations to activity.     He does not sleep much but his sleep is as usual which is not always that great.  Nevertheless he generally feels rested.      They enjoy mystery trips, the last to [facility] [place]  and are starting to plan another.    He feels his mood is good.  He has generalized anxiety disorder and has seen a psychologist  He notes he has always been anxious under certain conditions but does not think it is a problem.     He does have the type 2 diabetes, hyperlipidemia and hypertension. He follows this with his PCP.     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  He has a daughter who also had her spleen removed and there is a strong family history on his father's side.  At the time of his splenectomy, he also had an appendectomy, cholecystectomy and hernia repair.  He has also has had bilateral cataract surgery.         -  Background PMH, FH, SH  PMH notable for  hereditary spherocytosis, HBP, anxiety, hyperlipidemia , DM, s/p splenectomy, appy, stephanie, hernia repair,  cataract removal.  FH-dementia, hereditary spherocytosis  -     On exam he appeared comfortable with normal affect  HEENT full EOMs  CHEST:  no resp distress  NEUROLOGIC:  mentation seems normal but some memory loss evident; speech normal  PSYCH: mood good      -  On 2-2-24 Creat 0.78, Ca 9,2, A1c 8.7     On 9-1-24 creat 0.92, A1c 6.7    -  The images of 5-5-23 showed nonspecific small lung nodules but while it is complicated the RLL nodule appeared to me to be stable c/w Jan 2021     Formal read:  CT chest 5-5-23  IMPRESSION: Unchanged right lower lobe pulmonary nodules. Unchanged  right upper lobe wedge resection site. Bilateral renal cysts. No  evidence of chest wall recurrence.     The 2-22-24 images showed slight growth of a lung nodule.    CT chest 2-2-24  IMPRESSION: Increase in size of a right lower lobe pulmonary nodule  which contains a central focus of calcification and has adjacent  satellite nodularity. This is indeterminant and may represent  granulomatous disease but neoplasm cannot be excluded. Continued  follow-up is recommended.      I reviewed the new images of 10-24-24 and there are nonspecific small lung nodules but while it is complicated the RLL nodules appear only possibly slightly larger c/w  5-5-23.    Formal read:  CT chest 10-24-24   IMPRESSION: Scattered nodules in the anterior right lower lobe are  marginally increased since 2022. Continued follow-up suggested     -     We discussed the situation, and I think it is a fairly low-risk lesion.       At the time of surgery, a 2-cm protuberant lesion in the fourth intercostal space was noted.  At this time, he also had a small wedge resection of the right upper lobe for some nodularity that had been seen.  At the time of resection, a 3-cm tumor involving skeletal muscle was found without lymphovascular invasion and with negative margins.  Studies for the PDGFB gene rearrangement were negative, but could not eliminate the DFSP diagnosis re  sampling.  There was no high-grade nuclear atypia, increased mitotic activity, or necrosis.  The lung biopsy was negative for malignancy.  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  This is an unusual lesion and unlike the cutaneous dermatofibroma, it usually is of high cellularity without other heterogeneous elements.  This is generally a low-grade lesion but can recur and rarely metastasize.     I told him I thought there was a fairly low risk of recurrence, but it does need some followup, as do his nonspecific lung nodules.      Given the overall appearance we will check a CT in a year.  At that point possibly 0-1 more visit.    This recommendation is not, obviously, based on a lot of data, but it seems like a reasonable approach.  It is perhaps possible that the COVID augmented the imaging properties of the nodule and we will see what it looks like next time.     All of his questions were addressed and he will call if he has others.        -  Augie Soto M.D.  Professor  Hematology, Oncology and Transplantation  -   cc:  MD Leah Freedman MD, Thoracic Surgery     Luis Velasco MD, Thoracic Surgery

## 2024-10-22 ENCOUNTER — THERAPY VISIT (OUTPATIENT)
Dept: PHYSICAL THERAPY | Facility: CLINIC | Age: 79
End: 2024-10-22
Attending: PSYCHIATRY & NEUROLOGY
Payer: COMMERCIAL

## 2024-10-22 DIAGNOSIS — R26.9 ABNORMAL GAIT: Primary | ICD-10-CM

## 2024-10-22 PROCEDURE — 97112 NEUROMUSCULAR REEDUCATION: CPT | Mod: GP

## 2024-10-22 PROCEDURE — 97110 THERAPEUTIC EXERCISES: CPT | Mod: GP

## 2024-10-24 ENCOUNTER — ANCILLARY PROCEDURE (OUTPATIENT)
Dept: CT IMAGING | Facility: CLINIC | Age: 79
End: 2024-10-24
Attending: INTERNAL MEDICINE
Payer: COMMERCIAL

## 2024-10-24 DIAGNOSIS — R91.8 PULMONARY NODULES: ICD-10-CM

## 2024-10-24 DIAGNOSIS — R41.3 MEMORY LOSS: ICD-10-CM

## 2024-10-24 DIAGNOSIS — C49.3 SOFT TISSUE SARCOMA OF CHEST WALL (H): ICD-10-CM

## 2024-10-24 DIAGNOSIS — R73.09 ELEVATED HEMOGLOBIN A1C: ICD-10-CM

## 2024-10-24 DIAGNOSIS — D58.0 HEREDITARY SPHEROCYTOSIS (H): ICD-10-CM

## 2024-10-24 LAB
CREAT BLD-MCNC: 1 MG/DL (ref 0.7–1.3)
EGFRCR SERPLBLD CKD-EPI 2021: >60 ML/MIN/1.73M2

## 2024-10-24 PROCEDURE — 82565 ASSAY OF CREATININE: CPT | Performed by: PATHOLOGY

## 2024-10-24 PROCEDURE — 71260 CT THORAX DX C+: CPT | Performed by: RADIOLOGY

## 2024-10-24 RX ORDER — IOPAMIDOL 755 MG/ML
96 INJECTION, SOLUTION INTRAVASCULAR ONCE
Status: COMPLETED | OUTPATIENT
Start: 2024-10-24 | End: 2024-10-24

## 2024-10-24 RX ADMIN — IOPAMIDOL 96 ML: 755 INJECTION, SOLUTION INTRAVASCULAR at 09:53

## 2024-10-24 NOTE — DISCHARGE INSTRUCTIONS

## 2024-10-29 ENCOUNTER — VIRTUAL VISIT (OUTPATIENT)
Dept: ONCOLOGY | Facility: CLINIC | Age: 79
End: 2024-10-29
Attending: INTERNAL MEDICINE
Payer: COMMERCIAL

## 2024-10-29 ENCOUNTER — THERAPY VISIT (OUTPATIENT)
Dept: PHYSICAL THERAPY | Facility: CLINIC | Age: 79
End: 2024-10-29
Attending: PSYCHIATRY & NEUROLOGY
Payer: COMMERCIAL

## 2024-10-29 VITALS — BODY MASS INDEX: 25.06 KG/M2 | WEIGHT: 185 LBS | HEIGHT: 72 IN

## 2024-10-29 DIAGNOSIS — Z90.81 HISTORY OF SPLENECTOMY: ICD-10-CM

## 2024-10-29 DIAGNOSIS — D58.0 HEREDITARY SPHEROCYTOSIS (H): ICD-10-CM

## 2024-10-29 DIAGNOSIS — R41.3 MEMORY LOSS: ICD-10-CM

## 2024-10-29 DIAGNOSIS — R91.8 PULMONARY NODULES: ICD-10-CM

## 2024-10-29 DIAGNOSIS — R26.9 ABNORMAL GAIT: Primary | ICD-10-CM

## 2024-10-29 DIAGNOSIS — C49.3 SOFT TISSUE SARCOMA OF CHEST WALL (H): Primary | ICD-10-CM

## 2024-10-29 PROCEDURE — 97110 THERAPEUTIC EXERCISES: CPT | Mod: GP

## 2024-10-29 PROCEDURE — 97112 NEUROMUSCULAR REEDUCATION: CPT | Mod: GP

## 2024-10-29 PROCEDURE — 99214 OFFICE O/P EST MOD 30 MIN: CPT | Mod: 95 | Performed by: INTERNAL MEDICINE

## 2024-10-29 ASSESSMENT — PAIN SCALES - GENERAL: PAINLEVEL_OUTOF10: NO PAIN (0)

## 2024-10-29 NOTE — LETTER
10/29/2024      Gerber Levine  [address]  [facility] [address]      Dear Colleague,    Thank you for referring your patient, Gerber Levine, to the [facility]. Please see a copy of my visit note below.                    Video     Start about 414  Stop about 436  Pt home  Prov offsite  Plat amwel          USE DOXIMETRY NEXT TIME _ AMWELL DID NOT WORK and WASTED 20+ min of roomer time            10-29-24    I saw Mr. Levine for f/u of a sarcoma of the chest wall.       Background  In brief, he had some URI-like symptoms in 06/2016 leading to a chest x-ray which revealed a mass leading to further evaluation.  PET/CT imaging revealed about a 2 x 1.3-cm mass extending from the right pleura into the chest wall.  He also had some small nonspecific lung nodules noted.  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  He himself noted no symptoms from this lesion.  The tumor was excised en bloc on 09/21/2016 with a 3-cm margin on each side anteriorly and posteriorly.    -  Specimen #: [id number]   Collected: 9/21/2016      FINAL DIAGNOSIS:   A. Soft tissue, right chest wall, resection:   - Cellular/deep fibrous histiocytoma; see comment   - Tumor size: 3.0 cm   - Tumor extent: Involves skeletal muscle   - Lymphovascular invasion: Not identified   - Margins of resection: Free of tumor     B. Soft tissue, additional right chest wall margin, resection:   - Benign skeletal muscle, bone and connective tissue     C. 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 Soft tissue, right muscle final superficial margin, resection:    - Benign skeletal muscle and connective tissue     E. Soft tissue, right chest wall skin margin, resection:   - Benign skin and subcutaneous tissue     These features favor a deep, cellular fibrous histiocytoma.     Deep fibrous histiocytoma (deep counterpart of  cutaneous fibrous   histiocytoma / dermatofibroma) is a rare lesion that arises in the   skeletal muscle or visceral locations. Unlike cutaneous dermatofibroma,   it is usually a highly cellular lesion that lacks heterogeneous elements   such as giant cells, histiocytic aggregates and hemosiderin, thus   mimicking DFSP. These lesions in general are clinically indolent but   have the potential to recur at an increased frequency (varies from 22%   to 57%) and undergo metastasis in rare instances.  -        Interval history     On 23 he was seen for a possible CVA that was felt to be an acute encephalopathy felt related to a covid infection.     He recovered from the COVID completely.    His wife accompanied him on the visit.    He was hospitalized on 3-25-24 for pneumonia and respiratory failure with diastolic HF.    He is generally doing pretty well now with no complaints but he does think he has gradually worsening dementia and memory issues; his mom  of dementia.  His wife thinks is not that different.    He gets out of the house every day and walks 2.5 miles typically.  He notes no limitations to activity.     He does not sleep much but his sleep is as usual which is not always that great.  Nevertheless he generally feels rested.      They enjoy mystery trips, the last to [facility] [place]  and are starting to plan another.    He feels his mood is good.  He has generalized anxiety disorder and has seen a psychologist  He notes he has always been anxious under certain conditions but does not think it is a problem.     He does have the type 2 diabetes, hyperlipidemia and hypertension. He follows this with his PCP.     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  He has a daughter who also had her spleen removed and there is a strong family history on his father's side.  At the time of his splenectomy, he also had an  appendectomy, cholecystectomy and hernia repair.  He has also has had bilateral cataract surgery.         -  Background PMH, FH, SH  PMH notable for  hereditary spherocytosis, HBP, anxiety, hyperlipidemia , DM, s/p splenectomy, appy, stephanie, hernia repair, cataract removal.  FH-dementia, hereditary spherocytosis  -     On exam he appeared comfortable with normal affect  HEENT full EOMs  CHEST:  no resp distress  NEUROLOGIC:  mentation seems normal but some memory loss evident; speech normal  PSYCH: mood good      -  On 2-2-24 Creat 0.78, Ca 9,2, A1c 8.7     On 9-1-24 creat 0.92, A1c 6.7    -  The images of 5-5-23 showed nonspecific small lung nodules but while it is complicated the RLL nodule appeared to me to be stable c/w Jan 2021     Formal read:  CT chest 5-5-23  IMPRESSION: Unchanged right lower lobe pulmonary nodules. Unchanged  right upper lobe wedge resection site. Bilateral renal cysts. No  evidence of chest wall recurrence.     The 2-22-24 images showed slight growth of a lung nodule.    CT chest 2-2-24  IMPRESSION: Increase in size of a right lower lobe pulmonary nodule  which contains a central focus of calcification and has adjacent  satellite nodularity. This is indeterminant and may represent  granulomatous disease but neoplasm cannot be excluded. Continued  follow-up is recommended.      I reviewed the new images of 10-24-24 and there are nonspecific small lung nodules but while it is complicated the RLL nodules appear only possibly slightly larger c/w  5-5-23.    Formal read:  CT chest 10-24-24   IMPRESSION: Scattered nodules in the anterior right lower lobe are  marginally increased since 2022. Continued follow-up suggested     -     We discussed the situation, and I think it is a fairly low-risk lesion.       At the time of surgery, a 2-cm protuberant lesion in the fourth intercostal space was noted.  At this time, he also had a small wedge resection of the right upper lobe for some nodularity that  had been seen.  At the time of resection, a 3-cm tumor involving skeletal muscle was found without lymphovascular invasion and with negative margins.  Studies for the PDGFB gene rearrangement were negative, but could not eliminate the DFSP diagnosis re sampling.  There was no high-grade nuclear atypia, increased mitotic activity, or necrosis.  The lung biopsy was negative for malignancy.  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  This is an unusual lesion and unlike the cutaneous dermatofibroma, it usually is of high cellularity without other heterogeneous elements.  This is generally a low-grade lesion but can recur and rarely metastasize.     I told him I thought there was a fairly low risk of recurrence, but it does need some followup, as do his nonspecific lung nodules.      Given the overall appearance we will check a CT in a year.  At that point possibly 0-1 more visit.    This recommendation is not, obviously, based on a lot of data, but it seems like a reasonable approach.  It is perhaps possible that the COVID augmented the imaging properties of the nodule and we will see what it looks like next time.     All of his questions were addressed and he will call if he has others.        -  Augie Soto M.D.  Professor  Hematology, Oncology and Transplantation  -   cc:  MD Leah Freedman MD, Thoracic Surgery     Luis Velasco MD, Thoracic Surgery      Again, thank you for allowing me to participate in the care of your patient.        Sincerely,        Augie Soto MD

## 2024-10-29 NOTE — NURSING NOTE
Current patient location: [address]  [facility] [address]    Is the patient currently in the state of [place]? YES    Visit mode:VIDEO    If the visit is dropped, the patient can be reconnected by: VIDEO VISIT: Text to cell phone:   Telephone Information:   Mobile [phone]       Will anyone else be joining the visit? NO  (If patient encounters technical issues they should call [phone])    Are changes needed to the allergy or medication list? Pt stated no changes to allergies and Pt stated no med changes    Are refills needed on medications prescribed by this physician? NO    Rooming Documentation:  Unable to complete questionnaire- Pt's wife completed check in because Patient couldn't hear.     Reason for visit: RECHECK    Judy OLIVIER      
75 y/o with maculopapular rash. well appearing. joint pain involving shoulder, wrist. possible new rheumatologic illnesses. will obtain ESR, CPK, CRP, XR shoulders wrists, pain control, steroid.

## 2024-11-04 DIAGNOSIS — F32.0 MILD MAJOR DEPRESSION (H): ICD-10-CM

## 2024-11-05 ENCOUNTER — THERAPY VISIT (OUTPATIENT)
Dept: PHYSICAL THERAPY | Facility: CLINIC | Age: 79
End: 2024-11-05
Attending: PSYCHIATRY & NEUROLOGY
Payer: COMMERCIAL

## 2024-11-05 DIAGNOSIS — R26.9 ABNORMAL GAIT: Primary | ICD-10-CM

## 2024-11-05 PROCEDURE — 97110 THERAPEUTIC EXERCISES: CPT | Mod: GP

## 2024-11-05 PROCEDURE — 97112 NEUROMUSCULAR REEDUCATION: CPT | Mod: GP

## 2024-11-05 NOTE — PROGRESS NOTES
11/05/24 0800   Signing Clinician's Name / Credentials   Signing clinician's name / credentials Gabriele Armenta DPT   Functional Gait Assessment (DEANNE Kamara, FRANKLYN Carreon, et al. (2004))   1. GAIT LEVEL SURFACE 3  (3.72 sec)   2. CHANGE IN GAIT SPEED 3   3. GAIT WITH HORIZONTAL HEAD TURNS 2   4. GAIT WITH VERTICAL HEAD TURNS 3   5. GAIT AND PIVOT TURN 3   6. STEP OVER OBSTACLE 3   7. GAIT WITH NARROW BASE OF SUPPORT 1  (4 steps)   8. GAIT WITH EYES CLOSED 2  (7.69 sec)   9. AMBULATING BACKWARDS 3  (9.85 sec)   10. STEPS 3   Total Functional Gait Assessment Score   TOTAL SCORE: (MAXIMUM SCORE 30) 26     Functional Gait Assessment (FGA): The FGA assesses postural stability during various walking tasks.   Gait assistive device used: None    Scores of <22 /30 have been correlated with predicting falls in community-dwelling older adults according to Anh & Hesham 2010.   Scores of <18 /30 have been correlated with increased risk for falls in patients with Parkinsons Disease according to Yahir Gonzalez, Hogan et al 2014.  Minimal Detectable Change for patients with acute/chronic stroke = 4.2 according to Crystal & Galindoschfrancy 2009  Minimal Detectable Change for patients with vestibular disorder = 8 according to Anh & Hesham 2010    Assessment (rationale for performing, application to patient s function & care plan): improving dynamic standing balance, still challenged by head turns with gait, NBOS.     (Minutes billed as physical performance test): 10 including time to educate pt on results

## 2024-11-06 RX ORDER — SERTRALINE HYDROCHLORIDE 100 MG/1
100 TABLET, FILM COATED ORAL DAILY
Qty: 90 TABLET | Refills: 3 | Status: SHIPPED | OUTPATIENT
Start: 2024-11-06

## 2024-11-11 NOTE — TELEPHONE ENCOUNTER
Prescription Order for Diabetic testing supplies form received from [facility] for Tee Cuba MD.  Forms placed in provider 'sign me' folder.  Please fax forms to [phone] after completion.    Cassi Morrissey           High Risk (score 12 or above)

## 2024-11-12 ENCOUNTER — THERAPY VISIT (OUTPATIENT)
Dept: PHYSICAL THERAPY | Facility: CLINIC | Age: 79
End: 2024-11-12
Attending: PSYCHIATRY & NEUROLOGY
Payer: COMMERCIAL

## 2024-11-12 DIAGNOSIS — R26.9 ABNORMAL GAIT: Primary | ICD-10-CM

## 2024-11-12 PROCEDURE — 97110 THERAPEUTIC EXERCISES: CPT | Mod: GP

## 2024-11-19 ENCOUNTER — THERAPY VISIT (OUTPATIENT)
Dept: PHYSICAL THERAPY | Facility: CLINIC | Age: 79
End: 2024-11-19
Attending: PSYCHIATRY & NEUROLOGY
Payer: COMMERCIAL

## 2024-11-19 DIAGNOSIS — R26.9 ABNORMAL GAIT: Primary | ICD-10-CM

## 2024-11-19 PROCEDURE — 97110 THERAPEUTIC EXERCISES: CPT | Mod: GP

## 2024-11-26 ENCOUNTER — THERAPY VISIT (OUTPATIENT)
Dept: PHYSICAL THERAPY | Facility: CLINIC | Age: 79
End: 2024-11-26
Attending: PSYCHIATRY & NEUROLOGY
Payer: COMMERCIAL

## 2024-11-26 DIAGNOSIS — R26.9 ABNORMAL GAIT: Primary | ICD-10-CM

## 2024-11-26 PROCEDURE — 97110 THERAPEUTIC EXERCISES: CPT | Mod: GP

## 2024-12-03 ENCOUNTER — THERAPY VISIT (OUTPATIENT)
Dept: PHYSICAL THERAPY | Facility: CLINIC | Age: 79
End: 2024-12-03
Attending: PSYCHIATRY & NEUROLOGY
Payer: COMMERCIAL

## 2024-12-03 DIAGNOSIS — R26.9 ABNORMAL GAIT: Primary | ICD-10-CM

## 2024-12-03 PROCEDURE — 97110 THERAPEUTIC EXERCISES: CPT | Mod: GP

## 2024-12-03 PROCEDURE — 97112 NEUROMUSCULAR REEDUCATION: CPT | Mod: GP

## 2024-12-03 PROCEDURE — 97750 PHYSICAL PERFORMANCE TEST: CPT | Mod: GP

## 2024-12-03 NOTE — PROGRESS NOTES
12/03/24 0900   Signing Clinician's Name / Credentials   Signing clinician's name / credentials Gabriele Armenta DPT   Functional Gait Assessment (DEANNE Kamara, FRANKLYN Carreon, et al. (2004))   1. GAIT LEVEL SURFACE 3   2. CHANGE IN GAIT SPEED 3   3. GAIT WITH HORIZONTAL HEAD TURNS 2   4. GAIT WITH VERTICAL HEAD TURNS 3   5. GAIT AND PIVOT TURN 3   6. STEP OVER OBSTACLE 3   7. GAIT WITH NARROW BASE OF SUPPORT 2   8. GAIT WITH EYES CLOSED 3  (6.6 sec)   9. AMBULATING BACKWARDS 3  (10.1 sec)   10. STEPS 3   Total Functional Gait Assessment Score   TOTAL SCORE: (MAXIMUM SCORE 30) 28     Functional Gait Assessment (FGA): The FGA assesses postural stability during various walking tasks.   Gait assistive device used: None    Scores of <22 /30 have been correlated with predicting falls in community-dwelling older adults according to Anh & Hesham 2010.   Scores of <18 /30 have been correlated with increased risk for falls in patients with Parkinsons Disease according to Yahir Gonzalez Zhou et al 2014.  Minimal Detectable Change for patients with acute/chronic stroke = 4.2 according to Thiclaire & Ritschel 2009  Minimal Detectable Change for patients with vestibular disorder = 8 according to Anh & Hesham 2010    Assessment (rationale for performing, application to patient s function & care plan): pt improving with balance, still limited by narrow RACHID.    (Minutes billed as physical performance test): 10 min including time to discuss results

## 2024-12-03 NOTE — TELEPHONE ENCOUNTER
[facility] faxed Rx Image re; Blood glucose (NO BRAND SPECIFIED) test strip    Pharmacy Comments:  Need RX for Contour Next Meter    Thanks!   Home

## 2024-12-03 NOTE — PROGRESS NOTES
12/03/24 0500   Appointment Info   Signing clinician's name / credentials Gabriele Armenta DPT   Visits Used 9/10 visits until progress note due   Medical Diagnosis R26.9 (ICD-10-CM) - Abnormal gait   PT Tx Diagnosis Force production deficit, balance deficits   Progress Note/Certification   Start of Care Date 09/17/24   Onset of illness/injury or Date of Surgery 08/23/34  (referral)   Therapy Frequency 1x/month   Predicted Duration 12 weeks   Certification date from 12/03/24   Certification date to 03/02/25   Progress Note Completed Date 09/17/24   GOALS   PT Goals 5   PT Goal 1   Goal Identifier HEP   Goal Description Pt will be able to demonstrate HEP activities without need for cues from provider to demonstrate understanding and independence with HEP.   Rationale to maximize safety and independence with performance of ADLs and functional tasks;to maximize safety and independence within the home;to maximize safety and independence within the community   Target Date 03/02/25   PT Goal 2   Goal Identifier FGA   Goal Description Pt will demonstrate a 4 point improvement on the FGA to demonstrate minimum clinically significant difference in score to demonstrate improved safety with functional mobility and decrease risk of falls.   Rationale to maximize safety and independence within the home;to maximize safety and independence with performance of ADLs and functional tasks;to maximize safety and independence within the community   Target Date 12/15/24   Goal Progress 12/3 - 28/30. 11/5 - 26/30.   PT Goal 3   Goal Identifier Gait speed   Goal Description Pt will demonstrate a 0.12 second improvement in gait speed to demonstrate minimum clinically significant difference in score to demonstrate improved gait velocity.   Rationale to maximize safety and independence with performance of ADLs and functional tasks;to maximize safety and independence within the home;to maximize safety and independence within the community    Target Date 12/15/24   Goal Progress 12/3 - 1.74 m/sec   Date Met 12/03/24   PT Goal 4   Goal Identifier 5xSTS   Goal Description Pt will demonstrate a 2.3 seconds improvement on 5xSTS to demonstrate minimum clinically significant difference in score to demonstrate improved LE strength.   Rationale to maximize safety and independence with performance of ADLs and functional tasks;to maximize safety and independence within the home;to maximize safety and independence within the community   Goal Progress 12/3 - 18.12 sec. 11/5 - 22.0 sec.   Target Date 12/15/24   Date Met 12/03/24   PT Goal 5   Goal Identifier Standing Static Balance   Goal Description Pt will be able to perform SLS for >8 sec BLE and perform tandem stance for >10 sec without UE support to demonstrate improved ability to control COM over RACHID when standing at home during ADLs and recreational activities.   Goal Progress 12/3 - SLS 5 sec BLE   Target Date 03/02/25   Subjective Report   Subjective Report Pt reports feeling fine, hasn't done exercises as much since it's so cold outside in his garage.   Objective Measure 1   Objective Measure FGA   Details 28/30   Objective Measure 2   Objective Measure 10MWT   Details 3.44 sec to walk 6 m = 1.74 m/sec   Objective Measure 3   Objective Measure 5xSTS   Details 18.12 sec   Objective Measure 4   Objective Measure SLS   Details 5 sec LLE and 5 sec RLE   Therapeutic Procedure/Exercise   Therapeutic Procedures: strength, endurance, ROM, flexibility minutes (13328) 15   Ther Proc 1 HEP   Ther Proc 1 - Details See below   Ther Proc 2 Outcome Measures   Ther Proc 2 - Details 5TSTS, 10MWT, see above   PTRx Ther Proc 1 Sit to Stand   PTRx Ther Proc 1 - Details 12/3 - performed during 5TSTS   PTRx Ther Proc 2 Side Stepping With Theraband   PTRx Ther Proc 2 - Details No Notes   PTRx Ther Proc 3 Standing Hip Flexion   PTRx Ther Proc 3 - Details 12/3 - HIDDEN focusing on standing balance instead of marching   PTRx Ther  Proc 4 Calf Raises No Support   PTRx Ther Proc 4 - Details No Notes   PTRx Ther Proc 5 Walking Backwards   PTRx Ther Proc 5 - Details 12/3 - recommended pt continue to perform at home pt v/u.   Neuromuscular Re-education   Neuromuscular re-ed of mvmt, balance, coord, kinesthetic sense, posture, proprioception minutes (71669) 15   Neuro Re-ed 1 HEP   Neuro Re-ed 1 - Details See below   Neuro Re-ed 2 Outcome Measure   Neuro Re-ed 2 - Details SLS, see above   PTRx Neuro Re-ed 1 One Leg Stands Supported   PTRx Neuro Re-ed 1 - Details 12/3 - standing with wall on L 5-10 sec hold x 10 each leg light CGA intermittently to correct L lean and maintain COM over foot.   PTRx Neuro Re-ed 2 Tandem Stance Static With Chair   PTRx Neuro Re-ed 2 - Details 12/3 - wall on L holding 4-5 sec x 10 each leg   PTRx Neuro Re-ed 3 Tandem Walking and Balance   PTRx Neuro Re-ed 3 - Details 12/3 - 10 steps (5 each leg) x 6 with wall on L   Eval/Assessments   Assessments Physical Performance Test/Measures   Physical Performance Test/measures   Physical Performance Test/Measurement, Minutes (86521) 10   Physical Performance Test/Measurement Details FGA, see note for details   Skilled Intervention Assessment of dynamic standing balance   Plan   Home program PTRx   Plan for next session Reassess balance, practice and progress HEP for standing dynamic and static balance   Comments   Comments Pt demonstrating improvements on all goals, adjusting HEP to focus more on standing static and dynamic balance. Plan to see pt again in ~1 month to reassess balance and see if improvements made. Plan to have one more session after that before discharging from this episode of PT.   Total Session Time   Timed Code Treatment Minutes 40   Total Treatment Time (sum of timed and untimed services) 40         [facility]                                                                                   OUTPATIENT PHYSICAL THERAPY    PLAN OF  TREATMENT FOR OUTPATIENT REHABILITATION   Patient's Last Name, First Name, Gerber Valadez YOB: 1945   Provider's Name   [facility]   Medical Record No.  [id number]     Onset Date: 08/23/34 (referral)  Start of Care Date: 09/17/24     Medical Diagnosis:  R26.9 (ICD-10-CM) - Abnormal gait      PT Treatment Diagnosis:  Force production deficit, balance deficits Plan of Treatment  Frequency/Duration: 1x/month/ 12 weeks    Certification date from 12/03/24 to 03/02/25         See note for plan of treatment details and functional goals     Tamir Armenta, PT                         I CERTIFY THE NEED FOR THESE SERVICES FURNISHED UNDER        THIS PLAN OF TREATMENT AND WHILE UNDER MY CARE     (Physician attestation of this document indicates review and certification of the therapy plan).              Referring Provider:  Guru Vieira    Initial Assessment  See Epic Evaluation- Start of Care Date: 09/17/24

## 2024-12-23 DIAGNOSIS — E11.65 TYPE 2 DIABETES MELLITUS WITH HYPERGLYCEMIA, WITHOUT LONG-TERM CURRENT USE OF INSULIN (H): ICD-10-CM

## 2024-12-23 RX ORDER — BLOOD SUGAR DIAGNOSTIC
1 STRIP MISCELLANEOUS DAILY
Qty: 100 STRIP | Refills: 1 | Status: SHIPPED | OUTPATIENT
Start: 2024-12-23

## 2025-01-23 ENCOUNTER — THERAPY VISIT (OUTPATIENT)
Dept: PHYSICAL THERAPY | Facility: CLINIC | Age: 80
End: 2025-01-23
Attending: PSYCHIATRY & NEUROLOGY
Payer: COMMERCIAL

## 2025-01-23 DIAGNOSIS — R26.9 ABNORMAL GAIT: Primary | ICD-10-CM

## 2025-01-23 PROCEDURE — 97112 NEUROMUSCULAR REEDUCATION: CPT | Mod: GP

## 2025-01-23 PROCEDURE — 97750 PHYSICAL PERFORMANCE TEST: CPT | Mod: GP

## 2025-01-23 PROCEDURE — 97110 THERAPEUTIC EXERCISES: CPT | Mod: GP

## 2025-01-27 ENCOUNTER — PATIENT OUTREACH (OUTPATIENT)
Dept: CARE COORDINATION | Facility: CLINIC | Age: 80
End: 2025-01-27
Payer: COMMERCIAL

## 2025-02-13 ENCOUNTER — THERAPY VISIT (OUTPATIENT)
Dept: PHYSICAL THERAPY | Facility: CLINIC | Age: 80
End: 2025-02-13
Attending: PSYCHIATRY & NEUROLOGY
Payer: COMMERCIAL

## 2025-02-13 DIAGNOSIS — R26.9 ABNORMAL GAIT: Primary | ICD-10-CM

## 2025-02-13 PROCEDURE — 97530 THERAPEUTIC ACTIVITIES: CPT | Mod: GP

## 2025-02-13 PROCEDURE — 97112 NEUROMUSCULAR REEDUCATION: CPT | Mod: GP

## 2025-02-13 NOTE — PROGRESS NOTES
"   02/13/25 0500   Appointment Info   Signing clinician's name / credentials Gabriele Armenta DPT   Total/Authorized Visits 11 total visits   Visits Used 1/10 visits until progress note due   Medical Diagnosis R26.9 (ICD-10-CM) - Abnormal gait   PT Tx Diagnosis Force production deficit, balance deficits   Progress Note/Certification   Start of Care Date 09/17/24   Onset of illness/injury or Date of Surgery 08/23/34  (referral)   Therapy Frequency 1x/month   Predicted Duration 12 weeks   Certification date from 12/03/24   Certification date to 03/02/25   Progress Note Completed Date 01/23/25   GOALS   PT Goals 5   PT Goal 1   Goal Identifier HEP   Goal Description Pt will be able to demonstrate HEP activities without need for cues from provider to demonstrate understanding and independence with HEP.   Rationale to maximize safety and independence with performance of ADLs and functional tasks;to maximize safety and independence within the home;to maximize safety and independence within the community   Goal Progress 2/13 - pt reports not performing exercises \"religiously\" at home, but has done some of the walking/balance exercises.   Target Date 03/02/25   PT Goal 2   Goal Identifier FGA   Goal Description Pt will demonstrate a 4 point improvement on the FGA to demonstrate minimum clinically significant difference in score to demonstrate improved safety with functional mobility and decrease risk of falls.   Rationale to maximize safety and independence within the home;to maximize safety and independence with performance of ADLs and functional tasks;to maximize safety and independence within the community   Goal Progress 12/3 - 28/30. 11/5 - 26/30.   Target Date 03/02/25   PT Goal 3   Goal Identifier Gait speed   Goal Description Pt will demonstrate a 0.12 second improvement in gait speed to demonstrate minimum clinically significant difference in score to demonstrate improved gait velocity.   Rationale to maximize safety " "and independence with performance of ADLs and functional tasks;to maximize safety and independence within the home;to maximize safety and independence within the community   Goal Progress 12/3 - 1.74 m/sec   Target Date 12/15/24   Date Met 12/03/24   PT Goal 4   Goal Identifier 5xSTS   Goal Description Pt will demonstrate a 2.3 seconds improvement on 5xSTS to demonstrate minimum clinically significant difference in score to demonstrate improved LE strength.   Rationale to maximize safety and independence with performance of ADLs and functional tasks;to maximize safety and independence within the home;to maximize safety and independence within the community   Goal Progress 12/3 - 18.12 sec. 11/5 - 22.0 sec.   Target Date 12/15/24   Date Met 12/03/24   PT Goal 5   Goal Identifier Standing Static Balance   Goal Description Pt will be able to perform SLS for >8 sec BLE and perform tandem stance for >10 sec without UE support to demonstrate improved ability to control COM over RACHID when standing at home during ADLs and recreational activities.   Goal Progress 12/3 - SLS 5 sec BLE   Target Date 03/02/25   Subjective Report   Subjective Report Pt reports not performing HEP much at home, \"but every once in awhile.\"   Therapeutic Activity   Ther Act 1 Education   Ther Act 1 - Details Ed on ex progressions including how to progress reps and resistance safely, progressions for HEP.   Ther Act 2 HEP   Ther Act 2 - Details Discussed ex to perform at home, adjustments made to HEP, and which ex to focus on (STS, SLS, tandem walking, retro walking). Pt v/u and agreement.   Therapeutic Activities: dynamic activities to improve functional performance minutes (37270) 20   Therapeutic Activities Ther Act 2   Neuromuscular Re-education   Neuromuscular re-ed of mvmt, balance, coord, kinesthetic sense, posture, proprioception minutes (68794) 20   Neuro Re-ed 1 HEP   Neuro Re-ed 1 - Details Discussed   Neuro Re-ed 2 SLS   Neuro Re-ed 2 - " Details Focused on SLS with B HHA 10 sec, then no UE support 6 sec. Practiced SLS with mirror in front for visual feedback with CGA-Nathalie, 10-15 sec with each leg.   Plan   Home program PTRx   Plan for next session D/C   Comments   Comments Plan to d/c at this time. Recommended pt perform HEP at home more often to maintain BLE strength and balance long term. Pt v/u and agreement.   Total Session Time   Timed Code Treatment Minutes 40   Total Treatment Time (sum of timed and untimed services) 40         DISCHARGE  Reason for Discharge: No further expectation of progress.    Equipment Issued: none    Discharge Plan: Patient to continue home program.  Recommended continuing HEP, participate more in HEP to obtain benefits from ex.    Referring Provider:  Guru Vieira

## 2025-02-14 SDOH — HEALTH STABILITY: PHYSICAL HEALTH: ON AVERAGE, HOW MANY DAYS PER WEEK DO YOU ENGAGE IN MODERATE TO STRENUOUS EXERCISE (LIKE A BRISK WALK)?: 7 DAYS

## 2025-02-14 SDOH — HEALTH STABILITY: PHYSICAL HEALTH: ON AVERAGE, HOW MANY MINUTES DO YOU ENGAGE IN EXERCISE AT THIS LEVEL?: 40 MIN

## 2025-02-14 ASSESSMENT — SOCIAL DETERMINANTS OF HEALTH (SDOH): HOW OFTEN DO YOU GET TOGETHER WITH FRIENDS OR RELATIVES?: PATIENT DECLINED

## 2025-02-19 ENCOUNTER — OFFICE VISIT (OUTPATIENT)
Dept: FAMILY MEDICINE | Facility: CLINIC | Age: 80
End: 2025-02-19
Attending: NURSE PRACTITIONER
Payer: COMMERCIAL

## 2025-02-19 VITALS
SYSTOLIC BLOOD PRESSURE: 130 MMHG | DIASTOLIC BLOOD PRESSURE: 70 MMHG | RESPIRATION RATE: 16 BRPM | HEART RATE: 63 BPM | OXYGEN SATURATION: 96 % | HEIGHT: 73 IN | BODY MASS INDEX: 26.4 KG/M2 | TEMPERATURE: 97.6 F | WEIGHT: 199.2 LBS

## 2025-02-19 DIAGNOSIS — I10 HYPERTENSION GOAL BP (BLOOD PRESSURE) < 140/90: ICD-10-CM

## 2025-02-19 DIAGNOSIS — Z00.00 ENCOUNTER FOR MEDICARE ANNUAL WELLNESS EXAM: Primary | ICD-10-CM

## 2025-02-19 DIAGNOSIS — C49.3 SOFT TISSUE SARCOMA OF CHEST WALL (H): ICD-10-CM

## 2025-02-19 DIAGNOSIS — C44.612 BASAL CELL CARCINOMA (BCC) OF SKIN OF RIGHT UPPER EXTREMITY INCLUDING SHOULDER: ICD-10-CM

## 2025-02-19 DIAGNOSIS — H91.90 HEARING LOSS, UNSPECIFIED HEARING LOSS TYPE, UNSPECIFIED LATERALITY: ICD-10-CM

## 2025-02-19 DIAGNOSIS — E11.65 TYPE 2 DIABETES MELLITUS WITH HYPERGLYCEMIA, WITHOUT LONG-TERM CURRENT USE OF INSULIN (H): ICD-10-CM

## 2025-02-19 DIAGNOSIS — E78.5 HYPERLIPIDEMIA LDL GOAL <100: ICD-10-CM

## 2025-02-19 DIAGNOSIS — J30.9 ALLERGIC RHINITIS, UNSPECIFIED SEASONALITY, UNSPECIFIED TRIGGER: ICD-10-CM

## 2025-02-19 PROBLEM — D72.829 LEUKOCYTOSIS, UNSPECIFIED TYPE: Status: RESOLVED | Noted: 2024-03-25 | Resolved: 2025-02-19

## 2025-02-19 LAB
ALBUMIN SERPL BCG-MCNC: 4.2 G/DL (ref 3.5–5.2)
ALP SERPL-CCNC: 86 U/L (ref 40–150)
ALT SERPL W P-5'-P-CCNC: 18 U/L (ref 0–70)
ANION GAP SERPL CALCULATED.3IONS-SCNC: 11 MMOL/L (ref 7–15)
AST SERPL W P-5'-P-CCNC: 23 U/L (ref 0–45)
BILIRUB SERPL-MCNC: 0.7 MG/DL
BUN SERPL-MCNC: 30.1 MG/DL (ref 8–23)
CALCIUM SERPL-MCNC: 9.4 MG/DL (ref 8.8–10.4)
CHLORIDE SERPL-SCNC: 104 MMOL/L (ref 98–107)
CHOLEST SERPL-MCNC: 199 MG/DL
CREAT SERPL-MCNC: 0.91 MG/DL (ref 0.67–1.17)
CREAT UR-MCNC: 99.8 MG/DL
EGFRCR SERPLBLD CKD-EPI 2021: 84 ML/MIN/1.73M2
ERYTHROCYTE [DISTWIDTH] IN BLOOD BY AUTOMATED COUNT: 12.5 % (ref 10–15)
EST. AVERAGE GLUCOSE BLD GHB EST-MCNC: 166 MG/DL
FASTING STATUS PATIENT QL REPORTED: YES
FASTING STATUS PATIENT QL REPORTED: YES
GLUCOSE SERPL-MCNC: 126 MG/DL (ref 70–99)
HBA1C MFR BLD: 7.4 % (ref 0–5.6)
HCO3 SERPL-SCNC: 25 MMOL/L (ref 22–29)
HCT VFR BLD AUTO: 40 % (ref 40–53)
HDLC SERPL-MCNC: 69 MG/DL
HGB BLD-MCNC: 13.6 G/DL (ref 13.3–17.7)
LDLC SERPL CALC-MCNC: 110 MG/DL
MCH RBC QN AUTO: 30.4 PG (ref 26.5–33)
MCHC RBC AUTO-ENTMCNC: 34 G/DL (ref 31.5–36.5)
MCV RBC AUTO: 90 FL (ref 78–100)
MICROALBUMIN UR-MCNC: <12 MG/L
MICROALBUMIN/CREAT UR: NORMAL MG/G{CREAT}
NONHDLC SERPL-MCNC: 130 MG/DL
PLATELET # BLD AUTO: 249 10E3/UL (ref 150–450)
POTASSIUM SERPL-SCNC: 4.9 MMOL/L (ref 3.4–5.3)
PROT SERPL-MCNC: 7.5 G/DL (ref 6.4–8.3)
RBC # BLD AUTO: 4.47 10E6/UL (ref 4.4–5.9)
SODIUM SERPL-SCNC: 140 MMOL/L (ref 135–145)
TRIGL SERPL-MCNC: 101 MG/DL
WBC # BLD AUTO: 8.9 10E3/UL (ref 4–11)

## 2025-02-19 RX ORDER — ATORVASTATIN CALCIUM 40 MG/1
40 TABLET, FILM COATED ORAL DAILY
Qty: 90 TABLET | Refills: 3 | Status: SHIPPED | OUTPATIENT
Start: 2025-02-19

## 2025-02-19 RX ORDER — PHENAZOPYRIDINE HYDROCHLORIDE 95 MG/1
190 TABLET ORAL 3 TIMES DAILY
COMMUNITY

## 2025-02-19 ASSESSMENT — PAIN SCALES - GENERAL: PAINLEVEL_OUTOF10: NO PAIN (0)

## 2025-02-19 NOTE — PATIENT INSTRUCTIONS
Patient Education   Preventive Care Advice   This is general advice given by our system to help you stay healthy. However, your care team may have specific advice just for you. Please talk to your care team about your preventive care needs.  Nutrition  Eat 5 or more servings of fruits and vegetables each day.  Try wheat bread, brown rice and whole grain pasta (instead of white bread, rice, and pasta).  Get enough calcium and vitamin D. Check the label on foods and aim for 100% of the RDA (recommended daily allowance).  Lifestyle  Exercise at least 150 minutes each week  (30 minutes a day, 5 days a week).  Do muscle strengthening activities 2 days a week. These help control your weight and prevent disease.  No smoking.  Wear sunscreen to prevent skin cancer.  Have a dental exam and cleaning every 6 months.  Yearly exams  See your health care team every year to talk about:  Any changes in your health.  Any medicines your care team has prescribed.  Preventive care, family planning, and ways to prevent chronic diseases.  Shots (vaccines)   HPV shots (up to age 26), if you've never had them before.  Hepatitis B shots (up to age 59), if you've never had them before.  COVID-19 shot: Get this shot when it's due.  Flu shot: Get a flu shot every year.  Tetanus shot: Get a tetanus shot every 10 years.  Pneumococcal, hepatitis A, and RSV shots: Ask your care team if you need these based on your risk.  Shingles shot (for age 50 and up)  General health tests  Diabetes screening:  Starting at age 35, Get screened for diabetes at least every 3 years.  If you are younger than age 35, ask your care team if you should be screened for diabetes.  Cholesterol test: At age 39, start having a cholesterol test every 5 years, or more often if advised.  Bone density scan (DEXA): At age 50, ask your care team if you should have this scan for osteoporosis (brittle bones).  Hepatitis C: Get tested at least once in your life.  STIs (sexually  transmitted infections)  Before age 24: Ask your care team if you should be screened for STIs.  After age 24: Get screened for STIs if you're at risk. You are at risk for STIs (including HIV) if:  You are sexually active with more than one person.  You don't use condoms every time.  You or a partner was diagnosed with a sexually transmitted infection.  If you are at risk for HIV, ask about PrEP medicine to prevent HIV.  Get tested for HIV at least once in your life, whether you are at risk for HIV or not.  Cancer screening tests  Cervical cancer screening: If you have a cervix, begin getting regular cervical cancer screening tests starting at age 21.  Breast cancer scan (mammogram): If you've ever had breasts, begin having regular mammograms starting at age 40. This is a scan to check for breast cancer.  Colon cancer screening: It is important to start screening for colon cancer at age 45.  Have a colonoscopy test every 10 years (or more often if you're at risk) Or, ask your provider about stool tests like a FIT test every year or Cologuard test every 3 years.  To learn more about your testing options, visit:   .  For help making a decision, visit:   https://bit.ly/ly81047.  Prostate cancer screening test: If you have a prostate, ask your care team if a prostate cancer screening test (PSA) at age 55 is right for you.  Lung cancer screening: If you are a current or former smoker ages 50 to 80, ask your care team if ongoing lung cancer screenings are right for you.  For informational purposes only. Not to replace the advice of your health care provider. Copyright   2023 [facility] [employer]. All rights reserved. Clinically reviewed by the [facility] Transitions Program. [employer] [id number] - REV 01/24.  Hearing Loss: Care Instructions  Overview     Hearing loss is a sudden or slow decrease in how well you hear. It can range from slight to profound. Permanent hearing loss can occur with aging. It also can  happen when you are exposed long-term to loud noise. Examples include listening to loud music, riding motorcycles, or being around other loud machines.  Hearing loss can affect your work and home life. It can make you feel lonely or depressed. You may feel that you have lost your independence. But hearing aids and other devices can help you hear better and feel connected to others.  Follow-up care is a key part of your treatment and safety. Be sure to make and go to all appointments, and call your doctor if you are having problems. It's also a good idea to know your test results and keep a list of the medicines you take.  How can you care for yourself at home?  Avoid loud noises whenever possible. This helps keep your hearing from getting worse.  Always wear hearing protection around loud noises.  Wear a hearing aid as directed.  A professional can help you pick a hearing aid that will work best for you.  You can also get hearing aids over the counter for mild to moderate hearing loss.  Have hearing tests as your doctor suggests. They can show whether your hearing has changed. Your hearing aid may need to be adjusted.  Use other devices as needed. These may include:  Telephone amplifiers and hearing aids that can connect to a television, stereo, radio, or microphone.  Devices that use lights or vibrations. These alert you to the doorbell, a ringing telephone, or a baby monitor.  Television closed-captioning. This shows the words at the bottom of the screen. Most new TVs can do this.  TTY (text telephone). This lets you type messages back and forth on the telephone instead of talking or listening. These devices are also called TDD. When messages are typed on the keyboard, they are sent over the phone line to a receiving TTY. The message is shown on a monitor.  Use text messaging, social media, and email if it is hard for you to communicate by telephone.  Try to learn a listening technique called speechreading. It is  "not lipreading. You pay attention to people's gestures, expressions, posture, and tone of voice. These clues can help you understand what a person is saying. Face the person you are talking to, and have them face you. Make sure the lighting is good. You need to see the other person's face clearly.  Think about counseling if you need help to adjust to your hearing loss.  When should you call for help?  Watch closely for changes in your health, and be sure to contact your doctor if:    You think your hearing is getting worse.     You have new symptoms, such as dizziness or nausea.   Where can you learn more?  Go to https://www.EyesBot.net/patiented  Enter R798 in the search box to learn more about \"Hearing Loss: Care Instructions.\"  Current as of: September 27, 2023  Content Version: 14.3    2024 [employer].   Care instructions adapted under license by your healthcare professional. If you have questions about a medical condition or this instruction, always ask your healthcare professional. [employer] disclaims any warranty or liability for your use of this information.    Bladder Training: Care Instructions  Your Care Instructions     Bladder training is used to treat urge incontinence and stress incontinence. Urge incontinence means that the need to urinate comes on so fast that you can't get to a toilet in time. Stress incontinence means that you leak urine because of pressure on your bladder. For example, it may happen when you laugh, cough, or lift something heavy.  Bladder training can increase how long you can wait before you have to urinate. It can also help your bladder hold more urine. And it can give you better control over the urge to urinate.  It is important to remember that bladder training takes a few weeks to a few months to make a difference. You may not see results right away, but don't give up.  Follow-up care is a key part of your treatment and safety. Be sure to make and " go to all appointments, and call your doctor if you are having problems. It's also a good idea to know your test results and keep a list of the medicines you take.  How can you care for yourself at home?  Work with your doctor to come up with a bladder training program that is right for you. You may use one or more of the following methods.  Delayed urination  In the beginning, try to keep from urinating for 5 minutes after you first feel the need to go.  While you wait, take deep, slow breaths to relax. Kegel exercises can also help you delay the need to go to the bathroom.  After some practice, when you can easily wait 5 minutes to urinate, try to wait 10 minutes before you urinate.  Slowly increase the waiting period until you are able to control when you have to urinate.  Scheduled urination  Empty your bladder when you first wake up in the morning.  Schedule times throughout the day when you will urinate.  Start by going to the bathroom every hour, even if you don't need to go.  Slowly increase the time between trips to the bathroom.  When you have found a schedule that works well for you, keep doing it.  If you wake up during the night and have to urinate, do it. Apply your schedule to waking hours only.  Kegel exercises  These tighten and strengthen pelvic muscles, which can help you control the flow of urine. (If doing these exercises causes pain, stop doing them and talk with your doctor.) To do Kegel exercises:  Squeeze your muscles as if you were trying not to pass gas. Or squeeze your muscles as if you were stopping the flow of urine. Your belly, legs, and buttocks shouldn't move.  Hold the squeeze for 3 seconds, then relax for 5 to 10 seconds.  Start with 3 seconds, then add 1 second each week until you are able to squeeze for 10 seconds.  Repeat the exercise 10 times a session. Do 3 to 8 sessions a day.  When should you call for help?  Watch closely for changes in your health, and be sure to contact  "your doctor if:    Your incontinence is getting worse.     You do not get better as expected.   Where can you learn more?  Go to https://www.[employer].net/patiented  Enter V684 in the search box to learn more about \"Bladder Training: Care Instructions.\"  Current as of: April 30, 2024  Content Version: 14.3    2024 [employer].   Care instructions adapted under license by your healthcare professional. If you have questions about a medical condition or this instruction, always ask your healthcare professional. [employer] disclaims any warranty or liability for your use of this information.       "

## 2025-02-19 NOTE — PROGRESS NOTES
Preventive Care Visit  [facility]  Caty Yusuf NP, Nurse Practitioner - Family  Feb 19, 2025      Assessment & Plan     Encounter for Medicare annual wellness exam  - Hemoglobin A1c  - Comprehensive metabolic panel (BMP + Alb, Alk Phos, ALT, AST, Total. Bili, TP)  - Lipid panel reflex to direct LDL Fasting  - CBC with platelets    Type 2 diabetes mellitus with hyperglycemia, without long-term current use of insulin (H)  Known issue that I take into account for their medical decisions, no current exacerbations or new concerns.   - Albumin Random Urine Quantitative with Creat Ratio; Future  - Hemoglobin A1c; Future  - Albumin Random Urine Quantitative with Creat Ratio  - Hemoglobin A1c    Hypertension goal BP (blood pressure) < 140/90  Known issue that I take into account for their medical decisions, no current exacerbations or new concerns.   - Comprehensive metabolic panel (BMP + Alb, Alk Phos, ALT, AST, Total. Bili, TP); Future  - Comprehensive metabolic panel (BMP + Alb, Alk Phos, ALT, AST, Total. Bili, TP)    Hyperlipidemia LDL goal <100  Known issue that I take into account for their medical decisions, no current exacerbations or new concerns.   - atorvastatin (LIPITOR) 40 MG tablet; Take 1 tablet (40 mg) by mouth daily.  - Lipid panel reflex to direct LDL Fasting; Future  - Lipid panel reflex to direct LDL Fasting    Hearing loss, unspecified hearing loss type, unspecified laterality  Wears hearing aids.    Allergic rhinitis, unspecified seasonality, unspecified trigger  Known issue that I take into account for their medical decisions, no current exacerbations or new concerns.     Basal cell carcinoma (BCC) of skin of right upper extremity including shoulder  Known issue that I take into account for their medical decisions, no current exacerbations or new concerns.  He has follow up with Dermatology for skin check next month.    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   Follows with Dr. Soto, Oncology for surveillance.          BMI  Estimated body mass index is 26.28 kg/m  as calculated from the following:    Height as of this encounter: 1.854 m (6' 1\").    Weight as of this encounter: 90.4 kg (199 lb 3.2 oz).       Counseling  Appropriate preventive services were addressed with this patient via screening, questionnaire, or discussion as appropriate for fall prevention, nutrition, physical activity, Tobacco-use cessation, social engagement, weight loss and cognition.  Checklist reviewing preventive services available has been given to the patient.  Reviewed patient's diet, addressing concerns and/or questions.   The patient was provided with written information regarding signs of hearing loss.   Information on urinary incontinence and treatment options given to patient.           Austen   Art is a 80 year old, presenting for the following:  Wellness Visit (Fasting )        2/19/2025     7:10 AM   Additional Questions   Roomed by Adela DARNELL    Blood sugars 110 in the mornings, checking daily.    Did Physical Therapy for abnormal gait from September 2024 through February 2025.    No acute concerns today.    Health Care Directive  Patient has a Health Care Directive on file  Advance care planning document is on file and is current.      2/14/2025   General Health   How would you rate your overall physical health? Good   Feel stress (tense, anxious, or unable to sleep) Only a little   (!) STRESS CONCERN      2/14/2025   Nutrition   Diet: Regular (no restrictions)         2/14/2025   Exercise   Days per week of moderate/strenous exercise 7 days   Average minutes spent exercising at this level 40 min         2/14/2025   Social Factors   Frequency of gathering with friends or relatives Patient declined   Worry food won't last until get money to buy more No   Food not " last or not have enough money for food? No   Do you have housing? (Housing is defined as stable permanent housing and does not include staying ouside in a car, in a tent, in an abandoned building, in an overnight shelter, or couch-surfing.) Yes   Are you worried about losing your housing? No   Lack of transportation? No   Unable to get utilities (heat,electricity)? No         2/14/2025   Fall Risk   Fallen 2 or more times in the past year? No   Trouble with walking or balance? No          2/14/2025   Activities of Daily Living- Home Safety   Needs help with the following daily activites None of the above   Safety concerns in the home None of the above         2/14/2025   Dental   Dentist two times every year? Yes         2/14/2025   Hearing Screening   Hearing concerns? (!) I FEEL THAT PEOPLE ARE MUMBLING OR NOT SPEAKING CLEARLY.    (!) I NEED TO ASK PEOPLE TO SPEAK UP OR REPEAT THEMSELVES.    (!) IT'S HARD TO FOLLOW A CONVERSATION IN A NOISY RESTAURANT OR CROWDED ROOM.    (!) TROUBLE UNDERSTANDING SPEECH ON THE TELEPHONE       Multiple values from one day are sorted in reverse-chronological order         2/14/2025   Driving Risk Screening   Patient/family members have concerns about driving No         2/14/2025   General Alertness/Fatigue Screening   Have you been more tired than usual lately? No         2/14/2025   Urinary Incontinence Screening   Bothered by leaking urine in past 6 months Yes                    2/14/2025   Substance Use   Alcohol more than 3/day or more than 7/wk No   Do you have a current opioid prescription? No   How severe/bad is pain from 1 to 10? 4/10   Do you use any other substances recreationally? No     Social History     Tobacco Use    Smoking status: Never     Passive exposure: Never    Smokeless tobacco: Never   Vaping Use    Vaping status: Never Used   Substance Use Topics    Alcohol use: Yes     Comment: Occasionaly    Drug use: No                 Reviewed and updated as needed this  visit by Provider     Meds   Med Hx  Surg Hx  Fam Hx            BP Readings from Last 3 Encounters:   02/19/25 130/70   09/12/24 122/76   08/23/24 124/67    Wt Readings from Last 3 Encounters:   02/19/25 90.4 kg (199 lb 3.2 oz)   10/29/24 83.9 kg (185 lb)   09/12/24 88.9 kg (196 lb)                  Patient Active Problem List   Diagnosis    Allergic rhinitis    Hereditary spherocytosis    Dysthymic disorder    Hearing loss    HYPERLIPIDEMIA LDL GOAL <100    Advance care planning    Hypertension goal BP (blood pressure) < 140/90    IVETTE (generalized anxiety disorder)    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    Type 2 diabetes mellitus with hyperglycemia, without long-term current use of insulin (H)    Major depressive disorder, recurrent, mild    Memory loss    Right sided weakness    Basal cell carcinoma (BCC) of skin of right upper extremity including shoulder    Abnormal gait     Past Surgical History:   Procedure Laterality Date    ABDOMEN SURGERY  1976    Spleen removed    APPENDECTOMY  1976    BIOPSY  2016    BRONCHOSCOPY FLEXIBLE N/A 9/21/2016    Procedure: BRONCHOSCOPY FLEXIBLE;  Surgeon: Leah Nixon MD;  Location: [facility] OR    CHOLECYSTECTOMY  1976    COLONOSCOPY  2006    ENT SURGERY  Colestiatoma removal    1981?    EYE SURGERY  2014    cataracts    HERNIA REPAIR  1976    REPAIR CHEST WALL N/A 9/21/2016    Procedure: REPAIR CHEST WALL;  Surgeon: Leah Nixon MD;  Location: [facility] OR    SURGICAL HISTORY OF -   1976    SPLENECTOMY    SURGICAL HISTORY OF -       APPENDECTOMY    SURGICAL HISTORY OF -       CHOLECYSTECTOMY    SURGICAL HISTORY OF -       HERNIAORRHAPHY    SURGICAL HISTORY OF -   1981    cholesteotoma       Social History     Tobacco Use    Smoking status: Never     Passive exposure: Never    Smokeless tobacco: Never   Substance Use Topics    Alcohol use: Yes     Comment: Occasionaly     Family History   Problem Relation Age of Onset    Dementia Mother     Other Cancer Mother          Skin cancer    Diabetes Father     Emphysema Father     Intellectual Disability Sister     Diabetes Sister     Dementia Brother     Prostate Cancer Brother     Diabetes Maternal Grandmother     Genitourinary Problems Daughter         spherocytosis, had spleen removed    Diabetes Sister     C.A.D. No family hx of     Cancer - colorectal No family hx of     Hypertension No family hx of     Cerebrovascular Disease No family hx of     Breast Cancer No family hx of          Current Outpatient Medications   Medication Sig Dispense Refill    ASPIRIN 81 MG OR TABS 1 tab po QD (Once per day)      atorvastatin (LIPITOR) 40 MG tablet Take 1 tablet (40 mg) by mouth daily. 90 tablet 3    blood glucose (ACCU-CHEK RODRI PLUS) test strip 1 strip by In Vitro route daily. 100 strip 1    blood glucose (CONTOUR NEXT TEST) test strip USE TO TEST BLOOD SUGAR 1 TIME DAILY OR AS DIRECTED 100 strip 3    blood glucose (NO BRAND SPECIFIED) lancets standard Use to test blood sugar daily and as needed 100 each 11    CALCITRATE/VITAMIN D 315-200 MG-UNIT OR TABS One tab daily in the morning      Cyanocobalamin (B-12 PO) Take by mouth every morning Take 1/2 tablet daily      glipiZIDE (GLUCOTROL XL) 10 MG 24 hr tablet Take 1 tablet (10 mg) by mouth daily (with breakfast) 90 tablet 3    losartan (COZAAR) 50 MG tablet Take 1 tablet (50 mg) by mouth daily 90 tablet 3    metFORMIN (GLUCOPHAGE XR) 500 MG 24 hr tablet Take 2 tablets (1,000 mg) by mouth 2 times daily (with meals) 360 tablet 3    Microlet Lancets MISC USE TO TEST BLOOD SUGAR DAILY AND AS NEEDED 100 each 5    phenazopyridine (AZO URINARY PAIN RELIEF) 95 MG tablet Take 190 mg by mouth 3 times daily.      sertraline (ZOLOFT) 100 MG tablet TAKE 1 TABLET  BY MOUTH DAILY 90 tablet 3    triamcinolone (KENALOG) 0.5 % external ointment Use to lesion on left arm twice daily for 14 days 15 g 1     Allergies   Allergen Reactions    Oxycodone Itching    Shrimp Swelling     Current providers  "sharing in care for this patient include:  Patient Care Team:  Caty Yusuf NP as PCP - General (Nurse Practitioner - Family)  Cheryl Ward APRN CNS as Clinical Nurse Specialist (Oncology)  Augie Soto MD as Assigned Cancer Care Provider  Caty Yusuf NP as Assigned PCP  Alfonzo Lara, PhD LP as MD (Neuropsychology)  Turner Lyons MD as MD (Psychiatry)  Guru Vieira DO as Physician (Neurology)  Alfonzo Lara, PhD LP as Assigned Behavioral Health Provider  Guru Vieira DO as Assigned Neuroscience Provider    The following health maintenance items are reviewed in Epic and correct as of today:  Health Maintenance   Topic Date Due    MICROALBUMIN  02/08/2025    BMP  03/12/2025    ALT  03/25/2025    COVID-19 Vaccine (9 - 2024-25 season) 03/26/2025    LIPID  04/05/2025    PHQ-9  04/05/2025    EYE EXAM  04/22/2025    A1C  08/19/2025    DIABETIC FOOT EXAM  09/12/2025    MEDICARE ANNUAL WELLNESS VISIT  02/19/2026    ANNUAL REVIEW OF HM ORDERS  02/19/2026    FALL RISK ASSESSMENT  02/19/2026    CBC  02/19/2026    DTAP/TDAP/TD IMMUNIZATION (2 - Td or Tdap) 03/29/2026    COLORECTAL CANCER SCREENING  05/12/2026    HF ACTION PLAN  09/12/2027    ADVANCE CARE PLANNING  06/10/2029    TSH W/FREE T4 REFLEX  Completed    DEPRESSION ACTION PLAN  Completed    INFLUENZA VACCINE  Completed    Pneumococcal Vaccine: 50+ Years  Completed    ZOSTER IMMUNIZATION  Completed    RSV VACCINE  Completed    HPV IMMUNIZATION  Aged Out    MENINGITIS IMMUNIZATION  Aged Out            Objective    Exam  /70 (BP Location: Right arm, Patient Position: Sitting, Cuff Size: Adult Large)   Pulse 63   Temp 97.6  F (36.4  C) (Oral)   Resp 16   Ht 1.854 m (6' 1\")   Wt 90.4 kg (199 lb 3.2 oz)   SpO2 96%   BMI 26.28 kg/m     Estimated body mass index is 26.28 kg/m  as calculated from the following:    Height as of this encounter: 1.854 m (6' 1\").    Weight as of this " encounter: 90.4 kg (199 lb 3.2 oz).    Physical Exam  GENERAL: alert and no distress  EYES: Eyes grossly normal to inspection, PERRL and conjunctivae and sclerae normal  HENT: ear canals and TM's normal, nose and mouth without ulcers or lesions  NECK: no adenopathy, no asymmetry, masses, or scars  RESP: lungs clear to auscultation - no rales, rhonchi or wheezes  CV: regular rate and rhythm, normal S1 S2, no S3 or S4, no murmur, click or rub, no peripheral edema  ABDOMEN: soft, nontender and bowel sounds normal  SKIN: no suspicious lesions or rashes  NEURO: Normal strength and tone, mentation intact and speech normal  PSYCH: mentation appears normal, affect normal/bright         2/19/2025   Mini Cog   Clock Draw Score 2 Normal   3 Item Recall 2 objects recalled   Mini Cog Total Score 4              Signed Electronically by: Caty Yusuf NP

## 2025-03-20 ENCOUNTER — TRANSFERRED RECORDS (OUTPATIENT)
Dept: HEALTH INFORMATION MANAGEMENT | Facility: CLINIC | Age: 80
End: 2025-03-20
Payer: COMMERCIAL

## 2025-03-22 ENCOUNTER — HEALTH MAINTENANCE LETTER (OUTPATIENT)
Age: 80
End: 2025-03-22

## 2025-05-19 DIAGNOSIS — E11.65 TYPE 2 DIABETES MELLITUS WITH HYPERGLYCEMIA, WITHOUT LONG-TERM CURRENT USE OF INSULIN (H): ICD-10-CM

## 2025-05-19 DIAGNOSIS — I10 HYPERTENSION GOAL BP (BLOOD PRESSURE) < 140/90: ICD-10-CM

## 2025-05-19 RX ORDER — LOSARTAN POTASSIUM 50 MG/1
50 TABLET ORAL DAILY
Qty: 90 TABLET | Refills: 0 | Status: SHIPPED | OUTPATIENT
Start: 2025-05-19

## 2025-05-19 RX ORDER — GLIPIZIDE 10 MG/1
10 TABLET, FILM COATED, EXTENDED RELEASE ORAL
Qty: 90 TABLET | Refills: 0 | Status: SHIPPED | OUTPATIENT
Start: 2025-05-19

## 2025-08-26 DIAGNOSIS — E11.65 TYPE 2 DIABETES MELLITUS WITH HYPERGLYCEMIA, WITHOUT LONG-TERM CURRENT USE OF INSULIN (H): ICD-10-CM

## 2025-08-26 DIAGNOSIS — I10 HYPERTENSION GOAL BP (BLOOD PRESSURE) < 140/90: ICD-10-CM

## 2025-08-27 RX ORDER — GLIPIZIDE 10 MG/1
10 TABLET, FILM COATED, EXTENDED RELEASE ORAL
Qty: 90 TABLET | Refills: 0 | Status: SHIPPED | OUTPATIENT
Start: 2025-08-27

## 2025-08-27 RX ORDER — LOSARTAN POTASSIUM 50 MG/1
50 TABLET ORAL DAILY
Qty: 90 TABLET | Refills: 0 | Status: SHIPPED | OUTPATIENT
Start: 2025-08-27